# Patient Record
Sex: MALE | Race: WHITE | NOT HISPANIC OR LATINO | Employment: OTHER | URBAN - METROPOLITAN AREA
[De-identification: names, ages, dates, MRNs, and addresses within clinical notes are randomized per-mention and may not be internally consistent; named-entity substitution may affect disease eponyms.]

---

## 2017-03-08 ENCOUNTER — OFFICE VISIT (OUTPATIENT)
Dept: FAMILY MEDICINE | Facility: CLINIC | Age: 72
End: 2017-03-08
Payer: MEDICARE

## 2017-03-08 VITALS
HEIGHT: 69 IN | WEIGHT: 213.38 LBS | OXYGEN SATURATION: 98 % | BODY MASS INDEX: 31.6 KG/M2 | HEART RATE: 59 BPM | DIASTOLIC BLOOD PRESSURE: 82 MMHG | SYSTOLIC BLOOD PRESSURE: 142 MMHG

## 2017-03-08 DIAGNOSIS — E11.59 HYPERTENSION ASSOCIATED WITH DIABETES: ICD-10-CM

## 2017-03-08 DIAGNOSIS — Z00.00 ENCOUNTER FOR PREVENTIVE HEALTH EXAMINATION: Primary | ICD-10-CM

## 2017-03-08 DIAGNOSIS — M15.9 PRIMARY OSTEOARTHRITIS INVOLVING MULTIPLE JOINTS: ICD-10-CM

## 2017-03-08 DIAGNOSIS — E11.69 DYSLIPIDEMIA WITH LOW HIGH DENSITY LIPOPROTEIN (HDL) CHOLESTEROL WITH HYPERTRIGLYCERIDEMIA DUE TO TYPE 2 DIABETES MELLITUS: ICD-10-CM

## 2017-03-08 DIAGNOSIS — Z13.31 POSITIVE DEPRESSION SCREENING: ICD-10-CM

## 2017-03-08 DIAGNOSIS — I77.9 BILATERAL CAROTID ARTERY DISEASE: ICD-10-CM

## 2017-03-08 DIAGNOSIS — M53.87 SCIATICA OF RIGHT SIDE ASSOCIATED WITH DISORDER OF LUMBOSACRAL SPINE: ICD-10-CM

## 2017-03-08 DIAGNOSIS — I15.2 HYPERTENSION ASSOCIATED WITH DIABETES: ICD-10-CM

## 2017-03-08 DIAGNOSIS — E63.9 POOR NUTRITION: ICD-10-CM

## 2017-03-08 DIAGNOSIS — Z12.5 SCREENING FOR PROSTATE CANCER: ICD-10-CM

## 2017-03-08 DIAGNOSIS — Z13.6 ENCOUNTER FOR ABDOMINAL AORTIC ANEURYSM SCREENING: ICD-10-CM

## 2017-03-08 DIAGNOSIS — I27.89 OTHER CHRONIC PULMONARY HEART DISEASES: ICD-10-CM

## 2017-03-08 DIAGNOSIS — E78.2 DYSLIPIDEMIA WITH LOW HIGH DENSITY LIPOPROTEIN (HDL) CHOLESTEROL WITH HYPERTRIGLYCERIDEMIA DUE TO TYPE 2 DIABETES MELLITUS: ICD-10-CM

## 2017-03-08 PROBLEM — M15.0 PRIMARY OSTEOARTHRITIS INVOLVING MULTIPLE JOINTS: Status: ACTIVE | Noted: 2017-03-08

## 2017-03-08 PROCEDURE — 3079F DIAST BP 80-89 MM HG: CPT | Mod: S$GLB,,, | Performed by: NURSE PRACTITIONER

## 2017-03-08 PROCEDURE — 99999 PR PBB SHADOW E&M-EST. PATIENT-LVL V: CPT | Mod: PBBFAC,,, | Performed by: NURSE PRACTITIONER

## 2017-03-08 PROCEDURE — 1160F RVW MEDS BY RX/DR IN RCRD: CPT | Mod: S$GLB,,, | Performed by: NURSE PRACTITIONER

## 2017-03-08 PROCEDURE — 4010F ACE/ARB THERAPY RXD/TAKEN: CPT | Mod: S$GLB,,, | Performed by: NURSE PRACTITIONER

## 2017-03-08 PROCEDURE — 1157F ADVNC CARE PLAN IN RCRD: CPT | Mod: S$GLB,,, | Performed by: NURSE PRACTITIONER

## 2017-03-08 PROCEDURE — 2022F DILAT RTA XM EVC RTNOPTHY: CPT | Mod: S$GLB,,, | Performed by: NURSE PRACTITIONER

## 2017-03-08 PROCEDURE — 1125F AMNT PAIN NOTED PAIN PRSNT: CPT | Mod: S$GLB,,, | Performed by: NURSE PRACTITIONER

## 2017-03-08 PROCEDURE — 3045F PR MOST RECENT HEMOGLOBIN A1C LEVEL 7.0-9.0%: CPT | Mod: S$GLB,,, | Performed by: NURSE PRACTITIONER

## 2017-03-08 PROCEDURE — 99499 UNLISTED E&M SERVICE: CPT | Mod: S$GLB,,, | Performed by: NURSE PRACTITIONER

## 2017-03-08 PROCEDURE — G0439 PPPS, SUBSEQ VISIT: HCPCS | Mod: S$GLB,,, | Performed by: NURSE PRACTITIONER

## 2017-03-08 PROCEDURE — 3077F SYST BP >= 140 MM HG: CPT | Mod: S$GLB,,, | Performed by: NURSE PRACTITIONER

## 2017-03-08 PROCEDURE — 1159F MED LIST DOCD IN RCRD: CPT | Mod: S$GLB,,, | Performed by: NURSE PRACTITIONER

## 2017-03-08 RX ORDER — MELOXICAM 15 MG/1
15 TABLET ORAL DAILY
Qty: 90 TABLET | Refills: 0 | Status: SHIPPED | OUTPATIENT
Start: 2017-03-08 | End: 2017-04-11 | Stop reason: SDUPTHER

## 2017-03-08 RX ORDER — GABAPENTIN 300 MG/1
300 CAPSULE ORAL 2 TIMES DAILY
Qty: 180 CAPSULE | Refills: 0 | Status: SHIPPED | OUTPATIENT
Start: 2017-03-08 | End: 2017-04-11 | Stop reason: SDUPTHER

## 2017-03-08 NOTE — Clinical Note
Primary Care Providers: Sam Barroso MD, MD (General)  Your patient was seen today for a HRA visit. Gap(s) in care (HEDIS gaps) have been identified during this visit that require additional testing and possible follow up.  Orders Placed This Encounter     US AAA Screening         Standing Status: Future         Standing Expiration Date: 3/8/2018         Order Specific Question: May the Radiologist modify the order per protocol to meet the clinical needs of the patient?         Answer: Yes     PSA, Screening         Standing Status: Future         Standing Expiration Date: 5/7/2018     CBC auto differential         Standing Status: Future         Standing Expiration Date: 5/7/2018     Comprehensive metabolic panel         Standing Status: Future         Standing Expiration Date: 5/7/2018     Ambulatory Referral to Diabetes Education         Referral Priority:Routine         Referral Type:Consultation         Referral Reason:Specialty Services Required         Requested Specialt

## 2017-03-08 NOTE — MR AVS SNAPSHOT
[address]  [place] [address]  Phone: [phone]  Fax: [phone]                  Jin Ngo   3/8/2017 10:00 AM   Office Visit    Description:  Male : 1945   Provider:  Terrie Nielsen NP   Department:  [facility]           Reason for Visit     Health Risk Assessment           Diagnoses this Visit        Comments    Encounter for preventive health examination    -  Primary     Uncontrolled type 2 diabetes mellitus without complication, without long-term current use of insulin         Hypertension associated with diabetes         Dyslipidemia with low high density lipoprotein (HDL) cholesterol with hypertriglyceridemia due to type 2 diabetes mellitus         Primary osteoarthritis involving multiple joints         Sciatica of right side associated with disorder of lumbosacral spine         Encounter for abdominal aortic aneurysm screening         Screening for prostate cancer                To Do List           Future Appointments        Provider Department Dept Phone    2017 7:40 AM LAB [place] [facility] [phone]    2017 7:50 AM [facility] [facility] [phone]    2017 8:45 AM [facility] [phone]    2017 9:40 AM Sam Barroso MD [facility] [phone]      Goals (5 Years of Data)     None      Follow-Up and Disposition     Return in 5 weeks (on 2017) for annual visit with PCP, HRA visit in 1 year.       These Medications        Disp Refills Start End    meloxicam (MOBIC) 15 MG tablet 90 tablet 0 3/8/2017     Take 1 tablet (15 mg total) by mouth once daily. - Oral    Ph[facility] [address] [facility] Ph #: [phone]       gabapentin (NEURONTIN) 300 MG capsule 180 capsule 0 3/8/2017 2017    Take 1 capsule (300 mg total) by mouth 2 (two)  times daily. - Oral    Pharmacy: [facility] - [place], [place] - [address]  Ph #: [phone]         [facility]     [facility] On Call Nurse Care Line - 24/7 Assistance  Registered nurses in the [facility] On Call Center provide clinical advisement, health education, appointment booking, and other advisory services.  Call for this free service at [phone].             Medications           START taking these NEW medications        Refills    meloxicam (MOBIC) 15 MG tablet 0    Sig: Take 1 tablet (15 mg total) by mouth once daily.    Class: Normal    Route: Oral    gabapentin (NEURONTIN) 300 MG capsule 0    Sig: Take 1 capsule (300 mg total) by mouth 2 (two) times daily.    Class: Normal    Route: Oral      STOP taking these medications     diclofenac sodium (VOLTAREN) 1 % Gel Apply 2 g topically 4 (four) times daily.    fentaNYL (DURAGESIC) 25 mcg/hr APPLY 1 PATCH EVERY 72 HOURS FOR 30 DAYS           Verify that the below list of medications is an accurate representation of the medications you are currently taking.  If none reported, the list may be blank. If incorrect, please contact your healthcare provider. Carry this list with you in case of emergency.           Current Medications     allopurinol (ZYLOPRIM) 300 MG tablet Take 1 tablet (300 mg total) by mouth once daily.    aspirin (ECOTRIN) 81 MG EC tablet Take 81 mg by mouth once daily.    baclofen (LIORESAL) 10 MG tablet Take 0.5 tablets by mouth 2 (two) times daily.     clopidogrel (PLAVIX) 75 mg tablet Take 1 tablet (75 mg total) by mouth once daily.    fenofibrate 160 MG Tab Take 1 tablet (160 mg total) by mouth once daily.    ketoconazole (NIZORAL) 2 % cream Apply topically once daily. For rash on ears and face for 7 days and may repeat    lisinopril (PRINIVIL,ZESTRIL) 20 MG tablet Take 1 tablet (20 mg total) by mouth once daily.    metoprolol tartrate (LOPRESSOR) 25 MG tablet Take 1 tablet (25 mg total) by mouth 2 (two)  "times daily.    oxycodone-acetaminophen (PERCOCET) 5-325 mg per tablet Take 1 tablet by mouth 2 (two) times daily.    pravastatin (PRAVACHOL) 40 MG tablet Take 2 tablets (80 mg total) by mouth once daily.    colchicine 0.6 mg tablet Take 1 tablet (0.6 mg total) by mouth 2 (two) times daily.    gabapentin (NEURONTIN) 300 MG capsule Take 1 capsule (300 mg total) by mouth 2 (two) times daily.    meloxicam (MOBIC) 15 MG tablet Take 1 tablet (15 mg total) by mouth once daily.           Clinical Reference Information           Your Vitals Were     BP Pulse Height Weight SpO2 BMI    142/82 (BP Location: Left arm, Patient Position: Sitting, BP Method: Manual) 59 5' 9" (1.753 m) 96.8 kg (213 lb 6.5 oz) 98% 31.51 kg/m2      Blood Pressure          Most Recent Value    BP  (!)  142/82      Allergies as of 3/8/2017     Metformin    Pcn [Penicillins]      Immunizations Administered on Date of Encounter - 3/8/2017     None      Orders Placed During Today's Visit     Future Labs/Procedures Expected by Expires    CBC auto differential  3/8/2017 5/7/2018    Comprehensive metabolic panel  3/8/2017 5/7/2018    PSA, Screening  3/8/2017 5/7/2018    US AAA Screening  3/8/2017 3/8/2018      [facility] Sign-Up     Activating your [facility] account is as easy as 1-2-3!     1) Visit [email or web address], select Sign Up Now, enter this activation code and your date of birth, then select Next.  [email or web address]  Expires: 4/22/2017 11:15 AM      2) Create a username and password to use when you visit [facility] in the future and select a security question in case you lose your password and select Next.    3) Enter your e-mail address and click Sign Up!    Additional Information  If you have questions, please e-mail [email or web address] or call [phone] to talk to our [facility] staff. Remember, [facility] is NOT to be used for urgent needs. For medical emergencies, dial 911.         Instructions      Counseling and Referral of Other " Preventative  (Italic type indicates deductible and co-insurance are waived)    Patient Name: Jin Ngo  Today's Date: 3/8/2017      SERVICE LIMITATIONS RECOMMENDATION    Vaccines    · Pneumococcal (once after 65)    · Influenza (annually)    · Hepatitis B (if medium/high risk)    · Prevnar 13      Hepatitis B medium/high risk factors:       - End-stage renal disease       - Hemophiliacs who received Factor VII or         IX concentrates       - Clients of institutions for the mentally             retarded       - Persons who live in the same house as          a HepB carrier       - Homosexual men       - Illicit injectable drug abusers     Pneumococcal: Done, no repeat necessary     Influenza: Recommended to patient, declined     Hepatitis B: N/A Not recommended     Prevnar 13: Done, repeat at next scheduled date    Prostate cancer screening (annually to age 75)     Prostate specific antigen (PSA) Shared decision making with Provider. Sometimes a co-pay may be required if the patient decides to have this test. The USPSTF no longer recommends prostate cancer screening routinely in medicine: every 1 year    Colorectal cancer screening (to age 75)    · Fecal occult blood test (annual)  · Flexible sigmoidoscopy (5y)  · Screening colonoscopy (10y)  · Barium enema   Last done 03/22/16, recommend to repeat every 10  years    Diabetes self-management training (no USPSTF recommendations)  Requires referral by treating physician for patient with diabetes or renal disease. 10 hours of initial DSMT sessions of no less than 30 minutes each in a continuous 12-month period. 2 hours of follow-up DSMT in subsequent years.  Scheduled, see appointments    Glaucoma screening (no USPSTF recommendation)  Diabetes mellitus, family history   , age 50 or over    American, age 65 or over  Scheduled, see appointments    Medical nutrition therapy for diabetes or renal disease (no recommended schedule)  Requires  referral by treating physician for patient with diabetes or renal disease or kidney transplant within the past 3 years.  Can be provided in same year as diabetes self-management training (DSMT), and CMS recommends medical nutrition therapy take place after DSMT. Up to 3 hours for initial year and 2 hours in subsequent years.  Scheduled, see appointments    Cardiovascular screening blood tests (every 5 years)  · Fasting lipid panel  Order as a panel if possible  Scheduled, see appointments    Diabetes screening tests (at least every 3 years, Medicare covers annually or at 6-month intervals for prediabetic patients)  · Fasting blood sugar (FBS) or glucose tolerance test (GTT)  Patient must be diagnosed with one of the following:       - Hypertension       - Dyslipidemia       - Obesity (BMI 30kg/m2)       - Previous elevated impaired FBS or GTT       ... or any two of the following:       - Overweight (BMI 25 but <30)       - Family history of diabetes       - Age 65 or older       - History of gestational diabetes or birth of baby weighing more than 9 pounds  Scheduled, see appointments    Abdominal aortic aneurysm screening (once)  · Sonogram   Limited to patients who meet one of the following criteria:       - Men who are 65-75 years old and have smoked more than 100 cigarette in their lifetime       - Anyone with a family history of abdominal aortic aneurysm       - Anyone recommended for screening by the USPSTF  Scheduled, see appointments    HIV screening (annually for increased risk patients)  · HIV-1 and HIV-2 by EIA, or WILLIAM, rapid antibody test or oral mucosa transudate  Patients must be at increased risk for HIV infection per USPSTF guidelines or pregnant. Tests covered annually for patient at increased risk or as requested by the patient. Pregnant patients may receive up to 3 tests during pregnancy.  Risks discussed, screening is not recommended    Smoking cessation counseling (up to 8 sessions per year)   Patients must be asymptomatic of tobacco-related conditions to receive as a preventative service.  Former Smoker    Subsequent annual wellness visit  At least 12 months since last AWV  Return in one year     The following information is provided to all patients.  This information is to help you find resources for any of the problems found today that may be affecting your health:                Living healthy guide: www.[facility].[place]      Understanding Diabetes: www.diabetes.org      Eating healthy: www.cdc.gov/healthyweight      CDC home safety checklist: www.cdc.gov/steadi/patient.html      Agency on Aging: www.goea.[place].[facility]      Alcoholics anonymous (AA): www.aa.org      Physical Activity: www.any.nih.gov/qo4wonu      Tobacco use: www.quitwithusla.org          Language Assistance Services     ATTENTION: Language assistance services are available, free of charge. Please call [phone].      ATENCIÓN: Si viktoriyala matias, tiene a carrizales disposición servicios gratuitos de asistencia lingüística. Llame al [phone].     CHÚ Ý: N?u b?n nói Ti?ng Vi?t, có các d?ch v? h? tr? ngôn ng? mi?n phí dành cho b?n. G?i s? [phone].         [facility] complies with applicable Federal civil rights laws and does not discriminate on the basis of race, color, national origin, age, disability, or sex.

## 2017-03-08 NOTE — PROGRESS NOTES
"Jin Ngo presented for a  Medicare AWV and comprehensive Health Risk Assessment today. The following components were reviewed and updated:    · Medical history  · Family History  · Social history  · Allergies and Current Medications  · Health Risk Assessment  · Health Maintenance  · Care Team     ** See Completed Assessments for Annual Wellness Visit within the encounter summary.**       The following assessments were completed:  · Living Situation  · CAGE  · Depression Screening  · Timed Get Up and Go  · Whisper Test  · Cognitive Function Screening  · Nutrition Screening  · ADL Screening  · PAQ Screening    Vitals:    03/08/17 1018   BP: (!) 142/82   BP Location: Left arm   Patient Position: Sitting   BP Method: Manual   Pulse: (!) 59   SpO2: 98%   Weight: 96.8 kg (213 lb 6.5 oz)   Height: 5' 9" (1.753 m)     Body mass index is 31.51 kg/(m^2).     Physical Exam   Constitutional: He is oriented to person, place, and time. He appears well-developed. No distress.   obese   HENT:   Head: Normocephalic and atraumatic.   Eyes: EOM are normal. Pupils are equal, round, and reactive to light.   Neck: Neck supple. No JVD present. No tracheal deviation present.   Cardiovascular: Normal rate, regular rhythm, normal heart sounds and intact distal pulses.    No murmur heard.  Pulmonary/Chest: Effort normal and breath sounds normal. No respiratory distress. He has no wheezes. He has no rales.   Abdominal: Soft. Bowel sounds are normal. He exhibits no distension and no mass. There is no tenderness.   Musculoskeletal: He exhibits no edema.        Right shoulder: He exhibits decreased range of motion, tenderness and spasm.        Lumbar back: He exhibits bony tenderness and pain.   Abnormal ROM, stiffness, and joint pain in bilateral knees and hands;    Neurological: He is alert and oriented to person, place, and time. Coordination normal.   Skin: Skin is warm and dry. No erythema. No pallor.   Psychiatric: He has a normal mood " and affect. His behavior is normal. Judgment and thought content normal. Cognition and memory are normal. He expresses no homicidal and no suicidal ideation.   Nursing note and vitals reviewed.        Diagnoses and health risks identified today and associated recommendations/orders:    1. Encounter for preventive health examination    2. Uncontrolled type 2 diabetes mellitus with diabetic neuropathy, without long-term current use of insulin  Chronic; uncontrolled.  Last HgA1C was 7.8.  Followed by PCP and Podiatry.  - Ambulatory Referral to Diabetes Education  - Ambulatory Referral to Nutrition Services    3. Poor Nutrition  - Ambulatory Referral to Nutrition Services    4. Hypertension associated with diabetes  Chronic; stable on medication.  Followed by PCP.  - CBC auto differential; Future  - Comprehensive metabolic panel; Future    5. Dyslipidemia with low HDL cholesterol with hypertriglyceridemia due to type 2 diabetes mellitus  Chronic; stable on medication.  Followed by PCP.    6. Bilateral carotid artery disease  Chronic; stable.  Followed by Cardiology.    7. Other chronic pulmonary heart diseases  Chronic; stable on medication.  Followed by Cardiology.    8. Encounter for abdominal aortic aneurysm screening  - US AAA Screening; Future    9. Positive depression screening  Positive depression screening; PHQ-9 score: 5  Pt is down because of physical limitations due to pain.  No suicidal or homicidal ideation noted.  Follow-up appointment with PCP made.    10. Screening for prostate cancer  - PSA, Screening; Future    11. BMI 31.0-31.9,adult  Chronic, stable. Therapeutic lifestyle changes discussed. Followed by PCP.      Provided Jin with a 5-10 year written screening schedule and personal prevention plan. Recommendations were developed using the USPSTF age appropriate recommendations. Education, counseling, and referrals were provided as needed. After Visit Summary printed and given to patient which  includes a list of additional screenings\tests needed.    Return in 5 weeks (on 4/11/2017) for annual visit with PCP, HRA visit in 1 year.    Terrie Nielsen NP

## 2017-03-08 NOTE — PROGRESS NOTES
Subjective:       Patient ID: Jin Ngo is a 71 y.o. male.    Chief Complaint: Back Pain, Leg Pain    HPI Comments: Pt is currently seen at [facility] where he gets spinal injections and Percocet.  Pt states that spinal injections provide short term relief and Percocet doesn't provide any relief.  Pt is also taking Baclofen.  These drugs all make him extremely drowsy and his optimal goal is pain management without Percocet and Baclofen.  He states that he has never tried NSAIDS or gabapentin for pain relief.    Back Pain   This is a chronic problem. The current episode started more than 1 year ago. The problem occurs constantly. The problem is unchanged. The pain is present in the lumbar spine. The quality of the pain is described as aching. The pain radiates to the right thigh. The pain is at a severity of 8/10. The pain is severe. The pain is the same all the time. The symptoms are aggravated by bending, lying down, standing and twisting. Stiffness is present in the morning. Associated symptoms include leg pain, numbness and tingling. He has tried muscle relaxant and home exercises (spinal injections) for the symptoms. The treatment provided mild (spinal injections have significant relief for about 25 days; he gets them about every 4 months) relief.   Leg Pain    The incident occurred more than 1 week ago. The pain is present in the right thigh. The quality of the pain is described as shooting and burning. The pain is at a severity of 8/10. The pain is severe. The pain has been intermittent since onset. Associated symptoms include numbness and tingling. He reports no foreign bodies present. The symptoms are aggravated by movement. He has tried acetaminophen (Percocet) for the symptoms. The treatment provided mild (Percocet causes extreme drowsiness) relief.     Review of Systems   Constitutional: Positive for activity change.   Musculoskeletal: Positive for arthralgias and back pain.    Neurological: Positive for tingling and numbness.       Objective:      Physical Exam    SEE ATTACHED NOTE    Assessment:       1. Sciatica of right side associated with disorder of lumbosacral spine    2. Primary osteoarthritis involving multiple joints        Plan:     Jin was seen today for back and leg pain.    Diagnoses and all orders for this visit:    Sciatica of right side associated with disorder of lumbosacral spine  Continue seeing [facility] for spinal injections.  Begin to wean off of Percocet and Baclofen slowly as tolerated.  -     gabapentin (NEURONTIN) 300 MG capsule; Take 1 capsule (300 mg total) by mouth 2 (two) times daily.    Primary osteoarthritis involving multiple joints  -     meloxicam (MOBIC) 15 MG tablet; Take 1 tablet (15 mg total) by mouth once daily.    Follow-up with PCP if symptoms worsen or fail to improve.

## 2017-03-13 ENCOUNTER — HOSPITAL ENCOUNTER (OUTPATIENT)
Dept: DIABETES | Facility: HOSPITAL | Age: 72
Discharge: HOME | End: 2017-03-13
Attending: FAMILY MEDICINE
Payer: MEDICARE

## 2017-03-13 PROCEDURE — G0108 DIAB MANAGE TRN  PER INDIV: HCPCS | Performed by: REGISTERED NURSE

## 2017-03-13 NOTE — PROGRESS NOTES
DIABETES EDUCATION ASSESSMENT    : 1945  Referring Physician: Terrie Nielsen NP    Previous Diabetes Education: yes  YR: Individual: 1 hour  YR: Group:   Number of hours remaining in annual dsmt benefit - subject to insurance benefits 9 hours    Social History:  Occupation retired  Highest Level of Education: 11  Learning Challenges: mild  Learning Styles: reading, listening  Primary Support: wife  Major Stressors: pain    Diabetes History:  Diagnosed: 4 years  Family History:  mother    Home Glucose Monitoring Freqency: Pt has a meter but has not been testing. Pt will contact MD to get new testing supplies ordered. Instructed pt to test once a day fasting. Instructed pt on the normal blood glucose ranges. Instructed pt to keep a record of his blood sugars and to bring the record to his MD visits.    Labs: Last A1C was 7.8%. 16    Current Meal Plan: Diabetic meal plan explained to pt. Review of pts current meal plan showed that he drinks water and milk. Pt sleeps late so he usually skips breakfast. Pt states that he has a large appetite Pt snacks on sweets, left over meals or fruit. Pt is not eating balanced meals. Instructed pt on the food groups, how to read labels and count carbs. Pt was given sample menus and meal plans as examples. Discussed with pt the importance of eating 3 balanced and portioned meals. Pt had fair understanding of meal plan and compliance is hoped for.    Exercise Assessment:  Do you exercise: no  Type of Exercise:    Frequency:     Standards of Care  Last Eye Exam: has appt  Last Dental Exam:   Last Podiatry Exam: every 6 months  Stress Test:    During today's visit the patient was introduces to/educated on the following content areas:  · Diabetes Disease Process and Treatment Options  · Chronic and Acute Complication: Hypoglycemia signs, symptoms, and treatment.  · Nutritional Management  · Physical activity  · Diabetes Medications:   · Blood Glucose Monitoring:   provided instructions on usage of machine, logs, AMBG schedule, blood glucose, and estimated glucose average goals. Patient was able to return back demonstration of use.  · Importance of Self Care: Standards of care for eye exam, foot exam, dental exam, stress test, and kidney tests.  · Addressing psychosocial issues and concerns  · Importance of making self care behavioral changes and goal setting.      EDUCATION PLAN:    Based on educational assessment:  Patient has selected the following goal(s) based on his/her individual needs: eat better. The selected goal will have an impact on the patient's health by:lower blood sugars. In order to meet the above goal and self care plan, patient will attend the following Diabetic Self Management Sessions:  · Patient able to attend group session covering Basic Diabetes Self Management Class reflecting current evidence and diabetes practice guidelines.  · Patient will attend Diabetes Nutrition Workshop  ·       Time spent counseling patient today 60 mins      Provided with written materials and phone numbers for Clinic. Questions addressed.  Pt has had diabetes for 4 years. Pt has a family history of diabetes. Instructed pt to call for any problems or questions.

## 2017-04-04 ENCOUNTER — HOSPITAL ENCOUNTER (OUTPATIENT)
Dept: RADIOLOGY | Facility: HOSPITAL | Age: 72
Discharge: HOME | End: 2017-04-04
Attending: NURSE PRACTITIONER
Payer: MEDICARE

## 2017-04-04 DIAGNOSIS — Z13.6 ENCOUNTER FOR ABDOMINAL AORTIC ANEURYSM SCREENING: ICD-10-CM

## 2017-04-04 PROBLEM — I70.0 ABDOMINAL AORTIC ATHEROSCLEROSIS: Status: ACTIVE | Noted: 2017-04-04

## 2017-04-04 PROCEDURE — 76706 US ABDL AORTA SCREEN AAA: CPT | Mod: TC,PO

## 2017-04-11 ENCOUNTER — OFFICE VISIT (OUTPATIENT)
Dept: FAMILY MEDICINE | Facility: CLINIC | Age: 72
End: 2017-04-11
Payer: MEDICARE

## 2017-04-11 VITALS
HEART RATE: 61 BPM | HEIGHT: 68 IN | OXYGEN SATURATION: 96 % | SYSTOLIC BLOOD PRESSURE: 148 MMHG | WEIGHT: 211.19 LBS | BODY MASS INDEX: 32.01 KG/M2 | DIASTOLIC BLOOD PRESSURE: 76 MMHG | TEMPERATURE: 98 F

## 2017-04-11 DIAGNOSIS — E78.2 DYSLIPIDEMIA WITH LOW HIGH DENSITY LIPOPROTEIN (HDL) CHOLESTEROL WITH HYPERTRIGLYCERIDEMIA DUE TO TYPE 2 DIABETES MELLITUS: ICD-10-CM

## 2017-04-11 DIAGNOSIS — E11.69 DYSLIPIDEMIA WITH LOW HIGH DENSITY LIPOPROTEIN (HDL) CHOLESTEROL WITH HYPERTRIGLYCERIDEMIA DUE TO TYPE 2 DIABETES MELLITUS: ICD-10-CM

## 2017-04-11 DIAGNOSIS — Z00.00 ROUTINE HEALTH MAINTENANCE: Primary | ICD-10-CM

## 2017-04-11 DIAGNOSIS — Z23 NEED FOR PNEUMOCOCCAL VACCINATION: ICD-10-CM

## 2017-04-11 DIAGNOSIS — M53.87 SCIATICA OF RIGHT SIDE ASSOCIATED WITH DISORDER OF LUMBOSACRAL SPINE: ICD-10-CM

## 2017-04-11 DIAGNOSIS — E11.59 HYPERTENSION ASSOCIATED WITH DIABETES: ICD-10-CM

## 2017-04-11 DIAGNOSIS — I15.2 HYPERTENSION ASSOCIATED WITH DIABETES: ICD-10-CM

## 2017-04-11 DIAGNOSIS — M15.9 PRIMARY OSTEOARTHRITIS INVOLVING MULTIPLE JOINTS: ICD-10-CM

## 2017-04-11 PROCEDURE — 99397 PER PM REEVAL EST PAT 65+ YR: CPT | Mod: S$GLB,,, | Performed by: FAMILY MEDICINE

## 2017-04-11 PROCEDURE — 3078F DIAST BP <80 MM HG: CPT | Mod: S$GLB,,, | Performed by: FAMILY MEDICINE

## 2017-04-11 PROCEDURE — 99499 UNLISTED E&M SERVICE: CPT | Mod: S$GLB,,, | Performed by: FAMILY MEDICINE

## 2017-04-11 PROCEDURE — 3077F SYST BP >= 140 MM HG: CPT | Mod: S$GLB,,, | Performed by: FAMILY MEDICINE

## 2017-04-11 RX ORDER — METOPROLOL TARTRATE 25 MG/1
25 TABLET, FILM COATED ORAL 2 TIMES DAILY
Qty: 180 TABLET | Refills: 3 | Status: SHIPPED | OUTPATIENT
Start: 2017-04-11 | End: 2018-04-30 | Stop reason: SDUPTHER

## 2017-04-11 RX ORDER — MELOXICAM 15 MG/1
15 TABLET ORAL DAILY
Qty: 90 TABLET | Refills: 0 | Status: SHIPPED | OUTPATIENT
Start: 2017-04-11 | End: 2017-09-12 | Stop reason: SDUPTHER

## 2017-04-11 RX ORDER — LISINOPRIL 20 MG/1
40 TABLET ORAL DAILY
Qty: 180 TABLET | Refills: 3 | Status: SHIPPED | OUTPATIENT
Start: 2017-04-11 | End: 2018-04-30 | Stop reason: SDUPTHER

## 2017-04-11 RX ORDER — LISINOPRIL 20 MG/1
20 TABLET ORAL DAILY
Qty: 90 TABLET | Refills: 3 | Status: SHIPPED | OUTPATIENT
Start: 2017-04-11 | End: 2017-04-11

## 2017-04-11 RX ORDER — ALLOPURINOL 300 MG/1
300 TABLET ORAL DAILY
Qty: 90 TABLET | Refills: 3 | Status: SHIPPED | OUTPATIENT
Start: 2017-04-11 | End: 2018-05-10 | Stop reason: SDUPTHER

## 2017-04-11 RX ORDER — PRAVASTATIN SODIUM 40 MG/1
80 TABLET ORAL DAILY
Qty: 180 TABLET | Refills: 3 | Status: SHIPPED | OUTPATIENT
Start: 2017-04-11 | End: 2018-04-30 | Stop reason: SDUPTHER

## 2017-04-11 RX ORDER — CLOPIDOGREL BISULFATE 75 MG/1
75 TABLET ORAL DAILY
Qty: 90 TABLET | Refills: 3 | Status: SHIPPED | OUTPATIENT
Start: 2017-04-11 | End: 2018-04-30 | Stop reason: SDUPTHER

## 2017-04-11 RX ORDER — GABAPENTIN 300 MG/1
300 CAPSULE ORAL 2 TIMES DAILY
Qty: 180 CAPSULE | Refills: 3 | Status: SHIPPED | OUTPATIENT
Start: 2017-04-11 | End: 2018-04-30

## 2017-04-11 RX ORDER — FENOFIBRATE 160 MG/1
160 TABLET ORAL DAILY
Qty: 90 TABLET | Refills: 3 | Status: SHIPPED | OUTPATIENT
Start: 2017-04-11 | End: 2018-04-30 | Stop reason: SDUPTHER

## 2017-04-11 RX ORDER — KETOCONAZOLE 20 MG/G
CREAM TOPICAL DAILY
Qty: 30 G | Refills: 2 | Status: SHIPPED | OUTPATIENT
Start: 2017-04-11 | End: 2020-11-14

## 2017-04-12 NOTE — PROGRESS NOTES
Patient ID: Jin Ngo is a 71 y.o. male.    Chief Complaint: Follow-up (wellness); Arthritis; and Sciatica    HPI      Jin Ngo is a 71 y.o. male. here for annual exam.   Doing much better since off baclofin and narcotic meds.  Less cloudy feeling, less lethargic and less connstipation.    Co of occ muscle spasm upper back.    Dm cont to loose wt and change diet.      CAD stable with no cp or palpatations.    Gout stable        Review of Symptoms    Constitutional: Negative.    HENT: Negative.    Eyes: Negative.    Respiratory: Negative.    Cardiovascular: Negative.    Gastrointestinal: Negative.    Endocrine: Negative.    Genitourinary: Negative.    Musculoskeletal: Negative.    Skin: Negative.    Allergic/Immunologic: Negative.    Neurological: Negative.    Hematological: Negative.    Psychiatric/Behavioral: Negative.      Except as above in HPI        Physical  Exam    Constitutional:  Oriented to person, place, and time. Appears well-developed and well-nourished.     HENT:   Head: Normocephalic and atraumatic.     Right Ear: Tympanic membrane, external ear and ear canal normal.     Left Ear: Tympanic membrane, external ear and ear canal normal.     Nose: Nose normal. No rhinorrhea or nasal deformity.     Mouth/Throat: Uvula is midline, oropharynx is clear and moist and mucous membranes are normal.   [] dentures    Eyes: Conjunctivae are normal. Right eye exhibits no discharge. Left eye exhibits no discharge. No scleral icterus.     Neck:  No JVD present. No tracheal deviation  []  Neck supple.   []  No Carotid bruit    Cardiovascular: Normal rate, regular rhythm and normal heart sounds.      Pulmonary/Chest: Effort normal and breath sounds normal. No stridor. No respiratory distress. No wheezes. No rales.      Musculoskeletal: Normal range of motion. No edema or tenderness.   No deformity     Lymphadenopathy:  No cervical adenopathy.     Neurological:  Alert and oriented to person, place, and time.  Coordination normal.     Skin: Skin is warm and dry. No rash noted.     Psychiatric: Normal mood and affect. Speech is normal and behavior is normal. Judgment and thought content normal.       Assessment / Plan:      ICD-10-CM ICD-9-CM   1. Routine health maintenance Z00.00 V70.0   2. Sciatica of right side associated with disorder of lumbosacral spine M53.9 724.3   3. Primary osteoarthritis involving multiple joints M15.0 715.09   4. Need for pneumococcal vaccination Z23 V03.82   5. Hypertension associated with diabetes E11.59 250.80    I10 401.9   6. Uncontrolled type 2 diabetes mellitus with diabetic neuropathy, without long-term current use of insulin E11.40 250.62    E11.65 357.2   7. Dyslipidemia with low HDL cholesterol with hypertriglyceridemia due to type 2 diabetes mellitus E11.69 250.80    E78.6 272.4    E78.1      Routine health maintenance    Sciatica of right side associated with disorder of lumbosacral spine  -     gabapentin (NEURONTIN) 300 MG capsule; Take 1 capsule (300 mg total) by mouth 2 (two) times daily.  Dispense: 180 capsule; Refill: 3    Primary osteoarthritis involving multiple joints  -     meloxicam (MOBIC) 15 MG tablet; Take 1 tablet (15 mg total) by mouth once daily.  Dispense: 90 tablet; Refill: 0    Need for pneumococcal vaccination    Hypertension associated with diabetes    Uncontrolled type 2 diabetes mellitus with diabetic neuropathy, without long-term current use of insulin    Dyslipidemia with low HDL cholesterol with hypertriglyceridemia due to type 2 diabetes mellitus    Other orders  -     allopurinol (ZYLOPRIM) 300 MG tablet; Take 1 tablet (300 mg total) by mouth once daily.  Dispense: 90 tablet; Refill: 3  -     clopidogrel (PLAVIX) 75 mg tablet; Take 1 tablet (75 mg total) by mouth once daily.  Dispense: 90 tablet; Refill: 3  -     pravastatin (PRAVACHOL) 40 MG tablet; Take 2 tablets (80 mg total) by mouth once daily.  Dispense: 180 tablet; Refill: 3  -     metoprolol  tartrate (LOPRESSOR) 25 MG tablet; Take 1 tablet (25 mg total) by mouth 2 (two) times daily.  Dispense: 180 tablet; Refill: 3  -     Discontinue: lisinopril (PRINIVIL,ZESTRIL) 20 MG tablet; Take 1 tablet (20 mg total) by mouth once daily.  Dispense: 90 tablet; Refill: 3  -     fenofibrate 160 MG Tab; Take 1 tablet (160 mg total) by mouth once daily.  Dispense: 90 tablet; Refill: 3  -     ketoconazole (NIZORAL) 2 % cream; Apply topically once daily. For rash on ears and face for 7 days and may repeat  Dispense: 30 g; Refill: 2  -     Cancel: Pneumococcal Polysaccharide Vaccine (23 Valent) (SQ/IM)  -     lisinopril (PRINIVIL,ZESTRIL) 20 MG tablet; Take 2 tablets (40 mg total) by mouth once daily.  Dispense: 180 tablet; Refill: 3

## 2017-04-21 ENCOUNTER — HOSPITAL ENCOUNTER (OUTPATIENT)
Dept: DIABETES | Facility: HOSPITAL | Age: 72
Discharge: HOME | End: 2017-04-21
Attending: FAMILY MEDICINE
Payer: MEDICARE

## 2017-04-21 PROCEDURE — G0109 DIAB MANAGE TRN IND/GROUP: HCPCS | Performed by: REGISTERED NURSE

## 2017-04-21 NOTE — PROGRESS NOTES
"Diabetes Education - Combined Group Class  Part I  "The Basics and Beyond of Diabetes Self-Management - Moving Forward with Diabetes"    Pre Test Score - Verbal pretest completed; patient participated during the verbal test using [place] Conversation Map discussion "On The Road to Better Managing Your Diabetes."  Patient attended Diabetes Self Management Training group class today. Class education content included:  Diabetes Overview  Monitoring and Use of Results  The ABCs of Diabetes  Acute and Chronic Complications - Reducing Risks  Medications  Behavior Change Strategies for Healthy Self-Care Behaviors, Done in both part I and II  Support Systems and Healthy Coping, Done in both Part I and II  Patient interaction during the [place] Conversation Map discussion "Continuing Your Journey with Diabetes" was excellent. Part I class time  hours--taught by .    Part II, Post Test score :   "Eating and Moving with Diabetes: "It's all about Choice and Change" --Taught by IVETT Guzman  Class education content included:  Basic Meal Planning  Behavior Modification   Cholesterol Management  Portion Control  Sodium and Fiber Guidelines  Basic Carbohydrate Counting  Label Reading  Exercise Precautions/Guidelines-Taught by     Information presented today focused on behavior modification and meal planning. Pt was given time to practice planning meals and counting carbohydrates. Guidelines and precautions in regards to physical activity were also reviewed today. Pertinent handouts were provided as added resources.   Patient was provided with the self-care behavior change goal (STG) as identified on the initial assessment visit. Using this information along with the "My S.M.A.R.T Goals" worksheet; the patient participated in individualized goal development with a focus on developing personal strategies to promote health and behavior change.  Class evaluation was given and completed.   Patient was participative in both parts of the class and " asked appropriate questions.       Part II 3 hours; Total Class Duration:  hours,

## 2017-05-12 ENCOUNTER — HOSPITAL ENCOUNTER (OUTPATIENT)
Dept: DIABETES | Facility: HOSPITAL | Age: 72
Discharge: HOME | End: 2017-05-12
Attending: FAMILY MEDICINE
Payer: MEDICARE

## 2017-05-12 PROCEDURE — G0109 DIAB MANAGE TRN IND/GROUP: HCPCS | Performed by: REGISTERED NURSE

## 2017-05-12 NOTE — PROGRESS NOTES
"Diabetes Education - Combined Group Class  Part I  "The Basics and Beyond of Diabetes Self-Management - Moving Forward with Diabetes"    Pre Test Score - Verbal pretest completed; patient participated during the verbal test using [place] Conversation Map discussion "On The Road to Better Managing Your Diabetes."  Patient attended Diabetes Self Management Training group class today. Class education content included:  Diabetes Overview  Monitoring and Use of Results  The ABCs of Diabetes  Acute and Chronic Complications - Reducing Risks  Medications  Behavior Change Strategies for Healthy Self-Care Behaviors, Done in both part I and II  Support Systems and Healthy Coping, Done in both Part I and II  Patient interaction during the [place] Conversation Map discussion "Continuing Your Journey with Diabetes" was excellent. Part I class time 3 hours--taught by IVETT Guzman.    Part II, Post Test score :   "Eating and Moving with Diabetes: "It's all about Choice and Change" --Taught by   Class education content included:  Basic Meal Planning  Behavior Modification   Cholesterol Management  Portion Control  Sodium and Fiber Guidelines  Basic Carbohydrate Counting  Label Reading  Exercise Precautions/Guidelines-Taught by IVETT Guzman    Information presented today focused on behavior modification and meal planning. Pt was given time to practice planning meals and counting carbohydrates. Guidelines and precautions in regards to physical activity were also reviewed today. Pertinent handouts were provided as added resources.   Patient was provided with the self-care behavior change goal (STG) as identified on the initial assessment visit. Using this information along with the "My S.M.A.R.T Goals" worksheet; the patient participated in individualized goal development with a focus on developing personal strategies to promote health and behavior change.  Class evaluation was given and completed.   Patient was participative in both parts of the " class and asked appropriate questions.       Part II  hours; Total Class Duration:  hours,

## 2017-05-31 ENCOUNTER — TELEPHONE (OUTPATIENT)
Dept: FAMILY MEDICINE | Facility: CLINIC | Age: 72
End: 2017-05-31

## 2017-05-31 RX ORDER — INSULIN PUMP SYRINGE, 3 ML
EACH MISCELLANEOUS
Qty: 1 EACH | Refills: 0 | Status: SHIPPED | OUTPATIENT
Start: 2017-05-31 | End: 2020-10-01

## 2017-05-31 NOTE — TELEPHONE ENCOUNTER
----- Message from Judi Villatoro sent at 5/31/2017  3:55 PM CDT -----  Contact: wife Candelaria  Wife calling on patient's behalf. Requesting new Rx for diabetic testing supplies  Meter, lancets & strips    [facility] mail order

## 2017-07-05 ENCOUNTER — TELEPHONE (OUTPATIENT)
Dept: FAMILY MEDICINE | Facility: CLINIC | Age: 72
End: 2017-07-05

## 2017-07-05 DIAGNOSIS — I15.2 HYPERTENSION ASSOCIATED WITH DIABETES: ICD-10-CM

## 2017-07-05 DIAGNOSIS — E11.69 DYSLIPIDEMIA WITH LOW HIGH DENSITY LIPOPROTEIN (HDL) CHOLESTEROL WITH HYPERTRIGLYCERIDEMIA DUE TO TYPE 2 DIABETES MELLITUS: ICD-10-CM

## 2017-07-05 DIAGNOSIS — E11.59 HYPERTENSION ASSOCIATED WITH DIABETES: ICD-10-CM

## 2017-07-05 DIAGNOSIS — I77.9 BILATERAL CAROTID ARTERY DISEASE: ICD-10-CM

## 2017-07-05 DIAGNOSIS — E78.2 DYSLIPIDEMIA WITH LOW HIGH DENSITY LIPOPROTEIN (HDL) CHOLESTEROL WITH HYPERTRIGLYCERIDEMIA DUE TO TYPE 2 DIABETES MELLITUS: ICD-10-CM

## 2017-07-05 NOTE — TELEPHONE ENCOUNTER
----- Message from Tiara Adamson sent at 7/5/2017  9:40 AM CDT -----  Contact: Pt [phone]  Patient would like orders for blood work sent to [facility] ([facility])

## 2017-07-07 ENCOUNTER — LAB VISIT (OUTPATIENT)
Dept: LAB | Facility: HOSPITAL | Age: 72
End: 2017-07-07
Attending: FAMILY MEDICINE
Payer: MEDICARE

## 2017-07-07 DIAGNOSIS — E11.69 DYSLIPIDEMIA WITH LOW HIGH DENSITY LIPOPROTEIN (HDL) CHOLESTEROL WITH HYPERTRIGLYCERIDEMIA DUE TO TYPE 2 DIABETES MELLITUS: ICD-10-CM

## 2017-07-07 DIAGNOSIS — E78.2 DYSLIPIDEMIA WITH LOW HIGH DENSITY LIPOPROTEIN (HDL) CHOLESTEROL WITH HYPERTRIGLYCERIDEMIA DUE TO TYPE 2 DIABETES MELLITUS: ICD-10-CM

## 2017-07-07 DIAGNOSIS — I15.2 HYPERTENSION ASSOCIATED WITH DIABETES: ICD-10-CM

## 2017-07-07 DIAGNOSIS — E11.59 HYPERTENSION ASSOCIATED WITH DIABETES: ICD-10-CM

## 2017-07-07 DIAGNOSIS — I77.9 BILATERAL CAROTID ARTERY DISEASE: ICD-10-CM

## 2017-07-07 LAB
ALBUMIN SERPL BCP-MCNC: 4.4 G/DL
ALP SERPL-CCNC: 40 U/L
ALT SERPL W/O P-5'-P-CCNC: 32 U/L
ANION GAP SERPL CALC-SCNC: 13 MMOL/L
AST SERPL-CCNC: 26 U/L
BASOPHILS # BLD AUTO: 0.06 K/UL
BASOPHILS NFR BLD: 0.8 %
BILIRUB SERPL-MCNC: 0.9 MG/DL
BUN SERPL-MCNC: 45 MG/DL
CALCIUM SERPL-MCNC: 9.9 MG/DL
CHLORIDE SERPL-SCNC: 103 MMOL/L
CHOLEST/HDLC SERPL: 5 {RATIO}
CO2 SERPL-SCNC: 27 MMOL/L
CREAT SERPL-MCNC: 1.18 MG/DL
DIFFERENTIAL METHOD: NORMAL
EOSINOPHIL # BLD AUTO: 0.2 K/UL
EOSINOPHIL NFR BLD: 2.6 %
ERYTHROCYTE [DISTWIDTH] IN BLOOD BY AUTOMATED COUNT: 13.6 %
EST. GFR  (AFRICAN AMERICAN): >60 ML/MIN/1.73 M^2
EST. GFR  (NON AFRICAN AMERICAN): >60 ML/MIN/1.73 M^2
GLUCOSE SERPL-MCNC: 187 MG/DL
HCT VFR BLD AUTO: 46.1 %
HDL/CHOLESTEROL RATIO: 20 %
HDLC SERPL-MCNC: 150 MG/DL
HDLC SERPL-MCNC: 30 MG/DL
HGB BLD-MCNC: 15.3 G/DL
LDLC SERPL CALC-MCNC: 82.8 MG/DL
LYMPHOCYTES # BLD AUTO: 2.2 K/UL
LYMPHOCYTES NFR BLD: 28.5 %
MCH RBC QN AUTO: 30.5 PG
MCHC RBC AUTO-ENTMCNC: 33.2 %
MCV RBC AUTO: 92 FL
MONOCYTES # BLD AUTO: 0.7 K/UL
MONOCYTES NFR BLD: 8.3 %
NEUTROPHILS # BLD AUTO: 4.6 K/UL
NEUTROPHILS NFR BLD: 59 %
NONHDLC SERPL-MCNC: 120 MG/DL
PLATELET # BLD AUTO: 227 K/UL
PMV BLD AUTO: 10.6 FL
POTASSIUM SERPL-SCNC: 5.1 MMOL/L
PROT SERPL-MCNC: 7.7 G/DL
RBC # BLD AUTO: 5.02 M/UL
SODIUM SERPL-SCNC: 143 MMOL/L
TRIGL SERPL-MCNC: 186 MG/DL
TSH SERPL DL<=0.005 MIU/L-ACNC: 1.95 UIU/ML
WBC # BLD AUTO: 7.79 K/UL

## 2017-07-07 PROCEDURE — 80061 LIPID PANEL: CPT

## 2017-07-07 PROCEDURE — 85025 COMPLETE CBC W/AUTO DIFF WBC: CPT | Mod: PO

## 2017-07-07 PROCEDURE — 83036 HEMOGLOBIN GLYCOSYLATED A1C: CPT | Mod: PO

## 2017-07-07 PROCEDURE — 80053 COMPREHEN METABOLIC PANEL: CPT | Mod: PO

## 2017-07-07 PROCEDURE — 36415 COLL VENOUS BLD VENIPUNCTURE: CPT | Mod: PO

## 2017-07-07 PROCEDURE — 84443 ASSAY THYROID STIM HORMONE: CPT

## 2017-07-08 LAB
ESTIMATED AVG GLUCOSE: 217 MG/DL
HBA1C MFR BLD HPLC: 9.2 %

## 2017-07-11 ENCOUNTER — OFFICE VISIT (OUTPATIENT)
Dept: FAMILY MEDICINE | Facility: CLINIC | Age: 72
End: 2017-07-11
Payer: MEDICARE

## 2017-07-11 VITALS
DIASTOLIC BLOOD PRESSURE: 82 MMHG | BODY MASS INDEX: 30.81 KG/M2 | HEIGHT: 69 IN | OXYGEN SATURATION: 97 % | TEMPERATURE: 98 F | WEIGHT: 208 LBS | SYSTOLIC BLOOD PRESSURE: 110 MMHG | HEART RATE: 52 BPM

## 2017-07-11 DIAGNOSIS — Z23 NEED FOR PNEUMOCOCCAL VACCINATION: ICD-10-CM

## 2017-07-11 DIAGNOSIS — Z95.1 S/P CABG (CORONARY ARTERY BYPASS GRAFT): ICD-10-CM

## 2017-07-11 DIAGNOSIS — E78.2 MIXED HYPERLIPIDEMIA: ICD-10-CM

## 2017-07-11 DIAGNOSIS — E11.59 HYPERTENSION ASSOCIATED WITH DIABETES: ICD-10-CM

## 2017-07-11 DIAGNOSIS — I15.2 HYPERTENSION ASSOCIATED WITH DIABETES: ICD-10-CM

## 2017-07-11 PROCEDURE — 3046F HEMOGLOBIN A1C LEVEL >9.0%: CPT | Mod: S$GLB,,, | Performed by: FAMILY MEDICINE

## 2017-07-11 PROCEDURE — 1159F MED LIST DOCD IN RCRD: CPT | Mod: S$GLB,,, | Performed by: FAMILY MEDICINE

## 2017-07-11 PROCEDURE — 99499 UNLISTED E&M SERVICE: CPT | Mod: S$GLB,,, | Performed by: FAMILY MEDICINE

## 2017-07-11 PROCEDURE — 99214 OFFICE O/P EST MOD 30 MIN: CPT | Mod: S$GLB,,, | Performed by: FAMILY MEDICINE

## 2017-07-11 PROCEDURE — 90732 PPSV23 VACC 2 YRS+ SUBQ/IM: CPT | Mod: S$GLB,,, | Performed by: FAMILY MEDICINE

## 2017-07-11 PROCEDURE — 4010F ACE/ARB THERAPY RXD/TAKEN: CPT | Mod: S$GLB,,, | Performed by: FAMILY MEDICINE

## 2017-07-11 PROCEDURE — 1125F AMNT PAIN NOTED PAIN PRSNT: CPT | Mod: S$GLB,,, | Performed by: FAMILY MEDICINE

## 2017-07-11 PROCEDURE — G0009 ADMIN PNEUMOCOCCAL VACCINE: HCPCS | Mod: S$GLB,,, | Performed by: FAMILY MEDICINE

## 2017-07-16 NOTE — PROGRESS NOTES
Patient ID: Jin Ngo is a 71 y.o. male.    Chief Complaint: Results    HPI      Jin Ngo is a 71 y.o. male here following up results of recent tests which show elevation of hemoglobin A1c otherwise mostly normal.  Patient has been suffering from muscle skeletal pain including thoracic facet joint pain.  Patient with coronary artery disease-stable  History of TIA-none recently  Hypertension with associated diabetes-under control at this time            Review of Symptoms    Constitutional  Neg activity change, No chills /fever   Resp  Neg hemoptysis, stridor, choking  CVS  Neg chest pain, palpitations    Physical Exam    Constitutional:  Oriented to person, place, and time.appears well-developed and well-nourished.  No distress.      HENT  Head: Normocephalic and atraumatic  Right Ear: External ear normal.   Left Ear: External ear normal.   Nose: External nose normal.   Mouth:  Moist mucus membranes.    Eyes:  Conjunctivae are normal. Right eye exhibits no discharge.  Left eye exhibits no discharge. No scleral icterus.  No periorbital edema    Cardiovascular:  Regular rate, Regular rhythm     No extrasystole  Normal S1-S2      Pulmonary/Chest:   Normal effort and breath sounds.  No wheezing, rhonchi or rales.    Musculoskeletal:  No edema. No obvious deformity No waisting       Neurological:  Alert and oriented to person, place, and time.   Coordination normal.     Skin:   Skin is warm and dry.  No diaphoresis.   No rash noted.     Psychiatric: Normal mood and affect. Behavior is normal.  Judgment and thought content normal.       Assessment / Plan:      ICD-10-CM ICD-9-CM   1. Uncontrolled type 2 diabetes mellitus with diabetic neuropathy, without long-term current use of insulin E11.40 250.62    E11.65 357.2   2. Need for pneumococcal vaccination Z23 V03.82   3. Hypertension associated with diabetes E11.59 250.80    I10 401.9   4. Mixed hyperlipidemia E78.2 272.2   5. S/P CABG (coronary artery bypass  graft) Z95.1 V45.81     Uncontrolled type 2 diabetes mellitus with diabetic neuropathy, without long-term current use of insulin    Need for pneumococcal vaccination  -     Pneumococcal Polysaccharide Vaccine (23 Valent) (SQ/IM)    Hypertension associated with diabetes    Mixed hyperlipidemia    S/P CABG (coronary artery bypass graft)    Other orders  -     SITagliptin (JANUVIA) 50 MG Tab; Take 2 tablets (100 mg total) by mouth once daily.  Dispense: 90 tablet; Refill: 1

## 2017-07-18 ENCOUNTER — TELEPHONE (OUTPATIENT)
Dept: FAMILY MEDICINE | Facility: CLINIC | Age: 72
End: 2017-07-18

## 2017-09-12 DIAGNOSIS — M15.9 PRIMARY OSTEOARTHRITIS INVOLVING MULTIPLE JOINTS: ICD-10-CM

## 2017-09-12 RX ORDER — MELOXICAM 15 MG/1
15 TABLET ORAL DAILY
Qty: 90 TABLET | Refills: 0 | Status: SHIPPED | OUTPATIENT
Start: 2017-09-12 | End: 2018-04-30 | Stop reason: ALTCHOICE

## 2018-02-12 ENCOUNTER — PES CALL (OUTPATIENT)
Dept: ADMINISTRATIVE | Facility: CLINIC | Age: 73
End: 2018-02-12

## 2018-04-18 ENCOUNTER — TELEPHONE (OUTPATIENT)
Dept: FAMILY MEDICINE | Facility: CLINIC | Age: 73
End: 2018-04-18

## 2018-04-18 DIAGNOSIS — I77.9 BILATERAL CAROTID ARTERY DISEASE: ICD-10-CM

## 2018-04-18 DIAGNOSIS — E11.69 DYSLIPIDEMIA WITH LOW HIGH DENSITY LIPOPROTEIN (HDL) CHOLESTEROL WITH HYPERTRIGLYCERIDEMIA DUE TO TYPE 2 DIABETES MELLITUS: ICD-10-CM

## 2018-04-18 DIAGNOSIS — E78.2 MIXED HYPERLIPIDEMIA: ICD-10-CM

## 2018-04-18 DIAGNOSIS — E78.2 DYSLIPIDEMIA WITH LOW HIGH DENSITY LIPOPROTEIN (HDL) CHOLESTEROL WITH HYPERTRIGLYCERIDEMIA DUE TO TYPE 2 DIABETES MELLITUS: ICD-10-CM

## 2018-04-18 DIAGNOSIS — Z00.00 ROUTINE HEALTH MAINTENANCE: Primary | ICD-10-CM

## 2018-04-18 DIAGNOSIS — Z95.1 S/P CABG (CORONARY ARTERY BYPASS GRAFT): ICD-10-CM

## 2018-04-18 DIAGNOSIS — I15.2 HYPERTENSION ASSOCIATED WITH DIABETES: ICD-10-CM

## 2018-04-18 DIAGNOSIS — E11.59 HYPERTENSION ASSOCIATED WITH DIABETES: ICD-10-CM

## 2018-04-18 NOTE — TELEPHONE ENCOUNTER
----- Message from Judi Villatoro sent at 4/18/2018 11:45 AM CDT -----  Needs wellness blood work ordered. Going to [facility] prior to visit on 4/30/18

## 2018-04-27 ENCOUNTER — LAB VISIT (OUTPATIENT)
Dept: LAB | Facility: HOSPITAL | Age: 73
End: 2018-04-27
Attending: FAMILY MEDICINE
Payer: MEDICARE

## 2018-04-27 DIAGNOSIS — I77.9 BILATERAL CAROTID ARTERY DISEASE: ICD-10-CM

## 2018-04-27 DIAGNOSIS — E11.69 DYSLIPIDEMIA WITH LOW HIGH DENSITY LIPOPROTEIN (HDL) CHOLESTEROL WITH HYPERTRIGLYCERIDEMIA DUE TO TYPE 2 DIABETES MELLITUS: ICD-10-CM

## 2018-04-27 DIAGNOSIS — E78.2 MIXED HYPERLIPIDEMIA: ICD-10-CM

## 2018-04-27 DIAGNOSIS — Z00.00 ROUTINE HEALTH MAINTENANCE: ICD-10-CM

## 2018-04-27 DIAGNOSIS — E11.59 HYPERTENSION ASSOCIATED WITH DIABETES: ICD-10-CM

## 2018-04-27 DIAGNOSIS — Z95.1 S/P CABG (CORONARY ARTERY BYPASS GRAFT): ICD-10-CM

## 2018-04-27 DIAGNOSIS — E78.2 DYSLIPIDEMIA WITH LOW HIGH DENSITY LIPOPROTEIN (HDL) CHOLESTEROL WITH HYPERTRIGLYCERIDEMIA DUE TO TYPE 2 DIABETES MELLITUS: ICD-10-CM

## 2018-04-27 DIAGNOSIS — I15.2 HYPERTENSION ASSOCIATED WITH DIABETES: ICD-10-CM

## 2018-04-27 LAB
ALBUMIN SERPL BCP-MCNC: 4.2 G/DL
ALP SERPL-CCNC: 36 U/L
ALT SERPL W/O P-5'-P-CCNC: 34 U/L
ANION GAP SERPL CALC-SCNC: 14 MMOL/L
AST SERPL-CCNC: 28 U/L
BASOPHILS # BLD AUTO: 0.06 K/UL
BASOPHILS NFR BLD: 1.1 %
BILIRUB SERPL-MCNC: 0.6 MG/DL
BUN SERPL-MCNC: 34 MG/DL
CALCIUM SERPL-MCNC: 9.5 MG/DL
CHLORIDE SERPL-SCNC: 106 MMOL/L
CHOLEST SERPL-MCNC: 117 MG/DL
CHOLEST/HDLC SERPL: 4.7 {RATIO}
CO2 SERPL-SCNC: 26 MMOL/L
CREAT SERPL-MCNC: 1.3 MG/DL
DIFFERENTIAL METHOD: ABNORMAL
EOSINOPHIL # BLD AUTO: 0.2 K/UL
EOSINOPHIL NFR BLD: 3.7 %
ERYTHROCYTE [DISTWIDTH] IN BLOOD BY AUTOMATED COUNT: 14.3 %
EST. GFR  (AFRICAN AMERICAN): >60 ML/MIN/1.73 M^2
EST. GFR  (NON AFRICAN AMERICAN): 54.5 ML/MIN/1.73 M^2
ESTIMATED AVG GLUCOSE: 151 MG/DL
GLUCOSE SERPL-MCNC: 145 MG/DL
HBA1C MFR BLD HPLC: 6.9 %
HCT VFR BLD AUTO: 44.8 %
HDLC SERPL-MCNC: 25 MG/DL
HDLC SERPL: 21.4 %
HGB BLD-MCNC: 14.1 G/DL
LDLC SERPL CALC-MCNC: 67.6 MG/DL
LYMPHOCYTES # BLD AUTO: 1.4 K/UL
LYMPHOCYTES NFR BLD: 24.7 %
MCH RBC QN AUTO: 30.5 PG
MCHC RBC AUTO-ENTMCNC: 31.5 G/DL
MCV RBC AUTO: 97 FL
MONOCYTES # BLD AUTO: 0.6 K/UL
MONOCYTES NFR BLD: 11 %
NEUTROPHILS # BLD AUTO: 3.3 K/UL
NEUTROPHILS NFR BLD: 59.3 %
NONHDLC SERPL-MCNC: 92 MG/DL
PLATELET # BLD AUTO: 201 K/UL
PMV BLD AUTO: 11.4 FL
POTASSIUM SERPL-SCNC: 5.8 MMOL/L
PROT SERPL-MCNC: 7.1 G/DL
RBC # BLD AUTO: 4.63 M/UL
SODIUM SERPL-SCNC: 146 MMOL/L
TRIGL SERPL-MCNC: 122 MG/DL
TSH SERPL DL<=0.005 MIU/L-ACNC: 1.47 UIU/ML
WBC # BLD AUTO: 5.47 K/UL

## 2018-04-27 PROCEDURE — 85025 COMPLETE CBC W/AUTO DIFF WBC: CPT | Mod: PO

## 2018-04-27 PROCEDURE — 83036 HEMOGLOBIN GLYCOSYLATED A1C: CPT

## 2018-04-27 PROCEDURE — 36415 COLL VENOUS BLD VENIPUNCTURE: CPT | Mod: PO

## 2018-04-27 PROCEDURE — 80061 LIPID PANEL: CPT

## 2018-04-27 PROCEDURE — 84443 ASSAY THYROID STIM HORMONE: CPT | Mod: PO

## 2018-04-27 PROCEDURE — 80053 COMPREHEN METABOLIC PANEL: CPT | Mod: PO

## 2018-04-30 ENCOUNTER — OFFICE VISIT (OUTPATIENT)
Dept: FAMILY MEDICINE | Facility: CLINIC | Age: 73
End: 2018-04-30
Payer: MEDICARE

## 2018-04-30 VITALS
BODY MASS INDEX: 29.65 KG/M2 | SYSTOLIC BLOOD PRESSURE: 130 MMHG | HEART RATE: 66 BPM | WEIGHT: 200.19 LBS | TEMPERATURE: 99 F | DIASTOLIC BLOOD PRESSURE: 70 MMHG | HEIGHT: 69 IN

## 2018-04-30 DIAGNOSIS — Z95.1 S/P CABG (CORONARY ARTERY BYPASS GRAFT): ICD-10-CM

## 2018-04-30 DIAGNOSIS — E87.5 HYPERKALEMIA: Primary | ICD-10-CM

## 2018-04-30 DIAGNOSIS — E11.59 HYPERTENSION ASSOCIATED WITH DIABETES: ICD-10-CM

## 2018-04-30 DIAGNOSIS — M53.87 SCIATICA OF RIGHT SIDE ASSOCIATED WITH DISORDER OF LUMBOSACRAL SPINE: ICD-10-CM

## 2018-04-30 DIAGNOSIS — I25.10 CORONARY ARTERY DISEASE INVOLVING NATIVE CORONARY ARTERY OF NATIVE HEART WITHOUT ANGINA PECTORIS: ICD-10-CM

## 2018-04-30 DIAGNOSIS — I15.2 HYPERTENSION ASSOCIATED WITH DIABETES: ICD-10-CM

## 2018-04-30 DIAGNOSIS — M10.9 ACUTE GOUT OF KNEE, UNSPECIFIED CAUSE, UNSPECIFIED LATERALITY: ICD-10-CM

## 2018-04-30 PROCEDURE — 3008F BODY MASS INDEX DOCD: CPT | Mod: CPTII,S$GLB,, | Performed by: FAMILY MEDICINE

## 2018-04-30 PROCEDURE — 3044F HG A1C LEVEL LT 7.0%: CPT | Mod: CPTII,S$GLB,, | Performed by: FAMILY MEDICINE

## 2018-04-30 PROCEDURE — 99499 UNLISTED E&M SERVICE: CPT | Mod: S$GLB,,, | Performed by: FAMILY MEDICINE

## 2018-04-30 PROCEDURE — 99214 OFFICE O/P EST MOD 30 MIN: CPT | Mod: S$GLB,,, | Performed by: FAMILY MEDICINE

## 2018-04-30 PROCEDURE — 3078F DIAST BP <80 MM HG: CPT | Mod: CPTII,S$GLB,, | Performed by: FAMILY MEDICINE

## 2018-04-30 PROCEDURE — 3075F SYST BP GE 130 - 139MM HG: CPT | Mod: CPTII,S$GLB,, | Performed by: FAMILY MEDICINE

## 2018-04-30 RX ORDER — PRAVASTATIN SODIUM 40 MG/1
80 TABLET ORAL DAILY
Qty: 180 TABLET | Refills: 3 | Status: SHIPPED | OUTPATIENT
Start: 2018-04-30 | End: 2019-05-20 | Stop reason: SDUPTHER

## 2018-04-30 RX ORDER — METOPROLOL TARTRATE 25 MG/1
25 TABLET, FILM COATED ORAL 2 TIMES DAILY
Qty: 180 TABLET | Refills: 3 | Status: SHIPPED | OUTPATIENT
Start: 2018-04-30 | End: 2019-05-20 | Stop reason: SDUPTHER

## 2018-04-30 RX ORDER — FENOFIBRATE 160 MG/1
160 TABLET ORAL DAILY
Qty: 90 TABLET | Refills: 3 | Status: SHIPPED | OUTPATIENT
Start: 2018-04-30 | End: 2019-05-20 | Stop reason: SDUPTHER

## 2018-04-30 RX ORDER — LISINOPRIL 20 MG/1
40 TABLET ORAL DAILY
Qty: 180 TABLET | Refills: 3 | Status: SHIPPED | OUTPATIENT
Start: 2018-04-30 | End: 2019-05-20 | Stop reason: SDUPTHER

## 2018-04-30 RX ORDER — COLCHICINE 0.6 MG/1
0.6 TABLET ORAL 2 TIMES DAILY PRN
Qty: 30 TABLET | Refills: 2 | Status: SHIPPED | OUTPATIENT
Start: 2018-04-30 | End: 2021-10-15

## 2018-04-30 RX ORDER — CLOPIDOGREL BISULFATE 75 MG/1
75 TABLET ORAL DAILY
Qty: 90 TABLET | Refills: 3 | Status: SHIPPED | OUTPATIENT
Start: 2018-04-30 | End: 2019-05-20 | Stop reason: SDUPTHER

## 2018-04-30 RX ORDER — HYDROCODONE BITARTRATE AND ACETAMINOPHEN 10; 325 MG/1; MG/1
TABLET ORAL
Refills: 0 | COMMUNITY
Start: 2018-04-10 | End: 2019-05-20

## 2018-04-30 RX ORDER — GABAPENTIN 300 MG/1
300 CAPSULE ORAL 2 TIMES DAILY
Qty: 180 CAPSULE | Refills: 3 | Status: CANCELLED | OUTPATIENT
Start: 2018-04-30 | End: 2018-07-29

## 2018-04-30 NOTE — PROGRESS NOTES
Patient ID: Jin Ngo is a 72 y.o. male.    Chief Complaint: Results (discuss lab result s) and Medication Refill (All medications)    HPI      Jin Ngo is a 72 y.o. male here for a few reasons. Discuss labs and check up on chronic medical conditions.  Htn stable   Cad - stable no co of problems  Gout - currently pain and swelling of his knee.  Sciatic pain occ running from back down leg.            Review of Symptoms    Constitutional  Some activity change due to gout, No chills /fever   Resp  Neg hemoptysis, stridor, choking  CVS  Neg chest pain, palpitations    Physical Exam    Constitutional:  Oriented to person, place, and time.appears well-developed and well-nourished.  No distress.      HENT  Head: Normocephalic and atraumatic  Right Ear: External ear normal.   Left Ear: External ear normal.   Nose: External nose normal.   Mouth:  Moist mucus membranes.    Eyes:  Conjunctivae are normal. Right eye exhibits no discharge.  Left eye exhibits no discharge. No scleral icterus.  No periorbital edema    Cardiovascular:  Regular rate, Regular rhythm     No extrasystole  Normal S1-S2      Pulmonary/Chest:   Normal effort and breath sounds.  No wheezing, rhonchi or rales.    Musculoskeletal:  No edema. No obvious deformity No wasting       Neurological:  Alert and oriented to person, place, and time.   Coordination normal.     Skin:   Skin is warm and dry.  No diaphoresis.   No rash noted.     Psychiatric: Normal mood and affect. Behavior is normal.  Judgment and thought content normal.       Assessment / Plan:      ICD-10-CM ICD-9-CM   1. Hyperkalemia E87.5 276.7   2. Acute gout of knee, unspecified cause, unspecified laterality M10.9 274.01   3. Sciatica of right side associated with disorder of lumbosacral spine M53.9 724.3   4. S/P CABG (coronary artery bypass graft) Z95.1 V45.81   5. Coronary artery disease involving native coronary artery of native heart without angina pectoris I25.10 414.01   6.  Hypertension associated with diabetes E11.59 250.80    I10 401.9     Hyperkalemia  -     Cancel: Basic metabolic panel; Future; Expected date: 04/30/2018  -     Basic metabolic panel; Future; Expected date: 04/30/2018    Acute gout of knee, unspecified cause, unspecified laterality  -     colchicine 0.6 mg tablet; Take 1 tablet (0.6 mg total) by mouth 2 (two) times daily as needed.  Dispense: 30 tablet; Refill: 2    Sciatica of right side associated with disorder of lumbosacral spine    S/P CABG (coronary artery bypass graft)    Coronary artery disease involving native coronary artery of native heart without angina pectoris    Hypertension associated with diabetes    Other orders  -     clopidogrel (PLAVIX) 75 mg tablet; Take 1 tablet (75 mg total) by mouth once daily.  Dispense: 90 tablet; Refill: 3  -     fenofibrate 160 MG Tab; Take 1 tablet (160 mg total) by mouth once daily.  Dispense: 90 tablet; Refill: 3  -     Cancel: gabapentin (NEURONTIN) 300 MG capsule; Take 1 capsule (300 mg total) by mouth 2 (two) times daily.  Dispense: 180 capsule; Refill: 3  -     lisinopril (PRINIVIL,ZESTRIL) 20 MG tablet; Take 2 tablets (40 mg total) by mouth once daily.  Dispense: 180 tablet; Refill: 3  -     metoprolol tartrate (LOPRESSOR) 25 MG tablet; Take 1 tablet (25 mg total) by mouth 2 (two) times daily.  Dispense: 180 tablet; Refill: 3  -     pravastatin (PRAVACHOL) 40 MG tablet; Take 2 tablets (80 mg total) by mouth once daily.  Dispense: 180 tablet; Refill: 3    cad stable  Treat gout  Htn stable no changes to meds

## 2018-05-05 LAB
BUN SERPL-MCNC: 20 MG/DL (ref 7–25)
BUN/CREAT SERPL: ABNORMAL (CALC) (ref 6–22)
CALCIUM SERPL-MCNC: 9.6 MG/DL (ref 8.6–10.3)
CHLORIDE SERPL-SCNC: 107 MMOL/L (ref 98–110)
CO2 SERPL-SCNC: 25 MMOL/L (ref 20–31)
CREAT SERPL-MCNC: 1.09 MG/DL (ref 0.7–1.18)
GFR SERPL CREATININE-BSD FRML MDRD: 67 ML/MIN/1.73M2
GLUCOSE SERPL-MCNC: 115 MG/DL (ref 65–99)
POTASSIUM SERPL-SCNC: 5 MMOL/L (ref 3.5–5.3)
SODIUM SERPL-SCNC: 139 MMOL/L (ref 135–146)

## 2018-05-10 RX ORDER — ALLOPURINOL 300 MG/1
300 TABLET ORAL DAILY
Qty: 90 TABLET | Refills: 3 | Status: SHIPPED | OUTPATIENT
Start: 2018-05-10 | End: 2019-05-20 | Stop reason: SDUPTHER

## 2018-05-10 NOTE — TELEPHONE ENCOUNTER
----- Message from Zabrina Childers sent at 5/10/2018  3:49 PM CDT -----  Contact: Wife / Krupa [phone]  Patient is requesting a medication refill.     RX name: allopurinol  Strength: 300 mg  Quantity: 90 pills  Directions: Take 1 tablet (300 mg total) by mouth once daily    Pharmacy name: [facility]      Phone number where patient can be reached: [phone]

## 2018-07-13 ENCOUNTER — TELEPHONE (OUTPATIENT)
Dept: FAMILY MEDICINE | Facility: CLINIC | Age: 73
End: 2018-07-13

## 2018-07-13 NOTE — TELEPHONE ENCOUNTER
----- Message from Marisa Restrepo sent at 7/13/2018  1:18 PM CDT -----  Contact: Dentist/cell/[phone]  Dr. Patricio Nuñez dentist will be extracting a tooth and patient is taking plavix. What would be the instructions for this medication?  Please call his cell phone and advise.

## 2018-07-13 NOTE — TELEPHONE ENCOUNTER
I would hold the medicine for 7 days prior to the procedure-I would not stop aspirin if he will allow you to take it through the procedure.    Do not stop Plavix if you had a stent placed within the last year-than you would have to forego the procedure or we may have to do something else.  I do not have a record any stents placed in your arteries within the year.    Due for a visit for foot exam and eye exam

## 2018-07-16 NOTE — TELEPHONE ENCOUNTER
Tried to contact the pt   No answer at home and unable to LM  And cell # is not working     Pt needs appt for foot exam and eye exam is needed

## 2018-07-16 NOTE — TELEPHONE ENCOUNTER
Claritza SOUZA [facility] Staff   Caller: Josefina () [phone] (Today,  2:19 PM)             Josefina would like to speak with you about the patient. Please advise.      I discussed with the pt dentist, dr hdez.  All instructions were given to the dentist  He will have the pt rtn my call

## 2018-09-27 ENCOUNTER — PES CALL (OUTPATIENT)
Dept: ADMINISTRATIVE | Facility: CLINIC | Age: 73
End: 2018-09-27

## 2019-03-27 ENCOUNTER — PES CALL (OUTPATIENT)
Dept: ADMINISTRATIVE | Facility: CLINIC | Age: 74
End: 2019-03-27

## 2019-05-20 ENCOUNTER — OFFICE VISIT (OUTPATIENT)
Dept: FAMILY MEDICINE | Facility: CLINIC | Age: 74
End: 2019-05-20
Payer: MEDICARE

## 2019-05-20 VITALS
HEIGHT: 69 IN | SYSTOLIC BLOOD PRESSURE: 122 MMHG | WEIGHT: 198.19 LBS | OXYGEN SATURATION: 96 % | HEART RATE: 67 BPM | TEMPERATURE: 99 F | BODY MASS INDEX: 29.36 KG/M2 | DIASTOLIC BLOOD PRESSURE: 68 MMHG

## 2019-05-20 DIAGNOSIS — E78.2 DYSLIPIDEMIA WITH LOW HIGH DENSITY LIPOPROTEIN (HDL) CHOLESTEROL WITH HYPERTRIGLYCERIDEMIA DUE TO TYPE 2 DIABETES MELLITUS: ICD-10-CM

## 2019-05-20 DIAGNOSIS — E79.0 ELEVATED URIC ACID IN BLOOD: ICD-10-CM

## 2019-05-20 DIAGNOSIS — I15.2 HYPERTENSION ASSOCIATED WITH DIABETES: ICD-10-CM

## 2019-05-20 DIAGNOSIS — I10 ESSENTIAL HYPERTENSION: ICD-10-CM

## 2019-05-20 DIAGNOSIS — E11.59 HYPERTENSION ASSOCIATED WITH DIABETES: ICD-10-CM

## 2019-05-20 DIAGNOSIS — K59.00 CONSTIPATION, UNSPECIFIED CONSTIPATION TYPE: Primary | ICD-10-CM

## 2019-05-20 DIAGNOSIS — I27.20 PULMONARY HTN: ICD-10-CM

## 2019-05-20 DIAGNOSIS — E78.2 MIXED HYPERLIPIDEMIA: ICD-10-CM

## 2019-05-20 DIAGNOSIS — E11.69 DYSLIPIDEMIA WITH LOW HIGH DENSITY LIPOPROTEIN (HDL) CHOLESTEROL WITH HYPERTRIGLYCERIDEMIA DUE TO TYPE 2 DIABETES MELLITUS: ICD-10-CM

## 2019-05-20 DIAGNOSIS — Z95.1 S/P CABG (CORONARY ARTERY BYPASS GRAFT): ICD-10-CM

## 2019-05-20 DIAGNOSIS — Z13.220 NEED FOR LIPID SCREENING: ICD-10-CM

## 2019-05-20 PROCEDURE — 3078F PR MOST RECENT DIASTOLIC BLOOD PRESSURE < 80 MM HG: ICD-10-PCS | Mod: CPTII,S$GLB,, | Performed by: FAMILY MEDICINE

## 2019-05-20 PROCEDURE — 3074F PR MOST RECENT SYSTOLIC BLOOD PRESSURE < 130 MM HG: ICD-10-PCS | Mod: CPTII,S$GLB,, | Performed by: FAMILY MEDICINE

## 2019-05-20 PROCEDURE — 99213 PR OFFICE/OUTPT VISIT, EST, LEVL III, 20-29 MIN: ICD-10-PCS | Mod: S$GLB,,, | Performed by: FAMILY MEDICINE

## 2019-05-20 PROCEDURE — 99499 UNLISTED E&M SERVICE: CPT | Mod: S$GLB,,, | Performed by: FAMILY MEDICINE

## 2019-05-20 PROCEDURE — 99499 RISK ADDL DX/OHS AUDIT: ICD-10-PCS | Mod: S$GLB,,, | Performed by: FAMILY MEDICINE

## 2019-05-20 PROCEDURE — 99213 OFFICE O/P EST LOW 20 MIN: CPT | Mod: S$GLB,,, | Performed by: FAMILY MEDICINE

## 2019-05-20 PROCEDURE — 3074F SYST BP LT 130 MM HG: CPT | Mod: CPTII,S$GLB,, | Performed by: FAMILY MEDICINE

## 2019-05-20 PROCEDURE — 3078F DIAST BP <80 MM HG: CPT | Mod: CPTII,S$GLB,, | Performed by: FAMILY MEDICINE

## 2019-05-20 RX ORDER — FENOFIBRATE 160 MG/1
160 TABLET ORAL DAILY
Qty: 90 TABLET | Refills: 3 | Status: SHIPPED | OUTPATIENT
Start: 2019-05-20 | End: 2020-07-16

## 2019-05-20 RX ORDER — LISINOPRIL 20 MG/1
40 TABLET ORAL DAILY
Qty: 180 TABLET | Refills: 3 | Status: SHIPPED | OUTPATIENT
Start: 2019-05-20 | End: 2020-07-16

## 2019-05-20 RX ORDER — PRAVASTATIN SODIUM 40 MG/1
80 TABLET ORAL DAILY
Qty: 180 TABLET | Refills: 3 | Status: SHIPPED | OUTPATIENT
Start: 2019-05-20 | End: 2020-07-16

## 2019-05-20 RX ORDER — ALLOPURINOL 300 MG/1
300 TABLET ORAL DAILY
Qty: 90 TABLET | Refills: 3 | Status: SHIPPED | OUTPATIENT
Start: 2019-05-20 | End: 2020-07-16

## 2019-05-20 RX ORDER — CLOPIDOGREL BISULFATE 75 MG/1
75 TABLET ORAL DAILY
Qty: 90 TABLET | Refills: 3 | Status: SHIPPED | OUTPATIENT
Start: 2019-05-20 | End: 2020-07-16

## 2019-05-20 RX ORDER — METOPROLOL TARTRATE 25 MG/1
25 TABLET, FILM COATED ORAL 2 TIMES DAILY
Qty: 180 TABLET | Refills: 3 | Status: SHIPPED | OUTPATIENT
Start: 2019-05-20 | End: 2020-07-16

## 2019-05-20 RX ORDER — POLYETHYLENE GLYCOL 3350 17 G/17G
17 POWDER, FOR SOLUTION ORAL DAILY
Qty: 3 BOTTLE | Refills: 3 | Status: SHIPPED | OUTPATIENT
Start: 2019-05-20 | End: 2020-11-14

## 2019-05-20 NOTE — PROGRESS NOTES
Patient ID: Jin Ngo is a 73 y.o. male.    Chief Complaint: Medication Refill    HPI       Jin Ngo is a 73 y.o. male with hard infrequent stool.  Comes out as donovan balls.  Feeling of fullness-no feeling of full evacuation    Complains about urinary problems with some leakage.  No dysuria hematuria.        Review of Symptoms    Constitutional  Neg activity change, No chills/ fever   Resp  Neg hemoptysis, stridor, choking  CVS  Neg chest pain, palpitations        Physical Exam    Vitals:    05/20/19 1342   BP: 122/68   Pulse: 67   Temp: 98.5 °F (36.9 °C)       Constitutional:   Oriented to person, place, and time.appears well-developed and well-nourished.   No distress.     HENT:   Head: Normocephalic and atraumatic.     Right Ear: Tympanic membrane, external ear and ear canal normal     Left Ear: Tympanic membrane, external ear and ear canal normal    Nose: External Normal. Normal turbinates, No rhinorrhea     Mouth/Throat: Uvula is midline, oropharynx is clear and moist and mucous membranes are normal.     Neck: supple no anterior cervical adenopathy    Eyes:   Conjunctivae are normal. Right eye exhibits no discharge. Left eye exhibits no discharge. No scleral icterus. No periorbital edema     Foot exam done    Cardiovascular:  Regular rate and rhythm with normal S1 and S2     Pulmonary/Chest:   Clear to auscultation bilaterally without wheezes, rhonchi or rales      Musculoskeletal:  No edema. No obvious deformity No wasting     Neurological:   Alert and oriented to person, place, and time. Coordination normal.     Skin:   Skin is warm and dry.   No diaphoresis.   No rash noted.    Psychiatric: Normal mood and affect. Behavior is normal. Judgment and thought content normal.       Assessment / Plan:      ICD-10-CM ICD-9-CM   1. Constipation, unspecified constipation type K59.00 564.00   2. Need for lipid screening Z13.220 V77.91   3. Dyslipidemia with low HDL cholesterol with hypertriglyceridemia due to  type 2 diabetes mellitus E11.69 250.80    E78.2 272.4   4. Uncontrolled type 2 diabetes mellitus with diabetic neuropathy, without long-term current use of insulin E11.40 250.62    E11.65 357.2   5. Hypertension associated with diabetes E11.59 250.80    I10 401.9   6. S/P CABG (coronary artery bypass graft) Z95.1 V45.81   7. Pulmonary HTN I27.20 416.8   8. Mixed hyperlipidemia E78.2 272.2   9. Essential hypertension I10 401.9   10. Elevated uric acid in blood E79.0 790.6     Constipation, unspecified constipation type    Need for lipid screening    Dyslipidemia with low HDL cholesterol with hypertriglyceridemia due to type 2 diabetes mellitus  -     Lipid panel; Future; Expected date: 05/20/2019    Uncontrolled type 2 diabetes mellitus with diabetic neuropathy, without long-term current use of insulin  -     Hemoglobin A1c; Future; Expected date: 05/20/2019    Hypertension associated with diabetes    S/P CABG (coronary artery bypass graft)    Pulmonary HTN    Mixed hyperlipidemia    Essential hypertension    Elevated uric acid in blood    Other orders  -     polyethylene glycol (GLYCOLAX) 17 gram/dose powder; Take 17 g by mouth once daily.  Dispense: 3 Bottle; Refill: 3      Discussed less tried to self constipation issue-urinary problems may be reduced once constipation is better

## 2019-05-21 ENCOUNTER — LAB VISIT (OUTPATIENT)
Dept: LAB | Facility: HOSPITAL | Age: 74
End: 2019-05-21
Attending: FAMILY MEDICINE
Payer: MEDICARE

## 2019-05-21 DIAGNOSIS — I27.20 PULMONARY HTN: ICD-10-CM

## 2019-05-21 DIAGNOSIS — Z95.1 S/P CABG (CORONARY ARTERY BYPASS GRAFT): ICD-10-CM

## 2019-05-21 DIAGNOSIS — K59.00 CONSTIPATION, UNSPECIFIED CONSTIPATION TYPE: ICD-10-CM

## 2019-05-21 DIAGNOSIS — E78.2 MIXED HYPERLIPIDEMIA: ICD-10-CM

## 2019-05-21 DIAGNOSIS — I15.2 HYPERTENSION ASSOCIATED WITH DIABETES: ICD-10-CM

## 2019-05-21 DIAGNOSIS — E11.69 DYSLIPIDEMIA WITH LOW HIGH DENSITY LIPOPROTEIN (HDL) CHOLESTEROL WITH HYPERTRIGLYCERIDEMIA DUE TO TYPE 2 DIABETES MELLITUS: ICD-10-CM

## 2019-05-21 DIAGNOSIS — E11.59 HYPERTENSION ASSOCIATED WITH DIABETES: ICD-10-CM

## 2019-05-21 DIAGNOSIS — I10 ESSENTIAL HYPERTENSION: ICD-10-CM

## 2019-05-21 DIAGNOSIS — E78.2 DYSLIPIDEMIA WITH LOW HIGH DENSITY LIPOPROTEIN (HDL) CHOLESTEROL WITH HYPERTRIGLYCERIDEMIA DUE TO TYPE 2 DIABETES MELLITUS: ICD-10-CM

## 2019-05-21 LAB
ALBUMIN SERPL BCP-MCNC: 4.2 G/DL (ref 3.5–5.2)
ALP SERPL-CCNC: 50 U/L (ref 38–126)
ALT SERPL W/O P-5'-P-CCNC: 19 U/L (ref 10–44)
ANION GAP SERPL CALC-SCNC: 11 MMOL/L (ref 8–16)
AST SERPL-CCNC: 21 U/L (ref 15–46)
BASOPHILS # BLD AUTO: 0.05 K/UL (ref 0–0.2)
BASOPHILS NFR BLD: 0.9 % (ref 0–1.9)
BILIRUB SERPL-MCNC: 0.5 MG/DL (ref 0.1–1)
BUN SERPL-MCNC: 30 MG/DL (ref 2–20)
CALCIUM SERPL-MCNC: 9.8 MG/DL (ref 8.7–10.5)
CHLORIDE SERPL-SCNC: 99 MMOL/L (ref 95–110)
CHOLEST SERPL-MCNC: 125 MG/DL (ref 120–199)
CHOLEST/HDLC SERPL: 5 {RATIO} (ref 2–5)
CO2 SERPL-SCNC: 28 MMOL/L (ref 23–29)
CREAT SERPL-MCNC: 1.14 MG/DL (ref 0.5–1.4)
DIFFERENTIAL METHOD: NORMAL
EOSINOPHIL # BLD AUTO: 0.2 K/UL (ref 0–0.5)
EOSINOPHIL NFR BLD: 3.4 % (ref 0–8)
ERYTHROCYTE [DISTWIDTH] IN BLOOD BY AUTOMATED COUNT: 13.9 % (ref 11.5–14.5)
EST. GFR  (AFRICAN AMERICAN): >60 ML/MIN/1.73 M^2
EST. GFR  (NON AFRICAN AMERICAN): >60 ML/MIN/1.73 M^2
ESTIMATED AVG GLUCOSE: 346 MG/DL (ref 68–131)
GLUCOSE SERPL-MCNC: 339 MG/DL (ref 70–110)
HBA1C MFR BLD HPLC: 13.7 % (ref 4–5.6)
HCT VFR BLD AUTO: 45.6 % (ref 40–54)
HDLC SERPL-MCNC: 25 MG/DL (ref 40–75)
HDLC SERPL: 20 % (ref 20–50)
HGB BLD-MCNC: 15.3 G/DL (ref 14–18)
LDLC SERPL CALC-MCNC: 40.2 MG/DL (ref 63–159)
LYMPHOCYTES # BLD AUTO: 1.4 K/UL (ref 1–4.8)
LYMPHOCYTES NFR BLD: 27.3 % (ref 18–48)
MCH RBC QN AUTO: 30.4 PG (ref 27–31)
MCHC RBC AUTO-ENTMCNC: 33.6 G/DL (ref 32–36)
MCV RBC AUTO: 91 FL (ref 82–98)
MONOCYTES # BLD AUTO: 0.5 K/UL (ref 0.3–1)
MONOCYTES NFR BLD: 10.2 % (ref 4–15)
NEUTROPHILS # BLD AUTO: 3 K/UL (ref 1.8–7.7)
NEUTROPHILS NFR BLD: 57.4 % (ref 38–73)
NONHDLC SERPL-MCNC: 100 MG/DL
PLATELET # BLD AUTO: 215 K/UL (ref 150–350)
PMV BLD AUTO: 10.9 FL (ref 9.2–12.9)
POTASSIUM SERPL-SCNC: 4.8 MMOL/L (ref 3.5–5.1)
PROT SERPL-MCNC: 7.4 G/DL (ref 6–8.4)
RBC # BLD AUTO: 5.04 M/UL (ref 4.6–6.2)
SODIUM SERPL-SCNC: 138 MMOL/L (ref 136–145)
TRIGL SERPL-MCNC: 299 MG/DL (ref 30–150)
TSH SERPL DL<=0.005 MIU/L-ACNC: 2.14 UIU/ML (ref 0.4–4)
URATE SERPL-MCNC: 5.3 MG/DL (ref 3.4–7)
WBC # BLD AUTO: 5.28 K/UL (ref 3.9–12.7)

## 2019-05-21 PROCEDURE — 85025 COMPLETE CBC W/AUTO DIFF WBC: CPT | Mod: HCNC,PO

## 2019-05-21 PROCEDURE — 83036 HEMOGLOBIN GLYCOSYLATED A1C: CPT | Mod: HCNC

## 2019-05-21 PROCEDURE — 36415 COLL VENOUS BLD VENIPUNCTURE: CPT | Mod: HCNC,PO

## 2019-05-21 PROCEDURE — 80053 COMPREHEN METABOLIC PANEL: CPT | Mod: HCNC,PO

## 2019-05-21 PROCEDURE — 84443 ASSAY THYROID STIM HORMONE: CPT | Mod: HCNC,PO

## 2019-05-21 PROCEDURE — 80061 LIPID PANEL: CPT | Mod: HCNC

## 2019-05-21 PROCEDURE — 84550 ASSAY OF BLOOD/URIC ACID: CPT | Mod: HCNC

## 2019-05-27 ENCOUNTER — TELEPHONE (OUTPATIENT)
Dept: FAMILY MEDICINE | Facility: CLINIC | Age: 74
End: 2019-05-27

## 2019-06-06 ENCOUNTER — TELEPHONE (OUTPATIENT)
Dept: FAMILY MEDICINE | Facility: CLINIC | Age: 74
End: 2019-06-06

## 2019-06-06 NOTE — TELEPHONE ENCOUNTER
Can you make contact with him possibly have a him come in so we can go over what to do with his out of control diabetes

## 2019-06-17 ENCOUNTER — OFFICE VISIT (OUTPATIENT)
Dept: FAMILY MEDICINE | Facility: CLINIC | Age: 74
End: 2019-06-17
Payer: MEDICARE

## 2019-06-17 VITALS
SYSTOLIC BLOOD PRESSURE: 132 MMHG | WEIGHT: 196.63 LBS | HEART RATE: 61 BPM | BODY MASS INDEX: 29.12 KG/M2 | TEMPERATURE: 99 F | HEIGHT: 69 IN | OXYGEN SATURATION: 95 % | DIASTOLIC BLOOD PRESSURE: 74 MMHG

## 2019-06-17 DIAGNOSIS — Z12.11 ENCOUNTER FOR SCREENING FOR MALIGNANT NEOPLASM OF COLON: ICD-10-CM

## 2019-06-17 DIAGNOSIS — K59.00 CONSTIPATION, UNSPECIFIED CONSTIPATION TYPE: ICD-10-CM

## 2019-06-17 DIAGNOSIS — E11.59 HYPERTENSION ASSOCIATED WITH DIABETES: ICD-10-CM

## 2019-06-17 DIAGNOSIS — E11.9 DIABETIC EYE EXAM: Primary | ICD-10-CM

## 2019-06-17 DIAGNOSIS — I15.2 HYPERTENSION ASSOCIATED WITH DIABETES: ICD-10-CM

## 2019-06-17 DIAGNOSIS — E79.0 ELEVATED URIC ACID IN BLOOD: ICD-10-CM

## 2019-06-17 DIAGNOSIS — Z01.00 DIABETIC EYE EXAM: Primary | ICD-10-CM

## 2019-06-17 DIAGNOSIS — I70.0 ABDOMINAL AORTIC ATHEROSCLEROSIS: ICD-10-CM

## 2019-06-17 PROCEDURE — 3078F PR MOST RECENT DIASTOLIC BLOOD PRESSURE < 80 MM HG: ICD-10-PCS | Mod: CPTII,S$GLB,, | Performed by: FAMILY MEDICINE

## 2019-06-17 PROCEDURE — 1101F PT FALLS ASSESS-DOCD LE1/YR: CPT | Mod: CPTII,S$GLB,, | Performed by: FAMILY MEDICINE

## 2019-06-17 PROCEDURE — 1101F PR PT FALLS ASSESS DOC 0-1 FALLS W/OUT INJ PAST YR: ICD-10-PCS | Mod: CPTII,S$GLB,, | Performed by: FAMILY MEDICINE

## 2019-06-17 PROCEDURE — 99214 OFFICE O/P EST MOD 30 MIN: CPT | Mod: S$GLB,,, | Performed by: FAMILY MEDICINE

## 2019-06-17 PROCEDURE — 3075F PR MOST RECENT SYSTOLIC BLOOD PRESS GE 130-139MM HG: ICD-10-PCS | Mod: CPTII,S$GLB,, | Performed by: FAMILY MEDICINE

## 2019-06-17 PROCEDURE — 99214 PR OFFICE/OUTPT VISIT, EST, LEVL IV, 30-39 MIN: ICD-10-PCS | Mod: S$GLB,,, | Performed by: FAMILY MEDICINE

## 2019-06-17 PROCEDURE — 3075F SYST BP GE 130 - 139MM HG: CPT | Mod: CPTII,S$GLB,, | Performed by: FAMILY MEDICINE

## 2019-06-17 PROCEDURE — 3046F HEMOGLOBIN A1C LEVEL >9.0%: CPT | Mod: CPTII,S$GLB,, | Performed by: FAMILY MEDICINE

## 2019-06-17 PROCEDURE — 3046F PR MOST RECENT HEMOGLOBIN A1C LEVEL > 9.0%: ICD-10-PCS | Mod: CPTII,S$GLB,, | Performed by: FAMILY MEDICINE

## 2019-06-17 PROCEDURE — 3078F DIAST BP <80 MM HG: CPT | Mod: CPTII,S$GLB,, | Performed by: FAMILY MEDICINE

## 2019-06-19 ENCOUNTER — TELEPHONE (OUTPATIENT)
Dept: GASTROENTEROLOGY | Facility: CLINIC | Age: 74
End: 2019-06-19

## 2019-06-19 NOTE — TELEPHONE ENCOUNTER
Referral was sent to schedule a Colonoscopy, left an voicemail for patient to call the office back in regards to scheduling procedure.

## 2019-06-27 NOTE — PROGRESS NOTES
" Patient ID: Jin Ngo is a 73 y.o. male.    Chief Complaint: Discuss; Diabetes out of control    HPI       Jin Ngo is a 73 y.o. male to here to discuss diabetes which is not fully controlled.  Has been very liberal with his diet.  Eating candy and other things brought to home by son.  Hypertension-under control-no problems  History of CVA TIA-no new problems  Sciatic-back pain-continues to live without pain medication-      Review of Symptoms    Constitutional  Neg activity change, No chills/ fever   Resp  Neg hemoptysis, stridor, choking  CVS  Neg chest pain, palpitations        Physical Exam    Vitals:    06/17/19 1339   BP: 132/74   Pulse: 61   Temp: 98.5 °F (36.9 °C)   SpO2: 95%   Weight: 89.2 kg (196 lb 10.4 oz)   Height: 5' 9" (1.753 m)        Constitutional:   Oriented to person, place, and time.appears well-developed and well-nourished.   No distress.     HENT:   Head: Normocephalic and atraumatic.     Right Ear: Tympanic membrane, external ear and ear canal normal     Left Ear: Tympanic membrane, external ear and ear canal normal    Nose: External Normal. Normal turbinates, No rhinorrhea     Mouth/Throat: Uvula is midline, oropharynx is clear and moist and mucous membranes are normal.     Neck: supple no anterior cervical adenopathy    Eyes:   Conjunctivae are normal. Right eye exhibits no discharge. Left eye exhibits no discharge. No scleral icterus. No periorbital edema       Cardiovascular:  Regular rate and rhythm with normal S1 and S2     Pulmonary/Chest:   Clear to auscultation bilaterally without wheezes, rhonchi or rales    Musculoskeletal:  No edema. No obvious deformity No wasting     Neurological:   Alert and oriented to person, place, and time. Coordination normal.     Skin:   Skin is warm and dry.   No diaphoresis.   No rash noted.    Psychiatric: Normal mood and affect. Behavior is normal. Judgment and thought content normal.       Assessment / Plan:      ICD-10-CM ICD-9-CM   1. " Diabetic eye exam Z01.00 V72.0    E11.9 250.00   2. Constipation, unspecified constipation type K59.00 564.00   3. Uncontrolled type 2 diabetes mellitus with diabetic neuropathy, without long-term current use of insulin E11.40 250.62    E11.65 357.2   4. Hypertension associated with diabetes E11.59 250.80    I10 401.9   5. Elevated uric acid in blood E79.0 790.6   6. Encounter for screening for malignant neoplasm of colon Z12.11 V76.51     Diabetic eye exam  -     Ambulatory Referral to Ophthalmology  -     Comprehensive metabolic panel; Future; Expected date: 06/17/2019  -     CBC auto differential; Future; Expected date: 06/17/2019  -     Lipid panel; Future; Expected date: 06/17/2019  -     Hemoglobin A1c; Future; Expected date: 06/17/2019    Constipation, unspecified constipation type  -     Comprehensive metabolic panel; Future; Expected date: 06/17/2019  -     CBC auto differential; Future; Expected date: 06/17/2019  -     Lipid panel; Future; Expected date: 06/17/2019  -     Hemoglobin A1c; Future; Expected date: 06/17/2019    Uncontrolled type 2 diabetes mellitus with diabetic neuropathy, without long-term current use of insulin  -     Comprehensive metabolic panel; Future; Expected date: 06/17/2019  -     CBC auto differential; Future; Expected date: 06/17/2019  -     Lipid panel; Future; Expected date: 06/17/2019  -     Hemoglobin A1c; Future; Expected date: 06/17/2019    Hypertension associated with diabetes  -     Comprehensive metabolic panel; Future; Expected date: 06/17/2019  -     CBC auto differential; Future; Expected date: 06/17/2019  -     Lipid panel; Future; Expected date: 06/17/2019  -     Hemoglobin A1c; Future; Expected date: 06/17/2019    Elevated uric acid in blood    Encounter for screening for malignant neoplasm of colon  -     Case request GI: COLONOSCOPY

## 2019-08-30 DIAGNOSIS — I15.2 HYPERTENSION ASSOCIATED WITH DIABETES: ICD-10-CM

## 2019-08-30 DIAGNOSIS — E11.59 HYPERTENSION ASSOCIATED WITH DIABETES: ICD-10-CM

## 2019-11-07 ENCOUNTER — TELEPHONE (OUTPATIENT)
Dept: FAMILY MEDICINE | Facility: CLINIC | Age: 74
End: 2019-11-07

## 2019-11-26 NOTE — TELEPHONE ENCOUNTER
----- Message from Maria Del Carmen Dale sent at 11/26/2019 11:21 AM CST -----  Contact: Krupa Ngo   [phone]    Patient needs an order for diabetic test strips sent to [facility].

## 2020-02-04 ENCOUNTER — TELEPHONE (OUTPATIENT)
Dept: FAMILY MEDICINE | Facility: CLINIC | Age: 75
End: 2020-02-04

## 2020-02-04 NOTE — TELEPHONE ENCOUNTER
----- Message from Estrada Patten sent at 2/4/2020 11:28 AM CST -----  Type:  Sooner Apoointment Request    Caller is requesting a sooner appointment.  Caller declined first available appointment listed below.  Caller will not accept being placed on the waitlist and is requesting a message be sent to doctor.  Name of Caller:  Candelaria , wife   When is the first available appointment? 03/02   Symptoms: annual , diabetes   Would the patient rather a call back or a response via [facility]?  Call back   Best Call Back Number: [phone]   Additional Information:

## 2020-02-04 NOTE — TELEPHONE ENCOUNTER
MILIND I spoke with the pt wife - she was requesting annual office visit to discuss DM    He is scheduled to see dr oakley 2/27/2020   Pt wife states his bld sugar has been elevated again   She will continue to monitor at home  Pt will have labs drawn that were previously ordered  June 2019 to include  A1c, cbc, cmp, lipid    I advised if lab work is alarming then dr oakley will have us reach out to the pt sooner then the scheduled appt

## 2020-02-10 ENCOUNTER — LAB VISIT (OUTPATIENT)
Dept: LAB | Facility: HOSPITAL | Age: 75
End: 2020-02-10
Attending: FAMILY MEDICINE
Payer: MEDICARE

## 2020-02-10 DIAGNOSIS — E11.9 DIABETIC EYE EXAM: ICD-10-CM

## 2020-02-10 DIAGNOSIS — E11.59 HYPERTENSION ASSOCIATED WITH DIABETES: ICD-10-CM

## 2020-02-10 DIAGNOSIS — Z01.00 DIABETIC EYE EXAM: ICD-10-CM

## 2020-02-10 DIAGNOSIS — K59.00 CONSTIPATION, UNSPECIFIED CONSTIPATION TYPE: ICD-10-CM

## 2020-02-10 DIAGNOSIS — I15.2 HYPERTENSION ASSOCIATED WITH DIABETES: ICD-10-CM

## 2020-02-10 LAB
ALBUMIN SERPL BCP-MCNC: 4.3 G/DL (ref 3.5–5.2)
ALP SERPL-CCNC: 42 U/L (ref 38–126)
ALT SERPL W/O P-5'-P-CCNC: 18 U/L (ref 10–44)
ANION GAP SERPL CALC-SCNC: 10 MMOL/L (ref 8–16)
AST SERPL-CCNC: 26 U/L (ref 15–46)
BASOPHILS # BLD AUTO: 0.06 K/UL (ref 0–0.2)
BASOPHILS NFR BLD: 1.3 % (ref 0–1.9)
BILIRUB SERPL-MCNC: 0.7 MG/DL (ref 0.1–1)
BUN SERPL-MCNC: 27 MG/DL (ref 2–20)
CALCIUM SERPL-MCNC: 9.3 MG/DL (ref 8.7–10.5)
CHLORIDE SERPL-SCNC: 100 MMOL/L (ref 95–110)
CHOLEST SERPL-MCNC: 150 MG/DL (ref 120–199)
CHOLEST/HDLC SERPL: 6 {RATIO} (ref 2–5)
CO2 SERPL-SCNC: 26 MMOL/L (ref 23–29)
CREAT SERPL-MCNC: 1.07 MG/DL (ref 0.5–1.4)
DIFFERENTIAL METHOD: ABNORMAL
EOSINOPHIL # BLD AUTO: 0.2 K/UL (ref 0–0.5)
EOSINOPHIL NFR BLD: 3.3 % (ref 0–8)
ERYTHROCYTE [DISTWIDTH] IN BLOOD BY AUTOMATED COUNT: 13.6 % (ref 11.5–14.5)
EST. GFR  (AFRICAN AMERICAN): >60 ML/MIN/1.73 M^2
EST. GFR  (NON AFRICAN AMERICAN): >60 ML/MIN/1.73 M^2
ESTIMATED AVG GLUCOSE: 306 MG/DL (ref 68–131)
GLUCOSE SERPL-MCNC: 271 MG/DL (ref 70–110)
HBA1C MFR BLD HPLC: 12.3 % (ref 4–5.6)
HCT VFR BLD AUTO: 47.2 % (ref 40–54)
HDLC SERPL-MCNC: 25 MG/DL (ref 40–75)
HDLC SERPL: 16.7 % (ref 20–50)
HGB BLD-MCNC: 15.2 G/DL (ref 14–18)
IMM GRANULOCYTES # BLD AUTO: 0.04 K/UL (ref 0–0.04)
IMM GRANULOCYTES NFR BLD AUTO: 0.8 % (ref 0–0.5)
LDLC SERPL CALC-MCNC: 69.4 MG/DL (ref 63–159)
LYMPHOCYTES # BLD AUTO: 1.4 K/UL (ref 1–4.8)
LYMPHOCYTES NFR BLD: 28.5 % (ref 18–48)
MCH RBC QN AUTO: 29.7 PG (ref 27–31)
MCHC RBC AUTO-ENTMCNC: 32.2 G/DL (ref 32–36)
MCV RBC AUTO: 92 FL (ref 82–98)
MONOCYTES # BLD AUTO: 0.6 K/UL (ref 0.3–1)
MONOCYTES NFR BLD: 11.7 % (ref 4–15)
NEUTROPHILS # BLD AUTO: 2.6 K/UL (ref 1.8–7.7)
NEUTROPHILS NFR BLD: 54.4 % (ref 38–73)
NONHDLC SERPL-MCNC: 125 MG/DL
NRBC BLD-RTO: 0 /100 WBC
PLATELET # BLD AUTO: 211 K/UL (ref 150–350)
PMV BLD AUTO: 10.8 FL (ref 9.2–12.9)
POTASSIUM SERPL-SCNC: 4.9 MMOL/L (ref 3.5–5.1)
PROT SERPL-MCNC: 7.5 G/DL (ref 6–8.4)
RBC # BLD AUTO: 5.12 M/UL (ref 4.6–6.2)
SODIUM SERPL-SCNC: 136 MMOL/L (ref 136–145)
TRIGL SERPL-MCNC: 278 MG/DL (ref 30–150)
WBC # BLD AUTO: 4.8 K/UL (ref 3.9–12.7)

## 2020-02-10 PROCEDURE — 83036 HEMOGLOBIN GLYCOSYLATED A1C: CPT | Mod: HCNC

## 2020-02-10 PROCEDURE — 80061 LIPID PANEL: CPT | Mod: HCNC

## 2020-02-10 PROCEDURE — 80053 COMPREHEN METABOLIC PANEL: CPT | Mod: HCNC,PO

## 2020-02-10 PROCEDURE — 85025 COMPLETE CBC W/AUTO DIFF WBC: CPT | Mod: HCNC,PO

## 2020-02-10 PROCEDURE — 36415 COLL VENOUS BLD VENIPUNCTURE: CPT | Mod: HCNC,PO

## 2020-02-11 NOTE — TELEPHONE ENCOUNTER
Lab work has gotten out of control with severely elevated hemoglobin A1c.    I would like you to see the endocrinologist.  I will put in the referral    In the meantime I suggest using a medication called Trulicity which is injected once a week in to your skin   prescription pending

## 2020-02-11 NOTE — TELEPHONE ENCOUNTER
I spoke with the pt   And his wife    Please send the trulicity to [facility]     He will await scheduling to call him to set up appt with endo    I scheduled him to come in tomorrow for nurse visit so I can demonstrate how to administer it

## 2020-02-12 ENCOUNTER — CLINICAL SUPPORT (OUTPATIENT)
Dept: FAMILY MEDICINE | Facility: CLINIC | Age: 75
End: 2020-02-12
Payer: MEDICARE

## 2020-02-12 NOTE — PROGRESS NOTES
Pt here with his wife to discuss how to administer trulicity   I demonstrated and the pt wife administered.  Pt and wife states that they understand how and when to administer    She will call me with any questions or concerns

## 2020-02-13 ENCOUNTER — PATIENT OUTREACH (OUTPATIENT)
Dept: ADMINISTRATIVE | Facility: HOSPITAL | Age: 75
End: 2020-02-13

## 2020-02-27 ENCOUNTER — OFFICE VISIT (OUTPATIENT)
Dept: FAMILY MEDICINE | Facility: CLINIC | Age: 75
End: 2020-02-27
Payer: MEDICARE

## 2020-02-27 VITALS
HEIGHT: 69 IN | SYSTOLIC BLOOD PRESSURE: 130 MMHG | BODY MASS INDEX: 28.75 KG/M2 | OXYGEN SATURATION: 95 % | TEMPERATURE: 98 F | HEART RATE: 67 BPM | DIASTOLIC BLOOD PRESSURE: 78 MMHG | WEIGHT: 194.13 LBS

## 2020-02-27 DIAGNOSIS — I27.20 PULMONARY HTN: ICD-10-CM

## 2020-02-27 DIAGNOSIS — Z00.00 ROUTINE HEALTH MAINTENANCE: Primary | ICD-10-CM

## 2020-02-27 DIAGNOSIS — I15.2 HYPERTENSION ASSOCIATED WITH DIABETES: ICD-10-CM

## 2020-02-27 DIAGNOSIS — K63.5 COLORECTAL POLYPS: ICD-10-CM

## 2020-02-27 DIAGNOSIS — K59.00 CONSTIPATION, UNSPECIFIED CONSTIPATION TYPE: ICD-10-CM

## 2020-02-27 DIAGNOSIS — I70.0 ABDOMINAL AORTIC ATHEROSCLEROSIS: ICD-10-CM

## 2020-02-27 DIAGNOSIS — K62.1 COLORECTAL POLYPS: ICD-10-CM

## 2020-02-27 DIAGNOSIS — E79.0 ELEVATED URIC ACID IN BLOOD: ICD-10-CM

## 2020-02-27 DIAGNOSIS — Z12.11 SCREENING FOR COLON CANCER: ICD-10-CM

## 2020-02-27 DIAGNOSIS — E11.59 HYPERTENSION ASSOCIATED WITH DIABETES: ICD-10-CM

## 2020-02-27 DIAGNOSIS — Z86.010 HISTORY OF COLON POLYPS: ICD-10-CM

## 2020-02-27 PROCEDURE — 3078F PR MOST RECENT DIASTOLIC BLOOD PRESSURE < 80 MM HG: ICD-10-PCS | Mod: CPTII,S$GLB,, | Performed by: FAMILY MEDICINE

## 2020-02-27 PROCEDURE — 99397 PR PREVENTIVE VISIT,EST,65 & OVER: ICD-10-PCS | Mod: S$GLB,,, | Performed by: FAMILY MEDICINE

## 2020-02-27 PROCEDURE — 99499 UNLISTED E&M SERVICE: CPT | Mod: S$GLB,,, | Performed by: FAMILY MEDICINE

## 2020-02-27 PROCEDURE — 3078F DIAST BP <80 MM HG: CPT | Mod: CPTII,S$GLB,, | Performed by: FAMILY MEDICINE

## 2020-02-27 PROCEDURE — 99397 PER PM REEVAL EST PAT 65+ YR: CPT | Mod: S$GLB,,, | Performed by: FAMILY MEDICINE

## 2020-02-27 PROCEDURE — 3046F PR MOST RECENT HEMOGLOBIN A1C LEVEL > 9.0%: ICD-10-PCS | Mod: CPTII,S$GLB,, | Performed by: FAMILY MEDICINE

## 2020-02-27 PROCEDURE — 3046F HEMOGLOBIN A1C LEVEL >9.0%: CPT | Mod: CPTII,S$GLB,, | Performed by: FAMILY MEDICINE

## 2020-02-27 PROCEDURE — 3075F SYST BP GE 130 - 139MM HG: CPT | Mod: CPTII,S$GLB,, | Performed by: FAMILY MEDICINE

## 2020-02-27 PROCEDURE — 99499 RISK ADDL DX/OHS AUDIT: ICD-10-PCS | Mod: S$GLB,,, | Performed by: FAMILY MEDICINE

## 2020-02-27 PROCEDURE — 3075F PR MOST RECENT SYSTOLIC BLOOD PRESS GE 130-139MM HG: ICD-10-PCS | Mod: CPTII,S$GLB,, | Performed by: FAMILY MEDICINE

## 2020-02-27 RX ORDER — TRAZODONE HYDROCHLORIDE 50 MG/1
TABLET ORAL
Qty: 60 TABLET | Refills: 1 | Status: SHIPPED | OUTPATIENT
Start: 2020-02-27 | End: 2020-11-14

## 2020-02-27 NOTE — PROGRESS NOTES
" Patient ID: Jin Ngo is a 74 y.o. male.    Chief Complaint: Annual Exam    HPI      Jin Ngo is a 74 y.o. male. here for annual exam.   Follow-up for hypertension history of TIA coronary disease lipidemia diabetes and elevated triglycerides.  Patient with poor sleep-wake cycle.  Has not been able to get in a rhythm.  Sleeps just a few hours each night wakes and then takes naps during the day.  Does not do any physical exercise.        Review of Symptoms    Constitutional: Negative.    HENT: Negative.    Eyes: Negative.    Respiratory: Negative.    Cardiovascular: Negative.    Gastrointestinal: Negative.    Endocrine: Negative.    Genitourinary: Negative.    Musculoskeletal: Negative.    Skin: Negative.    Allergic/Immunologic: Negative.    Neurological: Negative.    Hematological: Negative.    Psychiatric/Behavioral: Negative.      Except as above in HPI      Vitals:    02/27/20 0852   BP: 130/78   Pulse: 67   Temp: 98.1 °F (36.7 °C)   SpO2: 95%   Weight: 88 kg (194 lb 1.8 oz)   Height: 5' 9" (1.753 m)        Physical  Exam      Constitutional:  Oriented to person, place, and time. Appears well-developed and well-nourished.     HENT:   Head: Normocephalic and atraumatic.     Right Ear: Tympanic membrane, ear canal and External ear normal     Left Ear: Tympanic membrane, ear canal and External ear normal     Nose: Nose normal. No rhinorrhea or nasal deformity.     Mouth/Throat: Uvula is midline, oropharynx is clear and moist and mucous membranes are normal.      Eyes: Conjunctivae are normal. Right eye exhibits no discharge. Left eye exhibits no discharge. No scleral icterus.     Neck:  No JVD present. No tracheal deviation  [x]  Neck supple.   [x]  No Carotid bruit    Cardiovascular:  Regular rate and rhythm with normal S1 and S2     Pulmonary/Chest:   Clear to auscultation bilaterally without wheezes, rhonchi or rales    Musculoskeletal: Normal range of motion. No edema or tenderness.   No deformity "     Lymphadenopathy:  No cervical adenopathy.     Neurological:  Alert and oriented to person, place, and time. Coordination normal.     Skin: Skin is warm and dry. No rash noted.     Psychiatric: Normal mood and affect. Speech is normal and behavior is normal. Judgment and thought content normal.     Foot exam done-full sensation no ulcerations few areas of callus    Complete Blood Count  Lab Results   Component Value Date    RBC 5.12 02/10/2020    HGB 15.2 02/10/2020    HCT 47.2 02/10/2020    MCV 92 02/10/2020    MCH 29.7 02/10/2020    MCHC 32.2 02/10/2020    RDW 13.6 02/10/2020     02/10/2020    MPV 10.8 02/10/2020    GRAN 2.6 02/10/2020    GRAN 54.4 02/10/2020    LYMPH 1.4 02/10/2020    LYMPH 28.5 02/10/2020    MONO 0.6 02/10/2020    MONO 11.7 02/10/2020    EOS 0.2 02/10/2020    BASO 0.06 02/10/2020    EOSINOPHIL 3.3 02/10/2020    BASOPHIL 1.3 02/10/2020    DIFFMETHOD Automated 02/10/2020       Comprehensive Metabolic Panel  Lab Results   Component Value Date     (H) 02/10/2020    BUN 27 (H) 02/10/2020    CREATININE 1.07 02/10/2020     02/10/2020    K 4.9 02/10/2020     02/10/2020    PROT 7.5 02/10/2020    ALBUMIN 4.3 02/10/2020    BILITOT 0.7 02/10/2020    AST 26 02/10/2020    ALKPHOS 42 02/10/2020    CO2 26 02/10/2020    ALT 18 02/10/2020    ANIONGAP 10 02/10/2020    EGFRNONAA >60.0 02/10/2020    ESTGFRAFRICA >60.0 02/10/2020       TSH  No results found for: TSH    Assessment / Plan:      ICD-10-CM ICD-9-CM   1. Routine health maintenance Z00.00 V70.0   2. Screening for colon cancer Z12.11 V76.51   3. Uncontrolled type 2 diabetes mellitus with diabetic neuropathy, without long-term current use of insulin E11.40 250.62    E11.65 357.2   4. Constipation, unspecified constipation type K59.00 564.00   5. Hypertension associated with diabetes E11.59 250.80    I10 401.9   6. Elevated uric acid in blood E79.0 790.6   7. Pulmonary HTN I27.20 416.8   8. Colorectal polyps K63.5 211.3   9.  History of colon polyps Z86.010 V12.72   10. Abdominal aortic atherosclerosis I70.0 440.0     Routine health maintenance    Screening for colon cancer  -     Case request GI: COLONOSCOPY    Uncontrolled type 2 diabetes mellitus with diabetic neuropathy, without long-term current use of insulin  -     Comprehensive metabolic panel; Future; Expected date: 02/27/2020  -     CBC auto differential; Future; Expected date: 02/27/2020  -     Lipid panel; Future; Expected date: 02/27/2020  -     Hemoglobin A1c; Future; Expected date: 02/27/2020    Constipation, unspecified constipation type    Hypertension associated with diabetes  -     Comprehensive metabolic panel; Future; Expected date: 02/27/2020  -     CBC auto differential; Future; Expected date: 02/27/2020  -     Lipid panel; Future; Expected date: 02/27/2020  -     Hemoglobin A1c; Future; Expected date: 02/27/2020    Elevated uric acid in blood  -     Uric acid; Future; Expected date: 02/27/2020    Pulmonary HTN  -     Comprehensive metabolic panel; Future; Expected date: 02/27/2020  -     CBC auto differential; Future; Expected date: 02/27/2020  -     Lipid panel; Future; Expected date: 02/27/2020  -     Hemoglobin A1c; Future; Expected date: 02/27/2020    Colorectal polyps  -     Case request GI: COLONOSCOPY    History of colon polyps    Abdominal aortic atherosclerosis    Other orders  -     traZODone (DESYREL) 50 MG tablet; Use one or two tablets nightly to help get sleep cycle started  Dispense: 60 tablet; Refill: 1          Discussed how to stay healthy including: diet, exercise, refraining from smoking and discussed screening exams / tests needed for age, sex and family Hx.

## 2020-03-04 ENCOUNTER — TELEPHONE (OUTPATIENT)
Dept: GASTROENTEROLOGY | Facility: CLINIC | Age: 75
End: 2020-03-04

## 2020-06-10 ENCOUNTER — TELEPHONE (OUTPATIENT)
Dept: GASTROENTEROLOGY | Facility: CLINIC | Age: 75
End: 2020-06-10

## 2020-06-30 ENCOUNTER — TELEPHONE (OUTPATIENT)
Dept: FAMILY MEDICINE | Facility: CLINIC | Age: 75
End: 2020-06-30

## 2020-06-30 ENCOUNTER — LAB VISIT (OUTPATIENT)
Dept: LAB | Facility: HOSPITAL | Age: 75
End: 2020-06-30
Attending: FAMILY MEDICINE
Payer: MEDICARE

## 2020-06-30 ENCOUNTER — PATIENT OUTREACH (OUTPATIENT)
Dept: ADMINISTRATIVE | Facility: OTHER | Age: 75
End: 2020-06-30

## 2020-06-30 DIAGNOSIS — E11.59 HYPERTENSION ASSOCIATED WITH DIABETES: ICD-10-CM

## 2020-06-30 DIAGNOSIS — E11.59 HYPERTENSION ASSOCIATED WITH DIABETES: Primary | ICD-10-CM

## 2020-06-30 DIAGNOSIS — I27.20 PULMONARY HTN: ICD-10-CM

## 2020-06-30 DIAGNOSIS — E79.0 ELEVATED URIC ACID IN BLOOD: ICD-10-CM

## 2020-06-30 DIAGNOSIS — R79.89 ELEVATED SERUM CREATININE: ICD-10-CM

## 2020-06-30 DIAGNOSIS — I15.2 HYPERTENSION ASSOCIATED WITH DIABETES: ICD-10-CM

## 2020-06-30 DIAGNOSIS — I15.2 HYPERTENSION ASSOCIATED WITH DIABETES: Primary | ICD-10-CM

## 2020-06-30 LAB
ALBUMIN SERPL BCP-MCNC: 4.5 G/DL (ref 3.5–5.2)
ALP SERPL-CCNC: 35 U/L (ref 38–126)
ALT SERPL W/O P-5'-P-CCNC: 20 U/L (ref 10–44)
ANION GAP SERPL CALC-SCNC: 14 MMOL/L (ref 8–16)
AST SERPL-CCNC: 26 U/L (ref 15–46)
BASOPHILS # BLD AUTO: 0.06 K/UL (ref 0–0.2)
BASOPHILS NFR BLD: 1 % (ref 0–1.9)
BILIRUB SERPL-MCNC: 0.5 MG/DL (ref 0.1–1)
BUN SERPL-MCNC: 35 MG/DL (ref 2–20)
CALCIUM SERPL-MCNC: 9.3 MG/DL (ref 8.7–10.5)
CHLORIDE SERPL-SCNC: 101 MMOL/L (ref 95–110)
CHOLEST SERPL-MCNC: 117 MG/DL (ref 120–199)
CHOLEST/HDLC SERPL: 4.3 {RATIO} (ref 2–5)
CO2 SERPL-SCNC: 26 MMOL/L (ref 23–29)
CREAT SERPL-MCNC: 1.62 MG/DL (ref 0.5–1.4)
DIFFERENTIAL METHOD: NORMAL
EOSINOPHIL # BLD AUTO: 0.3 K/UL (ref 0–0.5)
EOSINOPHIL NFR BLD: 4 % (ref 0–8)
ERYTHROCYTE [DISTWIDTH] IN BLOOD BY AUTOMATED COUNT: 13.2 % (ref 11.5–14.5)
EST. GFR  (AFRICAN AMERICAN): 47.6 ML/MIN/1.73 M^2
EST. GFR  (NON AFRICAN AMERICAN): 41.2 ML/MIN/1.73 M^2
ESTIMATED AVG GLUCOSE: 177 MG/DL (ref 68–131)
GLUCOSE SERPL-MCNC: 145 MG/DL (ref 70–110)
HBA1C MFR BLD HPLC: 7.8 % (ref 4–5.6)
HCT VFR BLD AUTO: 44.6 % (ref 40–54)
HDLC SERPL-MCNC: 27 MG/DL (ref 40–75)
HDLC SERPL: 23.1 % (ref 20–50)
HGB BLD-MCNC: 14.7 G/DL (ref 14–18)
IMM GRANULOCYTES # BLD AUTO: 0.03 K/UL (ref 0–0.04)
IMM GRANULOCYTES NFR BLD AUTO: 0.5 % (ref 0–0.5)
LDLC SERPL CALC-MCNC: 60 MG/DL (ref 63–159)
LYMPHOCYTES # BLD AUTO: 1.9 K/UL (ref 1–4.8)
LYMPHOCYTES NFR BLD: 30.6 % (ref 18–48)
MCH RBC QN AUTO: 30.5 PG (ref 27–31)
MCHC RBC AUTO-ENTMCNC: 33 G/DL (ref 32–36)
MCV RBC AUTO: 93 FL (ref 82–98)
MONOCYTES # BLD AUTO: 0.8 K/UL (ref 0.3–1)
MONOCYTES NFR BLD: 12.5 % (ref 4–15)
NEUTROPHILS # BLD AUTO: 3.2 K/UL (ref 1.8–7.7)
NEUTROPHILS NFR BLD: 51.4 % (ref 38–73)
NONHDLC SERPL-MCNC: 90 MG/DL
NRBC BLD-RTO: 0 /100 WBC
PLATELET # BLD AUTO: 223 K/UL (ref 150–350)
PMV BLD AUTO: 10.6 FL (ref 9.2–12.9)
POTASSIUM SERPL-SCNC: 4.4 MMOL/L (ref 3.5–5.1)
PROT SERPL-MCNC: 7.7 G/DL (ref 6–8.4)
RBC # BLD AUTO: 4.82 M/UL (ref 4.6–6.2)
SODIUM SERPL-SCNC: 141 MMOL/L (ref 136–145)
TRIGL SERPL-MCNC: 150 MG/DL (ref 30–150)
URATE SERPL-MCNC: 4.6 MG/DL (ref 3.4–7)
WBC # BLD AUTO: 6.25 K/UL (ref 3.9–12.7)

## 2020-06-30 PROCEDURE — 80053 COMPREHEN METABOLIC PANEL: CPT | Mod: HCNC,PO

## 2020-06-30 PROCEDURE — 80061 LIPID PANEL: CPT | Mod: HCNC

## 2020-06-30 PROCEDURE — 84550 ASSAY OF BLOOD/URIC ACID: CPT | Mod: HCNC

## 2020-06-30 PROCEDURE — 85025 COMPLETE CBC W/AUTO DIFF WBC: CPT | Mod: HCNC,PO

## 2020-06-30 PROCEDURE — 36415 COLL VENOUS BLD VENIPUNCTURE: CPT | Mod: HCNC,PO

## 2020-06-30 PROCEDURE — 83036 HEMOGLOBIN GLYCOSYLATED A1C: CPT | Mod: HCNC

## 2020-06-30 NOTE — PROGRESS NOTES
Subjective:      Patient ID: Jin Ngo is a 74 y.o. male.    Chief Complaint:  Diabetes      History of Present Illness  Here for an appointment for type 2 diabetes management that is uncontrolled and complicated by neuropathy. This was diagnosed 2 years ago. Also diagnosed with hypertension, TIA./CAD, and hyperlipidemia.    Currently denies nocturia, polyuria, unexplained weight loss or blurred vision. Reports back pain complicated by sciatica, unable to walk too much. Not taking NSAIDs.     Diabetes medications include:  Trulicity 0.75 mg weekly --> expensive $162/month --> too expensive  Metformin --> denies side effects. Thinks may have had TIAs while taking metformin.     Denies any side effects.   Denies any difficulties with injections or sites of injections   Denies hypoglycemic episodes or symptoms.      Diabetes complications:  Last eye evaluation 1 year ago. Cataract removed, vision is very good.   Denies numbness, tingling sensation.  Symptomatic CAD or CVD. Denies history of kidney disease but recent labs demonstrate a higher creat 1.62.   Last urine test none  Denies hospitalizations for hyper- or hypo- glycemia.    Diabetes education: up to date    Exercise: no formal exercise    Review of Systems   Constitutional: Negative for chills and fever.   HENT: Negative for congestion and sinus pressure.    Eyes: Negative for visual disturbance.   Respiratory: Negative for chest tightness and shortness of breath.    Cardiovascular: Negative for chest pain and palpitations.   Gastrointestinal: Negative for abdominal distention, diarrhea, nausea and vomiting.   Genitourinary: Negative for dysuria and flank pain.   Musculoskeletal: Negative for back pain.   Skin: Negative for rash.   Neurological: Negative for weakness.   Hematological: Does not bruise/bleed easily.   Psychiatric/Behavioral: Negative for sleep disturbance.       Objective:   Physical Exam  Constitutional:       General: He is not in acute  "distress.     Appearance: He is well-developed.   HENT:      Head: Normocephalic and atraumatic.      Nose: Nose normal.      Mouth/Throat:      Pharynx: No oropharyngeal exudate.   Eyes:      General: No scleral icterus.     Conjunctiva/sclera: Conjunctivae normal.      Pupils: Pupils are equal, round, and reactive to light.   Neck:      Musculoskeletal: Normal range of motion and neck supple.      Thyroid: No thyromegaly.   Cardiovascular:      Rate and Rhythm: Normal rate and regular rhythm.      Heart sounds: Normal heart sounds.   Pulmonary:      Effort: Pulmonary effort is normal.      Breath sounds: Normal breath sounds.   Abdominal:      General: Bowel sounds are normal. There is no distension.      Palpations: Abdomen is soft.      Comments: Normal sites of insulin administration.   Musculoskeletal: Normal range of motion.         General: No tenderness.   Lymphadenopathy:      Cervical: No cervical adenopathy.   Skin:     General: Skin is warm and dry.      Comments: FOOT EXAM:  Visual inspection reveals no abrasions, bruises or calluses.  Vibratory sense is intact b/l.  Microfilament test is intact b/l.  Distal pulses present b/l.   Neurological:      Mental Status: He is alert and oriented to person, place, and time.      Deep Tendon Reflexes: Reflexes are normal and symmetric.       Vitals:    07/01/20 0958   BP: 126/74   Pulse: (!) 55   Height: 5' 9" (1.753 m)       BP Readings from Last 3 Encounters:   07/01/20 126/74   02/27/20 130/78   06/17/19 132/74     Wt Readings from Last 1 Encounters:   02/27/20 0852 88 kg (194 lb 1.8 oz)         Body mass index is 28.67 kg/m².    Lab Review:   Lab Results   Component Value Date    HGBA1C 7.8 (H) 06/30/2020     Lab Results   Component Value Date    CHOL 117 (L) 06/30/2020    HDL 27 (L) 06/30/2020    LDLCALC 60.0 (L) 06/30/2020    TRIG 150 06/30/2020    CHOLHDL 23.1 06/30/2020     Lab Results   Component Value Date     06/30/2020    K 4.4 06/30/2020    CL " 101 06/30/2020    CO2 26 06/30/2020     (H) 06/30/2020    BUN 35 (H) 06/30/2020    CREATININE 1.62 (H) 06/30/2020    CALCIUM 9.3 06/30/2020    PROT 7.7 06/30/2020    ALBUMIN 4.5 06/30/2020    BILITOT 0.5 06/30/2020    ALKPHOS 35 (L) 06/30/2020    AST 26 06/30/2020    ALT 20 06/30/2020    ANIONGAP 14 06/30/2020    ESTGFRAFRICA 47.6 (A) 06/30/2020    EGFRNONAA 41.2 (A) 06/30/2020    TSH 2.140 05/21/2019         Assessment and Plan     Uncontrolled type 2 diabetes mellitus with diabetic neuropathy, without long-term current use of insulin  Try ozempic has better CAD data and outcomes  Cost is an issue    Metformin written as allergy and written to cause stroke, which is extremely unusual and unclear to me if it really is the cause.   However his recent kidney function is borderline and would not be able to start a full dose of metformin.     Will switch to different GLP 1 agonist to see if cost is better. Otherwise consider oral agents: DPP4 inhibitor or SGLT 2 inhibitor     F/u in three months with NP     Goal A1C for age and comorbidities: 7 - 7.5%.    ANGELIC (acute kidney injury)  No new medications, avoids NSAIDs  Blood pressure at home   No supplements    Repeat labs in one week

## 2020-07-01 ENCOUNTER — OFFICE VISIT (OUTPATIENT)
Dept: ENDOCRINOLOGY | Facility: CLINIC | Age: 75
End: 2020-07-01
Payer: MEDICARE

## 2020-07-01 VITALS
HEART RATE: 55 BPM | HEIGHT: 69 IN | SYSTOLIC BLOOD PRESSURE: 126 MMHG | BODY MASS INDEX: 28.67 KG/M2 | DIASTOLIC BLOOD PRESSURE: 74 MMHG

## 2020-07-01 DIAGNOSIS — N17.9 ACUTE KIDNEY INJURY: Primary | ICD-10-CM

## 2020-07-01 DIAGNOSIS — N17.9 AKI (ACUTE KIDNEY INJURY): ICD-10-CM

## 2020-07-01 PROCEDURE — 99499 RISK ADDL DX/OHS AUDIT: ICD-10-PCS | Mod: HCNC,S$GLB,, | Performed by: INTERNAL MEDICINE

## 2020-07-01 PROCEDURE — 99204 OFFICE O/P NEW MOD 45 MIN: CPT | Mod: HCNC,S$GLB,, | Performed by: INTERNAL MEDICINE

## 2020-07-01 PROCEDURE — 99499 UNLISTED E&M SERVICE: CPT | Mod: HCNC,S$GLB,, | Performed by: INTERNAL MEDICINE

## 2020-07-01 PROCEDURE — 99204 PR OFFICE/OUTPT VISIT, NEW, LEVL IV, 45-59 MIN: ICD-10-PCS | Mod: HCNC,S$GLB,, | Performed by: INTERNAL MEDICINE

## 2020-07-01 PROCEDURE — 1159F MED LIST DOCD IN RCRD: CPT | Mod: HCNC,S$GLB,, | Performed by: INTERNAL MEDICINE

## 2020-07-01 PROCEDURE — 3078F DIAST BP <80 MM HG: CPT | Mod: HCNC,CPTII,S$GLB, | Performed by: INTERNAL MEDICINE

## 2020-07-01 PROCEDURE — 3074F SYST BP LT 130 MM HG: CPT | Mod: HCNC,CPTII,S$GLB, | Performed by: INTERNAL MEDICINE

## 2020-07-01 PROCEDURE — 3051F PR MOST RECENT HEMOGLOBIN A1C LEVEL 7.0 - < 8.0%: ICD-10-PCS | Mod: HCNC,CPTII,S$GLB, | Performed by: INTERNAL MEDICINE

## 2020-07-01 PROCEDURE — 1101F PT FALLS ASSESS-DOCD LE1/YR: CPT | Mod: HCNC,CPTII,S$GLB, | Performed by: INTERNAL MEDICINE

## 2020-07-01 PROCEDURE — 99999 PR PBB SHADOW E&M-EST. PATIENT-LVL III: ICD-10-PCS | Mod: PBBFAC,HCNC,, | Performed by: INTERNAL MEDICINE

## 2020-07-01 PROCEDURE — 99999 PR PBB SHADOW E&M-EST. PATIENT-LVL III: CPT | Mod: PBBFAC,HCNC,, | Performed by: INTERNAL MEDICINE

## 2020-07-01 PROCEDURE — 3008F BODY MASS INDEX DOCD: CPT | Mod: HCNC,CPTII,S$GLB, | Performed by: INTERNAL MEDICINE

## 2020-07-01 PROCEDURE — 3051F HG A1C>EQUAL 7.0%<8.0%: CPT | Mod: HCNC,CPTII,S$GLB, | Performed by: INTERNAL MEDICINE

## 2020-07-01 PROCEDURE — 1126F PR PAIN SEVERITY QUANTIFIED, NO PAIN PRESENT: ICD-10-PCS | Mod: HCNC,S$GLB,, | Performed by: INTERNAL MEDICINE

## 2020-07-01 PROCEDURE — 3074F PR MOST RECENT SYSTOLIC BLOOD PRESSURE < 130 MM HG: ICD-10-PCS | Mod: HCNC,CPTII,S$GLB, | Performed by: INTERNAL MEDICINE

## 2020-07-01 PROCEDURE — 1101F PR PT FALLS ASSESS DOC 0-1 FALLS W/OUT INJ PAST YR: ICD-10-PCS | Mod: HCNC,CPTII,S$GLB, | Performed by: INTERNAL MEDICINE

## 2020-07-01 PROCEDURE — 3078F PR MOST RECENT DIASTOLIC BLOOD PRESSURE < 80 MM HG: ICD-10-PCS | Mod: HCNC,CPTII,S$GLB, | Performed by: INTERNAL MEDICINE

## 2020-07-01 PROCEDURE — 1159F PR MEDICATION LIST DOCUMENTED IN MEDICAL RECORD: ICD-10-PCS | Mod: HCNC,S$GLB,, | Performed by: INTERNAL MEDICINE

## 2020-07-01 PROCEDURE — 3008F PR BODY MASS INDEX (BMI) DOCUMENTED: ICD-10-PCS | Mod: HCNC,CPTII,S$GLB, | Performed by: INTERNAL MEDICINE

## 2020-07-01 PROCEDURE — 1126F AMNT PAIN NOTED NONE PRSNT: CPT | Mod: HCNC,S$GLB,, | Performed by: INTERNAL MEDICINE

## 2020-07-01 NOTE — TELEPHONE ENCOUNTER
Your labs are very good   Your Ha1c has come down sigmoidally   From 12.3 to 7.8  That is incredible    Great job  Your cr a function of your kidney is elevated however   I would like to repeat this in one week    Drink plenty of water    .  Lab work ordered

## 2020-07-01 NOTE — ASSESSMENT & PLAN NOTE
No new medications, avoids NSAIDs  Blood pressure at home   No supplements    Repeat labs in one week

## 2020-07-01 NOTE — ASSESSMENT & PLAN NOTE
Try ozempic has better CAD data and outcomes  Cost is an issue    Metformin written as allergy and written to cause stroke, which is extremely unusual and unclear to me if it really is the cause.   However his recent kidney function is borderline and would not be able to start a full dose of metformin.     Will switch to different GLP 1 agonist to see if cost is better. Otherwise consider oral agents: DPP4 inhibitor or SGLT 2 inhibitor     F/u in three months with NP     Goal A1C for age and comorbidities: 7 - 7.5%.

## 2020-07-01 NOTE — LETTER
July 1, 2020      Sam Barroso MD  [address] [facility] [address]           [facility] Endocrinology [address] [place]  [address]  [facility] [address]  Phone: [phone]  Fax: [phone]          Patient: Jin Ngo   MR Number: [id number]   YOB: 1945   Date of Visit: 7/1/2020       Dear Dr. Sam Barroso:    Thank you for referring Jin Ngo to me for evaluation. Attached you will find relevant portions of my assessment and plan of care.    If you have questions, please do not hesitate to call me. I look forward to following Jin Ngo along with you.    Sincerely,    Madelin Diaz MD    Enclosure  CC:  No Recipients    If you would like to receive this communication electronically, please contact [email or web address] or [phone] to request more information on [employer] Link access.    For providers and/or their staff who would like to refer a patient to [facility], please contact us through our one-stop-shop provider referral line, [facility], at [phone].    If you feel you have received this communication in error or would no longer like to receive these types of communications, please e-mail [email or web address]

## 2020-07-01 NOTE — PROGRESS NOTES
Requested updates within Care Everywhere.  Patient's chart was reviewed for overdue JD topics.  Immunizations reconciled.

## 2020-07-06 NOTE — TELEPHONE ENCOUNTER
I called and notified the pt and wife    The nephrologist ordered bmp and scheduled for Wednesday

## 2020-07-08 ENCOUNTER — LAB VISIT (OUTPATIENT)
Dept: LAB | Facility: HOSPITAL | Age: 75
End: 2020-07-08
Attending: FAMILY MEDICINE
Payer: MEDICARE

## 2020-07-08 DIAGNOSIS — N17.9 ACUTE KIDNEY INJURY: ICD-10-CM

## 2020-07-08 LAB
ANION GAP SERPL CALC-SCNC: 11 MMOL/L (ref 8–16)
BUN SERPL-MCNC: 45 MG/DL (ref 2–20)
CALCIUM SERPL-MCNC: 9.6 MG/DL (ref 8.7–10.5)
CHLORIDE SERPL-SCNC: 103 MMOL/L (ref 95–110)
CO2 SERPL-SCNC: 24 MMOL/L (ref 23–29)
CREAT SERPL-MCNC: 1.57 MG/DL (ref 0.5–1.4)
EST. GFR  (AFRICAN AMERICAN): 49.5 ML/MIN/1.73 M^2
EST. GFR  (NON AFRICAN AMERICAN): 42.8 ML/MIN/1.73 M^2
GLUCOSE SERPL-MCNC: 187 MG/DL (ref 70–110)
POTASSIUM SERPL-SCNC: 4.9 MMOL/L (ref 3.5–5.1)
SODIUM SERPL-SCNC: 138 MMOL/L (ref 136–145)

## 2020-07-08 PROCEDURE — 80048 BASIC METABOLIC PNL TOTAL CA: CPT | Mod: HCNC,PO

## 2020-07-08 PROCEDURE — 36415 COLL VENOUS BLD VENIPUNCTURE: CPT | Mod: HCNC,PO

## 2020-07-13 ENCOUNTER — TELEPHONE (OUTPATIENT)
Dept: GASTROENTEROLOGY | Facility: CLINIC | Age: 75
End: 2020-07-13

## 2020-07-13 NOTE — TELEPHONE ENCOUNTER
I called patient to schedule colonoscopy. There was no answer. Phone just rang and hung up after awhile.

## 2020-07-16 RX ORDER — ALLOPURINOL 300 MG/1
TABLET ORAL
Qty: 90 TABLET | Refills: 3 | Status: SHIPPED | OUTPATIENT
Start: 2020-07-16 | End: 2021-06-11

## 2020-07-16 RX ORDER — PRAVASTATIN SODIUM 40 MG/1
80 TABLET ORAL DAILY
Qty: 180 TABLET | Refills: 3 | Status: SHIPPED | OUTPATIENT
Start: 2020-07-16 | End: 2021-06-11

## 2020-07-16 RX ORDER — LISINOPRIL 20 MG/1
TABLET ORAL
Qty: 180 TABLET | Refills: 3 | Status: SHIPPED | OUTPATIENT
Start: 2020-07-16 | End: 2021-06-11

## 2020-07-16 RX ORDER — METOPROLOL TARTRATE 25 MG/1
TABLET, FILM COATED ORAL
Qty: 180 TABLET | Refills: 3 | Status: SHIPPED | OUTPATIENT
Start: 2020-07-16 | End: 2021-06-11

## 2020-07-16 RX ORDER — CLOPIDOGREL BISULFATE 75 MG/1
TABLET ORAL
Qty: 90 TABLET | Refills: 3 | Status: SHIPPED | OUTPATIENT
Start: 2020-07-16 | End: 2021-06-11

## 2020-07-16 RX ORDER — FENOFIBRATE 160 MG/1
TABLET ORAL
Qty: 90 TABLET | Refills: 3 | Status: SHIPPED | OUTPATIENT
Start: 2020-07-16 | End: 2021-06-11

## 2020-07-20 ENCOUNTER — TELEPHONE (OUTPATIENT)
Dept: ENDOCRINOLOGY | Facility: CLINIC | Age: 75
End: 2020-07-20

## 2020-07-20 NOTE — TELEPHONE ENCOUNTER
----- Message from Adriana Dozier sent at 7/14/2020  4:39 PM CDT -----  Regarding: Medication Directions Update  Contact: Pharmacy  Pharmacy called regarding injection medication:   semaglutide (OZEMPIC) 0.25 mg or 0.5 mg(2 mg/1.5 mL) PnIj    Needs directions as there are two attached to the current script - conflict    Callback:   [facility] - [place], [place] - [address]  [address]  [facility] [address]  Phone: [phone] Fax: [phone]

## 2020-07-30 ENCOUNTER — TELEPHONE (OUTPATIENT)
Dept: GASTROENTEROLOGY | Facility: CLINIC | Age: 75
End: 2020-07-30

## 2020-08-27 NOTE — PATIENT INSTRUCTIONS
Counseling and Referral of Other Preventative  (Italic type indicates deductible and co-insurance are waived)    Patient Name: Jin Ngo  Today's Date: 3/8/2017      SERVICE LIMITATIONS RECOMMENDATION    Vaccines    · Pneumococcal (once after 65)    · Influenza (annually)    · Hepatitis B (if medium/high risk)    · Prevnar 13      Hepatitis B medium/high risk factors:       - End-stage renal disease       - Hemophiliacs who received Factor VII or         IX concentrates       - Clients of institutions for the mentally             retarded       - Persons who live in the same house as          a HepB carrier       - Homosexual men       - Illicit injectable drug abusers     Pneumococcal: Done, no repeat necessary     Influenza: Recommended to patient, declined     Hepatitis B: N/A Not recommended     Prevnar 13: Done, repeat at next scheduled date    Prostate cancer screening (annually to age 75)     Prostate specific antigen (PSA) Shared decision making with Provider. Sometimes a co-pay may be required if the patient decides to have this test. The USPSTF no longer recommends prostate cancer screening routinely in medicine: every 1 year    Colorectal cancer screening (to age 75)    · Fecal occult blood test (annual)  · Flexible sigmoidoscopy (5y)  · Screening colonoscopy (10y)  · Barium enema   Last done 03/22/16, recommend to repeat every 10  years    Diabetes self-management training (no USPSTF recommendations)  Requires referral by treating physician for patient with diabetes or renal disease. 10 hours of initial DSMT sessions of no less than 30 minutes each in a continuous 12-month period. 2 hours of follow-up DSMT in subsequent years.  Scheduled, see appointments    Glaucoma screening (no USPSTF recommendation)  Diabetes mellitus, family history   , age 50 or over    American, age 65 or over  Scheduled, see appointments    Medical nutrition therapy for diabetes or renal disease (no  recommended schedule)  Requires referral by treating physician for patient with diabetes or renal disease or kidney transplant within the past 3 years.  Can be provided in same year as diabetes self-management training (DSMT), and CMS recommends medical nutrition therapy take place after DSMT. Up to 3 hours for initial year and 2 hours in subsequent years.  Scheduled, see appointments    Cardiovascular screening blood tests (every 5 years)  · Fasting lipid panel  Order as a panel if possible  Scheduled, see appointments    Diabetes screening tests (at least every 3 years, Medicare covers annually or at 6-month intervals for prediabetic patients)  · Fasting blood sugar (FBS) or glucose tolerance test (GTT)  Patient must be diagnosed with one of the following:       - Hypertension       - Dyslipidemia       - Obesity (BMI 30kg/m2)       - Previous elevated impaired FBS or GTT       ... or any two of the following:       - Overweight (BMI 25 but <30)       - Family history of diabetes       - Age 65 or older       - History of gestational diabetes or birth of baby weighing more than 9 pounds  Scheduled, see appointments    Abdominal aortic aneurysm screening (once)  · Sonogram   Limited to patients who meet one of the following criteria:       - Men who are 65-75 years old and have smoked more than 100 cigarette in their lifetime       - Anyone with a family history of abdominal aortic aneurysm       - Anyone recommended for screening by the USPSTF  Scheduled, see appointments    HIV screening (annually for increased risk patients)  · HIV-1 and HIV-2 by EIA, or WILLIAM, rapid antibody test or oral mucosa transudate  Patients must be at increased risk for HIV infection per USPSTF guidelines or pregnant. Tests covered annually for patient at increased risk or as requested by the patient. Pregnant patients may receive up to 3 tests during pregnancy.  Risks discussed, screening is not recommended    Smoking cessation  counseling (up to 8 sessions per year)  Patients must be asymptomatic of tobacco-related conditions to receive as a preventative service.  Former Smoker    Subsequent annual wellness visit  At least 12 months since last AWV  Return in one year     The following information is provided to all patients.  This information is to help you find resources for any of the problems found today that may be affecting your health:                Living healthy guide: www.[facility].[place].[facility]      Understanding Diabetes: www.diabetes.org      Eating healthy: www.cdc.gov/healthyweight      [facility] home safety checklist: www.cdc.gov/steadi/patient.html      Agency on Aging: www.goea.[place].[facility]      Alcoholics anonymous (AA): www.aa.org      Physical Activity: www.any.nih.gov/gu5bkdr      Tobacco use: www.quitwithusla.org      DISPLAY PLAN FREE TEXT

## 2020-09-25 ENCOUNTER — LAB VISIT (OUTPATIENT)
Dept: LAB | Facility: HOSPITAL | Age: 75
End: 2020-09-25
Attending: INTERNAL MEDICINE
Payer: MEDICARE

## 2020-09-25 LAB
ANION GAP SERPL CALC-SCNC: 8 MMOL/L (ref 8–16)
BUN SERPL-MCNC: 32 MG/DL (ref 2–20)
CALCIUM SERPL-MCNC: 9.3 MG/DL (ref 8.7–10.5)
CHLORIDE SERPL-SCNC: 106 MMOL/L (ref 95–110)
CO2 SERPL-SCNC: 27 MMOL/L (ref 23–29)
CREAT SERPL-MCNC: 1.64 MG/DL (ref 0.5–1.4)
EST. GFR  (AFRICAN AMERICAN): 46.9 ML/MIN/1.73 M^2
EST. GFR  (NON AFRICAN AMERICAN): 40.6 ML/MIN/1.73 M^2
ESTIMATED AVG GLUCOSE: 246 MG/DL (ref 68–131)
GLUCOSE SERPL-MCNC: 232 MG/DL (ref 70–110)
HBA1C MFR BLD HPLC: 10.2 % (ref 4–5.6)
POTASSIUM SERPL-SCNC: 5 MMOL/L (ref 3.5–5.1)
SODIUM SERPL-SCNC: 141 MMOL/L (ref 136–145)

## 2020-09-25 PROCEDURE — 36415 COLL VENOUS BLD VENIPUNCTURE: CPT | Mod: HCNC,PO

## 2020-09-25 PROCEDURE — 80048 BASIC METABOLIC PNL TOTAL CA: CPT | Mod: HCNC,PO

## 2020-09-25 PROCEDURE — 83036 HEMOGLOBIN GLYCOSYLATED A1C: CPT | Mod: HCNC

## 2020-09-29 NOTE — PROGRESS NOTES
Subjective:      Patient ID: Jin Ngo is a 74 y.o. male.    Chief Complaint:  Diabetes    History of Present Illness  Jin Ngo with Type 2 diabetes complicated by TIA/CAD with CABG in 2007, hypertension, and dyslipidemia presents today for follow up of Type 2 diabetes. Previous patient of Dr. Diaz. Last seen in July 2020. This is his first visit with me.     He is accompanied by his wife, Krupa.     With regards to the diabetes:    Diagnosed with Type 2 DM around 2018  Family History of Type 2 DM: mother   Known complications:  DKA/HHS: denies  RN: due for visit; denies  PN: denies  Nephropathy: due for urine; CKD   Gastroparesis: denies  CAD: + s/p CABG 2007; TIAs     Current regimen:  At his last in Feb 2020, he reported that Trulicity was too expensive. We also stopped Metformin due to kidney function. We started Ozempic but he was unable to afford and did not let us know.     He is not on any diabetic medication for the last 3 months.     Other medications tried:  Metformin --> denies side effects. Thinks may have had TIAs while taking metformin.   Trulicity     Not checking blood sugar.   BG clinic is 180, coffee was 2.5 hours ago  Hypoglycemic event-denies  Yes, did not need assistance   Knows how to correct with 15 grams of carbs- juice, coke, or a peppermint.     Dietary recall: He states that following diabetic diet is hard and expensive.   Eats 2 meals a day. Gets up late. Has eggs, toast, and turkey mai for breakfast with iced tea or coffee with splenda.Dinner is pasta with meat and vegetable or salad. When discussing his diet, he reports he is also eating sandwiches midday   Snacks: apples, oranges, bananas, grapes  Drinks: iced tea with lemon, not really drinking water    Exercise - tried to stay active. No formal exercise. Trying to walk but had back injury as a child and has been having some pain. He has exercise bike and is planning on starting and walking.   Education - last visit: 2017;  bradly duran    Has a Medic alert tag-none; does want   Diabetes Management Status  Statin: Taking  ACE/ARB: Taking    Lab Results   Component Value Date    HGBA1C 10.2 (H) 09/25/2020    HGBA1C 7.8 (H) 06/30/2020    HGBA1C 12.3 (H) 02/10/2020     Screening or Prevention Patient's value Goal Complete/Controlled?   HgA1C Testing and Control   Lab Results   Component Value Date    HGBA1C 10.2 (H) 09/25/2020      Annually/Less than 8% No   Lipid profile : 06/30/2020 Annually Yes   LDL control Lab Results   Component Value Date    LDLCALC 60.0 (L) 06/30/2020    Annually/Less than 100 mg/dl  Yes   Nephropathy screening Lab Results   Component Value Date    LABMICR 30.0 04/04/2017     No results found for: PROTEINUA Annually No   Blood pressure BP Readings from Last 1 Encounters:   10/01/20 121/64    Less than 140/90 Yes   Dilated retinal exam : 10/04/2019 Annually Yes   Foot exam   : 02/27/2020 Annually Yes     Review of Systems   Constitutional: Negative for fatigue.   Eyes: Negative for visual disturbance.   Respiratory: Positive for shortness of breath (on exertion).    Cardiovascular: Negative for chest pain.   Gastrointestinal: Negative for abdominal pain.   Endocrine: Positive for polydipsia, polyphagia and polyuria.   Musculoskeletal: Negative for arthralgias.   Skin: Negative for wound.   Neurological: Positive for dizziness. Negative for headaches.   Hematological: Does not bruise/bleed easily.   Psychiatric/Behavioral: Negative for sleep disturbance.       Objective:   Physical Exam  Vitals signs reviewed.   Constitutional:       Appearance: He is well-developed.   Neck:      Thyroid: No thyromegaly.   Cardiovascular:      Rate and Rhythm: Normal rate.      Comments: Irregular heart rhythm   Pulmonary:      Effort: Pulmonary effort is normal.   Abdominal:      Palpations: Abdomen is soft.     Diabetes Foot Exam:   Denies sores or lesions to bilateral feet  Shoes appropriate  DM foot exam deferred; done in  "Feb 2020    Visit Vitals  /64   Ht 5' 9" (1.753 m)   Wt 93.9 kg (207 lb 0.2 oz)   BMI 30.57 kg/m²        Lab Review:   Lab Results   Component Value Date    HGBA1C 10.2 (H) 09/25/2020    HGBA1C 7.8 (H) 06/30/2020    HGBA1C 12.3 (H) 02/10/2020      Lab Results   Component Value Date    CHOL 117 (L) 06/30/2020    HDL 27 (L) 06/30/2020    LDLCALC 60.0 (L) 06/30/2020    TRIG 150 06/30/2020    CHOLHDL 23.1 06/30/2020     Lab Results   Component Value Date     09/25/2020    K 5.0 09/25/2020     09/25/2020    CO2 27 09/25/2020     (H) 09/25/2020    BUN 32 (H) 09/25/2020    CREATININE 1.64 (H) 09/25/2020    CALCIUM 9.3 09/25/2020    PROT 7.7 06/30/2020    ALBUMIN 4.5 06/30/2020    BILITOT 0.5 06/30/2020    ALKPHOS 35 (L) 06/30/2020    AST 26 06/30/2020    ALT 20 06/30/2020    ANIONGAP 8 09/25/2020    ESTGFRAFRICA 46.9 (A) 09/25/2020    EGFRNONAA 40.6 (A) 09/25/2020    TSH 2.140 05/21/2019     No results found for: [id number]  Assessment and Plan     1. Uncontrolled type 2 diabetes mellitus with diabetic neuropathy, without long-term current use of insulin  blood-glucose meter kit    lancets Misc    blood sugar diagnostic Strp    glipiZIDE (GLUCOTROL) 5 MG tablet    insulin detemir U-100 (LEVEMIR FLEXTOUCH U-100 INSULN) 100 unit/mL (3 mL) InPn pen    BD ULTRA-FINE TEJINDER PEN NEEDLE 32 gauge x 5/32" Ndle   2. ANGELIC (acute kidney injury)     3. Hypertension associated with diabetes     4. Mixed hyperlipidemia     5. Abnormal heart sounds  ECG 12 lead       Uncontrolled type 2 diabetes mellitus with diabetic neuropathy, without long-term current use of insulin  Cost is a big issue for him. Sent message for Trulicity patient assistance. He is resistant to insulin. We will try to get him controlled and then restart Trulicity if able to afford with patient assistance.   Goal A1C for age and comorbidities: 7 - 7.5%.  -- Reviewed goals of therapy are to get the best control we can without " hypoglycemia  Medication changes:   Start   Glipizide 5 mg with breakfast and dinner. Do not take without food   Levemir 10 units daily. Instruction and demonstration provided in clinic today. Repeat demonstration was correct.  Discussed I would prefer 3 meals daily so you don't snack. Check blood sugar before breakfast, lunch, and dinner and record on log. Please bring with you next time. Encouraged to call me [phone] for blood sugar less than 70.    -- Reviewed patient's current insulin regimen. Clarified proper insulin dose and timing in relation to meals, etc. Insulin injection sites and proper rotation instructed.    -- Advised frequent self blood glucose monitoring.  Patient encouraged to document glucose results and bring them to every clinic visit    -- Hypoglycemia precautions discussed. Instructed on precautions before driving.    -- Call for Bg repeatedly < 90 or > 180.   -- Close adherence to lifestyle changes recommended.   -- Periodic follow ups for eye evaluations, foot care and dental care suggested.  -- Encouraged exercise as tolerated  -- Given information on low carb snacks and diabetic drinks     ANGELIC (acute kidney injury)  No new medications, avoids NSAIDs  No metformin         Hypertension associated with diabetes  -- on ACEI   -- Controlled  -- Blood pressure goals discussed with patient      Mixed hyperlipidemia  Controlled   On statin per ADA recommendations      Abnormal heart sounds  -- Check EKG today      Follow up in about 4 weeks (around 10/29/2020).

## 2020-09-30 ENCOUNTER — PATIENT OUTREACH (OUTPATIENT)
Dept: ADMINISTRATIVE | Facility: OTHER | Age: 75
End: 2020-09-30

## 2020-09-30 NOTE — PROGRESS NOTES
Care Everywhere: updated   Immunization: updated    Health Maintenance: updated  Media Review: review for outside eye exam report  Legacy Review:   Order placed:   Upcoming appts:  efax sent to Dr. Suarez office to obtain eye exam report

## 2020-09-30 NOTE — LETTER
AUTHORIZATION FOR RELEASE OF   CONFIDENTIAL INFORMATION    Dear Juanpablo Suarez MD,    We are seeing Jin Ngo, date of birth 1945, in the clinic at [facility]. Sam Barroso MD is the patient's PCP. Jin Ngo has an outstanding lab/procedure at the time we reviewed his chart. In order to help keep his health information updated, he has authorized us to request the following medical record(s):        (  )  MAMMOGRAM                                      (  )  COLONOSCOPY      (  )  PAP SMEAR                                          (  )  OUTSIDE LAB RESULTS     (  )  DEXA SCAN                                          ( x )  EYE EXAM            (  )  FOOT EXAM                                          (  )  ENTIRE RECORD     (  )  OUTSIDE IMMUNIZATIONS                 (  )  _______________         Please fax records to [facility], [place] J [place], [place], [phone]     If you have any questions, please contact Rhonda Kennedy at [phone].           Patient Name: Jin Ngo  : 1945  Patient Phone #: [phone]

## 2020-10-01 ENCOUNTER — TELEPHONE (OUTPATIENT)
Dept: ENDOCRINOLOGY | Facility: CLINIC | Age: 75
End: 2020-10-01

## 2020-10-01 ENCOUNTER — OFFICE VISIT (OUTPATIENT)
Dept: ENDOCRINOLOGY | Facility: CLINIC | Age: 75
End: 2020-10-01
Payer: MEDICARE

## 2020-10-01 ENCOUNTER — TELEPHONE (OUTPATIENT)
Dept: PHARMACY | Facility: CLINIC | Age: 75
End: 2020-10-01

## 2020-10-01 ENCOUNTER — HOSPITAL ENCOUNTER (OUTPATIENT)
Dept: CARDIOLOGY | Facility: CLINIC | Age: 75
Discharge: HOME | End: 2020-10-01
Payer: MEDICARE

## 2020-10-01 VITALS
BODY MASS INDEX: 30.66 KG/M2 | WEIGHT: 207 LBS | DIASTOLIC BLOOD PRESSURE: 64 MMHG | SYSTOLIC BLOOD PRESSURE: 121 MMHG | HEIGHT: 69 IN

## 2020-10-01 DIAGNOSIS — E78.2 MIXED HYPERLIPIDEMIA: ICD-10-CM

## 2020-10-01 DIAGNOSIS — I15.2 HYPERTENSION ASSOCIATED WITH DIABETES: ICD-10-CM

## 2020-10-01 DIAGNOSIS — N17.9 AKI (ACUTE KIDNEY INJURY): ICD-10-CM

## 2020-10-01 DIAGNOSIS — R01.2 ABNORMAL HEART SOUNDS: ICD-10-CM

## 2020-10-01 DIAGNOSIS — E11.59 HYPERTENSION ASSOCIATED WITH DIABETES: ICD-10-CM

## 2020-10-01 PROCEDURE — 93005 ELECTROCARDIOGRAM TRACING: CPT | Mod: S$GLB

## 2020-10-01 PROCEDURE — 99214 PR OFFICE/OUTPT VISIT, EST, LEVL IV, 30-39 MIN: ICD-10-PCS | Mod: HCNC,S$GLB,, | Performed by: NURSE PRACTITIONER

## 2020-10-01 PROCEDURE — 3046F PR MOST RECENT HEMOGLOBIN A1C LEVEL > 9.0%: ICD-10-PCS | Mod: HCNC,CPTII,S$GLB, | Performed by: NURSE PRACTITIONER

## 2020-10-01 PROCEDURE — 3008F PR BODY MASS INDEX (BMI) DOCUMENTED: ICD-10-PCS | Mod: HCNC,CPTII,S$GLB, | Performed by: NURSE PRACTITIONER

## 2020-10-01 PROCEDURE — 1125F PR PAIN SEVERITY QUANTIFIED, PAIN PRESENT: ICD-10-PCS | Mod: HCNC,S$GLB,, | Performed by: NURSE PRACTITIONER

## 2020-10-01 PROCEDURE — 93010 EKG 12-LEAD: ICD-10-PCS | Mod: HCNC,S$GLB,, | Performed by: INTERNAL MEDICINE

## 2020-10-01 PROCEDURE — 1101F PT FALLS ASSESS-DOCD LE1/YR: CPT | Mod: HCNC,CPTII,S$GLB, | Performed by: NURSE PRACTITIONER

## 2020-10-01 PROCEDURE — 99499 UNLISTED E&M SERVICE: CPT | Mod: S$GLB,,, | Performed by: NURSE PRACTITIONER

## 2020-10-01 PROCEDURE — 3046F HEMOGLOBIN A1C LEVEL >9.0%: CPT | Mod: HCNC,CPTII,S$GLB, | Performed by: NURSE PRACTITIONER

## 2020-10-01 PROCEDURE — 99499 RISK ADDL DX/OHS AUDIT: ICD-10-PCS | Mod: S$GLB,,, | Performed by: NURSE PRACTITIONER

## 2020-10-01 PROCEDURE — 1159F MED LIST DOCD IN RCRD: CPT | Mod: HCNC,S$GLB,, | Performed by: NURSE PRACTITIONER

## 2020-10-01 PROCEDURE — 99214 OFFICE O/P EST MOD 30 MIN: CPT | Mod: HCNC,S$GLB,, | Performed by: NURSE PRACTITIONER

## 2020-10-01 PROCEDURE — 99999 PR PBB SHADOW E&M-EST. PATIENT-LVL IV: CPT | Mod: PBBFAC,HCNC,, | Performed by: NURSE PRACTITIONER

## 2020-10-01 PROCEDURE — 3078F DIAST BP <80 MM HG: CPT | Mod: HCNC,CPTII,S$GLB, | Performed by: NURSE PRACTITIONER

## 2020-10-01 PROCEDURE — 1125F AMNT PAIN NOTED PAIN PRSNT: CPT | Mod: HCNC,S$GLB,, | Performed by: NURSE PRACTITIONER

## 2020-10-01 PROCEDURE — 99999 PR PBB SHADOW E&M-EST. PATIENT-LVL IV: ICD-10-PCS | Mod: PBBFAC,HCNC,, | Performed by: NURSE PRACTITIONER

## 2020-10-01 PROCEDURE — 1159F PR MEDICATION LIST DOCUMENTED IN MEDICAL RECORD: ICD-10-PCS | Mod: HCNC,S$GLB,, | Performed by: NURSE PRACTITIONER

## 2020-10-01 PROCEDURE — 3074F PR MOST RECENT SYSTOLIC BLOOD PRESSURE < 130 MM HG: ICD-10-PCS | Mod: HCNC,CPTII,S$GLB, | Performed by: NURSE PRACTITIONER

## 2020-10-01 PROCEDURE — 3008F BODY MASS INDEX DOCD: CPT | Mod: HCNC,CPTII,S$GLB, | Performed by: NURSE PRACTITIONER

## 2020-10-01 PROCEDURE — 93010 ELECTROCARDIOGRAM REPORT: CPT | Mod: HCNC,S$GLB,, | Performed by: INTERNAL MEDICINE

## 2020-10-01 PROCEDURE — 1101F PR PT FALLS ASSESS DOC 0-1 FALLS W/OUT INJ PAST YR: ICD-10-PCS | Mod: HCNC,CPTII,S$GLB, | Performed by: NURSE PRACTITIONER

## 2020-10-01 PROCEDURE — 3074F SYST BP LT 130 MM HG: CPT | Mod: HCNC,CPTII,S$GLB, | Performed by: NURSE PRACTITIONER

## 2020-10-01 PROCEDURE — 3078F PR MOST RECENT DIASTOLIC BLOOD PRESSURE < 80 MM HG: ICD-10-PCS | Mod: HCNC,CPTII,S$GLB, | Performed by: NURSE PRACTITIONER

## 2020-10-01 RX ORDER — PEN NEEDLE, DIABETIC 31 GX5/16"
NEEDLE, DISPOSABLE MISCELLANEOUS
Qty: 100 EACH | Refills: 3 | Status: SHIPPED | OUTPATIENT
Start: 2020-10-01 | End: 2021-08-24

## 2020-10-01 RX ORDER — INSULIN PUMP SYRINGE, 3 ML
EACH MISCELLANEOUS
Qty: 1 EACH | Refills: 0 | Status: SHIPPED | OUTPATIENT
Start: 2020-10-01

## 2020-10-01 RX ORDER — INSULIN DETEMIR 100 [IU]/ML
10 INJECTION, SOLUTION SUBCUTANEOUS NIGHTLY
Qty: 3 ML | Refills: 11 | Status: SHIPPED | OUTPATIENT
Start: 2020-10-01 | End: 2021-03-31

## 2020-10-01 RX ORDER — LANCETS
EACH MISCELLANEOUS
Qty: 100 EACH | Refills: 0 | Status: SHIPPED | OUTPATIENT
Start: 2020-10-01 | End: 2020-12-28 | Stop reason: SDUPTHER

## 2020-10-01 RX ORDER — GLIPIZIDE 5 MG/1
5 TABLET ORAL
Qty: 60 TABLET | Refills: 11 | Status: SHIPPED | OUTPATIENT
Start: 2020-10-01 | End: 2021-06-01

## 2020-10-01 NOTE — TELEPHONE ENCOUNTER
----- Message from Ester Mitchell NP sent at 10/1/2020 10:38 AM CDT -----  Good morning,     I am seeing Mr. Ngo and he is having difficulty affording Trulicity. Any assistance would be appreciated. Please let me know if you have any questions.    Thank you,  Ester Mitchell  Endocrinology

## 2020-10-01 NOTE — PATIENT INSTRUCTIONS
Diabetes   Glipizide 5 mg with breakfast and dinner. Do not take without food   Start Levemir 10 units daily.       I would prefer 3 meals daily so you don't snack. Check blood sugar before breakfast, lunch, and dinner and record on log. Please bring with you next time. Call me [phone] for blood sugar less than 70.    Hypoglycemia - Low Blood Sugar    What can you do?  Rule of 15:    Test your blood sugar   If glucose is between 50-70 mg/dL then ingest 15 grams of fast-acting carbs   If glucose is less than 50 mg/dL then ingest 30 grams of fast-acting carbs   Ingest 15 grams of fast-acting carbohydrate - such as:   a. 3-4 glucose tablets  b. 4 oz juice  c. ½ can regular soda pop  d. 15 skittles or mini jelly beans    Re-check your blood sugar in 15 minutes. If its less than 70mg/dl, repeat steps 2 and 3.   If your next meal is more than 1 hour away, eat an additional 15 grams of carbohydrate and 1 ounce of protein (examples include crackers with cheese or one-half of a sandwich with peanut butter). It is important not to eat too much because this can raise your blood sugar above the target level.      After your blood sugar has normalized, think about why you went low. If you notice a pattern of low blood sugars, contact your Diabetes Team. We may need to adjust your medication.    Snacks can be an important part of a balanced, healthy meal plan. They allow you to eat more frequently, feeling full and satisfied throughout the day. Also, they allow you to spread carbohydrates evenly, which may stabilize blood sugars.  Plus, snacks are enjoyable!     The amount of carbohydrate needed at snacks varies. Generally, about 15-30 grams of carbohydrate per snack is recommended.  Below you will find some tasty treats.       0-5 gm carb   Crystal Light   Vitamin Water Zero   Herbal tea, unsweetened   2 tsp peanut butter on celery   1./2 cup sugar-free jell-o   1 sugar-free popsicle   ¼ cup blueberries   8oz Blue  [place] unsweetened almond milk   5 baby carrots & celery sticks, cucumbers, bell peppers dipped in ¼ cup salsa, 2Tbsp light ranch dressing or 2Tbsp plain Greek yogurt   10 Goldfish crackers   ½ oz low-fat cheese or string cheese   1 closed handful of nuts, unsalted   1 Tbsp of sunflower seeds, unsalted   1 cup Smart Pop popcorn   1 whole grain brown rice cake        15 gm carb   1 small piece of fruit or ½ banana or 1/2 cup lite canned fruit   3 amanda cracker squares   3 cups Smart Pop popcorn, top spray butter, [place] lite salt or cinnamon and Truvia   5 Vanilla Wafers   ½ cup low fat, no added sugar ice cream or frozen yogurt (Blue bell, Blue Bunny, Weight Watchers, Skinny Cow)   ½ turkey, ham, or chicken sandwich   ½ c fruit with ½ c Cottage cheese   4-6 unsalted wheat crackers with 1 oz low fat cheese or 1 tbsp peanut butter    30-45 goldfish crackers (depending on flavor)    7-8 Rastafarian mini brown rice cakes (caramel, apple cinnamon, chocolate)    12 Rastafarian mini brown rice cakes (cheddar, bbq, ranch)    1/3 cup hummus dip with raw veg   1/2 whole wheat cristiane, 1Tbsp hummus   Mini Pizza (1/2 whole wheat English muffin, low-fat  cheese, tomato sauce)   100 calorie snack pack (Oreo, Chips Ahoy, Ritz Mix, Baked Cheetos)   4-6 oz. light or Greek Style yogurt (Chobani, Yoplait, Okios, [place])   ½ cup sugar-free pudding     6 in. wheat tortilla or cristiane oven toasted chips (topped with spray butter flavoring, cinnamon, Truvia OR spray butter, garlic powder, chili powder)    18 BBQ Popchips (available at [employer], [employer], Fresh Market)       Diabetic drinks we recommend:   Water -BEST  Crystal light sugar free packets  Gatorade zero   Powerade zero  Tea without sweetener    Diabetic drinks we do not recommend:  Coke or any sugary regular soft drink  Coffee with sugar  Milk  Juice  Beer  Liquor    Sweeteners we recommend:  Stevia   Ngoc Rudolph    Note: Caffeine can increase your blood  sugar

## 2020-10-01 NOTE — Clinical Note
Auscultated abnormal rhythm today. Ordered EKG and showed 1st degree AV block with frequent PVCs. Please advise

## 2020-10-08 ENCOUNTER — TELEPHONE (OUTPATIENT)
Dept: FAMILY MEDICINE | Facility: CLINIC | Age: 75
End: 2020-10-08

## 2020-10-08 DIAGNOSIS — E78.2 MIXED HYPERLIPIDEMIA: ICD-10-CM

## 2020-10-08 DIAGNOSIS — Z95.1 S/P CABG (CORONARY ARTERY BYPASS GRAFT): Primary | ICD-10-CM

## 2020-10-08 NOTE — TELEPHONE ENCOUNTER
Had recent ekg that showed pvc s done by endocrinologist.  These typically are not a problem however I did notice that you have not seen the cardiologist in almost four years for routine follow-up from your coronary artery disease.  I sent in a referral.

## 2020-10-08 NOTE — TELEPHONE ENCOUNTER
----- Message from Ester Mitchell NP sent at 10/1/2020 12:06 PM CDT -----  Auscultated abnormal rhythm today. Ordered EKG and showed 1st degree AV block with frequent PVCs. Please advise

## 2020-10-09 NOTE — TELEPHONE ENCOUNTER
I called the pt house - no answer and no voice mail  Cell phone is not accepting calls at this time    The pt is scheduled to see dr mustafa - cardiologist on oct 23

## 2020-10-14 NOTE — TELEPHONE ENCOUNTER
Dr Barroso - I just wanted to let you know I have tried to call Mr Ngo several times and I did leave a message but he has not returned our call but his ekg.  ( see below )   But he does have scheduled appt  to see dr mustafa - cardiologist on oct 23

## 2020-10-20 ENCOUNTER — PATIENT OUTREACH (OUTPATIENT)
Dept: ADMINISTRATIVE | Facility: HOSPITAL | Age: 75
End: 2020-10-20

## 2020-10-23 ENCOUNTER — OFFICE VISIT (OUTPATIENT)
Dept: CARDIOLOGY | Facility: CLINIC | Age: 75
End: 2020-10-23
Payer: MEDICARE

## 2020-10-23 DIAGNOSIS — E78.2 MIXED HYPERLIPIDEMIA: ICD-10-CM

## 2020-10-23 DIAGNOSIS — E11.9 TYPE 2 DIABETES MELLITUS WITHOUT COMPLICATION, UNSPECIFIED WHETHER LONG TERM INSULIN USE: ICD-10-CM

## 2020-10-23 DIAGNOSIS — I65.23 BILATERAL CAROTID ARTERY OCCLUSION: ICD-10-CM

## 2020-10-23 DIAGNOSIS — I10 ESSENTIAL HYPERTENSION: ICD-10-CM

## 2020-10-23 DIAGNOSIS — M53.87 SCIATICA OF RIGHT SIDE ASSOCIATED WITH DISORDER OF LUMBOSACRAL SPINE: ICD-10-CM

## 2020-10-23 DIAGNOSIS — R00.2 PALPITATIONS: Primary | ICD-10-CM

## 2020-10-23 DIAGNOSIS — I25.2 HISTORY OF MI (MYOCARDIAL INFARCTION): ICD-10-CM

## 2020-10-23 DIAGNOSIS — Z95.1 S/P CABG (CORONARY ARTERY BYPASS GRAFT): ICD-10-CM

## 2020-10-23 PROCEDURE — 1101F PR PT FALLS ASSESS DOC 0-1 FALLS W/OUT INJ PAST YR: ICD-10-PCS | Mod: HCNC,CPTII,S$GLB, | Performed by: INTERNAL MEDICINE

## 2020-10-23 PROCEDURE — 99214 OFFICE O/P EST MOD 30 MIN: CPT | Mod: HCNC,S$GLB,, | Performed by: INTERNAL MEDICINE

## 2020-10-23 PROCEDURE — 3046F HEMOGLOBIN A1C LEVEL >9.0%: CPT | Mod: HCNC,CPTII,S$GLB, | Performed by: INTERNAL MEDICINE

## 2020-10-23 PROCEDURE — 99999 PR PBB SHADOW E&M-EST. PATIENT-LVL III: CPT | Mod: PBBFAC,HCNC,, | Performed by: INTERNAL MEDICINE

## 2020-10-23 PROCEDURE — 99214 PR OFFICE/OUTPT VISIT, EST, LEVL IV, 30-39 MIN: ICD-10-PCS | Mod: HCNC,S$GLB,, | Performed by: INTERNAL MEDICINE

## 2020-10-23 PROCEDURE — 1101F PT FALLS ASSESS-DOCD LE1/YR: CPT | Mod: HCNC,CPTII,S$GLB, | Performed by: INTERNAL MEDICINE

## 2020-10-23 PROCEDURE — 1159F PR MEDICATION LIST DOCUMENTED IN MEDICAL RECORD: ICD-10-PCS | Mod: HCNC,S$GLB,, | Performed by: INTERNAL MEDICINE

## 2020-10-23 PROCEDURE — 3046F PR MOST RECENT HEMOGLOBIN A1C LEVEL > 9.0%: ICD-10-PCS | Mod: HCNC,CPTII,S$GLB, | Performed by: INTERNAL MEDICINE

## 2020-10-23 PROCEDURE — 99499 UNLISTED E&M SERVICE: CPT | Mod: S$GLB,,, | Performed by: INTERNAL MEDICINE

## 2020-10-23 PROCEDURE — 99499 RISK ADDL DX/OHS AUDIT: ICD-10-PCS | Mod: S$GLB,,, | Performed by: INTERNAL MEDICINE

## 2020-10-23 PROCEDURE — 99999 PR PBB SHADOW E&M-EST. PATIENT-LVL III: ICD-10-PCS | Mod: PBBFAC,HCNC,, | Performed by: INTERNAL MEDICINE

## 2020-10-23 PROCEDURE — 1159F MED LIST DOCD IN RCRD: CPT | Mod: HCNC,S$GLB,, | Performed by: INTERNAL MEDICINE

## 2020-10-23 NOTE — ASSESSMENT & PLAN NOTE
His nuclear stress test did not show infarct pattern and his ejection fraction was normal.  His myocardial infarction occurred preceding bypass surgery in 2007 and was likely non-STEMI, small.

## 2020-10-23 NOTE — ASSESSMENT & PLAN NOTE
He is free of angina but is very sedentary.  His last stress test in 2016 was negative.  He is on aspirin and Plavix.

## 2020-10-23 NOTE — PROGRESS NOTES
[facility]     Subjective:    Patient ID:  Jin Ngo is a 74 y.o. male who presents for follow-up of Coronary Artery Disease, Carotid Artery Disease, and Palpitations    Review of patient's allergies indicates:   Allergen Reactions    Metformin      Caused stroke    Pcn [penicillins]       The patient had bypass surgery in 2007.  In 2016 there was confirmation of negative stress test with normal ejection fraction.  He has bilateral carotid disease severe left moderate right in 2016.  No follow-up has been carried out since that.  He is stable neurologically.  He is very inactive due to back problems with sciatica.    He recently saw his endocrinologist and an Electrocardiogram was done showing PVCs.  Irregular pulse on exam was detected prompting Electrocardiogram.  He has felt some palpitations.  He has reported shortness of breath at times.  He did have classic angina and heart attack before his bypass symptom wise.  He is been very sedentary because of his back problems but does not report chest pain.  His wife states his blood pressures have been stable.  He is not a smoker.    Coronary Artery Disease  Symptoms include palpitations. Pertinent negatives include no chest pain, dizziness, leg swelling, muscle weakness, shortness of breath or weight gain.   Palpitations   Pertinent negatives include no anxiety, chest pain, coughing, diaphoresis, dizziness, fever, irregular heartbeat, malaise/fatigue, nausea, near-syncope, numbness, shortness of breath, syncope, vomiting or weakness.       Review of Systems   Constitution: Negative for chills, decreased appetite, diaphoresis, fever, malaise/fatigue, night sweats, weight gain and weight loss.   HENT: Negative for congestion, ear discharge, ear pain, hearing loss, hoarse voice, nosebleeds, odynophagia, sore throat, stridor and tinnitus.    Eyes: Negative for blurred vision,  discharge, double vision, pain, photophobia, redness, vision loss in left eye, vision loss in right eye, visual disturbance and visual halos.   Cardiovascular: Positive for dyspnea on exertion and palpitations. Negative for chest pain, claudication, cyanosis, irregular heartbeat, leg swelling, near-syncope, orthopnea, paroxysmal nocturnal dyspnea and syncope.   Respiratory: Negative for cough, hemoptysis, shortness of breath, sleep disturbances due to breathing, snoring, sputum production and wheezing.    Endocrine: Negative for cold intolerance, heat intolerance, polydipsia, polyphagia and polyuria.   Hematologic/Lymphatic: Negative for adenopathy and bleeding problem. Does not bruise/bleed easily.   Skin: Negative for color change, dry skin, flushing, itching, nail changes, poor wound healing, rash, skin cancer, suspicious lesions and unusual hair distribution.   Musculoskeletal: Positive for back pain. Negative for arthritis, falls, gout, joint pain, joint swelling, muscle cramps, muscle weakness, myalgias, neck pain and stiffness.   Gastrointestinal: Negative for bloating, abdominal pain, anorexia, change in bowel habit, bowel incontinence, constipation, diarrhea, dysphagia, excessive appetite, flatus, heartburn, hematemesis, hematochezia, hemorrhoids, jaundice, melena, nausea and vomiting.   Genitourinary: Negative for bladder incontinence, decreased libido, dysuria, flank pain, frequency, genital sores, hematuria, hesitancy, incomplete emptying, nocturia and urgency.   Neurological: Negative for aphonia, brief paralysis, difficulty with concentration, disturbances in coordination, excessive daytime sleepiness, dizziness, focal weakness, headaches, light-headedness, loss of balance, numbness, paresthesias, seizures, sensory change, tremors, vertigo and weakness.   Psychiatric/Behavioral: Negative for altered mental status, depression, hallucinations, memory loss, substance abuse, suicidal ideas and thoughts of  violence. The patient does not have insomnia and is not nervous/anxious.    Allergic/Immunologic: Negative for hives and persistent infections.        Objective:     There were no vitals filed for this visit. Physical Exam   Constitutional: He is oriented to person, place, and time. He appears well-developed and well-nourished. No distress.   HENT:   Head: Normocephalic and atraumatic.   Nose: Nose normal.   Mouth/Throat: Oropharynx is clear and moist.   Eyes: Pupils are equal, round, and reactive to light. Conjunctivae and EOM are normal. Right eye exhibits no discharge. No scleral icterus.   Neck: Normal range of motion. Neck supple. No JVD present. No tracheal deviation present. No thyromegaly present.   Cardiovascular: Normal rate, regular rhythm, normal heart sounds and intact distal pulses. Exam reveals no gallop and no friction rub.   No murmur heard.  Pulmonary/Chest: Effort normal and breath sounds normal. No stridor. No respiratory distress. He has no wheezes. He has no rales. He exhibits no tenderness.   Abdominal: Soft. Bowel sounds are normal. He exhibits no distension and no mass. There is no abdominal tenderness. There is no rebound and no guarding.   Musculoskeletal: Normal range of motion.         General: No tenderness or edema.   Lymphadenopathy:     He has no cervical adenopathy.   Neurological: He is alert and oriented to person, place, and time. No cranial nerve deficit. Coordination normal.   Skin: Skin is warm and dry. No rash noted. He is not diaphoretic. No erythema. No pallor.   Psychiatric: He has a normal mood and affect. His behavior is normal. Judgment and thought content normal.         Assessment:       1. Palpitations    2. S/P CABG (coronary artery bypass graft)    3. Mixed hyperlipidemia    4. Bilateral carotid artery occlusion    5. Essential hypertension    6. 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 Uncontrolled type 2 diabetes mellitus with  diabetic neuropathy, without long-term current use of insulin    8. History of MI (myocardial infarction)      Results for orders placed or performed in visit on 09/25/20   Hemoglobin A1C   Result Value Ref Range    Hemoglobin A1C 10.2 (H) 4.0 - 5.6 %    Estimated Avg Glucose 246 (H) 68 - 131 mg/dL   Basic metabolic panel   Result Value Ref Range    Sodium 141 136 - 145 mmol/L    Potassium 5.0 3.5 - 5.1 mmol/L    Chloride 106 95 - 110 mmol/L    CO2 27 23 - 29 mmol/L    Glucose 232 (H) 70 - 110 mg/dL    BUN, Bld 32 (H) 2 - 20 mg/dL    Creatinine 1.64 (H) 0.50 - 1.40 mg/dL    Calcium 9.3 8.7 - 10.5 mg/dL    Anion Gap 8 8 - 16 mmol/L    eGFR if African American 46.9 (A) >60 mL/min/1.73 m^2    eGFR if non African American 40.6 (A) >60 mL/min/1.73 m^2     Lab Results   Component Value Date    WBC 6.25 06/30/2020    RBC 4.82 06/30/2020    HGB 14.7 06/30/2020    HCT 44.6 06/30/2020    MCV 93 06/30/2020    MCH 30.5 06/30/2020    MCHC 33.0 06/30/2020    RDW 13.2 06/30/2020     06/30/2020    MPV 10.6 06/30/2020    GRAN 3.2 06/30/2020    GRAN 51.4 06/30/2020    LYMPH 1.9 06/30/2020    LYMPH 30.6 06/30/2020    MONO 0.8 06/30/2020    MONO 12.5 06/30/2020    EOS 0.3 06/30/2020    BASO 0.06 06/30/2020    EOSINOPHIL 4.0 06/30/2020    BASOPHIL 1.0 06/30/2020        CMP  Lab Results   Component Value Date     09/25/2020    K 5.0 09/25/2020     09/25/2020    CO2 27 09/25/2020     (H) 09/25/2020    BUN 32 (H) 09/25/2020    CREATININE 1.64 (H) 09/25/2020    CALCIUM 9.3 09/25/2020    PROT 7.7 06/30/2020    ALBUMIN 4.5 06/30/2020    BILITOT 0.5 06/30/2020    ALKPHOS 35 (L) 06/30/2020    AST 26 06/30/2020    ALT 20 06/30/2020    ANIONGAP 8 09/25/2020    ESTGFRAFRICA 46.9 (A) 09/25/2020    EGFRNONAA 40.6 (A) 09/25/2020        No results found for: LABBLOO, LABURIN, RESPIRATORYC, GSRESP         Results for orders placed or performed during the hospital encounter of 10/01/20   ECG 12 lead    Collection Time: 10/01/20  11:35 AM    Narrative    Test Reason : R01.2,    Vent. Rate : 072 BPM     Atrial Rate : 072 BPM     P-R Int : 262 ms          QRS Dur : 094 ms      QT Int : 412 ms       P-R-T Axes : 090 -10 110 degrees     QTc Int : 451 ms    Sinus rhythm with 1st degree A-V block with frequent Premature ventricular  complexes  Nonspecific ST and T wave abnormality  Abnormal ECG  When compared with ECG of 25-JUL-2016 14:22,  Premature ventricular complexes are now Present  T wave inversion no longer evident in Lateral leads  Confirmed by Zabrina Sloan MD ([id number]) on 10/1/2020 2:37:53 PM    Referred By: GIFTY GAUTHIER           Confirmed By:Zabrina Sloan MD        US AAA Screening 04/04/2017 [id number] Final   NM Myocardial Perfusion Spect Multi Pharmacologic 08/01/2016 [id number] Final   NM Multi Study RX Stress Card Component 08/01/2016 [id number]             Plan:       Problem List Items Addressed This Visit        Neuro    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  His myocardial infarction occurred preceding bypass surgery in 2007 and was likely non-STEMI, small.         S/P CABG (coronary artery bypass graft)     He is free of angina but is very sedentary.  His last stress test in 2016 was negative.  He is on aspirin and Plavix.         Mixed hyperlipidemia     He will continue fenofibrate therapy.  His LDL is 60 HDL 27 range.         Bilateral carotid artery disease     A repeat carotid Doppler study is ordered.  There was significant left greater than right disease on last study in 2015.  Progression of carotid disease likely.         Relevant Orders    US Carotid Bilateral    Palpitations - Primary     A recent irregular pulse was  detected and an Electrocardiogram confirmed PVCs.  I detected an irregular tachycardic rhythm in my office which was transient which I suspected AFib but it normalized.  A 48 hr Holter monitor is ordered.         Relevant Orders    Holter monitor - 48 hour       Endocrine    Uncontrolled type 2 diabetes mellitus with diabetic neuropathy, without long-term current use of insulin     He has converted to insulin recently.                                 Marvin Sotelo MD  10/23/2020   3:06 PM

## 2020-10-23 NOTE — ASSESSMENT & PLAN NOTE
A repeat carotid Doppler study is ordered.  There was significant left greater than right disease on last study in 2015.  Progression of carotid disease likely.

## 2020-10-23 NOTE — ASSESSMENT & PLAN NOTE
A recent irregular pulse was detected and an Electrocardiogram confirmed PVCs.  I detected an irregular tachycardic rhythm in my office which was transient which I suspected AFib but it normalized.  A 48 hr Holter monitor is ordered.

## 2020-10-23 NOTE — PATIENT INSTRUCTIONS
"  Heart Palpitations    Palpitations are the feeling that your heart is beating hard, fast, or irregular. Some describe it as "pounding" or "skipped beats." Palpitations may occur in someone with heart disease, but can also occur in a healthy person.  Heart-related causes:  · Arrhythmia (a change from the heart's normal rhythm)  · Heart valve disease  · Disease of the heart muscle  · Coronary artery disease  · High blood pressure  Non-heart-related causes:  · Certain medicines (such as asthma inhalers and decongestants)  · Some herbal supplements, energy drinks and pills, and weight loss pills  · Illegal stimulant drugs (such as cocaine, crank, methamphetamine, PCP, bath salts, ecstasy)  · Caffeine, alcohol, and tobacco  · Medical conditions such as thyroid disease, anemia, anxiety, and panic disorder  Sometimes the cause can't be found.  Home care  Follow these home care tips:  · Avoid excess caffeine, alcohol, tobacco, and any stimulant drugs.  · Tell your doctor about any prescription or over-the-counter or herbal medicines you take.  Follow-up care  · Follow up with your doctor, or as advised.  Call 911  This is the fastest and safest way to get to the emergency department. The paramedics can also begin treatment on the way to the hospital, if needed.  Don't wait until your symptoms are severe to call 911. These are reasons to call 911:  · Chest pain  · Shortness of breath  · Feeling lightheaded, faint, or dizzy  · Fainting or loss of consciousness  · Very irregular heartbeat  · Rapid heartbeat that makes you uncomfortable  · Slower than usual heart rate associated with symptoms  · Slower than usual heart rate  · Chest pain with weakness, dizziness, heavy sweating, nausea, or vomiting  · Extreme drowsiness or confusion  · Weakness of an arm or leg, or on 1 side of the face  · Difficulty with speech or vision  When to seek medical advice  Get prompt medical attention if you have palpitations and any of the " "following:  · Weakness  · Dizziness  · Lightheadedness  · Fainting  Date Last Reviewed: 4/27/2016  © 9012-0822 [employer]. [address] [place] [address], [place] [address]. All rights reserved. This information is not intended as a substitute for professional medical care. Always follow your healthcare professional's instructions.        Heart Palpitations    Palpitations are the feeling that your heart is beating hard, fast, or irregular. Some describe it as "pounding" or "skipped beats." Palpitations may occur in someone with heart disease, but can also occur in a healthy person.  Heart-related causes:  · Arrhythmia (a change from the heart's normal rhythm)  · Heart valve disease  · Disease of the heart muscle  · Coronary artery disease  · High blood pressure  Non-heart-related causes:  · Certain medicines (such as asthma inhalers and decongestants)  · Some herbal supplements, energy drinks and pills, and weight loss pills  · Illegal stimulant drugs (such as cocaine, crank, methamphetamine, PCP, bath salts, ecstasy)  · Caffeine, alcohol, and tobacco  · Medical conditions such as thyroid disease, anemia, anxiety, and panic disorder  Sometimes the cause can't be found.  Home care  Follow these home care tips:  · Avoid excess caffeine, alcohol, tobacco, and any stimulant drugs.  · Tell your doctor about any prescription or over-the-counter or herbal medicines you take.  Follow-up care  · Follow up with your doctor, or as advised.  Call 911  This is the fastest and safest way to get to the emergency department. The paramedics can also begin treatment on the way to the hospital, if needed.  Don't wait until your symptoms are severe to call 911. These are reasons to call 911:  · Chest pain  · Shortness of breath  · Feeling lightheaded, faint, or dizzy  · Fainting or loss of consciousness  · Very irregular heartbeat  · Rapid heartbeat that makes you uncomfortable  · Slower than usual heart rate associated with " symptoms  · Slower than usual heart rate  · Chest pain with weakness, dizziness, heavy sweating, nausea, or vomiting  · Extreme drowsiness or confusion  · Weakness of an arm or leg, or on 1 side of the face  · Difficulty with speech or vision  When to seek medical advice  Get prompt medical attention if you have palpitations and any of the following:  · Weakness  · Dizziness  · Lightheadedness  · Fainting  Date Last Reviewed: 4/27/2016  © 6270-2313 [employer]. [address] [place] [address]. All rights reserved. This information is not intended as a substitute for professional medical care. Always follow your healthcare professional's instructions.

## 2020-10-23 NOTE — LETTER
October 23, 2020      Sam Barroso MD  [address] [facility] [address]           [facility] - Cardiology  [address]  [facility] [address]  Phone: [phone]  Fax: [phone]          Patient: Jin Ngo   MR Number: [id number]   YOB: 1945   Date of Visit: 10/23/2020       Dear Dr. Sam Barroso:    Thank you for referring Jin Ngo to me for evaluation. Attached you will find relevant portions of my assessment and plan of care.    If you have questions, please do not hesitate to call me. I look forward to following Jin Ngo along with you.    Sincerely,    Marvin Sotelo MD    Enclosure  CC:  No Recipients    If you would like to receive this communication electronically, please contact [email or web address] or [phone] to request more information on [employer] access.    For providers and/or their staff who would like to refer a patient to [facility], please contact us through our one-stop-shop provider referral line, [facility], at [phone].    If you feel you have received this communication in error or would no longer like to receive these types of communications, please e-mail [email or web address]

## 2020-10-28 ENCOUNTER — PATIENT OUTREACH (OUTPATIENT)
Dept: ADMINISTRATIVE | Facility: OTHER | Age: 75
End: 2020-10-28

## 2020-10-28 ENCOUNTER — TELEPHONE (OUTPATIENT)
Dept: CARDIOLOGY | Facility: CLINIC | Age: 75
End: 2020-10-28
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
## 2020-10-28 NOTE — TELEPHONE ENCOUNTER
----- Message from Marleen Mckinnon sent at 10/28/2020  7:53 AM CDT -----  Regarding: Missed call from doctor's office pt requesting a call back  Patient Advice:Pt missed a call from the Dr's Office and would like a return call from the nurse.    Pt can be reached at [phone]    Thanks

## 2020-11-02 ENCOUNTER — HOSPITAL ENCOUNTER (OUTPATIENT)
Dept: RADIOLOGY | Facility: HOSPITAL | Age: 75
Discharge: HOME | End: 2020-11-02
Attending: INTERNAL MEDICINE
Payer: MEDICARE

## 2020-11-02 ENCOUNTER — HOSPITAL ENCOUNTER (OUTPATIENT)
Dept: CARDIOLOGY | Facility: HOSPITAL | Age: 75
Discharge: HOME | End: 2020-11-02
Attending: INTERNAL MEDICINE
Payer: MEDICARE

## 2020-11-02 DIAGNOSIS — I65.23 BILATERAL CAROTID ARTERY OCCLUSION: ICD-10-CM

## 2020-11-02 DIAGNOSIS — R00.2 PALPITATIONS: ICD-10-CM

## 2020-11-02 PROCEDURE — 93226 XTRNL ECG REC<48 HR SCAN A/R: CPT | Mod: HCNC,PO

## 2020-11-02 PROCEDURE — 93880 EXTRACRANIAL BILAT STUDY: CPT | Mod: TC,HCNC,PO

## 2020-11-02 PROCEDURE — 93227 HOLTER MONITOR - 48 HOUR (CUPID ONLY): ICD-10-PCS | Mod: HCNC,,, | Performed by: INTERNAL MEDICINE

## 2020-11-02 PROCEDURE — 93227 XTRNL ECG REC<48 HR R&I: CPT | Mod: HCNC,,, | Performed by: INTERNAL MEDICINE

## 2020-11-03 ENCOUNTER — OFFICE VISIT (OUTPATIENT)
Dept: FAMILY MEDICINE | Facility: CLINIC | Age: 75
End: 2020-11-03
Payer: MEDICARE

## 2020-11-03 VITALS
SYSTOLIC BLOOD PRESSURE: 130 MMHG | HEART RATE: 67 BPM | HEIGHT: 69 IN | TEMPERATURE: 97 F | WEIGHT: 207.31 LBS | OXYGEN SATURATION: 96 % | DIASTOLIC BLOOD PRESSURE: 60 MMHG | BODY MASS INDEX: 30.7 KG/M2

## 2020-11-03 DIAGNOSIS — Z12.11 ENCOUNTER FOR SCREENING FOR MALIGNANT NEOPLASM OF COLON: ICD-10-CM

## 2020-11-03 DIAGNOSIS — M53.87 SCIATICA OF RIGHT SIDE ASSOCIATED WITH DISORDER OF LUMBOSACRAL SPINE: ICD-10-CM

## 2020-11-03 DIAGNOSIS — Z23 NEED FOR INFLUENZA VACCINATION: ICD-10-CM

## 2020-11-03 PROCEDURE — 3288F FALL RISK ASSESSMENT DOCD: CPT | Mod: CPTII,S$GLB,, | Performed by: FAMILY MEDICINE

## 2020-11-03 PROCEDURE — 99214 PR OFFICE/OUTPT VISIT, EST, LEVL IV, 30-39 MIN: ICD-10-PCS | Mod: 25,S$GLB,, | Performed by: FAMILY MEDICINE

## 2020-11-03 PROCEDURE — 1159F MED LIST DOCD IN RCRD: CPT | Mod: S$GLB,,, | Performed by: FAMILY MEDICINE

## 2020-11-03 PROCEDURE — 1125F PR PAIN SEVERITY QUANTIFIED, PAIN PRESENT: ICD-10-PCS | Mod: S$GLB,,, | Performed by: FAMILY MEDICINE

## 2020-11-03 PROCEDURE — 90694 FLU VACCINE - QUADRIVALENT - ADJUVANTED: ICD-10-PCS | Mod: S$GLB,,, | Performed by: FAMILY MEDICINE

## 2020-11-03 PROCEDURE — 3046F PR MOST RECENT HEMOGLOBIN A1C LEVEL > 9.0%: ICD-10-PCS | Mod: CPTII,S$GLB,, | Performed by: FAMILY MEDICINE

## 2020-11-03 PROCEDURE — 1125F AMNT PAIN NOTED PAIN PRSNT: CPT | Mod: S$GLB,,, | Performed by: FAMILY MEDICINE

## 2020-11-03 PROCEDURE — 90694 VACC AIIV4 NO PRSRV 0.5ML IM: CPT | Mod: S$GLB,,, | Performed by: FAMILY MEDICINE

## 2020-11-03 PROCEDURE — 3078F PR MOST RECENT DIASTOLIC BLOOD PRESSURE < 80 MM HG: ICD-10-PCS | Mod: CPTII,S$GLB,, | Performed by: FAMILY MEDICINE

## 2020-11-03 PROCEDURE — 3075F SYST BP GE 130 - 139MM HG: CPT | Mod: CPTII,S$GLB,, | Performed by: FAMILY MEDICINE

## 2020-11-03 PROCEDURE — 99214 OFFICE O/P EST MOD 30 MIN: CPT | Mod: 25,S$GLB,, | Performed by: FAMILY MEDICINE

## 2020-11-03 PROCEDURE — 3288F PR FALLS RISK ASSESSMENT DOCUMENTED: ICD-10-PCS | Mod: CPTII,S$GLB,, | Performed by: FAMILY MEDICINE

## 2020-11-03 PROCEDURE — 1159F PR MEDICATION LIST DOCUMENTED IN MEDICAL RECORD: ICD-10-PCS | Mod: S$GLB,,, | Performed by: FAMILY MEDICINE

## 2020-11-03 PROCEDURE — 1101F PT FALLS ASSESS-DOCD LE1/YR: CPT | Mod: CPTII,S$GLB,, | Performed by: FAMILY MEDICINE

## 2020-11-03 PROCEDURE — 3008F PR BODY MASS INDEX (BMI) DOCUMENTED: ICD-10-PCS | Mod: CPTII,S$GLB,, | Performed by: FAMILY MEDICINE

## 2020-11-03 PROCEDURE — 1101F PR PT FALLS ASSESS DOC 0-1 FALLS W/OUT INJ PAST YR: ICD-10-PCS | Mod: CPTII,S$GLB,, | Performed by: FAMILY MEDICINE

## 2020-11-03 PROCEDURE — 3046F HEMOGLOBIN A1C LEVEL >9.0%: CPT | Mod: CPTII,S$GLB,, | Performed by: FAMILY MEDICINE

## 2020-11-03 PROCEDURE — G0008 FLU VACCINE - QUADRIVALENT - ADJUVANTED: ICD-10-PCS | Mod: S$GLB,,, | Performed by: FAMILY MEDICINE

## 2020-11-03 PROCEDURE — G0008 ADMIN INFLUENZA VIRUS VAC: HCPCS | Mod: S$GLB,,, | Performed by: FAMILY MEDICINE

## 2020-11-03 PROCEDURE — 3078F DIAST BP <80 MM HG: CPT | Mod: CPTII,S$GLB,, | Performed by: FAMILY MEDICINE

## 2020-11-03 PROCEDURE — 3008F BODY MASS INDEX DOCD: CPT | Mod: CPTII,S$GLB,, | Performed by: FAMILY MEDICINE

## 2020-11-03 PROCEDURE — 3075F PR MOST RECENT SYSTOLIC BLOOD PRESS GE 130-139MM HG: ICD-10-PCS | Mod: CPTII,S$GLB,, | Performed by: FAMILY MEDICINE

## 2020-11-03 RX ORDER — DULOXETIN HYDROCHLORIDE 30 MG/1
30 CAPSULE, DELAYED RELEASE ORAL DAILY
Qty: 30 CAPSULE | Refills: 11 | Status: SHIPPED | OUTPATIENT
Start: 2020-11-03 | End: 2021-08-23

## 2020-11-03 NOTE — PROGRESS NOTES
Subjective:      Patient ID: Jin Ngo is a 74 y.o. male.    Chief Complaint:  Diabetes    History of Present Illness  Jin Ngo with Type 2 diabetes complicated by TIA/CAD with CABG in 2007, hypertension, and dyslipidemia presents today for follow up of Type 2 diabetes. Previous patient of Dr. Diaz. Last seen in Oct 2020. This is his first visit with me.     He is accompanied by his wife, Krupa.     At his last in Feb 2020, he reported that Trulicity was too expensive. We also stopped Metformin due to kidney function. We started Ozempic but he was unable to afford and did not let us know. He was not taking medications for diabetes at his Oct visit.    We consulted patient assistance last time for Trulicity but they have not heard anything yet. He is willing to stay on insulin if needed.    States polyuria has improved since last visit    With regards to the diabetes:    Diagnosed with Type 2 DM around 2018  Family History of Type 2 DM: mother   Known complications:  DKA/HHS: denies  RN: due for visit; denies  PN: denies  Nephropathy: due for urine; CKD   Gastroparesis: denies  CAD: + s/p CABG 2007; TIAs     Current regimen:  Levemir 10 units daily   Glipizide 5 mg with breakfast and dinner    Other medications tried:  Metformin --> denies side effects. Thinks may have had TIAs while taking metformin.   Trulicity     He is checking 3 times daily. See log below.            Hypoglycemic event-denies  Yes, did not need assistance   Knows how to correct with 15 grams of carbs- juice, coke, or a peppermint.     Dietary recall: He states that following diabetic diet is hard and expensive. He reports he is hungry but wife states that he was always hungry. He is now eating 3 meals daily. He is wondering when he can snack. He reports he is having problems with 2nd portions.   B: (9AM)  eggs, toast, and turkey mai for breakfast with iced tea or coffee with splenda.  L: sandwich or salad  D: pasta with meat and  vegetable or salad  Snacks: apples, oranges, bananas, grapes  Drinks: iced tea with lemon, not really drinking water; coffee, strawberry watermelon sugar free drink; milk     Exercise - tried to stay active. No formal exercise. Trying to walk but had back injury as a child and has been having some pain. He has exercise bike and is planning on starting and walking.     Education - last visit: 2017; politely declines    Has a Medic alert tag-none; does want   Diabetes Management Status  Statin: Taking  ACE/ARB: Taking    Lab Results   Component Value Date    HGBA1C 10.2 (H) 09/25/2020    HGBA1C 7.8 (H) 06/30/2020    HGBA1C 12.3 (H) 02/10/2020     Screening or Prevention Patient's value Goal Complete/Controlled?   HgA1C Testing and Control   Lab Results   Component Value Date    HGBA1C 10.2 (H) 09/25/2020      Annually/Less than 8% No   Lipid profile : 06/30/2020 Annually Yes   LDL control Lab Results   Component Value Date    LDLCALC 60.0 (L) 06/30/2020    Annually/Less than 100 mg/dl  Yes   Nephropathy screening Lab Results   Component Value Date    LABMICR 30.0 04/04/2017     No results found for: PROTEINUA Annually No   Blood pressure BP Readings from Last 1 Encounters:   11/04/20 130/72    Less than 140/90 Yes   Dilated retinal exam : 10/04/2019 Annually Yes   Foot exam   : 02/27/2020 Annually Yes     Review of Systems   Constitutional: Negative for fatigue.   Eyes: Negative for visual disturbance.   Respiratory: Positive for shortness of breath (on exertion).    Cardiovascular: Negative for chest pain.   Gastrointestinal: Negative for abdominal pain.   Endocrine: Positive for polydipsia, polyphagia and polyuria.   Musculoskeletal: Negative for arthralgias.   Skin: Negative for wound.   Neurological: Positive for dizziness. Negative for headaches.   Hematological: Does not bruise/bleed easily.   Psychiatric/Behavioral: Negative for sleep disturbance.       Objective:   Physical Exam  Vitals signs reviewed.  "  Constitutional:       Appearance: He is well-developed.   Neck:      Thyroid: No thyromegaly.   Cardiovascular:      Rate and Rhythm: Normal rate.      Comments: Irregular heart rhythm   Pulmonary:      Effort: Pulmonary effort is normal.   Abdominal:      Palpations: Abdomen is soft.   Skin:     Comments: Erythema to abdomen injection sites-encouraged rotation     injection sites are without edema or erythema. No lipo hypertropthy or atrophy  Diabetes Foot Exam:   Denies sores or lesions to bilateral feet  Shoes appropriate  DM foot exam deferred; done in Feb 2020    Visit Vitals  /72   Ht 5' 9" (1.753 m)   Wt 93.5 kg (206 lb 0.3 oz)   BMI 30.42 kg/m²        Lab Review:   Lab Results   Component Value Date    HGBA1C 10.2 (H) 09/25/2020    HGBA1C 7.8 (H) 06/30/2020    HGBA1C 12.3 (H) 02/10/2020      Lab Results   Component Value Date    CHOL 117 (L) 06/30/2020    HDL 27 (L) 06/30/2020    LDLCALC 60.0 (L) 06/30/2020    TRIG 150 06/30/2020    CHOLHDL 23.1 06/30/2020     Lab Results   Component Value Date     09/25/2020    K 5.0 09/25/2020     09/25/2020    CO2 27 09/25/2020     (H) 09/25/2020    BUN 32 (H) 09/25/2020    CREATININE 1.64 (H) 09/25/2020    CALCIUM 9.3 09/25/2020    PROT 7.7 06/30/2020    ALBUMIN 4.5 06/30/2020    BILITOT 0.5 06/30/2020    ALKPHOS 35 (L) 06/30/2020    AST 26 06/30/2020    ALT 20 06/30/2020    ANIONGAP 8 09/25/2020    ESTGFRAFRICA 46.9 (A) 09/25/2020    EGFRNONAA 40.6 (A) 09/25/2020    TSH 2.140 05/21/2019     No results found for: [id number]  Assessment and Plan     1. Uncontrolled type 2 diabetes mellitus with diabetic neuropathy, without long-term current use of insulin  Comprehensive Metabolic Panel    Hemoglobin A1C    Microalbumin/Creatinine Ratio, Urine   2. Mixed hyperlipidemia     3. Hypertension associated with diabetes     4. ANGELIC (acute kidney injury)     5. Abnormal heart sounds         Uncontrolled type 2 diabetes mellitus with diabetic neuropathy, " without long-term current use of insulin  Cost is a big issue for him. Sent message for Trulicity patient assistance last time. He was resistant to insulin but is now controlled on insulin and willing to stay on it. We will try to get him controlled and then restart Trulicity if able to afford with patient assistance.   Goal A1C for age and comorbidities: 7 - 7.5%.  -- Reviewed goals of therapy are to get the best control we can without hypoglycemia  -- Discussed logs do not represent and A1c 10.2%! He has controlled diabetes.  Medication changes:    Continue current medications   Levemir 10 units daily and Glipizide 5 mg daily     Check blood sugar a couple of times a day -fasting a couple of times weekly. Call me for blood sugar less than 70 or all the time above 200.    -- Reviewed patient's current insulin regimen. Clarified proper insulin dose and timing in relation to meals, etc. Insulin injection sites and proper rotation instructed.    -- Advised frequent self blood glucose monitoring.  Patient encouraged to document glucose results and bring them to every clinic visit    -- Hypoglycemia precautions discussed. Instructed on precautions before driving.    -- Call for Bg repeatedly < 90 or > 180.   -- Close adherence to lifestyle changes recommended.   -- Periodic follow ups for eye evaluations, foot care and dental care suggested.  -- Encouraged exercise as tolerated  -- Previously given information on low carb snacks and diabetic drinks     Mixed hyperlipidemia  Controlled   On statin per ADA recommendations      Hypertension associated with diabetes  -- on ACEI   -- Controlled  -- Blood pressure goals discussed with patient      ANGELIC (acute kidney injury)  No new medications, avoids NSAIDs  No metformin         Abnormal heart sounds  -- Auscultated at his Oct 2020 appointment and had abnormal EKG  -- Seeing cardio-- has holter monitor on today       Follow up in about 3 months (around 2/4/2021).

## 2020-11-03 NOTE — PROGRESS NOTES
" Patient ID: Jin Ngo is a 74 y.o. male.    Chief Complaint: check up and ciatic nerve pain    HPI       Jin Ngo is a 74 y.o. male here for follow-up currently with diabetes mellitus taking oral medication -insulin insulin-and also does use Ozempic.  Patient with significant muscle skeletal pain due to lower back.  No improvement in pain recently.      Review of Symptoms    Constitutional  Neg activity change, No chills/ fever   Resp  Neg hemoptysis, stridor, choking  CVS  Neg chest pain, palpitations        Physical Exam    Vitals:    11/03/20 1457   BP: 130/60   BP Location: Right arm   Patient Position: Sitting   Pulse: 67   Temp: 97.3 °F (36.3 °C)   TempSrc: Oral   SpO2: 96%   Weight: 94 kg (207 lb 5.5 oz)   Height: 5' 9" (1.753 m)        Constitutional:   Oriented to person, place, and time.appears well-developed and well-nourished.   No distress.     HENT:   Head: Normocephalic and atraumatic.     Right Ear: Tympanic membrane, external ear and ear canal normal     Left Ear: Tympanic membrane, external ear and ear canal normal    Nose: External Normal. Normal turbinates, No rhinorrhea     Mouth/Throat: Uvula is midline, oropharynx is clear and moist and mucous membranes are normal.     Neck: supple no anterior cervical adenopathy    Carotid artery:  Bruit    NEG [x]   POS[]    Eyes:   Conjunctivae are normal. Right eye exhibits no discharge. Left eye exhibits no discharge. No scleral icterus. No periorbital edema     Cardiovascular:  Regular rate and rhythm with normal S1 and S2     Pulmonary/Chest:   Clear to auscultation bilaterally without wheezes, rhonchi or rales    Musculoskeletal:  No edema. No obvious deformity No wasting     Neurological:   Alert and oriented to person, place, and time. Coordination normal.     Skin:   Skin is warm and dry.   No diaphoresis.   No rash noted.    Psychiatric: Normal mood and affect. Behavior is normal. Judgment and thought content normal.       Assessment / " Plan:      ICD-10-CM ICD-9-CM   1. Uncontrolled type 2 diabetes mellitus with diabetic neuropathy, without long-term current use of insulin  E11.40 250.62    E11.65 357.2   2. Need for influenza vaccination  Z23 V04.81   3. Encounter for screening for malignant neoplasm of colon  Z12.11 V76.51   4. Sciatica of right side associated with disorder of lumbosacral spine  M53.87 724.3     Uncontrolled type 2 diabetes mellitus with diabetic neuropathy, without long-term current use of insulin  -     Ambulatory referral/consult to Ophthalmology; Future; Expected date: 11/10/2020    Need for influenza vaccination  -     Influenza (FLUAD) - Quadrivalent (Adjuvanted) *Preferred* (65+) (PF)    Encounter for screening for malignant neoplasm of colon  -     Case request GI: Colonoscopy    Sciatica of right side associated with disorder of lumbosacral spine  Comments:  Cymbalta-in helps to reduce sensation of pain.    Other orders  -     DULoxetine (CYMBALTA) 30 MG capsule; Take 1 capsule (30 mg total) by mouth once daily. To help reduce feeling of back pain  Dispense: 30 capsule; Refill: 11

## 2020-11-04 ENCOUNTER — OFFICE VISIT (OUTPATIENT)
Dept: ENDOCRINOLOGY | Facility: CLINIC | Age: 75
End: 2020-11-04
Payer: MEDICARE

## 2020-11-04 VITALS
SYSTOLIC BLOOD PRESSURE: 130 MMHG | HEIGHT: 69 IN | DIASTOLIC BLOOD PRESSURE: 72 MMHG | WEIGHT: 206 LBS | BODY MASS INDEX: 30.51 KG/M2

## 2020-11-04 DIAGNOSIS — E78.2 MIXED HYPERLIPIDEMIA: ICD-10-CM

## 2020-11-04 DIAGNOSIS — R01.2 ABNORMAL HEART SOUNDS: ICD-10-CM

## 2020-11-04 DIAGNOSIS — E11.59 HYPERTENSION ASSOCIATED WITH DIABETES: ICD-10-CM

## 2020-11-04 DIAGNOSIS — I15.2 HYPERTENSION ASSOCIATED WITH DIABETES: ICD-10-CM

## 2020-11-04 DIAGNOSIS — N17.9 AKI (ACUTE KIDNEY INJURY): ICD-10-CM

## 2020-11-04 PROCEDURE — 99499 RISK ADDL DX/OHS AUDIT: ICD-10-PCS | Mod: S$GLB,,, | Performed by: NURSE PRACTITIONER

## 2020-11-04 PROCEDURE — 1101F PR PT FALLS ASSESS DOC 0-1 FALLS W/OUT INJ PAST YR: ICD-10-PCS | Mod: HCNC,CPTII,S$GLB, | Performed by: NURSE PRACTITIONER

## 2020-11-04 PROCEDURE — 99999 PR PBB SHADOW E&M-EST. PATIENT-LVL III: CPT | Mod: PBBFAC,HCNC,, | Performed by: NURSE PRACTITIONER

## 2020-11-04 PROCEDURE — 99214 OFFICE O/P EST MOD 30 MIN: CPT | Mod: HCNC,S$GLB,, | Performed by: NURSE PRACTITIONER

## 2020-11-04 PROCEDURE — 1126F AMNT PAIN NOTED NONE PRSNT: CPT | Mod: HCNC,S$GLB,, | Performed by: NURSE PRACTITIONER

## 2020-11-04 PROCEDURE — 99214 PR OFFICE/OUTPT VISIT, EST, LEVL IV, 30-39 MIN: ICD-10-PCS | Mod: HCNC,S$GLB,, | Performed by: NURSE PRACTITIONER

## 2020-11-04 PROCEDURE — 1126F PR PAIN SEVERITY QUANTIFIED, NO PAIN PRESENT: ICD-10-PCS | Mod: HCNC,S$GLB,, | Performed by: NURSE PRACTITIONER

## 2020-11-04 PROCEDURE — 3075F SYST BP GE 130 - 139MM HG: CPT | Mod: HCNC,CPTII,S$GLB, | Performed by: NURSE PRACTITIONER

## 2020-11-04 PROCEDURE — 1159F MED LIST DOCD IN RCRD: CPT | Mod: HCNC,S$GLB,, | Performed by: NURSE PRACTITIONER

## 2020-11-04 PROCEDURE — 3078F PR MOST RECENT DIASTOLIC BLOOD PRESSURE < 80 MM HG: ICD-10-PCS | Mod: HCNC,CPTII,S$GLB, | Performed by: NURSE PRACTITIONER

## 2020-11-04 PROCEDURE — 3008F BODY MASS INDEX DOCD: CPT | Mod: HCNC,CPTII,S$GLB, | Performed by: NURSE PRACTITIONER

## 2020-11-04 PROCEDURE — 99999 PR PBB SHADOW E&M-EST. PATIENT-LVL III: ICD-10-PCS | Mod: PBBFAC,HCNC,, | Performed by: NURSE PRACTITIONER

## 2020-11-04 PROCEDURE — 3046F PR MOST RECENT HEMOGLOBIN A1C LEVEL > 9.0%: ICD-10-PCS | Mod: HCNC,CPTII,S$GLB, | Performed by: NURSE PRACTITIONER

## 2020-11-04 PROCEDURE — 3008F PR BODY MASS INDEX (BMI) DOCUMENTED: ICD-10-PCS | Mod: HCNC,CPTII,S$GLB, | Performed by: NURSE PRACTITIONER

## 2020-11-04 PROCEDURE — 99499 UNLISTED E&M SERVICE: CPT | Mod: S$GLB,,, | Performed by: NURSE PRACTITIONER

## 2020-11-04 PROCEDURE — 1101F PT FALLS ASSESS-DOCD LE1/YR: CPT | Mod: HCNC,CPTII,S$GLB, | Performed by: NURSE PRACTITIONER

## 2020-11-04 PROCEDURE — 3075F PR MOST RECENT SYSTOLIC BLOOD PRESS GE 130-139MM HG: ICD-10-PCS | Mod: HCNC,CPTII,S$GLB, | Performed by: NURSE PRACTITIONER

## 2020-11-04 PROCEDURE — 3046F HEMOGLOBIN A1C LEVEL >9.0%: CPT | Mod: HCNC,CPTII,S$GLB, | Performed by: NURSE PRACTITIONER

## 2020-11-04 PROCEDURE — 1159F PR MEDICATION LIST DOCUMENTED IN MEDICAL RECORD: ICD-10-PCS | Mod: HCNC,S$GLB,, | Performed by: NURSE PRACTITIONER

## 2020-11-04 PROCEDURE — 3078F DIAST BP <80 MM HG: CPT | Mod: HCNC,CPTII,S$GLB, | Performed by: NURSE PRACTITIONER

## 2020-11-04 NOTE — ASSESSMENT & PLAN NOTE
-- Auscultated at his Oct 2020 appointment and had abnormal EKG  -- Seeing cardio-- has holter monitor on today

## 2020-11-04 NOTE — PATIENT INSTRUCTIONS
Diabetes   Continue current medications   Levemir 10 units daily and Glipizide 5 mg daily     Check blood sugar a couple of times a day -fasting a couple of times weekly. Call me for blood sugar less than 70 or all the time above 200.      Hypoglycemia - Low Blood Sugar  Blood Sugar less than 70    What can you do?  Rule of 15:    Test your blood sugar   If glucose is between 50-70 mg/dL then ingest 15 grams of fast-acting carbs   If glucose is less than 50 mg/dL then ingest 30 grams of fast-acting carbs   Ingest 15 grams of fast-acting carbohydrate - such as:   a. 3-4 glucose tablets  b. 4 oz juice  c. ½ can regular soda pop  d. 15 skittles or mini jelly beans    Re-check your blood sugar in 15 minutes. If its less than 70mg/dl, repeat steps 2 and 3.   If your next meal is more than 1 hour away, eat an additional 15 grams of carbohydrate and 1 ounce of protein (examples include crackers with cheese or one-half of a sandwich with peanut butter). It is important not to eat too much because this can raise your blood sugar above the target level.      After your blood sugar has normalized, think about why you went low. If you notice a pattern of low blood sugars, contact your Diabetes Team. We may need to adjust your medication.      Goal blood sugar is  fasting   Goal blood sugar 1 hour after meal is less than 180  Goal blood sugar 2 hour after meal is less than 140

## 2020-11-04 NOTE — ASSESSMENT & PLAN NOTE
Cost is a big issue for him. Sent message for Trulicity patient assistance last time. He was resistant to insulin but is now controlled on insulin and willing to stay on it. We will try to get him controlled and then restart Trulicity if able to afford with patient assistance.   Goal A1C for age and comorbidities: 7 - 7.5%.  -- Reviewed goals of therapy are to get the best control we can without hypoglycemia  -- Discussed logs do not represent and A1c 10.2%! He has controlled diabetes.  Medication changes:    Continue current medications   Levemir 10 units daily and Glipizide 5 mg daily     Check blood sugar a couple of times a day -fasting a couple of times weekly. Call me for blood sugar less than 70 or all the time above 200.    -- Reviewed patient's current insulin regimen. Clarified proper insulin dose and timing in relation to meals, etc. Insulin injection sites and proper rotation instructed.    -- Advised frequent self blood glucose monitoring.  Patient encouraged to document glucose results and bring them to every clinic visit    -- Hypoglycemia precautions discussed. Instructed on precautions before driving.    -- Call for Bg repeatedly < 90 or > 180.   -- Close adherence to lifestyle changes recommended.   -- Periodic follow ups for eye evaluations, foot care and dental care suggested.  -- Encouraged exercise as tolerated  -- Previously given information on low carb snacks and diabetic drinks

## 2020-11-05 LAB
OHS CV EVENT MONITOR DAY: 0
OHS CV HOLTER LENGTH DECIMAL HOURS: 48
OHS CV HOLTER LENGTH HOURS: 48
OHS CV HOLTER LENGTH MINUTES: 0

## 2020-11-06 ENCOUNTER — TELEPHONE (OUTPATIENT)
Dept: FAMILY MEDICINE | Facility: CLINIC | Age: 75
End: 2020-11-06

## 2020-11-06 NOTE — TELEPHONE ENCOUNTER
Thank you for the heads up.    I was able to get in touch with he and his wife.  Told them about the atrial fibrillation and the need to start anticoagulation.  Started him on Eliquis 5 milligrams p.o. b.i.d.    Not sure exactly when he started Plavix:  either after CABG or diagnosis of CVA.  Both remote past - told him to hold for now not sure of your recommendations.    Also he does not have a follow-up appointment-I said that your office would contact him regarding a follow-up date.      Thank you    Mike

## 2020-11-06 NOTE — TELEPHONE ENCOUNTER
----- Message from Marvin Sotelo MD sent at 11/6/2020 10:45 AM CST -----  The patient had a Holter monitor which showed atrial fibrillation paroxysmal.  I have tried to call the patient.  I think he will have to start DOAC therapy.  Hopefully he will call me.  There is a follow-up visit with me soon.

## 2020-11-09 ENCOUNTER — OFFICE VISIT (OUTPATIENT)
Dept: CARDIOLOGY | Facility: CLINIC | Age: 75
End: 2020-11-09
Payer: MEDICARE

## 2020-11-09 ENCOUNTER — TELEPHONE (OUTPATIENT)
Dept: CARDIOLOGY | Facility: CLINIC | Age: 75
End: 2020-11-09

## 2020-11-09 VITALS
OXYGEN SATURATION: 97 % | HEIGHT: 69 IN | BODY MASS INDEX: 30.36 KG/M2 | DIASTOLIC BLOOD PRESSURE: 60 MMHG | WEIGHT: 205 LBS | HEART RATE: 59 BPM | SYSTOLIC BLOOD PRESSURE: 130 MMHG

## 2020-11-09 DIAGNOSIS — Z86.73 HISTORY OF TIA (TRANSIENT ISCHEMIC ATTACK): ICD-10-CM

## 2020-11-09 DIAGNOSIS — Z95.1 S/P CABG (CORONARY ARTERY BYPASS GRAFT): Primary | ICD-10-CM

## 2020-11-09 DIAGNOSIS — I48.0 PAROXYSMAL ATRIAL FIBRILLATION: ICD-10-CM

## 2020-11-09 DIAGNOSIS — I65.23 BILATERAL CAROTID ARTERY OCCLUSION: ICD-10-CM

## 2020-11-09 DIAGNOSIS — I70.0 ABDOMINAL AORTIC ATHEROSCLEROSIS: ICD-10-CM

## 2020-11-09 DIAGNOSIS — E78.2 MIXED HYPERLIPIDEMIA: ICD-10-CM

## 2020-11-09 DIAGNOSIS — I10 ESSENTIAL HYPERTENSION: ICD-10-CM

## 2020-11-09 PROCEDURE — 3008F BODY MASS INDEX DOCD: CPT | Mod: HCNC,CPTII,S$GLB, | Performed by: INTERNAL MEDICINE

## 2020-11-09 PROCEDURE — 99999 PR PBB SHADOW E&M-EST. PATIENT-LVL IV: CPT | Mod: PBBFAC,HCNC,, | Performed by: INTERNAL MEDICINE

## 2020-11-09 PROCEDURE — 1159F PR MEDICATION LIST DOCUMENTED IN MEDICAL RECORD: ICD-10-PCS | Mod: HCNC,S$GLB,, | Performed by: INTERNAL MEDICINE

## 2020-11-09 PROCEDURE — 1159F MED LIST DOCD IN RCRD: CPT | Mod: HCNC,S$GLB,, | Performed by: INTERNAL MEDICINE

## 2020-11-09 PROCEDURE — 3075F SYST BP GE 130 - 139MM HG: CPT | Mod: HCNC,CPTII,S$GLB, | Performed by: INTERNAL MEDICINE

## 2020-11-09 PROCEDURE — 3075F PR MOST RECENT SYSTOLIC BLOOD PRESS GE 130-139MM HG: ICD-10-PCS | Mod: HCNC,CPTII,S$GLB, | Performed by: INTERNAL MEDICINE

## 2020-11-09 PROCEDURE — 99214 PR OFFICE/OUTPT VISIT, EST, LEVL IV, 30-39 MIN: ICD-10-PCS | Mod: HCNC,S$GLB,, | Performed by: INTERNAL MEDICINE

## 2020-11-09 PROCEDURE — 99499 RISK ADDL DX/OHS AUDIT: ICD-10-PCS | Mod: S$GLB,,, | Performed by: INTERNAL MEDICINE

## 2020-11-09 PROCEDURE — 1101F PR PT FALLS ASSESS DOC 0-1 FALLS W/OUT INJ PAST YR: ICD-10-PCS | Mod: HCNC,CPTII,S$GLB, | Performed by: INTERNAL MEDICINE

## 2020-11-09 PROCEDURE — 1101F PT FALLS ASSESS-DOCD LE1/YR: CPT | Mod: HCNC,CPTII,S$GLB, | Performed by: INTERNAL MEDICINE

## 2020-11-09 PROCEDURE — 93005 EKG 12-LEAD: ICD-10-PCS | Mod: HCNC,S$GLB,, | Performed by: INTERNAL MEDICINE

## 2020-11-09 PROCEDURE — 93010 EKG 12-LEAD: ICD-10-PCS | Mod: HCNC,S$GLB,, | Performed by: INTERNAL MEDICINE

## 2020-11-09 PROCEDURE — 99999 PR PBB SHADOW E&M-EST. PATIENT-LVL IV: ICD-10-PCS | Mod: PBBFAC,HCNC,, | Performed by: INTERNAL MEDICINE

## 2020-11-09 PROCEDURE — 1126F PR PAIN SEVERITY QUANTIFIED, NO PAIN PRESENT: ICD-10-PCS | Mod: HCNC,S$GLB,, | Performed by: INTERNAL MEDICINE

## 2020-11-09 PROCEDURE — 3008F PR BODY MASS INDEX (BMI) DOCUMENTED: ICD-10-PCS | Mod: HCNC,CPTII,S$GLB, | Performed by: INTERNAL MEDICINE

## 2020-11-09 PROCEDURE — 99214 OFFICE O/P EST MOD 30 MIN: CPT | Mod: HCNC,S$GLB,, | Performed by: INTERNAL MEDICINE

## 2020-11-09 PROCEDURE — 3078F PR MOST RECENT DIASTOLIC BLOOD PRESSURE < 80 MM HG: ICD-10-PCS | Mod: HCNC,CPTII,S$GLB, | Performed by: INTERNAL MEDICINE

## 2020-11-09 PROCEDURE — 93005 ELECTROCARDIOGRAM TRACING: CPT | Mod: HCNC,S$GLB,, | Performed by: INTERNAL MEDICINE

## 2020-11-09 PROCEDURE — 99499 UNLISTED E&M SERVICE: CPT | Mod: S$GLB,,, | Performed by: INTERNAL MEDICINE

## 2020-11-09 PROCEDURE — 1126F AMNT PAIN NOTED NONE PRSNT: CPT | Mod: HCNC,S$GLB,, | Performed by: INTERNAL MEDICINE

## 2020-11-09 PROCEDURE — 93010 ELECTROCARDIOGRAM REPORT: CPT | Mod: HCNC,S$GLB,, | Performed by: INTERNAL MEDICINE

## 2020-11-09 PROCEDURE — 3078F DIAST BP <80 MM HG: CPT | Mod: HCNC,CPTII,S$GLB, | Performed by: INTERNAL MEDICINE

## 2020-11-09 NOTE — PROGRESS NOTES
[facility] Cardiology     Subjective:    Patient ID:  Jin Ngo is a 74 y.o. male who presents for follow-up of Follow-up    Review of patient's allergies indicates:   Allergen Reactions    Metformin      Caused stroke    Pcn [penicillins]       The patient wore a Holter monitor confirming episodes of atrial fibrillation.  There report frequent PACs.  His carotid disease has worsened by Doppler study.  He remains asymptomatic neurologic.  He has had a TIA in the past.  Eliquis was started and he stop clopidogrel.  He remains on aspirin.  His Electrocardiogram today shows sinus rhythm with PACs independently interpreted by myself.  There is history of coronary disease with previous bypass in normal ejection fraction.  He is on Ace inhibitors and metoprolol.  He is very sedentary because of back problems.    Follow-up  Pertinent negatives include no abdominal pain, anorexia, change in bowel habit, chest pain, chills, congestion, coughing, diaphoresis, fever, headaches, joint swelling, myalgias, nausea, neck pain, numbness, rash, sore throat, vertigo, vomiting or weakness.       Review of Systems   Constitution: Negative for chills, decreased appetite, diaphoresis, fever, malaise/fatigue, night sweats, weight gain and weight loss.   HENT: Negative for congestion, ear discharge, ear pain, hearing loss, hoarse voice, nosebleeds, odynophagia, sore throat, stridor and tinnitus.    Eyes: Negative for blurred vision, discharge, double vision, pain, photophobia, redness, vision loss in left eye, vision loss in right eye, visual disturbance and visual halos.   Cardiovascular: Positive for palpitations. Negative for chest pain, claudication, cyanosis, dyspnea on exertion, irregular heartbeat, leg swelling, near-syncope, orthopnea, paroxysmal nocturnal dyspnea and syncope.   Respiratory: Negative for cough, hemoptysis, shortness of breath, sleep  "disturbances due to breathing, snoring, sputum production and wheezing.    Endocrine: Negative for cold intolerance, heat intolerance, polydipsia, polyphagia and polyuria.   Hematologic/Lymphatic: Negative for adenopathy and bleeding problem. Does not bruise/bleed easily.   Skin: Negative for color change, dry skin, flushing, itching, nail changes, poor wound healing, rash, skin cancer, suspicious lesions and unusual hair distribution.   Musculoskeletal: Positive for back pain. Negative for arthritis, falls, gout, joint pain, joint swelling, muscle cramps, muscle weakness, myalgias, neck pain and stiffness.   Gastrointestinal: Negative for bloating, abdominal pain, anorexia, change in bowel habit, bowel incontinence, constipation, diarrhea, dysphagia, excessive appetite, flatus, heartburn, hematemesis, hematochezia, hemorrhoids, jaundice, melena, nausea and vomiting.   Genitourinary: Negative for bladder incontinence, decreased libido, dysuria, flank pain, frequency, genital sores, hematuria, hesitancy, incomplete emptying, nocturia and urgency.   Neurological: Negative for aphonia, brief paralysis, difficulty with concentration, disturbances in coordination, excessive daytime sleepiness, dizziness, focal weakness, headaches, light-headedness, loss of balance, numbness, paresthesias, seizures, sensory change, tremors, vertigo and weakness.   Psychiatric/Behavioral: Negative for altered mental status, depression, hallucinations, memory loss, substance abuse, suicidal ideas and thoughts of violence. The patient does not have insomnia and is not nervous/anxious.    Allergic/Immunologic: Negative for hives and persistent infections.        Objective:       Vitals:    11/09/20 1353 11/09/20 1413   BP: (!) 142/68 130/60   Pulse: (!) 59    SpO2: 97%    Weight: 93 kg (205 lb)    Height: 5' 9" (1.753 m)     Physical Exam   Constitutional: He is oriented to person, place, and time. He appears well-developed and " well-nourished. No distress.   HENT:   Head: Normocephalic and atraumatic.   Nose: Nose normal.   Mouth/Throat: Oropharynx is clear and moist.   Eyes: Pupils are equal, round, and reactive to light. Conjunctivae and EOM are normal. Right eye exhibits no discharge. No scleral icterus.   Neck: Normal range of motion. Neck supple. No JVD present. No tracheal deviation present. No thyromegaly present.   Cardiovascular: Normal rate, normal heart sounds and intact distal pulses. Frequent extrasystoles are present. Exam reveals no gallop and no friction rub.   No murmur heard.  Pulses:       Carotid pulses are on the right side with bruit and on the left side with bruit.  Irregular heart rhythm   Pulmonary/Chest: Effort normal and breath sounds normal. No stridor. No respiratory distress. He has no wheezes. He has no rales. He exhibits no tenderness.   Abdominal: Soft. Bowel sounds are normal. He exhibits no distension and no mass. There is no abdominal tenderness. There is no rebound and no guarding.   Musculoskeletal: Normal range of motion.         General: No tenderness or edema.   Lymphadenopathy:     He has no cervical adenopathy.   Neurological: He is alert and oriented to person, place, and time. No cranial nerve deficit. Coordination normal.   Skin: Skin is warm and dry. No rash noted. He is not diaphoretic. No erythema. No pallor.   Psychiatric: He has a normal mood and affect. His behavior is normal. Judgment and thought content normal.         Assessment:       1. S/P CABG (coronary artery bypass graft)    2. Mixed hyperlipidemia    3. Essential hypertension    4. Bilateral carotid artery occlusion    5. Paroxysmal atrial fibrillation    6. Abdominal aortic atherosclerosis    7. History of TIA (transient ischemic attack)      Results for orders placed or performed during the hospital encounter of 11/02/20   Holter monitor - 48 hour   Result Value Ref Range    Holter length hours 48     holter length minutes 0      holter length dec hours 48.00     Event Monitor Day 0      Lab Results   Component Value Date    WBC 6.25 06/30/2020    RBC 4.82 06/30/2020    HGB 14.7 06/30/2020    HCT 44.6 06/30/2020    MCV 93 06/30/2020    MCH 30.5 06/30/2020    MCHC 33.0 06/30/2020    RDW 13.2 06/30/2020     06/30/2020    MPV 10.6 06/30/2020    GRAN 3.2 06/30/2020    GRAN 51.4 06/30/2020    LYMPH 1.9 06/30/2020    LYMPH 30.6 06/30/2020    MONO 0.8 06/30/2020    MONO 12.5 06/30/2020    EOS 0.3 06/30/2020    BASO 0.06 06/30/2020    EOSINOPHIL 4.0 06/30/2020    BASOPHIL 1.0 06/30/2020        CMP  Lab Results   Component Value Date     09/25/2020    K 5.0 09/25/2020     09/25/2020    CO2 27 09/25/2020     (H) 09/25/2020    BUN 32 (H) 09/25/2020    CREATININE 1.64 (H) 09/25/2020    CALCIUM 9.3 09/25/2020    PROT 7.7 06/30/2020    ALBUMIN 4.5 06/30/2020    BILITOT 0.5 06/30/2020    ALKPHOS 35 (L) 06/30/2020    AST 26 06/30/2020    ALT 20 06/30/2020    ANIONGAP 8 09/25/2020    ESTGFRAFRICA 46.9 (A) 09/25/2020    EGFRNONAA 40.6 (A) 09/25/2020        No results found for: LABBLOO, LABURIN, RESPIRATORYC, GSRESP         Results for orders placed or performed during the hospital encounter of 10/01/20   ECG 12 lead    Collection Time: 10/01/20 11:35 AM    Narrative    Test Reason : R01.2,    Vent. Rate : 072 BPM     Atrial Rate : 072 BPM     P-R Int : 262 ms          QRS Dur : 094 ms      QT Int : 412 ms       P-R-T Axes : 090 -10 110 degrees     QTc Int : 451 ms    Sinus rhythm with 1st degree A-V block with frequent Premature ventricular  complexes  Nonspecific ST and T wave abnormality  Abnormal ECG  When compared with ECG of 25-JUL-2016 14:22,  Premature ventricular complexes are now Present  T wave inversion no longer evident in Lateral leads  Confirmed by Zabrina Sloan MD ([id number]) on 10/1/2020 2:37:53 PM    Referred By: GIFTY GAUTHIER           Confirmed By:Zabrina Sloan MD                  Plan:       Problem List Items  Addressed This Visit        Neuro    History of TIA (transient ischemic attack)     Currently no symptoms reported.            Cardiac/Vascular    Hypertension     His current medications will be continued.  His blood pressure did normalize in my office today.         S/P CABG (coronary artery bypass graft) - Primary     He is free of angina.  His 2016 nuclear stress test was negative for ischemia, ejection fraction normal.         Mixed hyperlipidemia    Bilateral carotid artery disease     There is significant left greater than right asymptomatic disease which has progressed.  Management options have been discussed.  He may be a candidate for carotid stenting  through investigational study.  Dr. Norris has been consulted.         Relevant Orders    Ambulatory referral/consult to Interventional Cardiology    Abdominal aortic atherosclerosis     No symptoms reported.  His 2017 aortic diameter was less than 3 cm.         Paroxysmal atrial fibrillation     A recent Holter monitor confirms episodes of asymptomatic atrial fibrillation.   There were also PACs and PVCs.  He has had palpitations but no marked complaints of tachycardia or dyspnea.  Benefits of Eliquis discussed.  Clopidogrel has been discontinued.  He will continue aspirin.                               Marvin Sotelo MD  11/09/2020   2:24 PM

## 2020-11-09 NOTE — ASSESSMENT & PLAN NOTE
A recent Holter monitor confirms episodes of asymptomatic atrial fibrillation.   There were also PACs and PVCs.  He has had palpitations but no marked complaints of tachycardia or dyspnea.  Benefits of Eliquis discussed.  Clopidogrel has been discontinued.  He will continue aspirin.

## 2020-11-09 NOTE — ASSESSMENT & PLAN NOTE
He is free of angina.  His 2016 nuclear stress test was negative for ischemia, ejection fraction normal.

## 2020-11-09 NOTE — TELEPHONE ENCOUNTER
Reached out as requested by Dr Sotelo and spokw with spouse.    Informed them the Dr Sotelo would like to see him in clinic this afternoon. Spouse responded that she will work on getting a ride and will be to the clinic about 2:00.    Dr Sotelo updated.

## 2020-11-09 NOTE — ASSESSMENT & PLAN NOTE
There is significant left greater than right asymptomatic disease which has progressed.  Management options have been discussed.  He may be a candidate for carotid stenting  through investigational study.  Dr. Norris has been consulted.

## 2020-11-10 ENCOUNTER — TELEPHONE (OUTPATIENT)
Dept: CARDIOLOGY | Facility: CLINIC | Age: 75
End: 2020-11-10

## 2020-11-10 DIAGNOSIS — I10 ESSENTIAL HYPERTENSION: Primary | ICD-10-CM

## 2020-11-10 NOTE — TELEPHONE ENCOUNTER
----- Message from Antony Regan sent at 11/9/2020 12:06 PM CST -----  Type:  Patient Returning Call    Who Called: Krupa (wife)  Who Left Message for Patient: Marian  Does the patient know what this is regarding?: scheduling appt for today  Would the patient rather a call back or a response via [facility]? Call back  Best Call Back Number: [phone]  Additional Information: none

## 2020-11-10 NOTE — TELEPHONE ENCOUNTER
----- Message from Marvin Sotelo MD sent at 11/7/2020  6:32 AM CST -----  Lets work him in this Monday Double book me. Just call him Monday morning and we can squeeze him in sometime in the afternoon if he is willing.

## 2020-11-30 ENCOUNTER — PATIENT OUTREACH (OUTPATIENT)
Dept: ADMINISTRATIVE | Facility: OTHER | Age: 75
End: 2020-11-30

## 2020-11-30 NOTE — LETTER
November 30, 2020        Juanpablo Suarez MD  [address] [facility] [address]  [place] [address]             [facility]  [address]  [facility] [address]  Phone: [phone]   Patient: Jin Ngo   MR Number: [id number]   YOB: 1945           Dear Dr. Suarez:    Jin Ngo is a patient of Dr. Sam Barroso (PCP) at [facility]. While reviewing his chart, it has come to our attention that there is an outstanding procedure. Please help keep our Health Maintenance records as accurate and as up to date as possible by supplying the following:     Eye exam                                                                             Please fax to [facility] @ [phone].    Thank you for your assistance in our patient's healthcare.     Sincerely,     Leah Pozo, CMA [facility] Care Coordinator  [facility]

## 2020-11-30 NOTE — PROGRESS NOTES
Requested updates within Care Everywhere.  Patient's chart was reviewed for overdue JD topics.  Media reviewed for outside eye exam.  Eye exam requested from Dr. Juanpablo Suarez  Immunizations reconciled.

## 2020-12-01 ENCOUNTER — LAB VISIT (OUTPATIENT)
Dept: LAB | Facility: HOSPITAL | Age: 75
End: 2020-12-01
Attending: INTERNAL MEDICINE
Payer: MEDICARE

## 2020-12-01 ENCOUNTER — EDUCATION (OUTPATIENT)
Dept: CARDIOLOGY | Facility: CLINIC | Age: 75
End: 2020-12-01

## 2020-12-01 ENCOUNTER — OFFICE VISIT (OUTPATIENT)
Dept: CARDIOLOGY | Facility: CLINIC | Age: 75
End: 2020-12-01
Payer: MEDICARE

## 2020-12-01 VITALS
SYSTOLIC BLOOD PRESSURE: 136 MMHG | HEART RATE: 55 BPM | HEIGHT: 69 IN | DIASTOLIC BLOOD PRESSURE: 63 MMHG | BODY MASS INDEX: 30.04 KG/M2 | WEIGHT: 202.81 LBS | OXYGEN SATURATION: 98 %

## 2020-12-01 DIAGNOSIS — I15.2 HYPERTENSION ASSOCIATED WITH DIABETES: ICD-10-CM

## 2020-12-01 DIAGNOSIS — I10 ESSENTIAL HYPERTENSION: ICD-10-CM

## 2020-12-01 DIAGNOSIS — I48.0 PAROXYSMAL ATRIAL FIBRILLATION: ICD-10-CM

## 2020-12-01 DIAGNOSIS — I65.23 CAROTID STENOSIS, ASYMPTOMATIC, BILATERAL: Primary | ICD-10-CM

## 2020-12-01 DIAGNOSIS — I25.810 CORONARY ARTERY DISEASE INVOLVING AUTOLOGOUS ARTERY CORONARY BYPASS GRAFT WITHOUT ANGINA PECTORIS: ICD-10-CM

## 2020-12-01 DIAGNOSIS — I27.20 PULMONARY HTN: ICD-10-CM

## 2020-12-01 DIAGNOSIS — Z86.73 HISTORY OF TIA (TRANSIENT ISCHEMIC ATTACK): ICD-10-CM

## 2020-12-01 DIAGNOSIS — I65.23 BILATERAL CAROTID ARTERY STENOSIS: Primary | ICD-10-CM

## 2020-12-01 DIAGNOSIS — E11.59 HYPERTENSION ASSOCIATED WITH DIABETES: ICD-10-CM

## 2020-12-01 DIAGNOSIS — I65.23 CAROTID STENOSIS, ASYMPTOMATIC, BILATERAL: ICD-10-CM

## 2020-12-01 LAB
ANION GAP SERPL CALC-SCNC: 8 MMOL/L (ref 8–16)
APTT BLDCRRT: 33.6 SEC (ref 21–32)
BUN SERPL-MCNC: 38 MG/DL (ref 8–23)
CALCIUM SERPL-MCNC: 9 MG/DL (ref 8.7–10.5)
CHLORIDE SERPL-SCNC: 106 MMOL/L (ref 95–110)
CO2 SERPL-SCNC: 27 MMOL/L (ref 23–29)
CREAT SERPL-MCNC: 1.7 MG/DL (ref 0.5–1.4)
ERYTHROCYTE [DISTWIDTH] IN BLOOD BY AUTOMATED COUNT: 13.8 % (ref 11.5–14.5)
EST. GFR  (AFRICAN AMERICAN): 44.9 ML/MIN/1.73 M^2
EST. GFR  (NON AFRICAN AMERICAN): 38.9 ML/MIN/1.73 M^2
GLUCOSE SERPL-MCNC: 104 MG/DL (ref 70–110)
HCT VFR BLD AUTO: 49.9 % (ref 40–54)
HGB BLD-MCNC: 15.4 G/DL (ref 14–18)
INR PPP: 1 (ref 0.8–1.2)
MCH RBC QN AUTO: 29.7 PG (ref 27–31)
MCHC RBC AUTO-ENTMCNC: 30.9 G/DL (ref 32–36)
MCV RBC AUTO: 96 FL (ref 82–98)
PLATELET # BLD AUTO: 228 K/UL (ref 150–350)
PMV BLD AUTO: 10.4 FL (ref 9.2–12.9)
POTASSIUM SERPL-SCNC: 5.6 MMOL/L (ref 3.5–5.1)
PROTHROMBIN TIME: 11.3 SEC (ref 9–12.5)
RBC # BLD AUTO: 5.18 M/UL (ref 4.6–6.2)
SODIUM SERPL-SCNC: 141 MMOL/L (ref 136–145)
WBC # BLD AUTO: 5.95 K/UL (ref 3.9–12.7)

## 2020-12-01 PROCEDURE — 3075F SYST BP GE 130 - 139MM HG: CPT | Mod: HCNC,CPTII,S$GLB, | Performed by: INTERNAL MEDICINE

## 2020-12-01 PROCEDURE — 3046F PR MOST RECENT HEMOGLOBIN A1C LEVEL > 9.0%: ICD-10-PCS | Mod: HCNC,CPTII,S$GLB, | Performed by: INTERNAL MEDICINE

## 2020-12-01 PROCEDURE — 85610 PROTHROMBIN TIME: CPT | Mod: HCNC

## 2020-12-01 PROCEDURE — 1159F MED LIST DOCD IN RCRD: CPT | Mod: HCNC,S$GLB,, | Performed by: INTERNAL MEDICINE

## 2020-12-01 PROCEDURE — 99999 PR PBB SHADOW E&M-EST. PATIENT-LVL III: CPT | Mod: PBBFAC,HCNC,, | Performed by: INTERNAL MEDICINE

## 2020-12-01 PROCEDURE — 1125F PR PAIN SEVERITY QUANTIFIED, PAIN PRESENT: ICD-10-PCS | Mod: HCNC,S$GLB,, | Performed by: INTERNAL MEDICINE

## 2020-12-01 PROCEDURE — 85730 THROMBOPLASTIN TIME PARTIAL: CPT | Mod: HCNC

## 2020-12-01 PROCEDURE — 99499 RISK ADDL DX/OHS AUDIT: ICD-10-PCS | Mod: S$GLB,,, | Performed by: INTERNAL MEDICINE

## 2020-12-01 PROCEDURE — 3075F PR MOST RECENT SYSTOLIC BLOOD PRESS GE 130-139MM HG: ICD-10-PCS | Mod: HCNC,CPTII,S$GLB, | Performed by: INTERNAL MEDICINE

## 2020-12-01 PROCEDURE — 99499 UNLISTED E&M SERVICE: CPT | Mod: S$GLB,,, | Performed by: INTERNAL MEDICINE

## 2020-12-01 PROCEDURE — 36415 COLL VENOUS BLD VENIPUNCTURE: CPT | Mod: HCNC

## 2020-12-01 PROCEDURE — 80048 BASIC METABOLIC PNL TOTAL CA: CPT | Mod: HCNC

## 2020-12-01 PROCEDURE — 3078F DIAST BP <80 MM HG: CPT | Mod: HCNC,CPTII,S$GLB, | Performed by: INTERNAL MEDICINE

## 2020-12-01 PROCEDURE — 1159F PR MEDICATION LIST DOCUMENTED IN MEDICAL RECORD: ICD-10-PCS | Mod: HCNC,S$GLB,, | Performed by: INTERNAL MEDICINE

## 2020-12-01 PROCEDURE — 99205 PR OFFICE/OUTPT VISIT, NEW, LEVL V, 60-74 MIN: ICD-10-PCS | Mod: HCNC,S$GLB,, | Performed by: INTERNAL MEDICINE

## 2020-12-01 PROCEDURE — 3008F PR BODY MASS INDEX (BMI) DOCUMENTED: ICD-10-PCS | Mod: HCNC,CPTII,S$GLB, | Performed by: INTERNAL MEDICINE

## 2020-12-01 PROCEDURE — 3008F BODY MASS INDEX DOCD: CPT | Mod: HCNC,CPTII,S$GLB, | Performed by: INTERNAL MEDICINE

## 2020-12-01 PROCEDURE — 1125F AMNT PAIN NOTED PAIN PRSNT: CPT | Mod: HCNC,S$GLB,, | Performed by: INTERNAL MEDICINE

## 2020-12-01 PROCEDURE — 99999 PR PBB SHADOW E&M-EST. PATIENT-LVL III: ICD-10-PCS | Mod: PBBFAC,HCNC,, | Performed by: INTERNAL MEDICINE

## 2020-12-01 PROCEDURE — 3078F PR MOST RECENT DIASTOLIC BLOOD PRESSURE < 80 MM HG: ICD-10-PCS | Mod: HCNC,CPTII,S$GLB, | Performed by: INTERNAL MEDICINE

## 2020-12-01 PROCEDURE — 85027 COMPLETE CBC AUTOMATED: CPT | Mod: HCNC

## 2020-12-01 PROCEDURE — 3046F HEMOGLOBIN A1C LEVEL >9.0%: CPT | Mod: HCNC,CPTII,S$GLB, | Performed by: INTERNAL MEDICINE

## 2020-12-01 PROCEDURE — 99205 OFFICE O/P NEW HI 60 MIN: CPT | Mod: HCNC,S$GLB,, | Performed by: INTERNAL MEDICINE

## 2020-12-01 RX ORDER — SODIUM CHLORIDE 9 MG/ML
INJECTION, SOLUTION INTRAVENOUS CONTINUOUS
Status: CANCELLED | OUTPATIENT
Start: 2020-12-01

## 2020-12-01 RX ORDER — DIPHENHYDRAMINE HCL 25 MG
50 CAPSULE ORAL ONCE
Status: CANCELLED | OUTPATIENT
Start: 2020-12-01 | End: 2020-12-01

## 2020-12-01 NOTE — PROGRESS NOTES
"OUTPATIENT CATHETERIZATION INSTRUCTIONS    You have been scheduled for a procedure in the catheterization lab on Thursday, December 10, 2020.     Please report to the Cardiology Waiting Area on the Third floor of the hospital and check in at 6 AM.   You will then be taken to the SSCU (Short Stay Cardiac Unit) and prepared for your procedure. Please be aware that this is not the time of your procedure but the time you are to arrive. The procedures are scheduled on an hourly basis; however, emergency cases take precedence over all other cases.       You may not have anything to eat or drink after midnight the night before your test. You may take your regular morning medications with water. If there are any medications that you should not take you will be instructed to hold them that morning. If you are diabetic and on Metformin (Glucophage) do not take it the day before, the day of, and for 2 days after your procedure.      The procedure will take 1-2 hours to perform. After the procedure, you will return to SSCU on the third floor of the hospital. You will need to lie still (or keep your arm still) for the next 4 to 6 hours to minimize bleeding from the puncture site. Your family may remain in the room with you during this time.       You may be able to be discharged home that same afternoon if there is someone to drive you home and there were no complications. If you have one of the balloon, stent, or device procedures you may spend the night in the hospital. Your doctor will determine, based on your progress, the date and time of your discharge. The results of your procedure will be discussed with you before you are discharged. Any further testing or procedures will be scheduled for you either before you leave or you will be called with these appointments.       If you should have any questions, concerns, or need to change the date of your procedure, please call MIN Patel @ [phone]",    Special " Instructions:  Continue medications        THE ABOVE INSTRUCTIONS WERE GIVEN TO THE PATIENT VERBALLY AND THEY VERBALIZED UNDERSTANDING.  THEY DO NOT REQUIRE ANY SPECIAL NEEDS AND DO NOT HAVE ANY LEARNING BARRIERS.          Directions for Reporting to Cardiology Waiting Area in the Hospital  If you park in the Parking Garage:  Take elevators to the1st floor of the parking garage.  Continue past the gift shop, coffee shop, and piano.  Take a right and go to the gold elevators. (Elevator B)  Take the elevator to the 3rd floor.  Follow the arrow on the sign on the wall that says Cath Lab Registration/EP Lab Registration.  Follow the long hallway all the way around until you come to a big open area.  This is the registration area.  Check in at Reception Desk.    OR    If family is dropping you off:  Have them drop you off at the front of the Hospital under the green overhang.  Enter through the doors and take a right.  Take the E elevators to the 3rd floor Cardiology Waiting Area.  Check in at the Reception Desk in the waiting room.

## 2020-12-01 NOTE — H&P (VIEW-ONLY)
Interventional Cardiology Clinic Note  Reason for Visit: Bilateral carotid disease    HPI:   PT is a 74 year old gentleman with bilateral carotid disease referred from Dr. Sotelo.    Pt has had a long hx of carotid disease and had a TIA about 5 years ago. He is currently asymptomatic and states that they have been following with imaging. Recently he had an ultrasound with bilateral stenosis of > 70% so is here today for possible intervention. He currently has no numbness, weakness.     He has a hx of CAD, Hypertension, HLD, DM, AF, and CKD.   Review of Systems   All other systems reviewed and are negative.      PMH:     Past Medical History:   Diagnosis Date    ANGELIC (acute kidney injury) 7/1/2020    Coronary artery disease     Hyperlipidemia     Hypertension     Myocardial infarction     Obesity     Paroxysmal atrial fibrillation 11/9/2020    Primary osteoarthritis involving multiple joints 3/8/2017    Stroke     Type 2 diabetes mellitus without complication     Uncontrolled type 2 diabetes mellitus with neurologic complication, without long-term current use of insulin 3/8/2017     Past Surgical History:   Procedure Laterality Date    CATARACT EXTRACTION, BILATERAL      COLONOSCOPY      2011    COLONOSCOPY N/A 3/22/2016    Procedure: COLONOSCOPY;  Surgeon: Sridhar Reynoso MD;  Location: [facility];  Service: Endoscopy;  Laterality: N/A;    CORONARY ARTERY BYPASS GRAFT      EYE SURGERY  06/2014    Cataracts     Allergies:     Review of patient's allergies indicates:   Allergen Reactions    Metformin      Caused stroke    Pcn [penicillins]      Medications:     Current Outpatient Medications on File Prior to Visit   Medication Sig Dispense Refill    allopurinoL (ZYLOPRIM) 300 MG tablet TAKE 1 TABLET EVERY DAY 90 tablet 3    apixaban (ELIQUIS) 5 mg Tab Take 1 tablet (5 mg total) by mouth 2 (two) times daily. 60 tablet 1    aspirin (ECOTRIN) 81 MG EC tablet Take 81 mg by mouth once  "daily.      BD ULTRA-FINE TEJINDER PEN NEEDLE 32 gauge x 5/32" Ndle To use with levemir daily 100 each 3    blood sugar diagnostic Strp To check BG 4 times daily, to use with insurance preferred meter 100 strip 0    blood-glucose meter kit To check BG 4 times daily, to use with insurance preferred meter 1 each 0    clopidogreL (PLAVIX) 75 mg tablet TAKE 1 TABLET EVERY DAY 90 tablet 3    DULoxetine (CYMBALTA) 30 MG capsule Take 1 capsule (30 mg total) by mouth once daily. To help reduce feeling of back pain 30 capsule 11    fenofibrate 160 MG Tab TAKE 1 TABLET EVERY DAY 90 tablet 3    glipiZIDE (GLUCOTROL) 5 MG tablet Take 1 tablet (5 mg total) by mouth 2 (two) times daily before meals. 60 tablet 11    insulin detemir U-100 (LEVEMIR FLEXTOUCH U-100 INSULN) 100 unit/mL (3 mL) InPn pen Inject 10 Units into the skin every evening. 3 mL 11    lancets Misc To check BG 4 times daily, to use with insurance preferred meter 100 each 0    lisinopriL (PRINIVIL,ZESTRIL) 20 MG tablet TAKE 2 TABLETS ONE TIME DAILY 180 tablet 3    metoprolol tartrate (LOPRESSOR) 25 MG tablet TAKE 1 TABLET TWICE DAILY 180 tablet 3    semaglutide (OZEMPIC) 0.25 mg or 0.5 mg(2 mg/1.5 mL) PnIj Inject 0.25 mg into the skin every 7 days. Take 0.25 mg for 4 weeks, then increase to 0.5 mg weekly 6 Syringe 3    colchicine 0.6 mg tablet Take 1 tablet (0.6 mg total) by mouth 2 (two) times daily as needed. 30 tablet 2    pravastatin (PRAVACHOL) 40 MG tablet TAKE 2 TABLETS (80 MG TOTAL) BY MOUTH ONCE DAILY. (Patient not taking: Reported on 2020) 180 tablet 3     No current facility-administered medications on file prior to visit.      Social History:     Social History     Tobacco Use    Smoking status: Former Smoker     Packs/day: 3.00     Years: 15.00     Pack years: 45.00     Types: Cigarettes     Quit date: 1972     Years since quittin.9    Smokeless tobacco: Former User   Substance Use Topics    Alcohol use: Yes     Comment: 2-3 " "drinks per year     Family History:     Family History   Problem Relation Age of Onset    Heart disease Mother     Hypertension Mother     Diabetes Mother     Hyperlipidemia Mother     Heart disease Father     Hypertension Father     Thyroid disease Brother     Hypertension Son        Physical Exam  /63 (BP Location: Left arm, Patient Position: Sitting, BP Method: Medium (Automatic))   Pulse (!) 55   Ht 5' 9" (1.753 m)   Wt 92 kg (202 lb 12.8 oz)   SpO2 98%   BMI 29.95 kg/m²    GEN: Alert and oriented in NAD  NECK: no JVD appreciated   CVS: RRR, s1/s2, no MRG  PULM: CTAB no rales  ABD: NT/ND BS +  Extremities: warm and dry, palpable pulses, no edema  NEURO: Alert and oriented x 3  PSYCH: appropriate affect.             Labs:     Lab Results   Component Value Date     09/25/2020    K 5.0 09/25/2020     09/25/2020    CO2 27 09/25/2020    BUN 32 (H) 09/25/2020    CREATININE 1.64 (H) 09/25/2020    ANIONGAP 8 09/25/2020     Lab Results   Component Value Date    HGBA1C 10.2 (H) 09/25/2020     No results found for: BNP, BNPTRIAGEBLO Lab Results   Component Value Date    WBC 5.95 12/01/2020    HGB 15.4 12/01/2020    HCT 49.9 12/01/2020     12/01/2020    GRAN 3.2 06/30/2020    GRAN 51.4 06/30/2020     Lab Results   Component Value Date    CHOL 117 (L) 06/30/2020    HDL 27 (L) 06/30/2020    LDLCALC 60.0 (L) 06/30/2020    TRIG 150 06/30/2020          No results found for: EF      Assessment and Plan  Jin Ngo is a 74 y.o. gentleman here for B/L CRISTELA    1. Bilateral carotid artery stenosis   Pt with long hx of CRISTELA, he is asymptomatic with bilateral disease > 70% on US. Will sign up for CREST 2 registry does not fit the trial due to AF.     1. Arch and 4 Vessel with angioplasty and stenting of the worst lesion   2. Antiplatelets: ASA/Plavix  3. Access: R CFA  4. Pt is a RAYNA candidate and understands the importance of taking plavix for at least one year, understands that in case of " receiving a drug coated stent the failure to comply with dual anti-platelet therapy is likely to result in stent clothing, heart attack and death.   5. The risks, benefits, and alternatives of coronary vascular angiography and possible intervention were discussed with the patient. All questions were answered and informed consent was obtained. I had a detailed discussion with the patient regarding risk of stroke, MI, bleeding access site complications including limb loss, allergy, kidney failure including dialysis and death.  6. The patient understands the risks and benefits and wishes to go ahead with the procedure.  7. All patient's questions were answered       Ambulatory referral/consult to Interventional Cardiology   2. Essential hypertension   Continue current meds    3. Coronary artery disease involving autologous artery coronary bypass graft without angina pectoris   Cont current meds    4. Hypertension associated with diabetes   Continue current meds diabetes management per primary    5. Paroxysmal atrial fibrillation   Cont eliquis    6. Pulmonary HTN   Noted on echo.             Signed:        Jose C Hidalgo MD  Interventional Cardiology Fellow  Pager [phone]    Interventional Cardiology Staff  I have personally taken the history and examined this patient. I have discussed and agree with the resident's findings and plan as documented in the resident's note.  Bilateral carotid artery stenosis greater than 70% referred for evaluation for revascularization.  He has a past history of transient ischemic attack.  Patient fits crest 2 registry.  Will plan aortic arch with 4 vessel angiography and a carotid stenting.  Or    Cheko Norris

## 2020-12-01 NOTE — PROGRESS NOTES
Interventional Cardiology Clinic Note  Reason for Visit: Bilateral carotid disease    HPI:   PT is a 74 year old gentleman with bilateral carotid disease referred from Dr. Sotelo.    Pt has had a long hx of carotid disease and had a TIA about 5 years ago. He is currently asymptomatic and states that they have been following with imaging. Recently he had an ultrasound with bilateral stenosis of > 70% so is here today for possible intervention. He currently has no numbness, weakness.     He has a hx of CAD, Hypertension, HLD, DM, AF, and CKD.   Review of Systems   All other systems reviewed and are negative.      PMH:     Past Medical History:   Diagnosis Date    ANGELIC (acute kidney injury) 7/1/2020    Coronary artery disease     Hyperlipidemia     Hypertension     Myocardial infarction     Obesity     Paroxysmal atrial fibrillation 11/9/2020    Primary osteoarthritis involving multiple joints 3/8/2017    Stroke     Type 2 diabetes mellitus without complication     Uncontrolled type 2 diabetes mellitus with neurologic complication, without long-term current use of insulin 3/8/2017     Past Surgical History:   Procedure Laterality Date    CATARACT EXTRACTION, BILATERAL      COLONOSCOPY      2011    COLONOSCOPY N/A 3/22/2016    Procedure: COLONOSCOPY;  Surgeon: Sridhar Reynoso MD;  Location: [facility];  Service: Endoscopy;  Laterality: N/A;    CORONARY ARTERY BYPASS GRAFT      EYE SURGERY  06/2014    Cataracts     Allergies:     Review of patient's allergies indicates:   Allergen Reactions    Metformin      Caused stroke    Pcn [penicillins]      Medications:     Current Outpatient Medications on File Prior to Visit   Medication Sig Dispense Refill    allopurinoL (ZYLOPRIM) 300 MG tablet TAKE 1 TABLET EVERY DAY 90 tablet 3    apixaban (ELIQUIS) 5 mg Tab Take 1 tablet (5 mg total) by mouth 2 (two) times daily. 60 tablet 1    aspirin (ECOTRIN) 81 MG EC tablet Take 81 mg by mouth once  "daily.      BD ULTRA-FINE TEJINDER PEN NEEDLE 32 gauge x 5/32" Ndle To use with levemir daily 100 each 3    blood sugar diagnostic Strp To check BG 4 times daily, to use with insurance preferred meter 100 strip 0    blood-glucose meter kit To check BG 4 times daily, to use with insurance preferred meter 1 each 0    clopidogreL (PLAVIX) 75 mg tablet TAKE 1 TABLET EVERY DAY 90 tablet 3    DULoxetine (CYMBALTA) 30 MG capsule Take 1 capsule (30 mg total) by mouth once daily. To help reduce feeling of back pain 30 capsule 11    fenofibrate 160 MG Tab TAKE 1 TABLET EVERY DAY 90 tablet 3    glipiZIDE (GLUCOTROL) 5 MG tablet Take 1 tablet (5 mg total) by mouth 2 (two) times daily before meals. 60 tablet 11    insulin detemir U-100 (LEVEMIR FLEXTOUCH U-100 INSULN) 100 unit/mL (3 mL) InPn pen Inject 10 Units into the skin every evening. 3 mL 11    lancets Misc To check BG 4 times daily, to use with insurance preferred meter 100 each 0    lisinopriL (PRINIVIL,ZESTRIL) 20 MG tablet TAKE 2 TABLETS ONE TIME DAILY 180 tablet 3    metoprolol tartrate (LOPRESSOR) 25 MG tablet TAKE 1 TABLET TWICE DAILY 180 tablet 3    semaglutide (OZEMPIC) 0.25 mg or 0.5 mg(2 mg/1.5 mL) PnIj Inject 0.25 mg into the skin every 7 days. Take 0.25 mg for 4 weeks, then increase to 0.5 mg weekly 6 Syringe 3    colchicine 0.6 mg tablet Take 1 tablet (0.6 mg total) by mouth 2 (two) times daily as needed. 30 tablet 2    pravastatin (PRAVACHOL) 40 MG tablet TAKE 2 TABLETS (80 MG TOTAL) BY MOUTH ONCE DAILY. (Patient not taking: Reported on 2020) 180 tablet 3     No current facility-administered medications on file prior to visit.      Social History:     Social History     Tobacco Use    Smoking status: Former Smoker     Packs/day: 3.00     Years: 15.00     Pack years: 45.00     Types: Cigarettes     Quit date: 1972     Years since quittin.9    Smokeless tobacco: Former User   Substance Use Topics    Alcohol use: Yes     Comment: 2-3 " "drinks per year     Family History:     Family History   Problem Relation Age of Onset    Heart disease Mother     Hypertension Mother     Diabetes Mother     Hyperlipidemia Mother     Heart disease Father     Hypertension Father     Thyroid disease Brother     Hypertension Son        Physical Exam  /63 (BP Location: Left arm, Patient Position: Sitting, BP Method: Medium (Automatic))   Pulse (!) 55   Ht 5' 9" (1.753 m)   Wt 92 kg (202 lb 12.8 oz)   SpO2 98%   BMI 29.95 kg/m²    GEN: Alert and oriented in NAD  NECK: no JVD appreciated   CVS: RRR, s1/s2, no MRG  PULM: CTAB no rales  ABD: NT/ND BS +  Extremities: warm and dry, palpable pulses, no edema  NEURO: Alert and oriented x 3  PSYCH: appropriate affect.             Labs:     Lab Results   Component Value Date     09/25/2020    K 5.0 09/25/2020     09/25/2020    CO2 27 09/25/2020    BUN 32 (H) 09/25/2020    CREATININE 1.64 (H) 09/25/2020    ANIONGAP 8 09/25/2020     Lab Results   Component Value Date    HGBA1C 10.2 (H) 09/25/2020     No results found for: BNP, BNPTRIAGEBLO Lab Results   Component Value Date    WBC 5.95 12/01/2020    HGB 15.4 12/01/2020    HCT 49.9 12/01/2020     12/01/2020    GRAN 3.2 06/30/2020    GRAN 51.4 06/30/2020     Lab Results   Component Value Date    CHOL 117 (L) 06/30/2020    HDL 27 (L) 06/30/2020    LDLCALC 60.0 (L) 06/30/2020    TRIG 150 06/30/2020          No results found for: EF      Assessment and Plan  Jin Ngo is a 74 y.o. gentleman here for B/L CRISTELA    1. Bilateral carotid artery stenosis   Pt with long hx of CRISTELA, he is asymptomatic with bilateral disease > 70% on US. Will sign up for CREST 2 registry does not fit the trial due to AF.     1. Arch and 4 Vessel with angioplasty and stenting of the worst lesion   2. Antiplatelets: ASA/Plavix  3. Access: R CFA  4. Pt is a RAYNA candidate and understands the importance of taking plavix for at least one year, understands that in case of " receiving a drug coated stent the failure to comply with dual anti-platelet therapy is likely to result in stent clothing, heart attack and death.   5. The risks, benefits, and alternatives of coronary vascular angiography and possible intervention were discussed with the patient. All questions were answered and informed consent was obtained. I had a detailed discussion with the patient regarding risk of stroke, MI, bleeding access site complications including limb loss, allergy, kidney failure including dialysis and death.  6. The patient understands the risks and benefits and wishes to go ahead with the procedure.  7. All patient's questions were answered       Ambulatory referral/consult to Interventional Cardiology   2. Essential hypertension   Continue current meds    3. Coronary artery disease involving autologous artery coronary bypass graft without angina pectoris   Cont current meds    4. Hypertension associated with diabetes   Continue current meds diabetes management per primary    5. Paroxysmal atrial fibrillation   Cont eliquis    6. Pulmonary HTN   Noted on echo.             Signed:        Jose C Hidalgo MD  Interventional Cardiology Fellow  Pager [phone]    Interventional Cardiology Staff  I have personally taken the history and examined this patient. I have discussed and agree with the resident's findings and plan as documented in the resident's note.  Bilateral carotid artery stenosis greater than 70% referred for evaluation for revascularization.  He has a past history of transient ischemic attack.  Patient fits crest 2 registry.  Will plan aortic arch with 4 vessel angiography and a carotid stenting.  Or    Cheko Norris

## 2020-12-01 NOTE — LETTER
December 1, 2020      Marvin Sotelo MD  [address]  [address]  [place] [address]           [facility] 3rd Fl  [address]  [facility] [address]  Phone: [phone]          Patient: Jin Ngo   MR Number: [id number]   YOB: 1945   Date of Visit: 12/1/2020       Dear Dr. Marvin Sotelo:    Thank you for referring Jin Ngo to me for evaluation. Attached you will find relevant portions of my assessment and plan of care.    If you have questions, please do not hesitate to call me. I look forward to following Jin Ngo along with you.    Sincerely,    Cheko Norris MD    Enclosure  CC:  No Recipients    If you would like to receive this communication electronically, please contact [email or web address] or [phone] to request more information on [employer] access.    For providers and/or their staff who would like to refer a patient to [facility], please contact us through our one-stop-shop provider referral line, Chelsi Harden, at [phone].    If you feel you have received this communication in error or would no longer like to receive these types of communications, please e-mail [email or web address]

## 2020-12-04 ENCOUNTER — PES CALL (OUTPATIENT)
Dept: ADMINISTRATIVE | Facility: CLINIC | Age: 75
End: 2020-12-04

## 2020-12-10 ENCOUNTER — HOSPITAL ENCOUNTER (INPATIENT)
Facility: HOSPITAL | Age: 75
LOS: 1 days | Discharge: HOME | DRG: 036 | End: 2020-12-11
Attending: INTERNAL MEDICINE | Admitting: INTERNAL MEDICINE
Payer: MEDICARE

## 2020-12-10 DIAGNOSIS — I25.810 CORONARY ARTERY DISEASE INVOLVING AUTOLOGOUS ARTERY CORONARY BYPASS GRAFT WITHOUT ANGINA PECTORIS: ICD-10-CM

## 2020-12-10 DIAGNOSIS — I65.23 CAROTID STENOSIS, ASYMPTOMATIC, BILATERAL: ICD-10-CM

## 2020-12-10 DIAGNOSIS — Z86.73 HISTORY OF TIA (TRANSIENT ISCHEMIC ATTACK): ICD-10-CM

## 2020-12-10 LAB
ABO + RH BLD: NORMAL
ANION GAP SERPL CALC-SCNC: 11 MMOL/L (ref 8–16)
APTT BLDCRRT: 32.8 SEC (ref 21–32)
BLD GP AB SCN CELLS X3 SERPL QL: NORMAL
BUN SERPL-MCNC: 34 MG/DL (ref 8–23)
CALCIUM SERPL-MCNC: 9.2 MG/DL (ref 8.7–10.5)
CHLORIDE SERPL-SCNC: 106 MMOL/L (ref 95–110)
CO2 SERPL-SCNC: 22 MMOL/L (ref 23–29)
CREAT SERPL-MCNC: 1.5 MG/DL (ref 0.5–1.4)
ERYTHROCYTE [DISTWIDTH] IN BLOOD BY AUTOMATED COUNT: 13.8 % (ref 11.5–14.5)
EST. GFR  (AFRICAN AMERICAN): 52.3 ML/MIN/1.73 M^2
EST. GFR  (NON AFRICAN AMERICAN): 45.2 ML/MIN/1.73 M^2
GLUCOSE SERPL-MCNC: 105 MG/DL (ref 70–110)
HCT VFR BLD AUTO: 43.5 % (ref 40–54)
HGB BLD-MCNC: 13.7 G/DL (ref 14–18)
INR PPP: 1 (ref 0.8–1.2)
MCH RBC QN AUTO: 29.1 PG (ref 27–31)
MCHC RBC AUTO-ENTMCNC: 31.5 G/DL (ref 32–36)
MCV RBC AUTO: 92 FL (ref 82–98)
PLATELET # BLD AUTO: 206 K/UL (ref 150–350)
PMV BLD AUTO: 10.7 FL (ref 9.2–12.9)
POCT GLUCOSE: 103 MG/DL (ref 70–110)
POCT GLUCOSE: 170 MG/DL (ref 70–110)
POCT GLUCOSE: 94 MG/DL (ref 70–110)
POTASSIUM SERPL-SCNC: 4.4 MMOL/L (ref 3.5–5.1)
PROTHROMBIN TIME: 11.5 SEC (ref 9–12.5)
RBC # BLD AUTO: 4.71 M/UL (ref 4.6–6.2)
SODIUM SERPL-SCNC: 139 MMOL/L (ref 136–145)
WBC # BLD AUTO: 5.15 K/UL (ref 3.9–12.7)

## 2020-12-10 PROCEDURE — 25000242 PHARM REV CODE 250 ALT 637 W/ HCPCS: Mod: HCNC | Performed by: INTERNAL MEDICINE

## 2020-12-10 PROCEDURE — 36222 PLACE CATH CAROTID/INOM ART: CPT | Mod: 59,HCNC,RT | Performed by: INTERNAL MEDICINE

## 2020-12-10 PROCEDURE — 25000003 PHARM REV CODE 250: Mod: HCNC | Performed by: INTERNAL MEDICINE

## 2020-12-10 PROCEDURE — C1769 GUIDE WIRE: HCPCS | Mod: HCNC | Performed by: INTERNAL MEDICINE

## 2020-12-10 PROCEDURE — 85027 COMPLETE CBC AUTOMATED: CPT | Mod: HCNC

## 2020-12-10 PROCEDURE — 37215 PR CAROTID STENT PLACEMENT, CERVICAL: ICD-10-PCS | Mod: HCNC,LT,, | Performed by: INTERNAL MEDICINE

## 2020-12-10 PROCEDURE — C1725 CATH, TRANSLUMIN NON-LASER: HCPCS | Mod: HCNC | Performed by: INTERNAL MEDICINE

## 2020-12-10 PROCEDURE — 85730 THROMBOPLASTIN TIME PARTIAL: CPT | Mod: HCNC

## 2020-12-10 PROCEDURE — C1887 CATHETER, GUIDING: HCPCS | Mod: HCNC | Performed by: INTERNAL MEDICINE

## 2020-12-10 PROCEDURE — 27201423 OPTIME MED/SURG SUP & DEVICES STERILE SUPPLY: Mod: HCNC | Performed by: INTERNAL MEDICINE

## 2020-12-10 PROCEDURE — 85610 PROTHROMBIN TIME: CPT | Mod: HCNC

## 2020-12-10 PROCEDURE — 93005 ELECTROCARDIOGRAM TRACING: CPT | Mod: HCNC

## 2020-12-10 PROCEDURE — 37215 TRANSCATH STENT CCA W/EPS: CPT | Mod: HCNC,LT,, | Performed by: INTERNAL MEDICINE

## 2020-12-10 PROCEDURE — 93010 EKG 12-LEAD: ICD-10-PCS | Mod: HCNC,,, | Performed by: INTERNAL MEDICINE

## 2020-12-10 PROCEDURE — C1894 INTRO/SHEATH, NON-LASER: HCPCS | Mod: HCNC | Performed by: INTERNAL MEDICINE

## 2020-12-10 PROCEDURE — 82962 GLUCOSE BLOOD TEST: CPT | Mod: HCNC | Performed by: INTERNAL MEDICINE

## 2020-12-10 PROCEDURE — 37215 TRANSCATH STENT CCA W/EPS: CPT | Mod: HCNC,LT | Performed by: INTERNAL MEDICINE

## 2020-12-10 PROCEDURE — C9399 UNCLASSIFIED DRUGS OR BIOLOG: HCPCS | Mod: HCNC | Performed by: INTERNAL MEDICINE

## 2020-12-10 PROCEDURE — 36222 PLACE CATH CAROTID/INOM ART: CPT | Mod: 51,59,HCNC,RT | Performed by: INTERNAL MEDICINE

## 2020-12-10 PROCEDURE — 93010 ELECTROCARDIOGRAM REPORT: CPT | Mod: HCNC,,, | Performed by: INTERNAL MEDICINE

## 2020-12-10 PROCEDURE — 36222 PR ANGIO XTRCRANL ART+/- CERVIOCEREBRAL ARCH, CAROTID/INNOM ART, SELECTV CATH , S&I: ICD-10-PCS | Mod: 51,59,HCNC,RT | Performed by: INTERNAL MEDICINE

## 2020-12-10 PROCEDURE — 20600001 HC STEP DOWN PRIVATE ROOM: Mod: HCNC

## 2020-12-10 PROCEDURE — 86901 BLOOD TYPING SEROLOGIC RH(D): CPT | Mod: HCNC

## 2020-12-10 PROCEDURE — 63600175 PHARM REV CODE 636 W HCPCS: Mod: HCNC | Performed by: INTERNAL MEDICINE

## 2020-12-10 PROCEDURE — 85347 COAGULATION TIME ACTIVATED: CPT | Mod: HCNC | Performed by: INTERNAL MEDICINE

## 2020-12-10 PROCEDURE — C1760 CLOSURE DEV, VASC: HCPCS | Mod: HCNC | Performed by: INTERNAL MEDICINE

## 2020-12-10 PROCEDURE — C1876 STENT, NON-COA/NON-COV W/DEL: HCPCS | Mod: HCNC | Performed by: INTERNAL MEDICINE

## 2020-12-10 PROCEDURE — 80048 BASIC METABOLIC PNL TOTAL CA: CPT | Mod: HCNC

## 2020-12-10 DEVICE — IMPLANTABLE DEVICE
Type: IMPLANTABLE DEVICE | Site: CAROTID | Status: FUNCTIONAL
Brand: CORDIS PRECISE PRO RX NITINOL STENT SYSTEM

## 2020-12-10 RX ORDER — FENOFIBRATE 160 MG/1
160 TABLET ORAL DAILY
Status: DISCONTINUED | OUTPATIENT
Start: 2020-12-10 | End: 2020-12-10

## 2020-12-10 RX ORDER — IBUPROFEN 200 MG
24 TABLET ORAL
Status: DISCONTINUED | OUTPATIENT
Start: 2020-12-10 | End: 2020-12-11 | Stop reason: HOSPADM

## 2020-12-10 RX ORDER — GLUCAGON 1 MG
1 KIT INJECTION
Status: DISCONTINUED | OUTPATIENT
Start: 2020-12-10 | End: 2020-12-11 | Stop reason: HOSPADM

## 2020-12-10 RX ORDER — DIPHENHYDRAMINE HCL 50 MG
50 CAPSULE ORAL ONCE
Status: COMPLETED | OUTPATIENT
Start: 2020-12-10 | End: 2020-12-10

## 2020-12-10 RX ORDER — SODIUM CHLORIDE 9 MG/ML
INJECTION, SOLUTION INTRAVENOUS CONTINUOUS
Status: DISCONTINUED | OUTPATIENT
Start: 2020-12-10 | End: 2020-12-10

## 2020-12-10 RX ORDER — LIDOCAINE HYDROCHLORIDE 20 MG/ML
INJECTION, SOLUTION INFILTRATION; PERINEURAL
Status: DISCONTINUED | OUTPATIENT
Start: 2020-12-10 | End: 2020-12-10 | Stop reason: HOSPADM

## 2020-12-10 RX ORDER — LISINOPRIL 10 MG/1
40 TABLET ORAL DAILY
Status: DISCONTINUED | OUTPATIENT
Start: 2020-12-11 | End: 2020-12-11 | Stop reason: HOSPADM

## 2020-12-10 RX ORDER — IBUPROFEN 200 MG
16 TABLET ORAL
Status: DISCONTINUED | OUTPATIENT
Start: 2020-12-10 | End: 2020-12-11 | Stop reason: HOSPADM

## 2020-12-10 RX ORDER — PRAVASTATIN SODIUM 80 MG/1
80 TABLET ORAL DAILY
Status: DISCONTINUED | OUTPATIENT
Start: 2020-12-11 | End: 2020-12-11 | Stop reason: HOSPADM

## 2020-12-10 RX ORDER — HEPARIN SODIUM 1000 [USP'U]/ML
INJECTION, SOLUTION INTRAVENOUS; SUBCUTANEOUS
Status: DISCONTINUED | OUTPATIENT
Start: 2020-12-10 | End: 2020-12-10 | Stop reason: HOSPADM

## 2020-12-10 RX ORDER — ACETAMINOPHEN 325 MG/1
650 TABLET ORAL EVERY 4 HOURS PRN
Status: DISCONTINUED | OUTPATIENT
Start: 2020-12-10 | End: 2020-12-11 | Stop reason: HOSPADM

## 2020-12-10 RX ORDER — INSULIN ASPART 100 [IU]/ML
1-10 INJECTION, SOLUTION INTRAVENOUS; SUBCUTANEOUS
Status: DISCONTINUED | OUTPATIENT
Start: 2020-12-10 | End: 2020-12-11 | Stop reason: HOSPADM

## 2020-12-10 RX ORDER — FENOFIBRATE 160 MG/1
160 TABLET ORAL NIGHTLY
Status: DISCONTINUED | OUTPATIENT
Start: 2020-12-10 | End: 2020-12-11 | Stop reason: HOSPADM

## 2020-12-10 RX ORDER — CLOPIDOGREL BISULFATE 75 MG/1
75 TABLET ORAL DAILY
Status: DISCONTINUED | OUTPATIENT
Start: 2020-12-10 | End: 2020-12-11 | Stop reason: HOSPADM

## 2020-12-10 RX ORDER — AMOXICILLIN 250 MG
1 CAPSULE ORAL DAILY PRN
Status: DISCONTINUED | OUTPATIENT
Start: 2020-12-10 | End: 2020-12-11 | Stop reason: HOSPADM

## 2020-12-10 RX ORDER — SODIUM CHLORIDE 9 MG/ML
INJECTION, SOLUTION INTRAVENOUS CONTINUOUS
Status: ACTIVE | OUTPATIENT
Start: 2020-12-10 | End: 2020-12-10

## 2020-12-10 RX ORDER — DULOXETIN HYDROCHLORIDE 30 MG/1
30 CAPSULE, DELAYED RELEASE ORAL DAILY
Status: DISCONTINUED | OUTPATIENT
Start: 2020-12-10 | End: 2020-12-11 | Stop reason: HOSPADM

## 2020-12-10 RX ORDER — GLIPIZIDE 5 MG/1
5 TABLET ORAL
Status: DISCONTINUED | OUTPATIENT
Start: 2020-12-10 | End: 2020-12-10

## 2020-12-10 RX ORDER — METOPROLOL TARTRATE 25 MG/1
25 TABLET, FILM COATED ORAL 2 TIMES DAILY
Status: DISCONTINUED | OUTPATIENT
Start: 2020-12-10 | End: 2020-12-11 | Stop reason: HOSPADM

## 2020-12-10 RX ORDER — PROTAMINE SULFATE 10 MG/ML
INJECTION, SOLUTION INTRAVENOUS
Status: DISCONTINUED | OUTPATIENT
Start: 2020-12-10 | End: 2020-12-10 | Stop reason: HOSPADM

## 2020-12-10 RX ORDER — FENOFIBRATE 160 MG/1
160 TABLET ORAL NIGHTLY
Status: DISCONTINUED | OUTPATIENT
Start: 2020-12-11 | End: 2020-12-10

## 2020-12-10 RX ORDER — TALC
6 POWDER (GRAM) TOPICAL ONCE
Status: COMPLETED | OUTPATIENT
Start: 2020-12-10 | End: 2020-12-10

## 2020-12-10 RX ORDER — DIPHENHYDRAMINE HYDROCHLORIDE 50 MG/ML
INJECTION INTRAMUSCULAR; INTRAVENOUS
Status: DISCONTINUED | OUTPATIENT
Start: 2020-12-10 | End: 2020-12-10 | Stop reason: HOSPADM

## 2020-12-10 RX ORDER — ONDANSETRON 8 MG/1
8 TABLET, ORALLY DISINTEGRATING ORAL EVERY 8 HOURS PRN
Status: DISCONTINUED | OUTPATIENT
Start: 2020-12-10 | End: 2020-12-11 | Stop reason: HOSPADM

## 2020-12-10 RX ADMIN — SODIUM CHLORIDE 150 ML/HR: 0.9 INJECTION, SOLUTION INTRAVENOUS at 09:12

## 2020-12-10 RX ADMIN — DIPHENHYDRAMINE HYDROCHLORIDE 50 MG: 50 CAPSULE ORAL at 06:12

## 2020-12-10 RX ADMIN — METOPROLOL TARTRATE 25 MG: 25 TABLET, FILM COATED ORAL at 08:12

## 2020-12-10 RX ADMIN — ACETAMINOPHEN 650 MG: 325 TABLET ORAL at 11:12

## 2020-12-10 RX ADMIN — APIXABAN 5 MG: 5 TABLET, FILM COATED ORAL at 08:12

## 2020-12-10 RX ADMIN — SODIUM CHLORIDE 25 ML/HR: 0.9 INJECTION, SOLUTION INTRAVENOUS at 06:12

## 2020-12-10 RX ADMIN — FENOFIBRATE 160 MG: 160 TABLET ORAL at 08:12

## 2020-12-10 RX ADMIN — INSULIN DETEMIR 10 UNITS: 100 INJECTION, SOLUTION SUBCUTANEOUS at 08:12

## 2020-12-10 RX ADMIN — MELATONIN TAB 3 MG 6 MG: 3 TAB at 08:12

## 2020-12-10 RX ADMIN — DULOXETINE HYDROCHLORIDE 30 MG: 30 CAPSULE, DELAYED RELEASE ORAL at 11:12

## 2020-12-10 NOTE — Clinical Note
Angiography performed of the left common carotid. via hand injection with .  Multiple views performed

## 2020-12-10 NOTE — PLAN OF CARE
Plan of care discussed with patient. Patient is free of fall/trauma/injury. Denies CP/SOB. VSS and pt remains SR on monitor with Hrs in the high 50s - 60s. HA managed with PRN tylenol. Aortic arch procedure completed; R groin site CDI. Bedrest completed at 11:30. Patient is ACHS; BG protocol maintained per order. POC is for pt to be discharged 12/11. All questions addressed. Will continue to monitor

## 2020-12-10 NOTE — Clinical Note
Angiography performed of the right common carotid. via hand injection with .  Multiple views performed

## 2020-12-10 NOTE — NURSING
Patient admitted to CSU. Patient arrived to floor from , no evidence of distress; patient AAO x4 at this time. Patient on tele. NS infusing. R groin site checked; dressing CDI, site without redness or hematoma. Vital signs obtained. Patient voices no complaints at this time. Plan of care initiated with patient; bedrest until 11:30 am. Bed in lowest position, locked, SR up x2, call bell in reach. Will continue to monitor patient.

## 2020-12-10 NOTE — INTERVAL H&P NOTE
The patient has been examined and the H&P has been reviewed:    I concur with the findings and no changes have occurred since H&P was written.    Surgery risks, benefits and alternative options discussed and understood by patient/family.  Arch and 4 vessels  ASA/Plavix  Access: R CFA    Active Hospital Problems    Diagnosis  POA    Carotid stenosis, asymptomatic, bilateral [I65.23]  Yes      Resolved Hospital Problems   No resolved problems to display.

## 2020-12-10 NOTE — BRIEF OP NOTE
Post Cath Note  Preoperative Diagnosis: Carotid stenosis, asymptomatic, bilateral [I65.23]Coronary artery disease involving autologous artery coronary bypass graft without angina pectoris [I25.810]History of TIA (transient ischemic attack) [Z86.73]   Postop Diagnosis: Carotid stenosis, asymptomatic, bilateral [I65.23]Coronary artery disease involving autologous artery coronary bypass graft without angina pectoris [I25.810]History of TIA (transient ischemic attack) [Z86.73]  Referring Physician: Sam Barroso     Procedure: Angiogram, Aortic Arch And 4 Vessel (Bilateral), Angiogram, Carotid (Left), Stent, Carotid W/ Protect, PTA, ARTERY, CAROTID (N/A)       Access: Right CFA  : Cheko Norris MD   All Operators: Surgeon(s):  MD Yann Parnell MD Madhav Prakash Upadhyaya, MD Marloe Prince, MD Mike Blanco MD     See full report for further details    Intervention:     - S/P Carotid peripheral angioplasty please see full report.  Treatments/Procedures: Procedure(s) (LRB):  Angiogram, Aortic Arch And 4 Vessel (Bilateral)  Angiogram, Carotid (Left)  Stent, Carotid W/ Protect  PTA, ARTERY, CAROTID (N/A)     -Closure device: Perclosure    Findings:Severe peripheral disease is present. See catheterization report for full details.    Estimated Blood loss: 20 cc    Specimens removed: No  Post Cath Exam:     Vitals:    12/10/20 0630   BP: (!) 154/75   Pulse:    Resp:    Temp:      No unusual pain, hematoma, thrill or bruit at vascular access site.  Distal pulse present without signs of ischemia.    Recommendations:   - Patient tolerated procedure well. No immediate complications.   - Routine post-cath care  - Continue Eliquis and  Plavix   - Routine post cath protocol.  - Maximize medical management.  - Admit to inpatient overnight  - IVF at 150cc/hr for 4 hours  - Continue medical management, Follow-up with outpatient cardiologist    Yann Bernardo - Pager# [phone]  [phone]  12/10/2020  9:18 AM  Cardiovascular Fellow  [facility]

## 2020-12-10 NOTE — PLAN OF CARE
Pt arrived to unit accompanied by spouse.  Vss. Oriented pt and spouse to rm and unit.  Pre op orders and assessment initiated.  Pt remains npo.  Pt  In no acute distress and verbalizes no complaints.  Will continue to monitor.  Call bell within reach.

## 2020-12-10 NOTE — Clinical Note
Angiography performed post intervention of the left common carotid. via hand injection with .  MULTIPLE VIEWS PERFORMED

## 2020-12-11 VITALS
RESPIRATION RATE: 18 BRPM | TEMPERATURE: 99 F | BODY MASS INDEX: 29.62 KG/M2 | SYSTOLIC BLOOD PRESSURE: 158 MMHG | OXYGEN SATURATION: 99 % | DIASTOLIC BLOOD PRESSURE: 68 MMHG | HEART RATE: 73 BPM | HEIGHT: 69 IN | WEIGHT: 200 LBS

## 2020-12-11 DIAGNOSIS — I65.23 CAROTID STENOSIS, ASYMPTOMATIC, BILATERAL: Primary | ICD-10-CM

## 2020-12-11 LAB
POC ACTIVATED CLOTTING TIME K: 246 SEC (ref 74–137)
SAMPLE: ABNORMAL

## 2020-12-11 PROCEDURE — 25000003 PHARM REV CODE 250: Mod: HCNC | Performed by: INTERNAL MEDICINE

## 2020-12-11 RX ADMIN — CLOPIDOGREL 75 MG: 75 TABLET, FILM COATED ORAL at 10:12

## 2020-12-11 RX ADMIN — APIXABAN 5 MG: 5 TABLET, FILM COATED ORAL at 10:12

## 2020-12-11 RX ADMIN — LISINOPRIL 40 MG: 10 TABLET ORAL at 10:12

## 2020-12-11 RX ADMIN — METOPROLOL TARTRATE 25 MG: 25 TABLET, FILM COATED ORAL at 10:12

## 2020-12-11 RX ADMIN — DULOXETINE HYDROCHLORIDE 30 MG: 30 CAPSULE, DELAYED RELEASE ORAL at 10:12

## 2020-12-11 NOTE — PLAN OF CARE
Patient free from falls and injuries. Skin clean, dry, and intact. Reviewed plan of care with patient. Patient AAOX4, vital signs stable.       Pt s/p Angiogram w L Carotid stent placement. R groin access. Groin site dressing CDI, w no hematoma noted. Pt reports slight tenderness. Slight bruising noted around gauze. Pt ambulates w/o assistance. CBG ACHS, scheduled insulin given. PRN Melatonin given per pt request. Pt scheduled for d/c today. No acute events overnight. Pt resting in bed, no questions or concerns. Will continue to monitor.

## 2020-12-11 NOTE — PROGRESS NOTES
Patient is ready for discharge. Patient stable alert and oriented. IVs removed. No complaints of pain. Discussed discharge plan. Reviewed medications and side effects, appointments, and answered questions with patient and wife.

## 2020-12-11 NOTE — DISCHARGE SUMMARY
"Discharge Summary  Interventional Cardiology      Admit Date: 12/10/2020    Discharge Date:  12/11/2020    Attending Physician: Cheko Norris MD    Discharge Physician: Jose C Hidalgo MD    Principal Diagnoses: Carotid stenosis, asymptomatic, bilateral  Indication for Admission: Carotid angiogram with stenting    Discharged Condition: Good    Hospital Course:   PT with asymptomatic carotid disease here for angiogram. He was noted to have severe LICA stenosis so PTA with 5mm balloon was used and 10 x 40 stent was placed. Embolic protection was used. No complications. Pt will be discharged on Eliquis and plavix.    Outpatient Plan:  Follow up in 1 month with ultrasound.     Diet: Cardiac    Activity: Ad james, wound care instructions provided    Disposition: Home     Discharge Medications:      Medication List      CONTINUE taking these medications    allopurinoL 300 MG tablet  Commonly known as: ZYLOPRIM  TAKE 1 TABLET EVERY DAY     apixaban 5 mg Tab  Commonly known as: ELIQUIS  Take 1 tablet (5 mg total) by mouth 2 (two) times daily.     BD ULTRA-FINE TEJINDER PEN NEEDLE 32 gauge x 5/32" Ndle  Generic drug: pen needle, diabetic  To use with levemir daily     blood sugar diagnostic Strp  To check BG 4 times daily, to use with insurance preferred meter     blood-glucose meter kit  To check BG 4 times daily, to use with insurance preferred meter     clopidogreL 75 mg tablet  Commonly known as: PLAVIX  TAKE 1 TABLET EVERY DAY     colchicine 0.6 mg tablet  Commonly known as: COLCRYS  Take 1 tablet (0.6 mg total) by mouth 2 (two) times daily as needed.     DULoxetine 30 MG capsule  Commonly known as: CYMBALTA  Take 1 capsule (30 mg total) by mouth once daily. To help reduce feeling of back pain     fenofibrate 160 MG Tab  TAKE 1 TABLET EVERY DAY     glipiZIDE 5 MG tablet  Commonly known as: GLUCOTROL  Take 1 tablet (5 mg total) by mouth 2 (two) times daily before meals.     lancets Misc  To check BG 4 times daily, to use " with insurance preferred meter     LEVEMIR FLEXTOUCH U-100 INSULN 100 unit/mL (3 mL) Inpn pen  Generic drug: insulin detemir U-100  Inject 10 Units into the skin every evening.     lisinopriL 20 MG tablet  Commonly known as: PRINIVIL,ZESTRIL  TAKE 2 TABLETS ONE TIME DAILY     metoprolol tartrate 25 MG tablet  Commonly known as: LOPRESSOR  TAKE 1 TABLET TWICE DAILY     pravastatin 40 MG tablet  Commonly known as: PRAVACHOL  TAKE 2 TABLETS (80 MG TOTAL) BY MOUTH ONCE DAILY.     semaglutide 0.25 mg or 0.5 mg(2 mg/1.5 mL) Pnij  Commonly known as: OZEMPIC  Inject 0.25 mg into the skin every 7 days. Take 0.25 mg for 4 weeks, then increase to 0.5 mg weekly        STOP taking these medications    aspirin 81 MG EC tablet  Commonly known as: ECOTRIN          Follow Up:      Jose C Hidalgo MD  Interventional Cardiology Fellow  Pager: [phone]  12/11/2020 7:14 AM

## 2020-12-11 NOTE — PLAN OF CARE
12/11/20 1241   Final Note   Assessment Type Final Discharge Note   Anticipated Discharge Disposition Home   Hospital Follow Up  Appt(s) scheduled? Yes

## 2020-12-11 NOTE — PLAN OF CARE
12/11/20 1240   Discharge Assessment   Assessment Type Discharge Planning Assessment   Confirmed/corrected address and phone number on facesheet? Yes   Assessment information obtained from? Patient;Medical Record   Expected Length of Stay (days) 1   Communicated expected length of stay with patient/caregiver yes   Prior to hospitilization cognitive status: Alert/Oriented   Prior to hospitalization functional status: Independent   Current cognitive status: Alert/Oriented   Current Functional Status: Independent   Lives With spouse   Able to Return to Prior Arrangements yes   Is patient able to care for self after discharge? Yes   Patient's perception of discharge disposition home or selfcare   Readmission Within the Last 30 Days no previous admission in last 30 days   Patient currently being followed by outpatient case management? No   Patient currently receives any other outside agency services? No   Equipment Currently Used at Home none   Do you have any problems affording any of your prescribed medications? TBD   Is the patient taking medications as prescribed? yes   Does the patient have transportation home? Yes   Transportation Anticipated family or friend will provide   Does the patient receive services at the Coumadin Clinic? No   Discharge Plan A Home with family   DME Needed Upon Discharge  none   Patient/Family in Agreement with Plan yes

## 2020-12-14 NOTE — PHYSICIAN QUERY
PT Name: Jin Ngo  MR #: [id number]  CHRONIC KIDNEY DISEASE (CKD) CLARIFICATION     CDS/: Marcia Parker               Contact information: [email or web address]  This form is a permanent document in the medical record.     Query Date: December 14, 2020    By submitting this query, we are merely seeking further clarification of documentation. Please utilize your independent clinical judgment when addressing the question(s) below.    The Medical Record contains the following:   Indicators Supporting Clinical Findings Location in Medical Record   X CKD or Chronic Kidney (Renal) Failure/Disease CKD. H&P 12/1   X BUN/Creatinine                          GFR CR 1.5  BUN 34  GFR 45.2 Lab 12/10    Dehydration      Nausea / Vomiting      Dialysis / CRRT      Medication      Treatment      Other Chronic Conditions      Other       Provider, please further specify the stage of Chronic Kidney Disease (CKD):    [ x  ] Chronic Kidney Disease (CKD) stage 3a - Mildly to moderately decreased eGFR 45-59   [   ] Chronic Kidney Disease (CKD) stage 3b - Moderately to severely decreased eGFR 30-44   [   ] Chronic Kidney Disease (CKD) stage 3 unspecified   [   ] Other (please specify): _________________   [   ] Clinically Undetermined     Please document in your progress notes daily for the duration of treatment until resolved and include in your discharge summary.    Reference:     RADHA Mcgovern MD, & MIRANDA Friend MD, MS. (2020, June 18). Definition and staging of chronic kidney disease in adults ([id number] MAIK Varela MD, ScD & [id number] NELA Sesay MD, MSc, Eds.). Retrieved October 21, 2020, from https://www.[employer].madvertise/contents/definition-and-staging-of-chronic-kidney-disease-in-adults?search=ckd%20staging&source=search_result&selectedTitle=1~150&usage_type=default&display_rank=1    Form No. 58802

## 2020-12-21 LAB
LEFT EYE DM RETINOPATHY: NEGATIVE
LEFT EYE DM RETINOPATHY: NEGATIVE
RIGHT EYE DM RETINOPATHY: NEGATIVE
RIGHT EYE DM RETINOPATHY: NEGATIVE

## 2020-12-28 RX ORDER — LANCETS
EACH MISCELLANEOUS
Qty: 100 EACH | Refills: 0 | Status: SHIPPED | OUTPATIENT
Start: 2020-12-28 | End: 2021-05-12

## 2020-12-29 RX ORDER — LANCETS
EACH MISCELLANEOUS
Qty: 100 EACH | Refills: 0 | Status: SHIPPED | OUTPATIENT
Start: 2020-12-29 | End: 2021-01-04 | Stop reason: SDUPTHER

## 2020-12-29 RX ORDER — BLOOD SUGAR DIAGNOSTIC
STRIP MISCELLANEOUS
Qty: 100 STRIP | Refills: 0 | Status: SHIPPED | OUTPATIENT
Start: 2020-12-29 | End: 2021-01-04 | Stop reason: SDUPTHER

## 2020-12-31 RX ORDER — APIXABAN 5 MG/1
TABLET, FILM COATED ORAL
Qty: 180 TABLET | Refills: 1 | Status: SHIPPED | OUTPATIENT
Start: 2020-12-31 | End: 2021-06-27

## 2021-01-04 RX ORDER — LANCETS
EACH MISCELLANEOUS
Qty: 100 EACH | Refills: 0 | Status: SHIPPED | OUTPATIENT
Start: 2021-01-04 | End: 2021-05-12

## 2021-01-04 RX ORDER — BLOOD SUGAR DIAGNOSTIC
STRIP MISCELLANEOUS
Qty: 100 STRIP | Refills: 0 | Status: SHIPPED | OUTPATIENT
Start: 2021-01-04 | End: 2021-05-12 | Stop reason: SDUPTHER

## 2021-01-10 ENCOUNTER — PATIENT OUTREACH (OUTPATIENT)
Dept: ADMINISTRATIVE | Facility: OTHER | Age: 76
End: 2021-01-10

## 2021-01-12 ENCOUNTER — OFFICE VISIT (OUTPATIENT)
Dept: CARDIOLOGY | Facility: CLINIC | Age: 76
End: 2021-01-12
Payer: MEDICARE

## 2021-01-12 ENCOUNTER — HOSPITAL ENCOUNTER (OUTPATIENT)
Dept: CARDIOLOGY | Facility: HOSPITAL | Age: 76
Discharge: HOME | End: 2021-01-12
Attending: INTERNAL MEDICINE
Payer: MEDICARE

## 2021-01-12 VITALS
HEIGHT: 69 IN | OXYGEN SATURATION: 96 % | SYSTOLIC BLOOD PRESSURE: 146 MMHG | DIASTOLIC BLOOD PRESSURE: 70 MMHG | WEIGHT: 203.69 LBS | BODY MASS INDEX: 30.17 KG/M2 | HEART RATE: 70 BPM

## 2021-01-12 DIAGNOSIS — I10 ESSENTIAL HYPERTENSION: ICD-10-CM

## 2021-01-12 DIAGNOSIS — I48.0 PAROXYSMAL ATRIAL FIBRILLATION: ICD-10-CM

## 2021-01-12 DIAGNOSIS — Z95.1 S/P CABG (CORONARY ARTERY BYPASS GRAFT): ICD-10-CM

## 2021-01-12 DIAGNOSIS — Z86.73 HISTORY OF TIA (TRANSIENT ISCHEMIC ATTACK): ICD-10-CM

## 2021-01-12 DIAGNOSIS — E78.2 MIXED HYPERLIPIDEMIA: ICD-10-CM

## 2021-01-12 DIAGNOSIS — I65.23 BILATERAL CAROTID ARTERY STENOSIS: Primary | ICD-10-CM

## 2021-01-12 DIAGNOSIS — I65.23 CAROTID STENOSIS, ASYMPTOMATIC, BILATERAL: ICD-10-CM

## 2021-01-12 LAB
LEFT ARM DIASTOLIC BLOOD PRESSURE: 68 MMHG
LEFT ARM SYSTOLIC BLOOD PRESSURE: 140 MMHG
LEFT CBA DIAS: 14 CM/S
LEFT CBA SYS: 48 CM/S
LEFT CCA DIST DIAS: 14 CM/S
LEFT CCA DIST SYS: 67 CM/S
LEFT CCA MID DIAS: 10 CM/S
LEFT CCA MID SYS: 45 CM/S
LEFT CCA PROX DIAS: 10 CM/S
LEFT CCA PROX SYS: 68 CM/S
LEFT ECA DIAS: 9 CM/S
LEFT ECA SYS: 99 CM/S
LEFT ICA DIST DIAS: 19 CM/S
LEFT ICA DIST SYS: 72 CM/S
LEFT ICA MID DIAS: 19 CM/S
LEFT ICA MID SYS: 58 CM/S
LEFT ICA PROX DIAS: 14 CM/S
LEFT ICA PROX SYS: 48 CM/S
LEFT VERTEBRAL DIAS: 8 CM/S
LEFT VERTEBRAL SYS: 29 CM/S
OHS CV CAROTID RIGHT ICA EDV HIGHEST: 36
OHS CV CAROTID ULTRASOUND LEFT ICA/CCA RATIO: 1.07
OHS CV CAROTID ULTRASOUND RIGHT ICA/CCA RATIO: 2.39
OHS CV PV CAROTID LEFT HIGHEST CCA: 68
OHS CV PV CAROTID LEFT HIGHEST ICA: 72
OHS CV PV CAROTID RIGHT HIGHEST CCA: 67
OHS CV PV CAROTID RIGHT HIGHEST ICA: 153
OHS CV US CAROTID LEFT HIGHEST EDV: 19
RIGHT ARM DIASTOLIC BLOOD PRESSURE: 68 MMHG
RIGHT ARM SYSTOLIC BLOOD PRESSURE: 140 MMHG
RIGHT CBA DIAS: 14 CM/S
RIGHT CBA SYS: 92 CM/S
RIGHT CCA DIST DIAS: 15 CM/S
RIGHT CCA DIST SYS: 64 CM/S
RIGHT CCA MID DIAS: 14 CM/S
RIGHT CCA MID SYS: 67 CM/S
RIGHT CCA PROX DIAS: 14 CM/S
RIGHT CCA PROX SYS: 67 CM/S
RIGHT ECA DIAS: 12 CM/S
RIGHT ECA SYS: 248 CM/S
RIGHT ICA DIST DIAS: 19 CM/S
RIGHT ICA DIST SYS: 89 CM/S
RIGHT ICA MID DIAS: 30 CM/S
RIGHT ICA MID SYS: 108 CM/S
RIGHT ICA PROX DIAS: 36 CM/S
RIGHT ICA PROX SYS: 153 CM/S
RIGHT VERTEBRAL DIAS: 0 CM/S
RIGHT VERTEBRAL SYS: 0 CM/S

## 2021-01-12 PROCEDURE — 1125F PR PAIN SEVERITY QUANTIFIED, PAIN PRESENT: ICD-10-PCS | Mod: HCNC,S$GLB,, | Performed by: PHYSICIAN ASSISTANT

## 2021-01-12 PROCEDURE — 99024 PR POST-OP FOLLOW-UP VISIT: ICD-10-PCS | Mod: HCNC,S$GLB,, | Performed by: PHYSICIAN ASSISTANT

## 2021-01-12 PROCEDURE — 99999 PR PBB SHADOW E&M-EST. PATIENT-LVL IV: CPT | Mod: PBBFAC,HCNC,, | Performed by: PHYSICIAN ASSISTANT

## 2021-01-12 PROCEDURE — 99024 POSTOP FOLLOW-UP VISIT: CPT | Mod: HCNC,S$GLB,, | Performed by: PHYSICIAN ASSISTANT

## 2021-01-12 PROCEDURE — 99999 PR PBB SHADOW E&M-EST. PATIENT-LVL IV: ICD-10-PCS | Mod: PBBFAC,HCNC,, | Performed by: PHYSICIAN ASSISTANT

## 2021-01-12 PROCEDURE — 93880 EXTRACRANIAL BILAT STUDY: CPT | Mod: HCNC

## 2021-01-12 PROCEDURE — 3077F SYST BP >= 140 MM HG: CPT | Mod: HCNC,CPTII,S$GLB, | Performed by: PHYSICIAN ASSISTANT

## 2021-01-12 PROCEDURE — 3078F DIAST BP <80 MM HG: CPT | Mod: HCNC,CPTII,S$GLB, | Performed by: PHYSICIAN ASSISTANT

## 2021-01-12 PROCEDURE — 3078F PR MOST RECENT DIASTOLIC BLOOD PRESSURE < 80 MM HG: ICD-10-PCS | Mod: HCNC,CPTII,S$GLB, | Performed by: PHYSICIAN ASSISTANT

## 2021-01-12 PROCEDURE — 93880 EXTRACRANIAL BILAT STUDY: CPT | Mod: 26,HCNC,, | Performed by: INTERNAL MEDICINE

## 2021-01-12 PROCEDURE — 3046F HEMOGLOBIN A1C LEVEL >9.0%: CPT | Mod: HCNC,CPTII,S$GLB, | Performed by: PHYSICIAN ASSISTANT

## 2021-01-12 PROCEDURE — 93880 CV US DOPPLER CAROTID (CUPID ONLY): ICD-10-PCS | Mod: 26,HCNC,, | Performed by: INTERNAL MEDICINE

## 2021-01-12 PROCEDURE — 1125F AMNT PAIN NOTED PAIN PRSNT: CPT | Mod: HCNC,S$GLB,, | Performed by: PHYSICIAN ASSISTANT

## 2021-01-12 PROCEDURE — 3077F PR MOST RECENT SYSTOLIC BLOOD PRESSURE >= 140 MM HG: ICD-10-PCS | Mod: HCNC,CPTII,S$GLB, | Performed by: PHYSICIAN ASSISTANT

## 2021-01-12 PROCEDURE — 1159F PR MEDICATION LIST DOCUMENTED IN MEDICAL RECORD: ICD-10-PCS | Mod: HCNC,S$GLB,, | Performed by: PHYSICIAN ASSISTANT

## 2021-01-12 PROCEDURE — 3046F PR MOST RECENT HEMOGLOBIN A1C LEVEL > 9.0%: ICD-10-PCS | Mod: HCNC,CPTII,S$GLB, | Performed by: PHYSICIAN ASSISTANT

## 2021-01-12 PROCEDURE — 1159F MED LIST DOCD IN RCRD: CPT | Mod: HCNC,S$GLB,, | Performed by: PHYSICIAN ASSISTANT

## 2021-01-28 ENCOUNTER — LAB VISIT (OUTPATIENT)
Dept: LAB | Facility: HOSPITAL | Age: 76
End: 2021-01-28
Attending: NURSE PRACTITIONER
Payer: MEDICARE

## 2021-01-28 LAB
ALBUMIN SERPL BCP-MCNC: 4.4 G/DL (ref 3.5–5.2)
ALP SERPL-CCNC: 39 U/L (ref 38–126)
ALT SERPL W/O P-5'-P-CCNC: 19 U/L (ref 10–44)
ANION GAP SERPL CALC-SCNC: 10 MMOL/L (ref 8–16)
AST SERPL-CCNC: 28 U/L (ref 15–46)
BILIRUB SERPL-MCNC: 0.6 MG/DL (ref 0.1–1)
CALCIUM SERPL-MCNC: 9.7 MG/DL (ref 8.7–10.5)
CHLORIDE SERPL-SCNC: 103 MMOL/L (ref 95–110)
CO2 SERPL-SCNC: 28 MMOL/L (ref 23–29)
CREAT SERPL-MCNC: 1.63 MG/DL (ref 0.5–1.4)
EST. GFR  (AFRICAN AMERICAN): 46.9 ML/MIN/1.73 M^2
EST. GFR  (NON AFRICAN AMERICAN): 40.6 ML/MIN/1.73 M^2
ESTIMATED AVG GLUCOSE: 148 MG/DL (ref 68–131)
GLUCOSE SERPL-MCNC: 201 MG/DL (ref 70–110)
HBA1C MFR BLD: 6.8 % (ref 4–5.6)
POTASSIUM SERPL-SCNC: 4.9 MMOL/L (ref 3.5–5.1)
PROT SERPL-MCNC: 7.9 G/DL (ref 6–8.4)
SODIUM SERPL-SCNC: 141 MMOL/L (ref 136–145)
UUN UR-MCNC: 45 MG/DL (ref 2–20)

## 2021-01-28 PROCEDURE — 80053 COMPREHEN METABOLIC PANEL: CPT | Mod: PO

## 2021-01-28 PROCEDURE — 83036 HEMOGLOBIN GLYCOSYLATED A1C: CPT

## 2021-01-28 PROCEDURE — 36415 COLL VENOUS BLD VENIPUNCTURE: CPT | Mod: PO

## 2021-02-03 ENCOUNTER — TELEPHONE (OUTPATIENT)
Dept: ENDOCRINOLOGY | Facility: CLINIC | Age: 76
End: 2021-02-03

## 2021-02-04 ENCOUNTER — TELEPHONE (OUTPATIENT)
Dept: ENDOCRINOLOGY | Facility: CLINIC | Age: 76
End: 2021-02-04

## 2021-02-04 ENCOUNTER — OFFICE VISIT (OUTPATIENT)
Dept: ENDOCRINOLOGY | Facility: CLINIC | Age: 76
End: 2021-02-04
Payer: MEDICARE

## 2021-02-04 VITALS
HEART RATE: 65 BPM | BODY MASS INDEX: 29.71 KG/M2 | HEIGHT: 69 IN | WEIGHT: 200.63 LBS | SYSTOLIC BLOOD PRESSURE: 144 MMHG | OXYGEN SATURATION: 97 % | DIASTOLIC BLOOD PRESSURE: 78 MMHG

## 2021-02-04 DIAGNOSIS — I10 ESSENTIAL HYPERTENSION: ICD-10-CM

## 2021-02-04 DIAGNOSIS — E78.2 MIXED HYPERLIPIDEMIA: ICD-10-CM

## 2021-02-04 DIAGNOSIS — I25.810 CORONARY ARTERY DISEASE INVOLVING AUTOLOGOUS ARTERY CORONARY BYPASS GRAFT WITHOUT ANGINA PECTORIS: ICD-10-CM

## 2021-02-04 PROCEDURE — 99499 UNLISTED E&M SERVICE: CPT | Mod: S$GLB,,, | Performed by: NURSE PRACTITIONER

## 2021-02-04 PROCEDURE — 3078F PR MOST RECENT DIASTOLIC BLOOD PRESSURE < 80 MM HG: ICD-10-PCS | Mod: CPTII,S$GLB,, | Performed by: NURSE PRACTITIONER

## 2021-02-04 PROCEDURE — 99214 PR OFFICE/OUTPT VISIT, EST, LEVL IV, 30-39 MIN: ICD-10-PCS | Mod: S$GLB,,, | Performed by: NURSE PRACTITIONER

## 2021-02-04 PROCEDURE — 1125F PR PAIN SEVERITY QUANTIFIED, PAIN PRESENT: ICD-10-PCS | Mod: S$GLB,,, | Performed by: NURSE PRACTITIONER

## 2021-02-04 PROCEDURE — 99999 PR PBB SHADOW E&M-EST. PATIENT-LVL IV: ICD-10-PCS | Mod: PBBFAC,,, | Performed by: NURSE PRACTITIONER

## 2021-02-04 PROCEDURE — 1159F PR MEDICATION LIST DOCUMENTED IN MEDICAL RECORD: ICD-10-PCS | Mod: S$GLB,,, | Performed by: NURSE PRACTITIONER

## 2021-02-04 PROCEDURE — 99999 PR PBB SHADOW E&M-EST. PATIENT-LVL IV: CPT | Mod: PBBFAC,,, | Performed by: NURSE PRACTITIONER

## 2021-02-04 PROCEDURE — 3044F HG A1C LEVEL LT 7.0%: CPT | Mod: CPTII,S$GLB,, | Performed by: NURSE PRACTITIONER

## 2021-02-04 PROCEDURE — 3044F PR MOST RECENT HEMOGLOBIN A1C LEVEL <7.0%: ICD-10-PCS | Mod: CPTII,S$GLB,, | Performed by: NURSE PRACTITIONER

## 2021-02-04 PROCEDURE — 99499 RISK ADDL DX/OHS AUDIT: ICD-10-PCS | Mod: S$GLB,,, | Performed by: NURSE PRACTITIONER

## 2021-02-04 PROCEDURE — 3078F DIAST BP <80 MM HG: CPT | Mod: CPTII,S$GLB,, | Performed by: NURSE PRACTITIONER

## 2021-02-04 PROCEDURE — 99214 OFFICE O/P EST MOD 30 MIN: CPT | Mod: S$GLB,,, | Performed by: NURSE PRACTITIONER

## 2021-02-04 PROCEDURE — 3077F SYST BP >= 140 MM HG: CPT | Mod: CPTII,S$GLB,, | Performed by: NURSE PRACTITIONER

## 2021-02-04 PROCEDURE — 1125F AMNT PAIN NOTED PAIN PRSNT: CPT | Mod: S$GLB,,, | Performed by: NURSE PRACTITIONER

## 2021-02-04 PROCEDURE — 1159F MED LIST DOCD IN RCRD: CPT | Mod: S$GLB,,, | Performed by: NURSE PRACTITIONER

## 2021-02-04 PROCEDURE — 3077F PR MOST RECENT SYSTOLIC BLOOD PRESSURE >= 140 MM HG: ICD-10-PCS | Mod: CPTII,S$GLB,, | Performed by: NURSE PRACTITIONER

## 2021-03-18 ENCOUNTER — PATIENT OUTREACH (OUTPATIENT)
Dept: ADMINISTRATIVE | Facility: OTHER | Age: 76
End: 2021-03-18

## 2021-03-22 ENCOUNTER — OFFICE VISIT (OUTPATIENT)
Dept: CARDIOLOGY | Facility: CLINIC | Age: 76
End: 2021-03-22
Payer: MEDICARE

## 2021-03-22 ENCOUNTER — LAB VISIT (OUTPATIENT)
Dept: LAB | Facility: HOSPITAL | Age: 76
End: 2021-03-22
Attending: INTERNAL MEDICINE
Payer: MEDICARE

## 2021-03-22 VITALS
OXYGEN SATURATION: 97 % | SYSTOLIC BLOOD PRESSURE: 148 MMHG | WEIGHT: 206.19 LBS | HEART RATE: 73 BPM | HEIGHT: 69 IN | BODY MASS INDEX: 30.54 KG/M2 | DIASTOLIC BLOOD PRESSURE: 70 MMHG

## 2021-03-22 DIAGNOSIS — I48.0 PAROXYSMAL ATRIAL FIBRILLATION: ICD-10-CM

## 2021-03-22 DIAGNOSIS — N18.30 CKD STAGE 3 DUE TO TYPE 2 DIABETES MELLITUS: ICD-10-CM

## 2021-03-22 DIAGNOSIS — I48.0 PAROXYSMAL ATRIAL FIBRILLATION: Primary | ICD-10-CM

## 2021-03-22 DIAGNOSIS — E11.22 CKD STAGE 3 DUE TO TYPE 2 DIABETES MELLITUS: ICD-10-CM

## 2021-03-22 DIAGNOSIS — Z86.73 HISTORY OF TIA (TRANSIENT ISCHEMIC ATTACK): ICD-10-CM

## 2021-03-22 DIAGNOSIS — I70.0 ABDOMINAL AORTIC ATHEROSCLEROSIS: ICD-10-CM

## 2021-03-22 DIAGNOSIS — E78.2 MIXED HYPERLIPIDEMIA: ICD-10-CM

## 2021-03-22 DIAGNOSIS — I10 ESSENTIAL HYPERTENSION: ICD-10-CM

## 2021-03-22 DIAGNOSIS — I65.23 BILATERAL CAROTID ARTERY STENOSIS: ICD-10-CM

## 2021-03-22 DIAGNOSIS — Z95.1 S/P CABG (CORONARY ARTERY BYPASS GRAFT): ICD-10-CM

## 2021-03-22 LAB
BASOPHILS # BLD AUTO: 0.06 K/UL (ref 0–0.2)
BASOPHILS NFR BLD: 1 % (ref 0–1.9)
DIFFERENTIAL METHOD: ABNORMAL
EOSINOPHIL # BLD AUTO: 0.2 K/UL (ref 0–0.5)
EOSINOPHIL NFR BLD: 3.4 % (ref 0–8)
ERYTHROCYTE [DISTWIDTH] IN BLOOD BY AUTOMATED COUNT: 13.9 % (ref 11.5–14.5)
HCT VFR BLD AUTO: 43.3 % (ref 40–54)
HGB BLD-MCNC: 13.8 G/DL (ref 14–18)
IMM GRANULOCYTES # BLD AUTO: 0.02 K/UL (ref 0–0.04)
IMM GRANULOCYTES NFR BLD AUTO: 0.3 % (ref 0–0.5)
LYMPHOCYTES # BLD AUTO: 1.7 K/UL (ref 1–4.8)
LYMPHOCYTES NFR BLD: 27.4 % (ref 18–48)
MCH RBC QN AUTO: 29.8 PG (ref 27–31)
MCHC RBC AUTO-ENTMCNC: 31.9 G/DL (ref 32–36)
MCV RBC AUTO: 94 FL (ref 82–98)
MONOCYTES # BLD AUTO: 0.7 K/UL (ref 0.3–1)
MONOCYTES NFR BLD: 11.9 % (ref 4–15)
NEUTROPHILS # BLD AUTO: 3.4 K/UL (ref 1.8–7.7)
NEUTROPHILS NFR BLD: 56 % (ref 38–73)
NRBC BLD-RTO: 0 /100 WBC
PLATELET # BLD AUTO: 217 K/UL (ref 150–350)
PMV BLD AUTO: 10.4 FL (ref 9.2–12.9)
RBC # BLD AUTO: 4.63 M/UL (ref 4.6–6.2)
WBC # BLD AUTO: 6.14 K/UL (ref 3.9–12.7)

## 2021-03-22 PROCEDURE — 99999 PR PBB SHADOW E&M-EST. PATIENT-LVL V: CPT | Mod: PBBFAC,,, | Performed by: INTERNAL MEDICINE

## 2021-03-22 PROCEDURE — 99499 UNLISTED E&M SERVICE: CPT | Mod: S$GLB,,, | Performed by: INTERNAL MEDICINE

## 2021-03-22 PROCEDURE — 36415 COLL VENOUS BLD VENIPUNCTURE: CPT | Mod: PO | Performed by: INTERNAL MEDICINE

## 2021-03-22 PROCEDURE — 3044F HG A1C LEVEL LT 7.0%: CPT | Mod: CPTII,S$GLB,, | Performed by: INTERNAL MEDICINE

## 2021-03-22 PROCEDURE — 1159F PR MEDICATION LIST DOCUMENTED IN MEDICAL RECORD: ICD-10-PCS | Mod: S$GLB,,, | Performed by: INTERNAL MEDICINE

## 2021-03-22 PROCEDURE — 1101F PR PT FALLS ASSESS DOC 0-1 FALLS W/OUT INJ PAST YR: ICD-10-PCS | Mod: CPTII,S$GLB,, | Performed by: INTERNAL MEDICINE

## 2021-03-22 PROCEDURE — 1125F AMNT PAIN NOTED PAIN PRSNT: CPT | Mod: S$GLB,,, | Performed by: INTERNAL MEDICINE

## 2021-03-22 PROCEDURE — 85025 COMPLETE CBC W/AUTO DIFF WBC: CPT | Mod: PO | Performed by: INTERNAL MEDICINE

## 2021-03-22 PROCEDURE — 3077F SYST BP >= 140 MM HG: CPT | Mod: CPTII,S$GLB,, | Performed by: INTERNAL MEDICINE

## 2021-03-22 PROCEDURE — 3044F PR MOST RECENT HEMOGLOBIN A1C LEVEL <7.0%: ICD-10-PCS | Mod: CPTII,S$GLB,, | Performed by: INTERNAL MEDICINE

## 2021-03-22 PROCEDURE — 1159F MED LIST DOCD IN RCRD: CPT | Mod: S$GLB,,, | Performed by: INTERNAL MEDICINE

## 2021-03-22 PROCEDURE — 99214 OFFICE O/P EST MOD 30 MIN: CPT | Mod: S$GLB,,, | Performed by: INTERNAL MEDICINE

## 2021-03-22 PROCEDURE — 99499 RISK ADDL DX/OHS AUDIT: ICD-10-PCS | Mod: S$GLB,,, | Performed by: INTERNAL MEDICINE

## 2021-03-22 PROCEDURE — 99999 PR PBB SHADOW E&M-EST. PATIENT-LVL V: ICD-10-PCS | Mod: PBBFAC,,, | Performed by: INTERNAL MEDICINE

## 2021-03-22 PROCEDURE — 3078F PR MOST RECENT DIASTOLIC BLOOD PRESSURE < 80 MM HG: ICD-10-PCS | Mod: CPTII,S$GLB,, | Performed by: INTERNAL MEDICINE

## 2021-03-22 PROCEDURE — 3078F DIAST BP <80 MM HG: CPT | Mod: CPTII,S$GLB,, | Performed by: INTERNAL MEDICINE

## 2021-03-22 PROCEDURE — 3077F PR MOST RECENT SYSTOLIC BLOOD PRESSURE >= 140 MM HG: ICD-10-PCS | Mod: CPTII,S$GLB,, | Performed by: INTERNAL MEDICINE

## 2021-03-22 PROCEDURE — 3288F FALL RISK ASSESSMENT DOCD: CPT | Mod: CPTII,S$GLB,, | Performed by: INTERNAL MEDICINE

## 2021-03-22 PROCEDURE — 3288F PR FALLS RISK ASSESSMENT DOCUMENTED: ICD-10-PCS | Mod: CPTII,S$GLB,, | Performed by: INTERNAL MEDICINE

## 2021-03-22 PROCEDURE — 1101F PT FALLS ASSESS-DOCD LE1/YR: CPT | Mod: CPTII,S$GLB,, | Performed by: INTERNAL MEDICINE

## 2021-03-22 PROCEDURE — 99214 PR OFFICE/OUTPT VISIT, EST, LEVL IV, 30-39 MIN: ICD-10-PCS | Mod: S$GLB,,, | Performed by: INTERNAL MEDICINE

## 2021-03-22 PROCEDURE — 1125F PR PAIN SEVERITY QUANTIFIED, PAIN PRESENT: ICD-10-PCS | Mod: S$GLB,,, | Performed by: INTERNAL MEDICINE

## 2021-03-22 RX ORDER — AMLODIPINE BESYLATE 5 MG/1
5 TABLET ORAL DAILY
Qty: 90 TABLET | Refills: 4 | Status: SHIPPED | OUTPATIENT
Start: 2021-03-22 | End: 2022-03-01

## 2021-03-24 ENCOUNTER — PES CALL (OUTPATIENT)
Dept: ADMINISTRATIVE | Facility: CLINIC | Age: 76
End: 2021-03-24

## 2021-04-19 ENCOUNTER — PATIENT OUTREACH (OUTPATIENT)
Dept: FAMILY MEDICINE | Facility: CLINIC | Age: 76
End: 2021-04-19

## 2021-04-21 ENCOUNTER — PATIENT OUTREACH (OUTPATIENT)
Dept: ADMINISTRATIVE | Facility: HOSPITAL | Age: 76
End: 2021-04-21

## 2021-05-03 ENCOUNTER — OFFICE VISIT (OUTPATIENT)
Dept: FAMILY MEDICINE | Facility: CLINIC | Age: 76
End: 2021-05-03
Payer: MEDICARE

## 2021-05-03 VITALS
WEIGHT: 203.69 LBS | OXYGEN SATURATION: 95 % | BODY MASS INDEX: 30.17 KG/M2 | DIASTOLIC BLOOD PRESSURE: 50 MMHG | TEMPERATURE: 97 F | SYSTOLIC BLOOD PRESSURE: 110 MMHG | HEIGHT: 69 IN | HEART RATE: 58 BPM

## 2021-05-03 DIAGNOSIS — E11.59 HYPERTENSION ASSOCIATED WITH DIABETES: ICD-10-CM

## 2021-05-03 DIAGNOSIS — Z12.11 ENCOUNTER FOR SCREENING FOR MALIGNANT NEOPLASM OF COLON: Primary | ICD-10-CM

## 2021-05-03 DIAGNOSIS — J30.1 ALLERGIC RHINITIS DUE TO POLLEN, UNSPECIFIED SEASONALITY: ICD-10-CM

## 2021-05-03 DIAGNOSIS — E78.2 MIXED HYPERLIPIDEMIA: ICD-10-CM

## 2021-05-03 DIAGNOSIS — I15.2 HYPERTENSION ASSOCIATED WITH DIABETES: ICD-10-CM

## 2021-05-03 PROCEDURE — 3288F PR FALLS RISK ASSESSMENT DOCUMENTED: ICD-10-PCS | Mod: CPTII,S$GLB,, | Performed by: FAMILY MEDICINE

## 2021-05-03 PROCEDURE — 1126F PR PAIN SEVERITY QUANTIFIED, NO PAIN PRESENT: ICD-10-PCS | Mod: S$GLB,,, | Performed by: FAMILY MEDICINE

## 2021-05-03 PROCEDURE — 99214 OFFICE O/P EST MOD 30 MIN: CPT | Mod: S$GLB,,, | Performed by: FAMILY MEDICINE

## 2021-05-03 PROCEDURE — 1159F PR MEDICATION LIST DOCUMENTED IN MEDICAL RECORD: ICD-10-PCS | Mod: S$GLB,,, | Performed by: FAMILY MEDICINE

## 2021-05-03 PROCEDURE — 99214 PR OFFICE/OUTPT VISIT, EST, LEVL IV, 30-39 MIN: ICD-10-PCS | Mod: S$GLB,,, | Performed by: FAMILY MEDICINE

## 2021-05-03 PROCEDURE — 3288F FALL RISK ASSESSMENT DOCD: CPT | Mod: CPTII,S$GLB,, | Performed by: FAMILY MEDICINE

## 2021-05-03 PROCEDURE — 1126F AMNT PAIN NOTED NONE PRSNT: CPT | Mod: S$GLB,,, | Performed by: FAMILY MEDICINE

## 2021-05-03 PROCEDURE — 1101F PT FALLS ASSESS-DOCD LE1/YR: CPT | Mod: CPTII,S$GLB,, | Performed by: FAMILY MEDICINE

## 2021-05-03 PROCEDURE — 1101F PR PT FALLS ASSESS DOC 0-1 FALLS W/OUT INJ PAST YR: ICD-10-PCS | Mod: CPTII,S$GLB,, | Performed by: FAMILY MEDICINE

## 2021-05-03 PROCEDURE — 1159F MED LIST DOCD IN RCRD: CPT | Mod: S$GLB,,, | Performed by: FAMILY MEDICINE

## 2021-05-03 RX ORDER — FLUTICASONE PROPIONATE 50 MCG
1 SPRAY, SUSPENSION (ML) NASAL DAILY
Qty: 16 G | Refills: 2 | Status: SHIPPED | OUTPATIENT
Start: 2021-05-03 | End: 2021-07-28

## 2021-05-03 RX ORDER — CETIRIZINE HYDROCHLORIDE 10 MG/1
10 TABLET ORAL NIGHTLY
Qty: 90 TABLET | Refills: 3 | Status: SHIPPED | OUTPATIENT
Start: 2021-05-03 | End: 2021-10-15

## 2021-05-03 RX ORDER — CLOTRIMAZOLE 1 %
CREAM (GRAM) TOPICAL 2 TIMES DAILY
Qty: 45 G | Refills: 0 | Status: SHIPPED | OUTPATIENT
Start: 2021-05-03 | End: 2021-07-13

## 2021-05-12 ENCOUNTER — TELEPHONE (OUTPATIENT)
Dept: FAMILY MEDICINE | Facility: CLINIC | Age: 76
End: 2021-05-12

## 2021-05-12 RX ORDER — LANCETS
EACH MISCELLANEOUS
Qty: 200 EACH | Refills: 0 | Status: SHIPPED | OUTPATIENT
Start: 2021-05-12 | End: 2023-03-21 | Stop reason: SDUPTHER

## 2021-05-12 RX ORDER — BLOOD SUGAR DIAGNOSTIC
STRIP MISCELLANEOUS
Qty: 200 STRIP | Refills: 1 | Status: SHIPPED | OUTPATIENT
Start: 2021-05-12 | End: 2021-06-01

## 2021-05-25 ENCOUNTER — LAB VISIT (OUTPATIENT)
Dept: LAB | Facility: HOSPITAL | Age: 76
End: 2021-05-25
Attending: NURSE PRACTITIONER
Payer: MEDICARE

## 2021-05-25 LAB
ALBUMIN SERPL BCP-MCNC: 4 G/DL (ref 3.5–5.2)
ALP SERPL-CCNC: 36 U/L (ref 38–126)
ALT SERPL W/O P-5'-P-CCNC: 19 U/L (ref 10–44)
ANION GAP SERPL CALC-SCNC: 8 MMOL/L (ref 8–16)
AST SERPL-CCNC: 27 U/L (ref 15–46)
BILIRUB SERPL-MCNC: 0.7 MG/DL (ref 0.1–1)
CALCIUM SERPL-MCNC: 9.3 MG/DL (ref 8.7–10.5)
CHLORIDE SERPL-SCNC: 105 MMOL/L (ref 95–110)
CO2 SERPL-SCNC: 27 MMOL/L (ref 23–29)
CREAT SERPL-MCNC: 1.68 MG/DL (ref 0.5–1.4)
EST. GFR  (AFRICAN AMERICAN): 45.3 ML/MIN/1.73 M^2
EST. GFR  (NON AFRICAN AMERICAN): 39.1 ML/MIN/1.73 M^2
ESTIMATED AVG GLUCOSE: 177 MG/DL (ref 68–131)
GLUCOSE SERPL-MCNC: 234 MG/DL (ref 70–110)
HBA1C MFR BLD: 7.8 % (ref 4–5.6)
POTASSIUM SERPL-SCNC: 5.3 MMOL/L (ref 3.5–5.1)
PROT SERPL-MCNC: 7.1 G/DL (ref 6–8.4)
SODIUM SERPL-SCNC: 140 MMOL/L (ref 136–145)
UUN UR-MCNC: 40 MG/DL (ref 2–20)

## 2021-05-25 PROCEDURE — 83036 HEMOGLOBIN GLYCOSYLATED A1C: CPT | Performed by: NURSE PRACTITIONER

## 2021-05-25 PROCEDURE — 80053 COMPREHEN METABOLIC PANEL: CPT | Mod: PO | Performed by: NURSE PRACTITIONER

## 2021-05-25 PROCEDURE — 36415 COLL VENOUS BLD VENIPUNCTURE: CPT | Mod: PO | Performed by: NURSE PRACTITIONER

## 2021-06-01 ENCOUNTER — OFFICE VISIT (OUTPATIENT)
Dept: ENDOCRINOLOGY | Facility: CLINIC | Age: 76
End: 2021-06-01
Payer: MEDICARE

## 2021-06-01 VITALS
SYSTOLIC BLOOD PRESSURE: 128 MMHG | DIASTOLIC BLOOD PRESSURE: 74 MMHG | WEIGHT: 204.13 LBS | HEIGHT: 69 IN | BODY MASS INDEX: 30.23 KG/M2

## 2021-06-01 DIAGNOSIS — E87.5 HIGH POTASSIUM: ICD-10-CM

## 2021-06-01 DIAGNOSIS — I10 ESSENTIAL HYPERTENSION: Chronic | ICD-10-CM

## 2021-06-01 DIAGNOSIS — E78.2 DYSLIPIDEMIA WITH LOW HIGH DENSITY LIPOPROTEIN (HDL) CHOLESTEROL WITH HYPERTRIGLYCERIDEMIA DUE TO TYPE 2 DIABETES MELLITUS: ICD-10-CM

## 2021-06-01 DIAGNOSIS — I25.810 CORONARY ARTERY DISEASE INVOLVING AUTOLOGOUS ARTERY CORONARY BYPASS GRAFT WITHOUT ANGINA PECTORIS: Chronic | ICD-10-CM

## 2021-06-01 DIAGNOSIS — E11.69 DYSLIPIDEMIA WITH LOW HIGH DENSITY LIPOPROTEIN (HDL) CHOLESTEROL WITH HYPERTRIGLYCERIDEMIA DUE TO TYPE 2 DIABETES MELLITUS: ICD-10-CM

## 2021-06-01 PROCEDURE — 99999 PR PBB SHADOW E&M-EST. PATIENT-LVL III: ICD-10-PCS | Mod: PBBFAC,,, | Performed by: NURSE PRACTITIONER

## 2021-06-01 PROCEDURE — 99999 PR PBB SHADOW E&M-EST. PATIENT-LVL III: CPT | Mod: PBBFAC,,, | Performed by: NURSE PRACTITIONER

## 2021-06-01 PROCEDURE — 1126F AMNT PAIN NOTED NONE PRSNT: CPT | Mod: S$GLB,,, | Performed by: NURSE PRACTITIONER

## 2021-06-01 PROCEDURE — 1159F PR MEDICATION LIST DOCUMENTED IN MEDICAL RECORD: ICD-10-PCS | Mod: S$GLB,,, | Performed by: NURSE PRACTITIONER

## 2021-06-01 PROCEDURE — 3051F PR MOST RECENT HEMOGLOBIN A1C LEVEL 7.0 - < 8.0%: ICD-10-PCS | Mod: CPTII,S$GLB,, | Performed by: NURSE PRACTITIONER

## 2021-06-01 PROCEDURE — 1159F MED LIST DOCD IN RCRD: CPT | Mod: S$GLB,,, | Performed by: NURSE PRACTITIONER

## 2021-06-01 PROCEDURE — 99214 OFFICE O/P EST MOD 30 MIN: CPT | Mod: S$GLB,,, | Performed by: NURSE PRACTITIONER

## 2021-06-01 PROCEDURE — 1126F PR PAIN SEVERITY QUANTIFIED, NO PAIN PRESENT: ICD-10-PCS | Mod: S$GLB,,, | Performed by: NURSE PRACTITIONER

## 2021-06-01 PROCEDURE — 99214 PR OFFICE/OUTPT VISIT, EST, LEVL IV, 30-39 MIN: ICD-10-PCS | Mod: S$GLB,,, | Performed by: NURSE PRACTITIONER

## 2021-06-01 PROCEDURE — 3051F HG A1C>EQUAL 7.0%<8.0%: CPT | Mod: CPTII,S$GLB,, | Performed by: NURSE PRACTITIONER

## 2021-06-01 RX ORDER — GLIPIZIDE 5 MG/1
TABLET ORAL
Qty: 120 TABLET | Refills: 11 | Status: SHIPPED | OUTPATIENT
Start: 2021-06-01 | End: 2021-10-06

## 2021-06-08 ENCOUNTER — LAB VISIT (OUTPATIENT)
Dept: LAB | Facility: HOSPITAL | Age: 76
End: 2021-06-08
Attending: NURSE PRACTITIONER
Payer: MEDICARE

## 2021-06-08 DIAGNOSIS — E87.5 HIGH POTASSIUM: ICD-10-CM

## 2021-06-08 LAB
ANION GAP SERPL CALC-SCNC: 8 MMOL/L (ref 8–16)
CALCIUM SERPL-MCNC: 9.8 MG/DL (ref 8.7–10.5)
CHLORIDE SERPL-SCNC: 106 MMOL/L (ref 95–110)
CO2 SERPL-SCNC: 27 MMOL/L (ref 23–29)
CREAT SERPL-MCNC: 1.77 MG/DL (ref 0.5–1.4)
EST. GFR  (AFRICAN AMERICAN): 42.5 ML/MIN/1.73 M^2
EST. GFR  (NON AFRICAN AMERICAN): 36.8 ML/MIN/1.73 M^2
GLUCOSE SERPL-MCNC: 248 MG/DL (ref 70–110)
POTASSIUM SERPL-SCNC: 5.5 MMOL/L (ref 3.5–5.1)
SODIUM SERPL-SCNC: 141 MMOL/L (ref 136–145)
UUN UR-MCNC: 37 MG/DL (ref 2–20)

## 2021-06-08 PROCEDURE — 36415 COLL VENOUS BLD VENIPUNCTURE: CPT | Mod: PO | Performed by: NURSE PRACTITIONER

## 2021-06-08 PROCEDURE — 80048 BASIC METABOLIC PNL TOTAL CA: CPT | Mod: PO | Performed by: NURSE PRACTITIONER

## 2021-06-11 RX ORDER — FENOFIBRATE 160 MG/1
TABLET ORAL
Qty: 90 TABLET | Refills: 3 | Status: SHIPPED | OUTPATIENT
Start: 2021-06-11 | End: 2022-03-31

## 2021-06-11 RX ORDER — CLOPIDOGREL BISULFATE 75 MG/1
TABLET ORAL
Qty: 90 TABLET | Refills: 3 | Status: SHIPPED | OUTPATIENT
Start: 2021-06-11 | End: 2022-03-31

## 2021-06-11 RX ORDER — ALLOPURINOL 300 MG/1
TABLET ORAL
Qty: 90 TABLET | Refills: 3 | Status: SHIPPED | OUTPATIENT
Start: 2021-06-11 | End: 2022-03-31

## 2021-06-11 RX ORDER — METOPROLOL TARTRATE 25 MG/1
TABLET, FILM COATED ORAL
Qty: 180 TABLET | Refills: 3 | Status: SHIPPED | OUTPATIENT
Start: 2021-06-11 | End: 2022-03-31

## 2021-06-11 RX ORDER — PRAVASTATIN SODIUM 40 MG/1
TABLET ORAL
Qty: 180 TABLET | Refills: 3 | Status: SHIPPED | OUTPATIENT
Start: 2021-06-11 | End: 2021-10-06

## 2021-06-11 RX ORDER — LISINOPRIL 20 MG/1
TABLET ORAL
Qty: 180 TABLET | Refills: 3 | Status: SHIPPED | OUTPATIENT
Start: 2021-06-11 | End: 2021-10-15

## 2021-06-14 ENCOUNTER — TELEPHONE (OUTPATIENT)
Dept: FAMILY MEDICINE | Facility: CLINIC | Age: 76
End: 2021-06-14

## 2021-06-14 DIAGNOSIS — R79.89 ELEVATED SERUM CREATININE: ICD-10-CM

## 2021-06-14 DIAGNOSIS — I15.2 HYPERTENSION ASSOCIATED WITH DIABETES: Primary | ICD-10-CM

## 2021-06-14 DIAGNOSIS — E78.2 MIXED HYPERLIPIDEMIA: ICD-10-CM

## 2021-06-14 DIAGNOSIS — E79.0 ELEVATED URIC ACID IN BLOOD: ICD-10-CM

## 2021-06-14 DIAGNOSIS — E11.59 HYPERTENSION ASSOCIATED WITH DIABETES: Primary | ICD-10-CM

## 2021-06-17 ENCOUNTER — LAB VISIT (OUTPATIENT)
Dept: LAB | Facility: HOSPITAL | Age: 76
End: 2021-06-17
Attending: FAMILY MEDICINE
Payer: MEDICARE

## 2021-06-17 DIAGNOSIS — E78.2 MIXED HYPERLIPIDEMIA: ICD-10-CM

## 2021-06-17 DIAGNOSIS — R79.89 ELEVATED SERUM CREATININE: ICD-10-CM

## 2021-06-17 DIAGNOSIS — I15.2 HYPERTENSION ASSOCIATED WITH DIABETES: ICD-10-CM

## 2021-06-17 DIAGNOSIS — E79.0 ELEVATED URIC ACID IN BLOOD: ICD-10-CM

## 2021-06-17 DIAGNOSIS — E11.59 HYPERTENSION ASSOCIATED WITH DIABETES: ICD-10-CM

## 2021-06-17 LAB
ALBUMIN SERPL BCP-MCNC: 4.3 G/DL (ref 3.5–5.2)
ALP SERPL-CCNC: 35 U/L (ref 38–126)
ALT SERPL W/O P-5'-P-CCNC: 21 U/L (ref 10–44)
ANION GAP SERPL CALC-SCNC: 7 MMOL/L (ref 8–16)
AST SERPL-CCNC: 30 U/L (ref 15–46)
BASOPHILS # BLD AUTO: 0.06 K/UL (ref 0–0.2)
BASOPHILS NFR BLD: 1.2 % (ref 0–1.9)
BILIRUB SERPL-MCNC: 0.6 MG/DL (ref 0.1–1)
CALCIUM SERPL-MCNC: 9.4 MG/DL (ref 8.7–10.5)
CHLORIDE SERPL-SCNC: 103 MMOL/L (ref 95–110)
CO2 SERPL-SCNC: 28 MMOL/L (ref 23–29)
CREAT SERPL-MCNC: 1.63 MG/DL (ref 0.5–1.4)
DIFFERENTIAL METHOD: ABNORMAL
EOSINOPHIL # BLD AUTO: 0.3 K/UL (ref 0–0.5)
EOSINOPHIL NFR BLD: 5.2 % (ref 0–8)
ERYTHROCYTE [DISTWIDTH] IN BLOOD BY AUTOMATED COUNT: 13.6 % (ref 11.5–14.5)
EST. GFR  (AFRICAN AMERICAN): 46.9 ML/MIN/1.73 M^2
EST. GFR  (NON AFRICAN AMERICAN): 40.6 ML/MIN/1.73 M^2
GLUCOSE SERPL-MCNC: 141 MG/DL (ref 70–110)
HCT VFR BLD AUTO: 44.2 % (ref 40–54)
HGB BLD-MCNC: 14.1 G/DL (ref 14–18)
IMM GRANULOCYTES # BLD AUTO: 0.02 K/UL (ref 0–0.04)
IMM GRANULOCYTES NFR BLD AUTO: 0.4 % (ref 0–0.5)
LYMPHOCYTES # BLD AUTO: 1.5 K/UL (ref 1–4.8)
LYMPHOCYTES NFR BLD: 28.9 % (ref 18–48)
MAGNESIUM SERPL-MCNC: 1.7 MG/DL (ref 1.6–2.6)
MCH RBC QN AUTO: 29.9 PG (ref 27–31)
MCHC RBC AUTO-ENTMCNC: 31.9 G/DL (ref 32–36)
MCV RBC AUTO: 94 FL (ref 82–98)
MONOCYTES # BLD AUTO: 0.5 K/UL (ref 0.3–1)
MONOCYTES NFR BLD: 10.8 % (ref 4–15)
NEUTROPHILS # BLD AUTO: 2.7 K/UL (ref 1.8–7.7)
NEUTROPHILS NFR BLD: 53.5 % (ref 38–73)
NRBC BLD-RTO: 0 /100 WBC
PHOSPHATE SERPL-MCNC: 3.8 MG/DL (ref 2.7–4.5)
PLATELET # BLD AUTO: 222 K/UL (ref 150–450)
PMV BLD AUTO: 10.3 FL (ref 9.2–12.9)
POTASSIUM SERPL-SCNC: 4.8 MMOL/L (ref 3.5–5.1)
PROT SERPL-MCNC: 7.5 G/DL (ref 6–8.4)
RBC # BLD AUTO: 4.72 M/UL (ref 4.6–6.2)
SODIUM SERPL-SCNC: 138 MMOL/L (ref 136–145)
URATE SERPL-MCNC: 4.5 MG/DL (ref 3.4–7)
UUN UR-MCNC: 29 MG/DL (ref 2–20)
WBC # BLD AUTO: 5.02 K/UL (ref 3.9–12.7)

## 2021-06-17 PROCEDURE — 85025 COMPLETE CBC W/AUTO DIFF WBC: CPT | Mod: PO | Performed by: FAMILY MEDICINE

## 2021-06-17 PROCEDURE — 84100 ASSAY OF PHOSPHORUS: CPT | Mod: PO | Performed by: FAMILY MEDICINE

## 2021-06-17 PROCEDURE — 80053 COMPREHEN METABOLIC PANEL: CPT | Mod: PO | Performed by: FAMILY MEDICINE

## 2021-06-17 PROCEDURE — 84550 ASSAY OF BLOOD/URIC ACID: CPT | Performed by: FAMILY MEDICINE

## 2021-06-17 PROCEDURE — 83735 ASSAY OF MAGNESIUM: CPT | Mod: PO | Performed by: FAMILY MEDICINE

## 2021-06-17 PROCEDURE — 36415 COLL VENOUS BLD VENIPUNCTURE: CPT | Mod: PO | Performed by: FAMILY MEDICINE

## 2021-06-21 ENCOUNTER — OFFICE VISIT (OUTPATIENT)
Dept: NEPHROLOGY | Facility: CLINIC | Age: 76
End: 2021-06-21
Payer: MEDICARE

## 2021-06-21 VITALS
OXYGEN SATURATION: 93 % | WEIGHT: 205 LBS | SYSTOLIC BLOOD PRESSURE: 134 MMHG | HEART RATE: 64 BPM | HEIGHT: 69 IN | DIASTOLIC BLOOD PRESSURE: 74 MMHG | BODY MASS INDEX: 30.36 KG/M2

## 2021-06-21 DIAGNOSIS — I10 ESSENTIAL HYPERTENSION: Primary | ICD-10-CM

## 2021-06-21 DIAGNOSIS — N18.32 STAGE 3B CHRONIC KIDNEY DISEASE: ICD-10-CM

## 2021-06-21 PROCEDURE — 1159F PR MEDICATION LIST DOCUMENTED IN MEDICAL RECORD: ICD-10-PCS | Mod: S$GLB,,, | Performed by: INTERNAL MEDICINE

## 2021-06-21 PROCEDURE — 3051F HG A1C>EQUAL 7.0%<8.0%: CPT | Mod: CPTII,S$GLB,, | Performed by: INTERNAL MEDICINE

## 2021-06-21 PROCEDURE — 1159F MED LIST DOCD IN RCRD: CPT | Mod: S$GLB,,, | Performed by: INTERNAL MEDICINE

## 2021-06-21 PROCEDURE — 99999 PR PBB SHADOW E&M-EST. PATIENT-LVL V: CPT | Mod: PBBFAC,,, | Performed by: INTERNAL MEDICINE

## 2021-06-21 PROCEDURE — 1101F PT FALLS ASSESS-DOCD LE1/YR: CPT | Mod: CPTII,S$GLB,, | Performed by: INTERNAL MEDICINE

## 2021-06-21 PROCEDURE — 3051F PR MOST RECENT HEMOGLOBIN A1C LEVEL 7.0 - < 8.0%: ICD-10-PCS | Mod: CPTII,S$GLB,, | Performed by: INTERNAL MEDICINE

## 2021-06-21 PROCEDURE — 3288F PR FALLS RISK ASSESSMENT DOCUMENTED: ICD-10-PCS | Mod: CPTII,S$GLB,, | Performed by: INTERNAL MEDICINE

## 2021-06-21 PROCEDURE — 99204 OFFICE O/P NEW MOD 45 MIN: CPT | Mod: S$GLB,,, | Performed by: INTERNAL MEDICINE

## 2021-06-21 PROCEDURE — 3288F FALL RISK ASSESSMENT DOCD: CPT | Mod: CPTII,S$GLB,, | Performed by: INTERNAL MEDICINE

## 2021-06-21 PROCEDURE — 99204 PR OFFICE/OUTPT VISIT, NEW, LEVL IV, 45-59 MIN: ICD-10-PCS | Mod: S$GLB,,, | Performed by: INTERNAL MEDICINE

## 2021-06-21 PROCEDURE — 1101F PR PT FALLS ASSESS DOC 0-1 FALLS W/OUT INJ PAST YR: ICD-10-PCS | Mod: CPTII,S$GLB,, | Performed by: INTERNAL MEDICINE

## 2021-06-21 PROCEDURE — 99999 PR PBB SHADOW E&M-EST. PATIENT-LVL V: ICD-10-PCS | Mod: PBBFAC,,, | Performed by: INTERNAL MEDICINE

## 2021-06-27 RX ORDER — APIXABAN 5 MG/1
TABLET, FILM COATED ORAL
Qty: 60 TABLET | Refills: 5 | Status: SHIPPED | OUTPATIENT
Start: 2021-06-27 | End: 2021-12-23

## 2021-07-13 ENCOUNTER — HOSPITAL ENCOUNTER (OUTPATIENT)
Dept: CARDIOLOGY | Facility: HOSPITAL | Age: 76
Discharge: HOME | End: 2021-07-13
Attending: PHYSICIAN ASSISTANT
Payer: MEDICARE

## 2021-07-13 ENCOUNTER — OFFICE VISIT (OUTPATIENT)
Dept: CARDIOLOGY | Facility: CLINIC | Age: 76
End: 2021-07-13
Payer: MEDICARE

## 2021-07-13 VITALS
DIASTOLIC BLOOD PRESSURE: 68 MMHG | WEIGHT: 205.5 LBS | SYSTOLIC BLOOD PRESSURE: 126 MMHG | OXYGEN SATURATION: 96 % | HEART RATE: 63 BPM | BODY MASS INDEX: 30.44 KG/M2 | HEIGHT: 69 IN

## 2021-07-13 DIAGNOSIS — N18.30 CKD STAGE 3 DUE TO TYPE 2 DIABETES MELLITUS: ICD-10-CM

## 2021-07-13 DIAGNOSIS — I48.0 PAROXYSMAL ATRIAL FIBRILLATION: ICD-10-CM

## 2021-07-13 DIAGNOSIS — I65.23 CAROTID STENOSIS, ASYMPTOMATIC, BILATERAL: Primary | ICD-10-CM

## 2021-07-13 DIAGNOSIS — I65.23 BILATERAL CAROTID ARTERY STENOSIS: ICD-10-CM

## 2021-07-13 DIAGNOSIS — I10 ESSENTIAL HYPERTENSION: Chronic | ICD-10-CM

## 2021-07-13 DIAGNOSIS — E78.2 MIXED HYPERLIPIDEMIA: ICD-10-CM

## 2021-07-13 DIAGNOSIS — E11.22 CKD STAGE 3 DUE TO TYPE 2 DIABETES MELLITUS: ICD-10-CM

## 2021-07-13 DIAGNOSIS — Z86.73 HISTORY OF TIA (TRANSIENT ISCHEMIC ATTACK): ICD-10-CM

## 2021-07-13 DIAGNOSIS — I25.810 CORONARY ARTERY DISEASE INVOLVING AUTOLOGOUS ARTERY CORONARY BYPASS GRAFT WITHOUT ANGINA PECTORIS: Chronic | ICD-10-CM

## 2021-07-13 DIAGNOSIS — Z95.1 S/P CABG (CORONARY ARTERY BYPASS GRAFT): ICD-10-CM

## 2021-07-13 DIAGNOSIS — I25.2 HISTORY OF MI (MYOCARDIAL INFARCTION): ICD-10-CM

## 2021-07-13 PROBLEM — R00.2 PALPITATIONS: Status: RESOLVED | Noted: 2020-10-23 | Resolved: 2021-07-13

## 2021-07-13 PROBLEM — I15.2 HYPERTENSION ASSOCIATED WITH DIABETES: Status: RESOLVED | Noted: 2017-03-08 | Resolved: 2021-07-13

## 2021-07-13 PROBLEM — E11.59 HYPERTENSION ASSOCIATED WITH DIABETES: Status: RESOLVED | Noted: 2017-03-08 | Resolved: 2021-07-13

## 2021-07-13 PROBLEM — R01.2 ABNORMAL HEART SOUNDS: Status: RESOLVED | Noted: 2020-10-01 | Resolved: 2021-07-13

## 2021-07-13 LAB
LEFT ARM DIASTOLIC BLOOD PRESSURE: 65 MMHG
LEFT ARM SYSTOLIC BLOOD PRESSURE: 129 MMHG
LEFT CBA DIAS: 12 CM/S
LEFT CBA SYS: 56 CM/S
LEFT CCA DIST DIAS: 13 CM/S
LEFT CCA DIST SYS: 54 CM/S
LEFT CCA MID DIAS: 10 CM/S
LEFT CCA MID SYS: 70 CM/S
LEFT CCA PROX DIAS: 13 CM/S
LEFT CCA PROX SYS: 100 CM/S
LEFT ECA DIAS: 0 CM/S
LEFT ECA SYS: 86 CM/S
LEFT ICA DIST DIAS: 18 CM/S
LEFT ICA DIST SYS: 68 CM/S
LEFT ICA MID DIAS: 15 CM/S
LEFT ICA MID SYS: 74 CM/S
LEFT ICA PROX DIAS: 21 CM/S
LEFT ICA PROX SYS: 62 CM/S
LEFT VERTEBRAL DIAS: 12 CM/S
LEFT VERTEBRAL SYS: 55 CM/S
OHS CV CAROTID RIGHT ICA EDV HIGHEST: 26
OHS CV CAROTID ULTRASOUND LEFT ICA/CCA RATIO: 1.37
OHS CV CAROTID ULTRASOUND RIGHT ICA/CCA RATIO: 2.29
OHS CV PV CAROTID LEFT HIGHEST CCA: 100
OHS CV PV CAROTID LEFT HIGHEST ICA: 74
OHS CV PV CAROTID RIGHT HIGHEST CCA: 69
OHS CV PV CAROTID RIGHT HIGHEST ICA: 158
OHS CV US CAROTID LEFT HIGHEST EDV: 21
RIGHT ARM DIASTOLIC BLOOD PRESSURE: 67 MMHG
RIGHT ARM SYSTOLIC BLOOD PRESSURE: 139 MMHG
RIGHT CBA DIAS: 11 CM/S
RIGHT CBA SYS: 55 CM/S
RIGHT CCA DIST DIAS: 8 CM/S
RIGHT CCA DIST SYS: 69 CM/S
RIGHT CCA MID DIAS: 9 CM/S
RIGHT CCA MID SYS: 69 CM/S
RIGHT CCA PROX DIAS: 10 CM/S
RIGHT CCA PROX SYS: 68 CM/S
RIGHT ECA DIAS: 14 CM/S
RIGHT ECA SYS: 218 CM/S
RIGHT ICA DIST DIAS: 24 CM/S
RIGHT ICA DIST SYS: 123 CM/S
RIGHT ICA MID DIAS: 23 CM/S
RIGHT ICA MID SYS: 158 CM/S
RIGHT ICA PROX DIAS: 26 CM/S
RIGHT ICA PROX SYS: 142 CM/S

## 2021-07-13 PROCEDURE — 1125F AMNT PAIN NOTED PAIN PRSNT: CPT | Mod: S$GLB,,, | Performed by: PHYSICIAN ASSISTANT

## 2021-07-13 PROCEDURE — 1159F MED LIST DOCD IN RCRD: CPT | Mod: S$GLB,,, | Performed by: PHYSICIAN ASSISTANT

## 2021-07-13 PROCEDURE — 99499 RISK ADDL DX/OHS AUDIT: ICD-10-PCS | Mod: S$GLB,,, | Performed by: PHYSICIAN ASSISTANT

## 2021-07-13 PROCEDURE — 99999 PR PBB SHADOW E&M-EST. PATIENT-LVL IV: ICD-10-PCS | Mod: PBBFAC,,, | Performed by: PHYSICIAN ASSISTANT

## 2021-07-13 PROCEDURE — 3051F PR MOST RECENT HEMOGLOBIN A1C LEVEL 7.0 - < 8.0%: ICD-10-PCS | Mod: CPTII,S$GLB,, | Performed by: PHYSICIAN ASSISTANT

## 2021-07-13 PROCEDURE — 1159F PR MEDICATION LIST DOCUMENTED IN MEDICAL RECORD: ICD-10-PCS | Mod: S$GLB,,, | Performed by: PHYSICIAN ASSISTANT

## 2021-07-13 PROCEDURE — 93880 EXTRACRANIAL BILAT STUDY: CPT

## 2021-07-13 PROCEDURE — 99499 UNLISTED E&M SERVICE: CPT | Mod: S$GLB,,, | Performed by: PHYSICIAN ASSISTANT

## 2021-07-13 PROCEDURE — 3074F PR MOST RECENT SYSTOLIC BLOOD PRESSURE < 130 MM HG: ICD-10-PCS | Mod: CPTII,S$GLB,, | Performed by: PHYSICIAN ASSISTANT

## 2021-07-13 PROCEDURE — 3078F DIAST BP <80 MM HG: CPT | Mod: CPTII,S$GLB,, | Performed by: PHYSICIAN ASSISTANT

## 2021-07-13 PROCEDURE — 3051F HG A1C>EQUAL 7.0%<8.0%: CPT | Mod: CPTII,S$GLB,, | Performed by: PHYSICIAN ASSISTANT

## 2021-07-13 PROCEDURE — 99214 PR OFFICE/OUTPT VISIT, EST, LEVL IV, 30-39 MIN: ICD-10-PCS | Mod: S$GLB,,, | Performed by: PHYSICIAN ASSISTANT

## 2021-07-13 PROCEDURE — 99999 PR PBB SHADOW E&M-EST. PATIENT-LVL IV: CPT | Mod: PBBFAC,,, | Performed by: PHYSICIAN ASSISTANT

## 2021-07-13 PROCEDURE — 1125F PR PAIN SEVERITY QUANTIFIED, PAIN PRESENT: ICD-10-PCS | Mod: S$GLB,,, | Performed by: PHYSICIAN ASSISTANT

## 2021-07-13 PROCEDURE — 3078F PR MOST RECENT DIASTOLIC BLOOD PRESSURE < 80 MM HG: ICD-10-PCS | Mod: CPTII,S$GLB,, | Performed by: PHYSICIAN ASSISTANT

## 2021-07-13 PROCEDURE — 99214 OFFICE O/P EST MOD 30 MIN: CPT | Mod: S$GLB,,, | Performed by: PHYSICIAN ASSISTANT

## 2021-07-13 PROCEDURE — 3074F SYST BP LT 130 MM HG: CPT | Mod: CPTII,S$GLB,, | Performed by: PHYSICIAN ASSISTANT

## 2021-07-13 PROCEDURE — 93880 CV US DOPPLER CAROTID (CUPID ONLY): ICD-10-PCS | Mod: 26,,, | Performed by: INTERNAL MEDICINE

## 2021-07-13 PROCEDURE — 93880 EXTRACRANIAL BILAT STUDY: CPT | Mod: 26,,, | Performed by: INTERNAL MEDICINE

## 2021-07-14 ENCOUNTER — TELEPHONE (OUTPATIENT)
Dept: GASTROENTEROLOGY | Facility: CLINIC | Age: 76
End: 2021-07-14

## 2021-07-26 ENCOUNTER — OFFICE VISIT (OUTPATIENT)
Dept: CARDIOLOGY | Facility: CLINIC | Age: 76
End: 2021-07-26
Payer: MEDICARE

## 2021-07-26 VITALS
WEIGHT: 204.13 LBS | HEIGHT: 69 IN | OXYGEN SATURATION: 98 % | DIASTOLIC BLOOD PRESSURE: 61 MMHG | HEART RATE: 73 BPM | SYSTOLIC BLOOD PRESSURE: 117 MMHG | BODY MASS INDEX: 30.23 KG/M2

## 2021-07-26 DIAGNOSIS — N18.30 CKD STAGE 3 DUE TO TYPE 2 DIABETES MELLITUS: ICD-10-CM

## 2021-07-26 DIAGNOSIS — I65.23 CAROTID STENOSIS, ASYMPTOMATIC, BILATERAL: ICD-10-CM

## 2021-07-26 DIAGNOSIS — E11.22 CKD STAGE 3 DUE TO TYPE 2 DIABETES MELLITUS: ICD-10-CM

## 2021-07-26 DIAGNOSIS — I70.0 ABDOMINAL AORTIC ATHEROSCLEROSIS: ICD-10-CM

## 2021-07-26 DIAGNOSIS — I10 ESSENTIAL HYPERTENSION: Chronic | ICD-10-CM

## 2021-07-26 DIAGNOSIS — Z95.1 S/P CABG (CORONARY ARTERY BYPASS GRAFT): ICD-10-CM

## 2021-07-26 DIAGNOSIS — I25.10 CORONARY ARTERY DISEASE INVOLVING NATIVE HEART WITHOUT ANGINA PECTORIS, UNSPECIFIED VESSEL OR LESION TYPE: Chronic | ICD-10-CM

## 2021-07-26 DIAGNOSIS — E78.2 MIXED HYPERLIPIDEMIA: ICD-10-CM

## 2021-07-26 DIAGNOSIS — Z86.73 HISTORY OF TIA (TRANSIENT ISCHEMIC ATTACK): ICD-10-CM

## 2021-07-26 DIAGNOSIS — I48.0 PAROXYSMAL ATRIAL FIBRILLATION: ICD-10-CM

## 2021-07-26 PROCEDURE — 1126F AMNT PAIN NOTED NONE PRSNT: CPT | Mod: CPTII,S$GLB,, | Performed by: INTERNAL MEDICINE

## 2021-07-26 PROCEDURE — 1101F PT FALLS ASSESS-DOCD LE1/YR: CPT | Mod: CPTII,S$GLB,, | Performed by: INTERNAL MEDICINE

## 2021-07-26 PROCEDURE — 3288F PR FALLS RISK ASSESSMENT DOCUMENTED: ICD-10-PCS | Mod: CPTII,S$GLB,, | Performed by: INTERNAL MEDICINE

## 2021-07-26 PROCEDURE — 3051F HG A1C>EQUAL 7.0%<8.0%: CPT | Mod: CPTII,S$GLB,, | Performed by: INTERNAL MEDICINE

## 2021-07-26 PROCEDURE — 1160F PR REVIEW ALL MEDS BY PRESCRIBER/CLIN PHARMACIST DOCUMENTED: ICD-10-PCS | Mod: CPTII,S$GLB,, | Performed by: INTERNAL MEDICINE

## 2021-07-26 PROCEDURE — 99999 PR PBB SHADOW E&M-EST. PATIENT-LVL IV: ICD-10-PCS | Mod: PBBFAC,,, | Performed by: INTERNAL MEDICINE

## 2021-07-26 PROCEDURE — 3078F DIAST BP <80 MM HG: CPT | Mod: CPTII,S$GLB,, | Performed by: INTERNAL MEDICINE

## 2021-07-26 PROCEDURE — 99499 UNLISTED E&M SERVICE: CPT | Mod: S$GLB,,, | Performed by: INTERNAL MEDICINE

## 2021-07-26 PROCEDURE — 3288F FALL RISK ASSESSMENT DOCD: CPT | Mod: CPTII,S$GLB,, | Performed by: INTERNAL MEDICINE

## 2021-07-26 PROCEDURE — 3074F PR MOST RECENT SYSTOLIC BLOOD PRESSURE < 130 MM HG: ICD-10-PCS | Mod: CPTII,S$GLB,, | Performed by: INTERNAL MEDICINE

## 2021-07-26 PROCEDURE — 1126F PR PAIN SEVERITY QUANTIFIED, NO PAIN PRESENT: ICD-10-PCS | Mod: CPTII,S$GLB,, | Performed by: INTERNAL MEDICINE

## 2021-07-26 PROCEDURE — 1159F PR MEDICATION LIST DOCUMENTED IN MEDICAL RECORD: ICD-10-PCS | Mod: CPTII,S$GLB,, | Performed by: INTERNAL MEDICINE

## 2021-07-26 PROCEDURE — 99214 OFFICE O/P EST MOD 30 MIN: CPT | Mod: S$GLB,,, | Performed by: INTERNAL MEDICINE

## 2021-07-26 PROCEDURE — 99214 PR OFFICE/OUTPT VISIT, EST, LEVL IV, 30-39 MIN: ICD-10-PCS | Mod: S$GLB,,, | Performed by: INTERNAL MEDICINE

## 2021-07-26 PROCEDURE — 3051F PR MOST RECENT HEMOGLOBIN A1C LEVEL 7.0 - < 8.0%: ICD-10-PCS | Mod: CPTII,S$GLB,, | Performed by: INTERNAL MEDICINE

## 2021-07-26 PROCEDURE — 99999 PR PBB SHADOW E&M-EST. PATIENT-LVL IV: CPT | Mod: PBBFAC,,, | Performed by: INTERNAL MEDICINE

## 2021-07-26 PROCEDURE — 3074F SYST BP LT 130 MM HG: CPT | Mod: CPTII,S$GLB,, | Performed by: INTERNAL MEDICINE

## 2021-07-26 PROCEDURE — 1159F MED LIST DOCD IN RCRD: CPT | Mod: CPTII,S$GLB,, | Performed by: INTERNAL MEDICINE

## 2021-07-26 PROCEDURE — 1101F PR PT FALLS ASSESS DOC 0-1 FALLS W/OUT INJ PAST YR: ICD-10-PCS | Mod: CPTII,S$GLB,, | Performed by: INTERNAL MEDICINE

## 2021-07-26 PROCEDURE — 3078F PR MOST RECENT DIASTOLIC BLOOD PRESSURE < 80 MM HG: ICD-10-PCS | Mod: CPTII,S$GLB,, | Performed by: INTERNAL MEDICINE

## 2021-07-26 PROCEDURE — 1160F RVW MEDS BY RX/DR IN RCRD: CPT | Mod: CPTII,S$GLB,, | Performed by: INTERNAL MEDICINE

## 2021-07-26 PROCEDURE — 99499 RISK ADDL DX/OHS AUDIT: ICD-10-PCS | Mod: S$GLB,,, | Performed by: INTERNAL MEDICINE

## 2021-07-28 RX ORDER — FLUTICASONE PROPIONATE 50 MCG
SPRAY, SUSPENSION (ML) NASAL
Qty: 32 ML | Refills: 1 | Status: SHIPPED | OUTPATIENT
Start: 2021-07-28 | End: 2022-02-03

## 2021-08-02 ENCOUNTER — PES CALL (OUTPATIENT)
Dept: ADMINISTRATIVE | Facility: CLINIC | Age: 76
End: 2021-08-02

## 2021-09-29 ENCOUNTER — LAB VISIT (OUTPATIENT)
Dept: LAB | Facility: HOSPITAL | Age: 76
End: 2021-09-29
Attending: FAMILY MEDICINE
Payer: MEDICARE

## 2021-09-29 LAB
ALBUMIN SERPL BCP-MCNC: 4.4 G/DL (ref 3.5–5.2)
ALP SERPL-CCNC: 39 U/L (ref 38–126)
ALT SERPL W/O P-5'-P-CCNC: 22 U/L (ref 10–44)
ANION GAP SERPL CALC-SCNC: 13 MMOL/L (ref 8–16)
AST SERPL-CCNC: 29 U/L (ref 15–46)
BILIRUB SERPL-MCNC: 0.6 MG/DL (ref 0.1–1)
CALCIUM SERPL-MCNC: 9.1 MG/DL (ref 8.7–10.5)
CHLORIDE SERPL-SCNC: 103 MMOL/L (ref 95–110)
CHOLEST SERPL-MCNC: 135 MG/DL (ref 120–199)
CHOLEST/HDLC SERPL: 5.4 {RATIO} (ref 2–5)
CO2 SERPL-SCNC: 25 MMOL/L (ref 23–29)
CREAT SERPL-MCNC: 1.53 MG/DL (ref 0.5–1.4)
EST. GFR  (AFRICAN AMERICAN): 50.7 ML/MIN/1.73 M^2
EST. GFR  (NON AFRICAN AMERICAN): 43.8 ML/MIN/1.73 M^2
ESTIMATED AVG GLUCOSE: 174 MG/DL (ref 68–131)
GLUCOSE SERPL-MCNC: 138 MG/DL (ref 70–110)
HBA1C MFR BLD: 7.7 % (ref 4–5.6)
HDLC SERPL-MCNC: 25 MG/DL (ref 40–75)
HDLC SERPL: 18.5 % (ref 20–50)
LDLC SERPL CALC-MCNC: 80.8 MG/DL (ref 63–159)
NONHDLC SERPL-MCNC: 110 MG/DL
POTASSIUM SERPL-SCNC: 5.8 MMOL/L (ref 3.5–5.1)
PROT SERPL-MCNC: 7.7 G/DL (ref 6–8.4)
SODIUM SERPL-SCNC: 141 MMOL/L (ref 136–145)
TRIGL SERPL-MCNC: 146 MG/DL (ref 30–150)
UUN UR-MCNC: 34 MG/DL (ref 2–20)

## 2021-09-29 PROCEDURE — 80053 COMPREHEN METABOLIC PANEL: CPT | Mod: HCNC,PO | Performed by: NURSE PRACTITIONER

## 2021-09-29 PROCEDURE — 83036 HEMOGLOBIN GLYCOSYLATED A1C: CPT | Mod: HCNC | Performed by: NURSE PRACTITIONER

## 2021-09-29 PROCEDURE — 36415 COLL VENOUS BLD VENIPUNCTURE: CPT | Mod: HCNC,PO | Performed by: NURSE PRACTITIONER

## 2021-09-29 PROCEDURE — 80061 LIPID PANEL: CPT | Mod: HCNC | Performed by: NURSE PRACTITIONER

## 2021-10-06 ENCOUNTER — TELEPHONE (OUTPATIENT)
Dept: ENDOCRINOLOGY | Facility: CLINIC | Age: 76
End: 2021-10-06

## 2021-10-06 ENCOUNTER — NURSE TRIAGE (OUTPATIENT)
Dept: ADMINISTRATIVE | Facility: CLINIC | Age: 76
End: 2021-10-06

## 2021-10-06 ENCOUNTER — OFFICE VISIT (OUTPATIENT)
Dept: ENDOCRINOLOGY | Facility: CLINIC | Age: 76
End: 2021-10-06
Payer: MEDICARE

## 2021-10-06 ENCOUNTER — LAB VISIT (OUTPATIENT)
Dept: LAB | Facility: HOSPITAL | Age: 76
End: 2021-10-06
Payer: MEDICARE

## 2021-10-06 ENCOUNTER — HOSPITAL ENCOUNTER (EMERGENCY)
Facility: HOSPITAL | Age: 76
Discharge: HOME | End: 2021-10-06
Attending: EMERGENCY MEDICINE
Payer: MEDICARE

## 2021-10-06 ENCOUNTER — TELEPHONE (OUTPATIENT)
Dept: FAMILY MEDICINE | Facility: CLINIC | Age: 76
End: 2021-10-06

## 2021-10-06 VITALS
HEIGHT: 69 IN | BODY MASS INDEX: 29.78 KG/M2 | SYSTOLIC BLOOD PRESSURE: 112 MMHG | WEIGHT: 201.06 LBS | DIASTOLIC BLOOD PRESSURE: 65 MMHG

## 2021-10-06 VITALS
BODY MASS INDEX: 29.77 KG/M2 | SYSTOLIC BLOOD PRESSURE: 147 MMHG | DIASTOLIC BLOOD PRESSURE: 67 MMHG | TEMPERATURE: 99 F | RESPIRATION RATE: 20 BRPM | HEART RATE: 60 BPM | OXYGEN SATURATION: 96 % | WEIGHT: 201 LBS | HEIGHT: 69 IN

## 2021-10-06 DIAGNOSIS — N18.30 CKD STAGE 3 DUE TO TYPE 2 DIABETES MELLITUS: ICD-10-CM

## 2021-10-06 DIAGNOSIS — I25.10 CORONARY ARTERY DISEASE INVOLVING NATIVE HEART WITHOUT ANGINA PECTORIS, UNSPECIFIED VESSEL OR LESION TYPE: Chronic | ICD-10-CM

## 2021-10-06 DIAGNOSIS — E11.22 CKD STAGE 3 DUE TO TYPE 2 DIABETES MELLITUS: ICD-10-CM

## 2021-10-06 DIAGNOSIS — N18.32 STAGE 3B CHRONIC KIDNEY DISEASE: Primary | ICD-10-CM

## 2021-10-06 DIAGNOSIS — I10 PRIMARY HYPERTENSION: Chronic | ICD-10-CM

## 2021-10-06 DIAGNOSIS — R79.89 ELEVATED SERUM CREATININE: Primary | ICD-10-CM

## 2021-10-06 DIAGNOSIS — E78.5 HYPERLIPIDEMIA, UNSPECIFIED HYPERLIPIDEMIA TYPE: ICD-10-CM

## 2021-10-06 LAB
ALBUMIN SERPL BCP-MCNC: 3.9 G/DL (ref 3.5–5.2)
ALP SERPL-CCNC: 38 U/L (ref 38–126)
ALT SERPL W/O P-5'-P-CCNC: 21 U/L (ref 10–44)
ANION GAP SERPL CALC-SCNC: 11 MMOL/L (ref 8–16)
ANION GAP SERPL CALC-SCNC: 12 MMOL/L (ref 8–16)
AST SERPL-CCNC: 27 U/L (ref 15–46)
BASOPHILS # BLD AUTO: 0.05 K/UL (ref 0–0.2)
BASOPHILS NFR BLD: 0.9 % (ref 0–1.9)
BILIRUB SERPL-MCNC: 0.4 MG/DL (ref 0.1–1)
BUN SERPL-MCNC: 32 MG/DL (ref 8–23)
CALCIUM SERPL-MCNC: 8.7 MG/DL (ref 8.7–10.5)
CALCIUM SERPL-MCNC: 9.6 MG/DL (ref 8.7–10.5)
CHLORIDE SERPL-SCNC: 104 MMOL/L (ref 95–110)
CHLORIDE SERPL-SCNC: 106 MMOL/L (ref 95–110)
CO2 SERPL-SCNC: 20 MMOL/L (ref 23–29)
CO2 SERPL-SCNC: 23 MMOL/L (ref 23–29)
CREAT SERPL-MCNC: 1.54 MG/DL (ref 0.5–1.4)
CREAT SERPL-MCNC: 1.7 MG/DL (ref 0.5–1.4)
DIFFERENTIAL METHOD: ABNORMAL
EOSINOPHIL # BLD AUTO: 0.2 K/UL (ref 0–0.5)
EOSINOPHIL NFR BLD: 3.6 % (ref 0–8)
ERYTHROCYTE [DISTWIDTH] IN BLOOD BY AUTOMATED COUNT: 13.9 % (ref 11.5–14.5)
EST. GFR  (AFRICAN AMERICAN): 44.6 ML/MIN/1.73 M^2
EST. GFR  (AFRICAN AMERICAN): 50.3 ML/MIN/1.73 M^2
EST. GFR  (NON AFRICAN AMERICAN): 38.6 ML/MIN/1.73 M^2
EST. GFR  (NON AFRICAN AMERICAN): 43.5 ML/MIN/1.73 M^2
GLUCOSE SERPL-MCNC: 148 MG/DL (ref 70–110)
GLUCOSE SERPL-MCNC: 240 MG/DL (ref 70–110)
HCT VFR BLD AUTO: 40.1 % (ref 40–54)
HGB BLD-MCNC: 12.8 G/DL (ref 14–18)
IMM GRANULOCYTES # BLD AUTO: 0.02 K/UL (ref 0–0.04)
IMM GRANULOCYTES NFR BLD AUTO: 0.3 % (ref 0–0.5)
LYMPHOCYTES # BLD AUTO: 1.4 K/UL (ref 1–4.8)
LYMPHOCYTES NFR BLD: 24.1 % (ref 18–48)
MCH RBC QN AUTO: 29.7 PG (ref 27–31)
MCHC RBC AUTO-ENTMCNC: 31.9 G/DL (ref 32–36)
MCV RBC AUTO: 93 FL (ref 82–98)
MONOCYTES # BLD AUTO: 0.8 K/UL (ref 0.3–1)
MONOCYTES NFR BLD: 12.9 % (ref 4–15)
NEUTROPHILS # BLD AUTO: 3.4 K/UL (ref 1.8–7.7)
NEUTROPHILS NFR BLD: 58.2 % (ref 38–73)
NRBC BLD-RTO: 0 /100 WBC
PLATELET # BLD AUTO: 231 K/UL (ref 150–450)
PMV BLD AUTO: 10.4 FL (ref 9.2–12.9)
POCT GLUCOSE: 231 MG/DL (ref 70–110)
POTASSIUM SERPL-SCNC: 4.5 MMOL/L (ref 3.5–5.1)
POTASSIUM SERPL-SCNC: 6.1 MMOL/L (ref 3.5–5.1)
PROT SERPL-MCNC: 6.8 G/DL (ref 6–8.4)
RBC # BLD AUTO: 4.31 M/UL (ref 4.6–6.2)
SODIUM SERPL-SCNC: 136 MMOL/L (ref 136–145)
SODIUM SERPL-SCNC: 140 MMOL/L (ref 136–145)
UUN UR-MCNC: 34 MG/DL (ref 2–20)
WBC # BLD AUTO: 5.88 K/UL (ref 3.9–12.7)

## 2021-10-06 PROCEDURE — 80048 BASIC METABOLIC PNL TOTAL CA: CPT | Mod: HCNC | Performed by: NURSE PRACTITIONER

## 2021-10-06 PROCEDURE — 1160F PR REVIEW ALL MEDS BY PRESCRIBER/CLIN PHARMACIST DOCUMENTED: ICD-10-PCS | Mod: HCNC,CPTII,S$GLB, | Performed by: NURSE PRACTITIONER

## 2021-10-06 PROCEDURE — 1126F PR PAIN SEVERITY QUANTIFIED, NO PAIN PRESENT: ICD-10-PCS | Mod: HCNC,CPTII,S$GLB, | Performed by: NURSE PRACTITIONER

## 2021-10-06 PROCEDURE — 85025 COMPLETE CBC W/AUTO DIFF WBC: CPT | Mod: HCNC,ER | Performed by: EMERGENCY MEDICINE

## 2021-10-06 PROCEDURE — 1126F AMNT PAIN NOTED NONE PRSNT: CPT | Mod: HCNC,CPTII,S$GLB, | Performed by: NURSE PRACTITIONER

## 2021-10-06 PROCEDURE — 93010 EKG 12-LEAD: ICD-10-PCS | Mod: HCNC,,, | Performed by: INTERNAL MEDICINE

## 2021-10-06 PROCEDURE — 93010 ELECTROCARDIOGRAM REPORT: CPT | Mod: HCNC,,, | Performed by: INTERNAL MEDICINE

## 2021-10-06 PROCEDURE — 3051F PR MOST RECENT HEMOGLOBIN A1C LEVEL 7.0 - < 8.0%: ICD-10-PCS | Mod: HCNC,CPTII,S$GLB, | Performed by: NURSE PRACTITIONER

## 2021-10-06 PROCEDURE — 99999 PR PBB SHADOW E&M-EST. PATIENT-LVL III: CPT | Mod: PBBFAC,HCNC,, | Performed by: NURSE PRACTITIONER

## 2021-10-06 PROCEDURE — 93005 ELECTROCARDIOGRAM TRACING: CPT | Mod: HCNC,ER

## 2021-10-06 PROCEDURE — 82962 GLUCOSE BLOOD TEST: CPT | Mod: HCNC,ER

## 2021-10-06 PROCEDURE — 1101F PR PT FALLS ASSESS DOC 0-1 FALLS W/OUT INJ PAST YR: ICD-10-PCS | Mod: HCNC,CPTII,S$GLB, | Performed by: NURSE PRACTITIONER

## 2021-10-06 PROCEDURE — 1159F PR MEDICATION LIST DOCUMENTED IN MEDICAL RECORD: ICD-10-PCS | Mod: HCNC,CPTII,S$GLB, | Performed by: NURSE PRACTITIONER

## 2021-10-06 PROCEDURE — 99214 OFFICE O/P EST MOD 30 MIN: CPT | Mod: HCNC,S$GLB,, | Performed by: NURSE PRACTITIONER

## 2021-10-06 PROCEDURE — 1159F MED LIST DOCD IN RCRD: CPT | Mod: HCNC,CPTII,S$GLB, | Performed by: NURSE PRACTITIONER

## 2021-10-06 PROCEDURE — 99284 EMERGENCY DEPT VISIT MOD MDM: CPT | Mod: 25,HCNC,ER

## 2021-10-06 PROCEDURE — 3078F DIAST BP <80 MM HG: CPT | Mod: HCNC,CPTII,S$GLB, | Performed by: NURSE PRACTITIONER

## 2021-10-06 PROCEDURE — 3288F PR FALLS RISK ASSESSMENT DOCUMENTED: ICD-10-PCS | Mod: HCNC,CPTII,S$GLB, | Performed by: NURSE PRACTITIONER

## 2021-10-06 PROCEDURE — 36415 COLL VENOUS BLD VENIPUNCTURE: CPT | Mod: HCNC | Performed by: NURSE PRACTITIONER

## 2021-10-06 PROCEDURE — 99499 UNLISTED E&M SERVICE: CPT | Mod: S$GLB,,, | Performed by: NURSE PRACTITIONER

## 2021-10-06 PROCEDURE — 3288F FALL RISK ASSESSMENT DOCD: CPT | Mod: HCNC,CPTII,S$GLB, | Performed by: NURSE PRACTITIONER

## 2021-10-06 PROCEDURE — 80053 COMPREHEN METABOLIC PANEL: CPT | Mod: HCNC,ER | Performed by: EMERGENCY MEDICINE

## 2021-10-06 PROCEDURE — 3074F SYST BP LT 130 MM HG: CPT | Mod: HCNC,CPTII,S$GLB, | Performed by: NURSE PRACTITIONER

## 2021-10-06 PROCEDURE — 99999 PR PBB SHADOW E&M-EST. PATIENT-LVL III: ICD-10-PCS | Mod: PBBFAC,HCNC,, | Performed by: NURSE PRACTITIONER

## 2021-10-06 PROCEDURE — 3078F PR MOST RECENT DIASTOLIC BLOOD PRESSURE < 80 MM HG: ICD-10-PCS | Mod: HCNC,CPTII,S$GLB, | Performed by: NURSE PRACTITIONER

## 2021-10-06 PROCEDURE — 99499 RISK ADDL DX/OHS AUDIT: ICD-10-PCS | Mod: S$GLB,,, | Performed by: NURSE PRACTITIONER

## 2021-10-06 PROCEDURE — 99214 PR OFFICE/OUTPT VISIT, EST, LEVL IV, 30-39 MIN: ICD-10-PCS | Mod: HCNC,S$GLB,, | Performed by: NURSE PRACTITIONER

## 2021-10-06 PROCEDURE — 3074F PR MOST RECENT SYSTOLIC BLOOD PRESSURE < 130 MM HG: ICD-10-PCS | Mod: HCNC,CPTII,S$GLB, | Performed by: NURSE PRACTITIONER

## 2021-10-06 PROCEDURE — 3051F HG A1C>EQUAL 7.0%<8.0%: CPT | Mod: HCNC,CPTII,S$GLB, | Performed by: NURSE PRACTITIONER

## 2021-10-06 PROCEDURE — 1160F RVW MEDS BY RX/DR IN RCRD: CPT | Mod: HCNC,CPTII,S$GLB, | Performed by: NURSE PRACTITIONER

## 2021-10-06 PROCEDURE — 1101F PT FALLS ASSESS-DOCD LE1/YR: CPT | Mod: HCNC,CPTII,S$GLB, | Performed by: NURSE PRACTITIONER

## 2021-10-06 RX ORDER — GLIPIZIDE 5 MG/1
TABLET ORAL
Qty: 120 TABLET | Refills: 11 | Status: SHIPPED | OUTPATIENT
Start: 2021-10-06 | End: 2022-02-07

## 2021-10-06 RX ORDER — ATORVASTATIN CALCIUM 40 MG/1
40 TABLET, FILM COATED ORAL DAILY
Qty: 90 TABLET | Refills: 3 | Status: SHIPPED | OUTPATIENT
Start: 2021-10-06 | End: 2022-08-25

## 2021-10-08 ENCOUNTER — TELEPHONE (OUTPATIENT)
Dept: FAMILY MEDICINE | Facility: CLINIC | Age: 76
End: 2021-10-08

## 2021-10-08 ENCOUNTER — OFFICE VISIT (OUTPATIENT)
Dept: NEPHROLOGY | Facility: CLINIC | Age: 76
End: 2021-10-08
Payer: MEDICARE

## 2021-10-08 ENCOUNTER — LAB VISIT (OUTPATIENT)
Dept: LAB | Facility: HOSPITAL | Age: 76
End: 2021-10-08
Payer: MEDICARE

## 2021-10-08 VITALS
BODY MASS INDEX: 29.68 KG/M2 | SYSTOLIC BLOOD PRESSURE: 130 MMHG | OXYGEN SATURATION: 96 % | HEART RATE: 57 BPM | WEIGHT: 200.38 LBS | DIASTOLIC BLOOD PRESSURE: 60 MMHG | HEIGHT: 69 IN

## 2021-10-08 DIAGNOSIS — I10 ESSENTIAL HYPERTENSION: ICD-10-CM

## 2021-10-08 DIAGNOSIS — N18.30 CKD STAGE 3 DUE TO TYPE 2 DIABETES MELLITUS: Primary | ICD-10-CM

## 2021-10-08 DIAGNOSIS — E87.5 HYPERKALEMIA: ICD-10-CM

## 2021-10-08 DIAGNOSIS — E11.22 CKD STAGE 3 DUE TO TYPE 2 DIABETES MELLITUS: Primary | ICD-10-CM

## 2021-10-08 DIAGNOSIS — R79.89 ELEVATED SERUM CREATININE: ICD-10-CM

## 2021-10-08 LAB
ANION GAP SERPL CALC-SCNC: 9 MMOL/L (ref 8–16)
BUN SERPL-MCNC: 33 MG/DL (ref 8–23)
CALCIUM SERPL-MCNC: 10.2 MG/DL (ref 8.7–10.5)
CHLORIDE SERPL-SCNC: 102 MMOL/L (ref 95–110)
CO2 SERPL-SCNC: 25 MMOL/L (ref 23–29)
CREAT SERPL-MCNC: 1.7 MG/DL (ref 0.5–1.4)
EST. GFR  (AFRICAN AMERICAN): 44.6 ML/MIN/1.73 M^2
EST. GFR  (NON AFRICAN AMERICAN): 38.6 ML/MIN/1.73 M^2
GLUCOSE SERPL-MCNC: 185 MG/DL (ref 70–110)
POTASSIUM SERPL-SCNC: 5.2 MMOL/L (ref 3.5–5.1)
SODIUM SERPL-SCNC: 136 MMOL/L (ref 136–145)

## 2021-10-08 PROCEDURE — 1159F PR MEDICATION LIST DOCUMENTED IN MEDICAL RECORD: ICD-10-PCS | Mod: CPTII,S$GLB,, | Performed by: INTERNAL MEDICINE

## 2021-10-08 PROCEDURE — 3051F PR MOST RECENT HEMOGLOBIN A1C LEVEL 7.0 - < 8.0%: ICD-10-PCS | Mod: CPTII,S$GLB,, | Performed by: INTERNAL MEDICINE

## 2021-10-08 PROCEDURE — 99215 OFFICE O/P EST HI 40 MIN: CPT | Mod: S$GLB,,, | Performed by: INTERNAL MEDICINE

## 2021-10-08 PROCEDURE — 3075F PR MOST RECENT SYSTOLIC BLOOD PRESS GE 130-139MM HG: ICD-10-PCS | Mod: CPTII,S$GLB,, | Performed by: INTERNAL MEDICINE

## 2021-10-08 PROCEDURE — 3288F FALL RISK ASSESSMENT DOCD: CPT | Mod: CPTII,S$GLB,, | Performed by: INTERNAL MEDICINE

## 2021-10-08 PROCEDURE — 3051F HG A1C>EQUAL 7.0%<8.0%: CPT | Mod: CPTII,S$GLB,, | Performed by: INTERNAL MEDICINE

## 2021-10-08 PROCEDURE — 1159F MED LIST DOCD IN RCRD: CPT | Mod: CPTII,S$GLB,, | Performed by: INTERNAL MEDICINE

## 2021-10-08 PROCEDURE — 3075F SYST BP GE 130 - 139MM HG: CPT | Mod: CPTII,S$GLB,, | Performed by: INTERNAL MEDICINE

## 2021-10-08 PROCEDURE — 1126F AMNT PAIN NOTED NONE PRSNT: CPT | Mod: CPTII,S$GLB,, | Performed by: INTERNAL MEDICINE

## 2021-10-08 PROCEDURE — 99999 PR PBB SHADOW E&M-EST. PATIENT-LVL IV: ICD-10-PCS | Mod: PBBFAC,HCNC,, | Performed by: INTERNAL MEDICINE

## 2021-10-08 PROCEDURE — 3288F PR FALLS RISK ASSESSMENT DOCUMENTED: ICD-10-PCS | Mod: CPTII,S$GLB,, | Performed by: INTERNAL MEDICINE

## 2021-10-08 PROCEDURE — 1101F PR PT FALLS ASSESS DOC 0-1 FALLS W/OUT INJ PAST YR: ICD-10-PCS | Mod: CPTII,S$GLB,, | Performed by: INTERNAL MEDICINE

## 2021-10-08 PROCEDURE — 3078F DIAST BP <80 MM HG: CPT | Mod: CPTII,S$GLB,, | Performed by: INTERNAL MEDICINE

## 2021-10-08 PROCEDURE — 1126F PR PAIN SEVERITY QUANTIFIED, NO PAIN PRESENT: ICD-10-PCS | Mod: CPTII,S$GLB,, | Performed by: INTERNAL MEDICINE

## 2021-10-08 PROCEDURE — 1101F PT FALLS ASSESS-DOCD LE1/YR: CPT | Mod: CPTII,S$GLB,, | Performed by: INTERNAL MEDICINE

## 2021-10-08 PROCEDURE — 99999 PR PBB SHADOW E&M-EST. PATIENT-LVL IV: CPT | Mod: PBBFAC,HCNC,, | Performed by: INTERNAL MEDICINE

## 2021-10-08 PROCEDURE — 36415 COLL VENOUS BLD VENIPUNCTURE: CPT | Mod: HCNC | Performed by: FAMILY MEDICINE

## 2021-10-08 PROCEDURE — 80048 BASIC METABOLIC PNL TOTAL CA: CPT | Mod: HCNC | Performed by: FAMILY MEDICINE

## 2021-10-08 PROCEDURE — 99215 PR OFFICE/OUTPT VISIT, EST, LEVL V, 40-54 MIN: ICD-10-PCS | Mod: S$GLB,,, | Performed by: INTERNAL MEDICINE

## 2021-10-08 PROCEDURE — 3078F PR MOST RECENT DIASTOLIC BLOOD PRESSURE < 80 MM HG: ICD-10-PCS | Mod: CPTII,S$GLB,, | Performed by: INTERNAL MEDICINE

## 2021-10-09 DIAGNOSIS — N18.30 CKD STAGE 3 DUE TO TYPE 2 DIABETES MELLITUS: ICD-10-CM

## 2021-10-09 DIAGNOSIS — E11.22 CKD STAGE 3 DUE TO TYPE 2 DIABETES MELLITUS: ICD-10-CM

## 2021-10-09 DIAGNOSIS — E87.5 HYPERKALEMIA: Primary | ICD-10-CM

## 2021-10-11 ENCOUNTER — HOSPITAL ENCOUNTER (OUTPATIENT)
Dept: RADIOLOGY | Facility: HOSPITAL | Age: 76
Discharge: HOME | End: 2021-10-11
Attending: INTERNAL MEDICINE
Payer: MEDICARE

## 2021-10-11 DIAGNOSIS — I10 ESSENTIAL HYPERTENSION: ICD-10-CM

## 2021-10-11 PROCEDURE — 76770 US EXAM ABDO BACK WALL COMP: CPT | Mod: TC,PO

## 2021-10-12 ENCOUNTER — CLINICAL SUPPORT (OUTPATIENT)
Dept: ENDOCRINOLOGY | Facility: CLINIC | Age: 76
End: 2021-10-12
Payer: MEDICARE

## 2021-10-15 ENCOUNTER — LAB VISIT (OUTPATIENT)
Dept: LAB | Facility: HOSPITAL | Age: 76
End: 2021-10-15
Attending: INTERNAL MEDICINE
Payer: MEDICARE

## 2021-10-15 ENCOUNTER — OFFICE VISIT (OUTPATIENT)
Dept: FAMILY MEDICINE | Facility: CLINIC | Age: 76
End: 2021-10-15
Payer: MEDICARE

## 2021-10-15 VITALS
OXYGEN SATURATION: 95 % | SYSTOLIC BLOOD PRESSURE: 120 MMHG | WEIGHT: 199 LBS | DIASTOLIC BLOOD PRESSURE: 68 MMHG | TEMPERATURE: 98 F | HEART RATE: 68 BPM | HEIGHT: 69 IN | BODY MASS INDEX: 29.47 KG/M2

## 2021-10-15 DIAGNOSIS — N18.30 CKD STAGE 3 DUE TO TYPE 2 DIABETES MELLITUS: ICD-10-CM

## 2021-10-15 DIAGNOSIS — E87.5 HYPERKALEMIA: ICD-10-CM

## 2021-10-15 DIAGNOSIS — I15.2 HYPERTENSION ASSOCIATED WITH DIABETES: Primary | ICD-10-CM

## 2021-10-15 DIAGNOSIS — E11.22 CKD STAGE 3 DUE TO TYPE 2 DIABETES MELLITUS: ICD-10-CM

## 2021-10-15 DIAGNOSIS — E87.5 HIGH POTASSIUM: ICD-10-CM

## 2021-10-15 DIAGNOSIS — E11.59 HYPERTENSION ASSOCIATED WITH DIABETES: Primary | ICD-10-CM

## 2021-10-15 LAB
ALBUMIN SERPL BCP-MCNC: 4.4 G/DL (ref 3.5–5.2)
ALP SERPL-CCNC: 42 U/L (ref 38–126)
ALT SERPL W/O P-5'-P-CCNC: 21 U/L (ref 10–44)
ANION GAP SERPL CALC-SCNC: 11 MMOL/L (ref 8–16)
AST SERPL-CCNC: 33 U/L (ref 15–46)
BILIRUB SERPL-MCNC: 0.8 MG/DL (ref 0.1–1)
CALCIUM SERPL-MCNC: 9.2 MG/DL (ref 8.7–10.5)
CHLORIDE SERPL-SCNC: 101 MMOL/L (ref 95–110)
CO2 SERPL-SCNC: 27 MMOL/L (ref 23–29)
CREAT SERPL-MCNC: 1.6 MG/DL (ref 0.5–1.4)
EST. GFR  (AFRICAN AMERICAN): 48 ML/MIN/1.73 M^2
EST. GFR  (NON AFRICAN AMERICAN): 41.5 ML/MIN/1.73 M^2
GLUCOSE SERPL-MCNC: 156 MG/DL (ref 70–110)
POTASSIUM SERPL-SCNC: 4.6 MMOL/L (ref 3.5–5.1)
PROT SERPL-MCNC: 7.7 G/DL (ref 6–8.4)
SODIUM SERPL-SCNC: 139 MMOL/L (ref 136–145)
UUN UR-MCNC: 37 MG/DL (ref 2–20)

## 2021-10-15 PROCEDURE — 1160F RVW MEDS BY RX/DR IN RCRD: CPT | Mod: CPTII,S$GLB,, | Performed by: FAMILY MEDICINE

## 2021-10-15 PROCEDURE — 36415 COLL VENOUS BLD VENIPUNCTURE: CPT | Mod: PO | Performed by: INTERNAL MEDICINE

## 2021-10-15 PROCEDURE — 1159F MED LIST DOCD IN RCRD: CPT | Mod: CPTII,S$GLB,, | Performed by: FAMILY MEDICINE

## 2021-10-15 PROCEDURE — 3078F DIAST BP <80 MM HG: CPT | Mod: CPTII,S$GLB,, | Performed by: FAMILY MEDICINE

## 2021-10-15 PROCEDURE — 99213 OFFICE O/P EST LOW 20 MIN: CPT | Mod: S$GLB,,, | Performed by: FAMILY MEDICINE

## 2021-10-15 PROCEDURE — 99213 PR OFFICE/OUTPT VISIT, EST, LEVL III, 20-29 MIN: ICD-10-PCS | Mod: S$GLB,,, | Performed by: FAMILY MEDICINE

## 2021-10-15 PROCEDURE — 1125F AMNT PAIN NOTED PAIN PRSNT: CPT | Mod: CPTII,S$GLB,, | Performed by: FAMILY MEDICINE

## 2021-10-15 PROCEDURE — 3074F SYST BP LT 130 MM HG: CPT | Mod: CPTII,S$GLB,, | Performed by: FAMILY MEDICINE

## 2021-10-15 PROCEDURE — 80053 COMPREHEN METABOLIC PANEL: CPT | Mod: PO | Performed by: INTERNAL MEDICINE

## 2021-10-15 PROCEDURE — 3051F PR MOST RECENT HEMOGLOBIN A1C LEVEL 7.0 - < 8.0%: ICD-10-PCS | Mod: CPTII,S$GLB,, | Performed by: FAMILY MEDICINE

## 2021-10-15 PROCEDURE — 3051F HG A1C>EQUAL 7.0%<8.0%: CPT | Mod: CPTII,S$GLB,, | Performed by: FAMILY MEDICINE

## 2021-10-15 PROCEDURE — 3288F FALL RISK ASSESSMENT DOCD: CPT | Mod: CPTII,S$GLB,, | Performed by: FAMILY MEDICINE

## 2021-10-15 PROCEDURE — 1160F PR REVIEW ALL MEDS BY PRESCRIBER/CLIN PHARMACIST DOCUMENTED: ICD-10-PCS | Mod: CPTII,S$GLB,, | Performed by: FAMILY MEDICINE

## 2021-10-15 PROCEDURE — 3078F PR MOST RECENT DIASTOLIC BLOOD PRESSURE < 80 MM HG: ICD-10-PCS | Mod: CPTII,S$GLB,, | Performed by: FAMILY MEDICINE

## 2021-10-15 PROCEDURE — 1101F PR PT FALLS ASSESS DOC 0-1 FALLS W/OUT INJ PAST YR: ICD-10-PCS | Mod: CPTII,S$GLB,, | Performed by: FAMILY MEDICINE

## 2021-10-15 PROCEDURE — 1125F PR PAIN SEVERITY QUANTIFIED, PAIN PRESENT: ICD-10-PCS | Mod: CPTII,S$GLB,, | Performed by: FAMILY MEDICINE

## 2021-10-15 PROCEDURE — 3074F PR MOST RECENT SYSTOLIC BLOOD PRESSURE < 130 MM HG: ICD-10-PCS | Mod: CPTII,S$GLB,, | Performed by: FAMILY MEDICINE

## 2021-10-15 PROCEDURE — 3288F PR FALLS RISK ASSESSMENT DOCUMENTED: ICD-10-PCS | Mod: CPTII,S$GLB,, | Performed by: FAMILY MEDICINE

## 2021-10-15 PROCEDURE — 1159F PR MEDICATION LIST DOCUMENTED IN MEDICAL RECORD: ICD-10-PCS | Mod: CPTII,S$GLB,, | Performed by: FAMILY MEDICINE

## 2021-10-15 PROCEDURE — 1101F PT FALLS ASSESS-DOCD LE1/YR: CPT | Mod: CPTII,S$GLB,, | Performed by: FAMILY MEDICINE

## 2021-10-15 RX ORDER — CETIRIZINE HYDROCHLORIDE 10 MG/1
10 TABLET ORAL DAILY
Qty: 90 TABLET | Refills: 3 | Status: SHIPPED | OUTPATIENT
Start: 2021-10-15 | End: 2022-05-17

## 2021-10-18 ENCOUNTER — CLINICAL SUPPORT (OUTPATIENT)
Dept: ENDOCRINOLOGY | Facility: CLINIC | Age: 76
End: 2021-10-18
Payer: MEDICARE

## 2021-10-18 PROCEDURE — 95250 CONT GLUC MNTR PHYS/QHP EQP: CPT | Mod: S$GLB,,, | Performed by: INTERNAL MEDICINE

## 2021-10-18 PROCEDURE — 95250 PR GLUCOSE MONITORING,72 HRS,SUB-Q SENSOR: ICD-10-PCS | Mod: S$GLB,,, | Performed by: INTERNAL MEDICINE

## 2021-10-18 PROCEDURE — 95251 CONT GLUC MNTR ANALYSIS I&R: CPT | Mod: S$GLB,,, | Performed by: INTERNAL MEDICINE

## 2021-10-18 PROCEDURE — 95251 PR GLUCOSE MONITOR, 72 HOUR, PHYS INTERP: ICD-10-PCS | Mod: S$GLB,,, | Performed by: INTERNAL MEDICINE

## 2021-10-25 ENCOUNTER — TELEPHONE (OUTPATIENT)
Dept: ENDOCRINOLOGY | Facility: CLINIC | Age: 76
End: 2021-10-25
Payer: MEDICARE

## 2021-11-01 RX ORDER — SODIUM, POTASSIUM,MAG SULFATES 17.5-3.13G
1 SOLUTION, RECONSTITUTED, ORAL ORAL DAILY
Qty: 1 KIT | Refills: 0 | Status: SHIPPED | OUTPATIENT
Start: 2021-11-01 | End: 2021-11-03

## 2021-11-29 ENCOUNTER — TELEPHONE (OUTPATIENT)
Dept: CARDIOLOGY | Facility: CLINIC | Age: 76
End: 2021-11-29
Payer: MEDICARE

## 2021-11-29 ENCOUNTER — TELEPHONE (OUTPATIENT)
Dept: GASTROENTEROLOGY | Facility: CLINIC | Age: 76
End: 2021-11-29
Payer: MEDICARE

## 2021-11-29 ENCOUNTER — TELEPHONE (OUTPATIENT)
Dept: ENDOSCOPY | Facility: HOSPITAL | Age: 76
End: 2021-11-29
Payer: MEDICARE

## 2021-11-30 ENCOUNTER — TELEPHONE (OUTPATIENT)
Dept: ENDOSCOPY | Facility: HOSPITAL | Age: 76
End: 2021-11-30
Payer: MEDICARE

## 2021-12-03 ENCOUNTER — TELEPHONE (OUTPATIENT)
Dept: ENDOSCOPY | Facility: HOSPITAL | Age: 76
End: 2021-12-03
Payer: MEDICARE

## 2021-12-07 ENCOUNTER — ANESTHESIA (OUTPATIENT)
Dept: ENDOSCOPY | Facility: HOSPITAL | Age: 76
End: 2021-12-07
Payer: MEDICARE

## 2021-12-07 ENCOUNTER — ANESTHESIA EVENT (OUTPATIENT)
Dept: ENDOSCOPY | Facility: HOSPITAL | Age: 76
End: 2021-12-07
Payer: MEDICARE

## 2021-12-07 ENCOUNTER — HOSPITAL ENCOUNTER (OUTPATIENT)
Facility: HOSPITAL | Age: 76
Discharge: HOME | End: 2021-12-07
Attending: INTERNAL MEDICINE | Admitting: INTERNAL MEDICINE
Payer: MEDICARE

## 2021-12-07 VITALS
TEMPERATURE: 99 F | WEIGHT: 200 LBS | HEART RATE: 72 BPM | RESPIRATION RATE: 19 BRPM | BODY MASS INDEX: 30.31 KG/M2 | OXYGEN SATURATION: 97 % | HEIGHT: 68 IN | DIASTOLIC BLOOD PRESSURE: 65 MMHG | SYSTOLIC BLOOD PRESSURE: 116 MMHG

## 2021-12-07 DIAGNOSIS — Z12.11 SCREENING FOR MALIGNANT NEOPLASM OF COLON: ICD-10-CM

## 2021-12-07 LAB
GLUCOSE SERPL-MCNC: 175 MG/DL (ref 70–110)
POCT GLUCOSE: 175 MG/DL (ref 70–110)

## 2021-12-07 PROCEDURE — 88305 TISSUE EXAM BY PATHOLOGIST: CPT | Mod: 26,,, | Performed by: PATHOLOGY

## 2021-12-07 PROCEDURE — 27201089 HC SNARE, DISP (ANY): Mod: HCNC | Performed by: INTERNAL MEDICINE

## 2021-12-07 PROCEDURE — 45385 PR COLONOSCOPY,REMV LESN,SNARE: ICD-10-PCS | Mod: PT,HCNC,, | Performed by: INTERNAL MEDICINE

## 2021-12-07 PROCEDURE — 45385 COLONOSCOPY W/LESION REMOVAL: CPT | Mod: PT,HCNC,, | Performed by: INTERNAL MEDICINE

## 2021-12-07 PROCEDURE — 37000008 HC ANESTHESIA 1ST 15 MINUTES: Mod: HCNC | Performed by: INTERNAL MEDICINE

## 2021-12-07 PROCEDURE — 82962 GLUCOSE BLOOD TEST: CPT | Mod: HCNC | Performed by: INTERNAL MEDICINE

## 2021-12-07 PROCEDURE — 27200997: Performed by: INTERNAL MEDICINE

## 2021-12-07 PROCEDURE — 37000009 HC ANESTHESIA EA ADD 15 MINS: Mod: HCNC | Performed by: INTERNAL MEDICINE

## 2021-12-07 PROCEDURE — 45385 COLONOSCOPY W/LESION REMOVAL: CPT | Mod: PT,HCNC | Performed by: INTERNAL MEDICINE

## 2021-12-07 PROCEDURE — 25000003 PHARM REV CODE 250: Mod: HCNC | Performed by: INTERNAL MEDICINE

## 2021-12-07 PROCEDURE — 88305 TISSUE EXAM BY PATHOLOGIST: ICD-10-PCS | Mod: 26,,, | Performed by: PATHOLOGY

## 2021-12-07 PROCEDURE — 25000003 PHARM REV CODE 250: Mod: HCNC | Performed by: NURSE ANESTHETIST, CERTIFIED REGISTERED

## 2021-12-07 PROCEDURE — 88305 TISSUE EXAM BY PATHOLOGIST: CPT | Mod: HCNC | Performed by: PATHOLOGY

## 2021-12-07 PROCEDURE — 63600175 PHARM REV CODE 636 W HCPCS: Mod: HCNC | Performed by: NURSE ANESTHETIST, CERTIFIED REGISTERED

## 2021-12-07 RX ORDER — ETOMIDATE 2 MG/ML
INJECTION INTRAVENOUS
Status: DISCONTINUED | OUTPATIENT
Start: 2021-12-07 | End: 2021-12-07

## 2021-12-07 RX ORDER — SODIUM CHLORIDE 0.9 % (FLUSH) 0.9 %
10 SYRINGE (ML) INJECTION
Status: DISCONTINUED | OUTPATIENT
Start: 2021-12-07 | End: 2021-12-07 | Stop reason: HOSPADM

## 2021-12-07 RX ORDER — LIDOCAINE HYDROCHLORIDE 20 MG/ML
INJECTION INTRAVENOUS
Status: DISCONTINUED | OUTPATIENT
Start: 2021-12-07 | End: 2021-12-07

## 2021-12-07 RX ORDER — SODIUM CHLORIDE 9 MG/ML
INJECTION, SOLUTION INTRAVENOUS CONTINUOUS
Status: DISCONTINUED | OUTPATIENT
Start: 2021-12-07 | End: 2021-12-07 | Stop reason: HOSPADM

## 2021-12-07 RX ORDER — PROPOFOL 10 MG/ML
VIAL (ML) INTRAVENOUS
Status: DISCONTINUED | OUTPATIENT
Start: 2021-12-07 | End: 2021-12-07

## 2021-12-07 RX ADMIN — LIDOCAINE HYDROCHLORIDE 100 MG: 20 INJECTION, SOLUTION INTRAVENOUS at 02:12

## 2021-12-07 RX ADMIN — PROPOFOL 25 MG: 10 INJECTION, EMULSION INTRAVENOUS at 02:12

## 2021-12-07 RX ADMIN — PROPOFOL 30 MG: 10 INJECTION, EMULSION INTRAVENOUS at 02:12

## 2021-12-07 RX ADMIN — ETOMIDATE 2 MG: 2 INJECTION, SOLUTION INTRAVENOUS at 03:12

## 2021-12-07 RX ADMIN — ETOMIDATE 2 MG: 2 INJECTION, SOLUTION INTRAVENOUS at 02:12

## 2021-12-07 RX ADMIN — PROPOFOL 35 MG: 10 INJECTION, EMULSION INTRAVENOUS at 02:12

## 2021-12-07 RX ADMIN — SODIUM CHLORIDE: 0.9 INJECTION, SOLUTION INTRAVENOUS at 02:12

## 2021-12-07 RX ADMIN — ETOMIDATE 3 MG: 2 INJECTION, SOLUTION INTRAVENOUS at 02:12

## 2021-12-07 RX ADMIN — PROPOFOL 20 MG: 10 INJECTION, EMULSION INTRAVENOUS at 03:12

## 2021-12-08 ENCOUNTER — TELEPHONE (OUTPATIENT)
Dept: ENDOSCOPY | Facility: HOSPITAL | Age: 76
End: 2021-12-08
Payer: MEDICARE

## 2021-12-12 NOTE — PATIENT INSTRUCTIONS
Use OTC cream combination  clotrimazole and hydrocortisone.  To the affected area due to leaky faucet.     Click to add… Click to add…

## 2021-12-13 LAB
FINAL PATHOLOGIC DIAGNOSIS: NORMAL
GROSS: NORMAL
Lab: NORMAL

## 2021-12-23 RX ORDER — APIXABAN 5 MG/1
TABLET, FILM COATED ORAL
Qty: 60 TABLET | Refills: 2 | Status: SHIPPED | OUTPATIENT
Start: 2021-12-23 | End: 2022-04-07

## 2022-01-11 ENCOUNTER — HOSPITAL ENCOUNTER (OUTPATIENT)
Dept: CARDIOLOGY | Facility: HOSPITAL | Age: 77
Discharge: HOME | End: 2022-01-11
Attending: PHYSICIAN ASSISTANT
Payer: MEDICARE

## 2022-01-11 ENCOUNTER — OFFICE VISIT (OUTPATIENT)
Dept: CARDIOLOGY | Facility: CLINIC | Age: 77
End: 2022-01-11
Payer: MEDICARE

## 2022-01-11 VITALS
OXYGEN SATURATION: 96 % | SYSTOLIC BLOOD PRESSURE: 159 MMHG | BODY MASS INDEX: 29.91 KG/M2 | DIASTOLIC BLOOD PRESSURE: 91 MMHG | HEIGHT: 69 IN | HEART RATE: 72 BPM | WEIGHT: 201.94 LBS

## 2022-01-11 DIAGNOSIS — I65.23 CAROTID STENOSIS, ASYMPTOMATIC, BILATERAL: ICD-10-CM

## 2022-01-11 LAB
LEFT ARM DIASTOLIC BLOOD PRESSURE: 72 MMHG
LEFT ARM SYSTOLIC BLOOD PRESSURE: 124 MMHG
LEFT CBA DIAS: 12 CM/S
LEFT CBA SYS: 55 CM/S
LEFT CCA DIST DIAS: 14 CM/S
LEFT CCA DIST SYS: 52 CM/S
LEFT CCA MID DIAS: 13 CM/S
LEFT CCA MID SYS: 53 CM/S
LEFT CCA PROX DIAS: 15 CM/S
LEFT CCA PROX SYS: 69 CM/S
LEFT ECA DIAS: 8 CM/S
LEFT ECA SYS: 78 CM/S
LEFT ICA DIST DIAS: 13 CM/S
LEFT ICA DIST SYS: 42 CM/S
LEFT ICA MID DIAS: 16 CM/S
LEFT ICA MID SYS: 52 CM/S
LEFT ICA PROX DIAS: 18 CM/S
LEFT ICA PROX SYS: 47 CM/S
OHS CV CAROTID RIGHT ICA EDV HIGHEST: 33
OHS CV CAROTID ULTRASOUND LEFT ICA/CCA RATIO: 1
OHS CV CAROTID ULTRASOUND RIGHT ICA/CCA RATIO: 2.55
OHS CV PV CAROTID LEFT HIGHEST CCA: 69
OHS CV PV CAROTID LEFT HIGHEST ICA: 52
OHS CV PV CAROTID RIGHT HIGHEST CCA: 58
OHS CV PV CAROTID RIGHT HIGHEST ICA: 143
OHS CV US CAROTID LEFT HIGHEST EDV: 18
RIGHT ARM DIASTOLIC BLOOD PRESSURE: 72 MMHG
RIGHT ARM SYSTOLIC BLOOD PRESSURE: 124 MMHG
RIGHT CBA DIAS: 14 CM/S
RIGHT CBA SYS: 61 CM/S
RIGHT CCA DIST DIAS: 11 CM/S
RIGHT CCA DIST SYS: 56 CM/S
RIGHT CCA MID DIAS: 11 CM/S
RIGHT CCA MID SYS: 51 CM/S
RIGHT CCA PROX DIAS: 14 CM/S
RIGHT CCA PROX SYS: 58 CM/S
RIGHT ECA DIAS: 19 CM/S
RIGHT ECA SYS: 250 CM/S
RIGHT ICA DIST DIAS: 18 CM/S
RIGHT ICA DIST SYS: 89 CM/S
RIGHT ICA MID DIAS: 33 CM/S
RIGHT ICA MID SYS: 143 CM/S
RIGHT ICA PROX DIAS: 30 CM/S
RIGHT ICA PROX SYS: 114 CM/S

## 2022-01-11 PROCEDURE — 1160F PR REVIEW ALL MEDS BY PRESCRIBER/CLIN PHARMACIST DOCUMENTED: ICD-10-PCS | Mod: HCNC,CPTII,S$GLB, | Performed by: INTERNAL MEDICINE

## 2022-01-11 PROCEDURE — 93880 CV US DOPPLER CAROTID (CUPID ONLY): ICD-10-PCS | Mod: 26,HCNC,, | Performed by: INTERNAL MEDICINE

## 2022-01-11 PROCEDURE — 3077F SYST BP >= 140 MM HG: CPT | Mod: HCNC,CPTII,S$GLB, | Performed by: INTERNAL MEDICINE

## 2022-01-11 PROCEDURE — 1159F MED LIST DOCD IN RCRD: CPT | Mod: HCNC,CPTII,S$GLB, | Performed by: INTERNAL MEDICINE

## 2022-01-11 PROCEDURE — 3080F PR MOST RECENT DIASTOLIC BLOOD PRESSURE >= 90 MM HG: ICD-10-PCS | Mod: HCNC,CPTII,S$GLB, | Performed by: INTERNAL MEDICINE

## 2022-01-11 PROCEDURE — 99215 OFFICE O/P EST HI 40 MIN: CPT | Mod: HCNC,S$GLB,, | Performed by: INTERNAL MEDICINE

## 2022-01-11 PROCEDURE — 99999 PR PBB SHADOW E&M-EST. PATIENT-LVL IV: ICD-10-PCS | Mod: PBBFAC,HCNC,, | Performed by: INTERNAL MEDICINE

## 2022-01-11 PROCEDURE — 93880 EXTRACRANIAL BILAT STUDY: CPT | Mod: 26,HCNC,, | Performed by: INTERNAL MEDICINE

## 2022-01-11 PROCEDURE — 99499 UNLISTED E&M SERVICE: CPT | Mod: S$GLB,,, | Performed by: INTERNAL MEDICINE

## 2022-01-11 PROCEDURE — 99499 RISK ADDL DX/OHS AUDIT: ICD-10-PCS | Mod: S$GLB,,, | Performed by: INTERNAL MEDICINE

## 2022-01-11 PROCEDURE — 99999 PR PBB SHADOW E&M-EST. PATIENT-LVL IV: CPT | Mod: PBBFAC,HCNC,, | Performed by: INTERNAL MEDICINE

## 2022-01-11 PROCEDURE — 1159F PR MEDICATION LIST DOCUMENTED IN MEDICAL RECORD: ICD-10-PCS | Mod: HCNC,CPTII,S$GLB, | Performed by: INTERNAL MEDICINE

## 2022-01-11 PROCEDURE — 1160F RVW MEDS BY RX/DR IN RCRD: CPT | Mod: HCNC,CPTII,S$GLB, | Performed by: INTERNAL MEDICINE

## 2022-01-11 PROCEDURE — 3077F PR MOST RECENT SYSTOLIC BLOOD PRESSURE >= 140 MM HG: ICD-10-PCS | Mod: HCNC,CPTII,S$GLB, | Performed by: INTERNAL MEDICINE

## 2022-01-11 PROCEDURE — 99215 PR OFFICE/OUTPT VISIT, EST, LEVL V, 40-54 MIN: ICD-10-PCS | Mod: HCNC,S$GLB,, | Performed by: INTERNAL MEDICINE

## 2022-01-11 PROCEDURE — 3080F DIAST BP >= 90 MM HG: CPT | Mod: HCNC,CPTII,S$GLB, | Performed by: INTERNAL MEDICINE

## 2022-01-11 PROCEDURE — 93880 EXTRACRANIAL BILAT STUDY: CPT | Mod: HCNC

## 2022-01-11 NOTE — PROGRESS NOTES
Interventional Cardiology Clinic Note  Reason for Visit: Bilateral carotid disease    HPI:   PT is a 74 year old gentleman with bilateral carotid disease referred from Dr. Sotelo.    Pt has had a long hx of carotid disease and had a TIA about 5 years ago. He is currently asymptomatic and states that they have been following with imaging. Recently he had an ultrasound with bilateral stenosis of > 70% so is here today for possible intervention. He currently has no numbness, weakness.     He has a hx of CAD, Hypertension, HLD, DM, AF, and CKD.   Review of Systems   All other systems reviewed and are negative.      PMH:     Past Medical History:   Diagnosis Date    ANGELIC (acute kidney injury) 7/1/2020    Anticoagulant long-term use     CKD stage 3 due to type 2 diabetes mellitus 3/22/2021    Coronary artery disease     Encounter for blood transfusion     Hyperlipidemia     Hypertension     Myocardial infarction     Obesity     Other chronic pulmonary heart diseases     Paroxysmal atrial fibrillation 11/9/2020    Primary osteoarthritis involving multiple joints 3/8/2017    Stroke     Type 2 diabetes mellitus without complication     Uncontrolled type 2 diabetes mellitus with neurologic complication, without long-term current use of insulin 3/8/2017     Past Surgical History:   Procedure Laterality Date    CARDIAC SURGERY      CATARACT EXTRACTION, BILATERAL      COLONOSCOPY      2011    COLONOSCOPY N/A 3/22/2016    Procedure: COLONOSCOPY;  Surgeon: Sridhar Reynoso MD;  Location: [facility] ENDO;  Service: Endoscopy;  Laterality: N/A;    COLONOSCOPY N/A 12/7/2021    Procedure: Colonoscopy;  Surgeon: Bebeto Perry MD;  Location: [facility] ENDO;  Service: Endoscopy;  Laterality: N/A;    CORONARY ARTERY BYPASS GRAFT      EYE SURGERY  06/2014    Cataracts    PERCUTANEOUS TRANSLUMINAL ANGIOPLASTY (PTA) OF CAROTID ARTERY N/A 12/10/2020    Procedure: PTA, ARTERY, CAROTID;  Surgeon: Cheko Norris  "MD;  Location: [facility] CATH LAB;  Service: Cardiology;  Laterality: N/A;     Allergies:     Review of patient's allergies indicates:   Allergen Reactions    Metformin      Caused stroke    Pcn [penicillins]      Medications:     Current Outpatient Medications on File Prior to Visit   Medication Sig Dispense Refill    allopurinoL (ZYLOPRIM) 300 MG tablet TAKE 1 TABLET EVERY DAY 90 tablet 3    amLODIPine (NORVASC) 5 MG tablet Take 1 tablet (5 mg total) by mouth once daily. 90 tablet 4    atorvastatin (LIPITOR) 40 MG tablet Take 1 tablet (40 mg total) by mouth once daily. 90 tablet 3    cetirizine (ZYRTEC) 10 MG tablet Take 1 tablet (10 mg total) by mouth once daily. 90 tablet 3    clopidogreL (PLAVIX) 75 mg tablet TAKE 1 TABLET EVERY DAY 90 tablet 3    DULoxetine (CYMBALTA) 30 MG capsule TAKE 1 CAPSULE (30 MG TOTAL) ONCE DAILY. TO HELP REDUCE FEELING OF BACK PAIN 90 capsule 3    ELIQUIS 5 mg Tab TAKE 1 TABLET BY MOUTH TWICE A DAY 60 tablet 2    fenofibrate 160 MG Tab TAKE 1 TABLET EVERY DAY 90 tablet 3    fluticasone propionate (FLONASE) 50 mcg/actuation nasal spray INHALE 1 SPRAY IN EACH NOSTRIL ROUTE ONCE DAILY. 32 mL 1    glipiZIDE (GLUCOTROL) 5 MG tablet Take 5 mg (half tab) with breakfast and 5 mg with dinner 120 tablet 11    LEVEMIR FLEXTOUCH U-100 INSULN 100 unit/mL (3 mL) InPn pen INJECT 10 UNITS INTO THE SKIN EVERY EVENING. 15 mL 3    metoprolol tartrate (LOPRESSOR) 25 MG tablet TAKE 1 TABLET TWICE DAILY 180 tablet 3    ACCU-CHEK VERONICA PLUS TEST STRP Strp TEST BLOOD SUGAR FOUR TIMES DAILY 400 strip 3    BD ULTRA-FINE TEJINDER PEN NEEDLE 32 gauge x 5/32" Ndle USE WITH LEVEMIR DAILY 100 each 3    blood-glucose meter kit To check BG 4 times daily, to use with insurance preferred meter 1 each 0    lancets (ACCU-CHEK SOFTCLIX LANCETS) Misc To check BG 4 times daily, to use with insurance preferred meter. 200 each 0     No current facility-administered medications on file prior to visit.     Social " "History:     Social History     Tobacco Use    Smoking status: Former Smoker     Packs/day: 3.00     Years: 15.00     Pack years: 45.00     Types: Cigarettes     Quit date:      Years since quittin.0    Smokeless tobacco: Never Used   Substance Use Topics    Alcohol use: Yes     Alcohol/week: 1.0 standard drink     Types: 1 Shots of liquor per week     Comment: 2-3 drinks per year     Family History:     Family History   Problem Relation Age of Onset    Heart disease Mother     Hypertension Mother     Diabetes Mother     Hyperlipidemia Mother     Heart disease Father     Hypertension Father     Thyroid disease Brother     Hypertension Son        Physical Exam  BP (!) 159/91 (BP Location: Right arm, Patient Position: Sitting, BP Method: Large (Automatic))   Pulse 72   Ht 5' 9" (1.753 m)   Wt 91.6 kg (201 lb 15.1 oz)   SpO2 96%   BMI 29.82 kg/m²    GEN: Alert and oriented in NAD  NECK: no JVD appreciated   CVS: RRR, s1/s2, no MRG  PULM: CTAB no rales  ABD: NT/ND BS +  Extremities: warm and dry, palpable pulses, no edema  NEURO: Alert and oriented x 3  PSYCH: appropriate affect.             Labs:     Lab Results   Component Value Date     10/15/2021    K 4.6 10/15/2021     10/15/2021    CO2 27 10/15/2021    BUN 37 (H) 10/15/2021    CREATININE 1.60 (H) 10/15/2021    ANIONGAP 11 10/15/2021     Lab Results   Component Value Date    HGBA1C 7.7 (H) 2021     No results found for: BNP, BNPTRIAGEBLO Lab Results   Component Value Date    WBC 5.88 10/06/2021    HGB 12.8 (L) 10/06/2021    HCT 40.1 10/06/2021     10/06/2021    GRAN 3.4 10/06/2021    GRAN 58.2 10/06/2021     Lab Results   Component Value Date    CHOL 135 2021    HDL 25 (L) 2021    LDLCALC 80.8 2021    TRIG 146 2021          No results found for: EF      Assessment and Plan  Jin Ngo is a 76 y.o. gentleman here for B/L CRISTELA    1. Bilateral carotid artery stenosis   Pt with long hx of CRISTELA, " he is asymptomatic with bilateral disease > 70% on US. CREST 2 registry does not fit the trial due to AF. S/P Carotid stenting L ICA with a 9 x 40 On 12/10/2020. U/S today with no significant stenosis. Can follow up with primary cardiologist.    Ambulatory referral/consult to Interventional Cardiology   2. Essential hypertension   Continue current meds    3. Coronary artery disease involving autologous artery coronary bypass graft without angina pectoris   Cont current meds    4. Hypertension associated with diabetes   Continue current meds diabetes management per primary    5. Paroxysmal atrial fibrillation   Cont eliquis. Discussed watchman will get records from [place] to see if BROWN is present if no stop eliquis if it is discuss watchman further.     6. Pulmonary HTN   Noted on echo.             Signed:        Jose C Hidalgo MD  Interventional Cardiology Fellow  Pager [phone]      Interventional Cardiology Staff  I have personally taken the history and examined this patient. I have discussed and agree with the resident's findings and plan as documented in the resident's note. Follow up with primary cardiologist for yearly carotid US. Follow up with me prn.    Cheko Norris

## 2022-01-24 NOTE — TELEPHONE ENCOUNTER
No new care gaps identified.  Powered by [employer]. Reference number: [id number].   1/24/2022 12:09:41 AM CST

## 2022-01-31 ENCOUNTER — LAB VISIT (OUTPATIENT)
Dept: LAB | Facility: HOSPITAL | Age: 77
End: 2022-01-31
Attending: NURSE PRACTITIONER
Payer: MEDICARE

## 2022-01-31 DIAGNOSIS — E78.5 HYPERLIPIDEMIA, UNSPECIFIED HYPERLIPIDEMIA TYPE: ICD-10-CM

## 2022-01-31 LAB
ALBUMIN SERPL BCP-MCNC: 4.5 G/DL (ref 3.5–5.2)
ALP SERPL-CCNC: 49 U/L (ref 38–126)
ALT SERPL W/O P-5'-P-CCNC: 24 U/L (ref 10–44)
ANION GAP SERPL CALC-SCNC: 12 MMOL/L (ref 8–16)
AST SERPL-CCNC: 30 U/L (ref 15–46)
BILIRUB SERPL-MCNC: 0.6 MG/DL (ref 0.1–1)
CALCIUM SERPL-MCNC: 9.6 MG/DL (ref 8.7–10.5)
CHLORIDE SERPL-SCNC: 100 MMOL/L (ref 95–110)
CHOLEST SERPL-MCNC: 128 MG/DL (ref 120–199)
CHOLEST/HDLC SERPL: 5.6 {RATIO} (ref 2–5)
CO2 SERPL-SCNC: 28 MMOL/L (ref 23–29)
CREAT SERPL-MCNC: 1.31 MG/DL (ref 0.5–1.4)
EST. GFR  (AFRICAN AMERICAN): >60 ML/MIN/1.73 M^2
EST. GFR  (NON AFRICAN AMERICAN): 52.5 ML/MIN/1.73 M^2
ESTIMATED AVG GLUCOSE: 232 MG/DL (ref 68–131)
GLUCOSE SERPL-MCNC: 183 MG/DL (ref 70–110)
HBA1C MFR BLD: 9.7 % (ref 4–5.6)
HDLC SERPL-MCNC: 23 MG/DL (ref 40–75)
HDLC SERPL: 18 % (ref 20–50)
LDLC SERPL CALC-MCNC: 68.4 MG/DL (ref 63–159)
NONHDLC SERPL-MCNC: 105 MG/DL
POTASSIUM SERPL-SCNC: 4.9 MMOL/L (ref 3.5–5.1)
PROT SERPL-MCNC: 7.9 G/DL (ref 6–8.4)
SODIUM SERPL-SCNC: 140 MMOL/L (ref 136–145)
TRIGL SERPL-MCNC: 183 MG/DL (ref 30–150)
UUN UR-MCNC: 37 MG/DL (ref 2–20)

## 2022-01-31 PROCEDURE — 36415 COLL VENOUS BLD VENIPUNCTURE: CPT | Mod: HCNC,PO | Performed by: NURSE PRACTITIONER

## 2022-01-31 PROCEDURE — 83036 HEMOGLOBIN GLYCOSYLATED A1C: CPT | Mod: HCNC | Performed by: NURSE PRACTITIONER

## 2022-01-31 PROCEDURE — 80061 LIPID PANEL: CPT | Mod: HCNC | Performed by: NURSE PRACTITIONER

## 2022-01-31 PROCEDURE — 80053 COMPREHEN METABOLIC PANEL: CPT | Mod: HCNC,PO | Performed by: NURSE PRACTITIONER

## 2022-02-03 RX ORDER — FLUTICASONE PROPIONATE 50 MCG
SPRAY, SUSPENSION (ML) NASAL
Qty: 48 ML | Refills: 2 | Status: SHIPPED | OUTPATIENT
Start: 2022-02-03 | End: 2022-11-14

## 2022-02-03 NOTE — PROGRESS NOTES
"  Subjective:      Patient ID: Jin Ngo is a 76 y.o. male.    Chief Complaint:  diabetes    History of Present Illness  Jin Ngo with Type 2 diabetes complicated by TIA/CAD with CABG in 2007, hypertension, and dyslipidemia presents today for follow up of Type 2 diabetes. Previous patient of Dr. Diaz and RADHA Langston and myself. Last seen 10/2021    He is accompanied by his wife, Krupa.    At his Feb 2020, he reported that Trulicity was too expensive. We also stopped Metformin due to kidney function. We started Ozempic but he was unable to afford and did not let us know. He was not taking medications for diabetes at his Oct 2020 visit. We started medications below.     States his teeth are falling out. Has not seen dentist yet.     We performed CGMS in Oct 2021 and noted controlled BGs at 81% in target. We changed to glipizide to 5 mg before breakfast only and     With regards to diabetes:    Diagnosed: ~2018  Family History of Type 2 DM: mother   Known complications:  DKA -  RN -; saw in Dec 2020 outside [facility] -needs appt  PN -  Nephropathy +; needs appt  CAD +  CABG in 2007  LCA STENT placed in Dec 2020    Current regimen:  Levemir 10 units in AM  Glipizide 5 mg with breakfast and 5 mg with dinner    Other medications tried:  MFM - stopped in the past due to CKD  Trulicity-- stopped due to expense    We consulted patient assistance last time for Trulicity but they have not heard anything yet. He is willing to stay on insulin if needed.    Glucose Monitor:   1-2 times a day testing  Log reviewed:   States BGs in the AM have been in the 120-180s    Hypoglycemia:   Denies and denies s/s of hypoglycemia  Knows how to correct with 15 grams of carbs- juice, coke, or a peppermint.     Diet/Exercise: He feels that he is "not close as he could be" following diabetic diet. He states I am "constantly looking for food". States he has been eating more.     B: (9AM) eggs, toast, and turkey mai for breakfast with " iced tea or coffee with splenda.  L: sandwich or salad  D: pasta with meat and vegetable or salad (biggest meal)  Snacks: apples, oranges, bananas, grapes; cakes; cookies  Drinks: iced tea with lemon, not really drinking water; coffee and splenda, milk      Not sleeping well and having a snack at 2AM    Exercise - Trying to walk but had back injury as a child and has been having some pain. He has exercise bike but does not use.     Education - last visit: in the past; 2017  Podiatry: None    Diabetes Management Status    Hemoglobin A1C   Date Value Ref Range Status   01/31/2022 9.7 (H) 4.0 - 5.6 % Final     Comment:     ADA Screening Guidelines:  5.7-6.4%  Consistent with prediabetes  >or=6.5%  Consistent with diabetes    High levels of fetal hemoglobin interfere with the HbA1C  assay. Heterozygous hemoglobin variants (HbS, HgC, etc)do  not significantly interfere with this assay.   However, presence of multiple variants may affect accuracy.     09/29/2021 7.7 (H) 4.0 - 5.6 % Final     Comment:     ADA Screening Guidelines:  5.7-6.4%  Consistent with prediabetes  >or=6.5%  Consistent with diabetes    High levels of fetal hemoglobin interfere with the HbA1C  assay. Heterozygous hemoglobin variants (HbS, HgC, etc)do  not significantly interfere with this assay.   However, presence of multiple variants may affect accuracy.     05/25/2021 7.8 (H) 4.0 - 5.6 % Final     Comment:     ADA Screening Guidelines:  5.7-6.4%  Consistent with prediabetes  >or=6.5%  Consistent with diabetes    High levels of fetal hemoglobin interfere with the HbA1C  assay. Heterozygous hemoglobin variants (HbS, HgC, etc)do  not significantly interfere with this assay.   However, presence of multiple variants may affect accuracy.       Statin: Taking  ACE/ARB: Taking    Screening or Prevention Patient's value Goal Complete/Controlled?   HgA1C Testing and Control   Lab Results   Component Value Date    HGBA1C 9.7 (H) 01/31/2022      Annually/Less  "than 8% Yes   Lipid profile : 01/31/2022 Annually Yes   LDL control Lab Results   Component Value Date    LDLCALC 68.4 01/31/2022    Annually/Less than 100 mg/dl  Yes   Nephropathy screening Lab Results   Component Value Date    LABMICR 22.0 10/11/2021     Lab Results   Component Value Date    PROTEINUA Trace (A) 10/11/2021    Annually Yes   Blood pressure BP Readings from Last 1 Encounters:   02/07/22 115/80    Less than 140/90 No   Dilated retinal exam : 12/21/2020 Annually Yes   Foot exam   : 05/03/2021 Annually Yes     With regards to dyslipidemia,     He is on atorvastatin 40 mg daily  Results for FELIBERTO EUGENE (MRN [id number]) as of 2/7/2022 09:27   Ref. Range 1/31/2022 09:35   Cholesterol Latest Ref Range: 120 - 199 mg/dL 128   HDL Latest Ref Range: 40 - 75 mg/dL 23 (L)   HDL/Cholesterol Ratio Latest Ref Range: 20.0 - 50.0 % 18.0 (L)   LDL Cholesterol External Latest Ref Range: 63.0 - 159.0 mg/dL 68.4   Non-HDL Cholesterol Latest Units: mg/dL 105   Total Cholesterol/HDL Ratio Latest Ref Range: 2.0 - 5.0  5.6 (H)   Triglycerides Latest Ref Range: 30 - 150 mg/dL 183 (H)     Review of Systems   Constitutional: Negative for fatigue and unexpected weight change.   Eyes: Positive for visual disturbance.   Endocrine: Negative for polydipsia, polyphagia and polyuria.     Objective:   Physical Exam  Vitals reviewed.   Neck:      Thyroid: No thyromegaly.   Cardiovascular:      Comments: No edema present  Pulmonary:      Effort: Pulmonary effort is normal.     denies injection site edema or erythema. No lipo hypertropthy or atrophy.  Denies sores or lesions to feet  Shoes appropriate  DM foot exam deferred; done in May 2021    Visit Vitals  /80   Ht 5' 9" (1.753 m)   Wt 91.7 kg (202 lb 4.4 oz)   BMI 29.87 kg/m²     Body mass index is 29.87 kg/m².    Lab Review:   Lab Results   Component Value Date    HGBA1C 9.7 (H) 01/31/2022    HGBA1C 7.7 (H) 09/29/2021    HGBA1C 7.8 (H) 05/25/2021       Lab Results   Component " Value Date    CHOL 128 01/31/2022    HDL 23 (L) 01/31/2022    LDLCALC 68.4 01/31/2022    TRIG 183 (H) 01/31/2022    CHOLHDL 18.0 (L) 01/31/2022     Lab Results   Component Value Date     01/31/2022    K 4.9 01/31/2022     01/31/2022    CO2 28 01/31/2022     (H) 01/31/2022    BUN 37 (H) 01/31/2022    CREATININE 1.31 01/31/2022    CALCIUM 9.6 01/31/2022    PROT 7.9 01/31/2022    ALBUMIN 4.5 01/31/2022    BILITOT 0.6 01/31/2022    ALKPHOS 49 01/31/2022    AST 30 01/31/2022    ALT 24 01/31/2022    ANIONGAP 12 01/31/2022    ESTGFRAFRICA >60.0 01/31/2022    EGFRNONAA 52.5 (A) 01/31/2022    TSH 2.140 05/21/2019     No results found for: [id number]  Assessment and Plan     1. Uncontrolled type 2 diabetes mellitus with diabetic neuropathy, without long-term current use of insulin  insulin detemir U-100 (LEVEMIR FLEXTOUCH U-100 INSULN) 100 unit/mL (3 mL) InPn pen    glipiZIDE (GLUCOTROL) 5 MG tablet    Hemoglobin A1C    GLUCOSE MONITORING CONTINUOUS MIN 72 HOURS   2. Primary hypertension     3. Hyperlipidemia, unspecified hyperlipidemia type     4. CKD stage 3 due to type 2 diabetes mellitus     5. BMI 31.0-31.9,adult         Uncontrolled type 2 diabetes mellitus with diabetic neuropathy, without long-term current use of insulin  -- Labs prior to follow up.  -- A1c goal 7-7.5%.  -- Medications discussed:  MFM - stopped in the past due to possible side effects  GLP1-DPP4 - cost prohibitive  AMBROSE   SGLT2   Insulin   -- Reviewed logs/CGM: none today  Reach out to me sooner for any glucose <70 or consistently >200.  -- Patient would benefit from GLP1 and would likely be able to stop SFU or both SFU and insulin. However, medications are very limited.   -- Medication Changes:   Discussed his A1c john significantly. His BGs were at goal for age in Oct 2021. I believe the A1c jump is diet related. He does not want to change medications at this time as we discussed it will increase risk for low blood sugar. We will  let him go back to diabetic diet and recheck CGMS in one month. He is not interested in adding expensive medications for diabetes.     Continue Levemir 10 units [place]   Continue Glipizide 5mg with breakfast and 5 mg with dinner      Call me for blood sugar is less than 70.  Check labs on RTC     Kidney function is improved. Needs follow up. Encouraged to make follow up with Dr Isaac  Discussed you may need to go back on lisinopril - smaller dose and monitor potassium for renal protection    We will perform a professional continuous glucose monitor in order to assess blood sugar trends and patterns, highs and lows, and determine treatment plan.     Call me for blood sugar is less than 70.    -- Reviewed goals of therapy are to get the best control we can without hypoglycemia.  -- Reviewed patient's current insulin regimen. Clarified proper insulin dose and timing in relation to meals, etc. Insulin injection sites and proper rotation instructed.    -- Advised frequent self blood glucose monitoring.  Patient encouraged to document glucose results and bring them to every clinic visit.  -- Hypoglycemia precautions discussed. Instructed on precautions before driving.    -- Call for Bg repeatedly < 90 or > 180.   -- Close adherence to lifestyle changes recommended.   -- Periodic follow ups for eye evaluations, foot care and dental care suggested.    Coronary artery disease  -- Optimize glucose control.     Hypertension  -- No longer on ACEI-- defer to renal  -- Controlled today  -- Blood pressure goals discussed with patient.    Hyperlipidemia  LDL not controlled  On fenofibrate   Stop pravastatin    Start atorvastatin 40 mg daily      CKD stage 3 due to type 2 diabetes mellitus  Will make him appt with Dr. Isaac    BMI 31.0-31.9,adult  -- Encouraged dietary and lifestyle modifications.  -- Emphasized weight loss goals.      Follow up in about 4 months (around 6/7/2022).  Labs in today and labs on RTC

## 2022-02-04 NOTE — TELEPHONE ENCOUNTER
Refill Authorization Note   Jin Ngo  is requesting a refill authorization.  Brief Assessment and Rationale for Refill:  Approve     Medication Therapy Plan:       Medication Reconciliation Completed: No   Comments:   --->Care Gap information included below if applicable.   Orders Placed This Encounter    fluticasone propionate (FLONASE) 50 mcg/actuation nasal spray      Requested Prescriptions   Signed Prescriptions Disp Refills    fluticasone propionate (FLONASE) 50 mcg/actuation nasal spray 48 mL 2     Sig: INHALE 1 SPRAY IN EACH NOSTRIL ROUTE ONCE DAILY.       Ear, Nose, and Throat: Nasal Preparations - Corticosteroids Passed - 1/24/2022 12:09 AM        Passed - Patient is at least 18 years old        Passed - Valid encounter within last 15 months     Recent Visits  Date Type Provider Dept   10/15/21 Office Visit Sam Barroso MD [facility]   05/03/21 Office Visit Sam Barroso MD [facility]   11/03/20 Office Visit Sam Barroso MD [facility]   02/27/20 Office Visit Sam Barroso MD [facility]   Showing recent visits within past 720 days and meeting all other requirements  Future Appointments  No visits were found meeting these conditions.  Showing future appointments within next 150 days and meeting all other requirements      Future Appointments              In 4 days [facility] 6th Fl, [place]    In 1 week Sam Barroso MD [facility] [place] [facility]                    Appointments  past 12m or future 3m with PCP    Date Provider   Last Visit   10/15/2021 Sam Barroso MD   Next Visit   2/15/2022 Sam Barroso MD   ED visits in past 90 days: 0     Note composed:6:00 PM 02/03/2022

## 2022-02-07 ENCOUNTER — OFFICE VISIT (OUTPATIENT)
Dept: ENDOCRINOLOGY | Facility: CLINIC | Age: 77
End: 2022-02-07
Payer: MEDICARE

## 2022-02-07 VITALS
BODY MASS INDEX: 29.96 KG/M2 | DIASTOLIC BLOOD PRESSURE: 80 MMHG | WEIGHT: 202.25 LBS | HEIGHT: 69 IN | SYSTOLIC BLOOD PRESSURE: 115 MMHG

## 2022-02-07 DIAGNOSIS — I10 PRIMARY HYPERTENSION: Chronic | ICD-10-CM

## 2022-02-07 DIAGNOSIS — N18.30 CKD STAGE 3 DUE TO TYPE 2 DIABETES MELLITUS: ICD-10-CM

## 2022-02-07 DIAGNOSIS — E11.22 CKD STAGE 3 DUE TO TYPE 2 DIABETES MELLITUS: ICD-10-CM

## 2022-02-07 DIAGNOSIS — E78.5 HYPERLIPIDEMIA, UNSPECIFIED HYPERLIPIDEMIA TYPE: ICD-10-CM

## 2022-02-07 PROCEDURE — 99214 PR OFFICE/OUTPT VISIT, EST, LEVL IV, 30-39 MIN: ICD-10-PCS | Mod: HCNC,S$GLB,, | Performed by: NURSE PRACTITIONER

## 2022-02-07 PROCEDURE — 99499 RISK ADDL DX/OHS AUDIT: ICD-10-PCS | Mod: HCNC,S$GLB,, | Performed by: NURSE PRACTITIONER

## 2022-02-07 PROCEDURE — 1160F PR REVIEW ALL MEDS BY PRESCRIBER/CLIN PHARMACIST DOCUMENTED: ICD-10-PCS | Mod: HCNC,CPTII,S$GLB, | Performed by: NURSE PRACTITIONER

## 2022-02-07 PROCEDURE — 3288F FALL RISK ASSESSMENT DOCD: CPT | Mod: HCNC,CPTII,S$GLB, | Performed by: NURSE PRACTITIONER

## 2022-02-07 PROCEDURE — 1126F PR PAIN SEVERITY QUANTIFIED, NO PAIN PRESENT: ICD-10-PCS | Mod: HCNC,CPTII,S$GLB, | Performed by: NURSE PRACTITIONER

## 2022-02-07 PROCEDURE — 1159F MED LIST DOCD IN RCRD: CPT | Mod: HCNC,CPTII,S$GLB, | Performed by: NURSE PRACTITIONER

## 2022-02-07 PROCEDURE — 99499 UNLISTED E&M SERVICE: CPT | Mod: HCNC,S$GLB,, | Performed by: NURSE PRACTITIONER

## 2022-02-07 PROCEDURE — 3079F DIAST BP 80-89 MM HG: CPT | Mod: HCNC,CPTII,S$GLB, | Performed by: NURSE PRACTITIONER

## 2022-02-07 PROCEDURE — 99999 PR PBB SHADOW E&M-EST. PATIENT-LVL III: CPT | Mod: PBBFAC,HCNC,, | Performed by: NURSE PRACTITIONER

## 2022-02-07 PROCEDURE — 1159F PR MEDICATION LIST DOCUMENTED IN MEDICAL RECORD: ICD-10-PCS | Mod: HCNC,CPTII,S$GLB, | Performed by: NURSE PRACTITIONER

## 2022-02-07 PROCEDURE — 3074F PR MOST RECENT SYSTOLIC BLOOD PRESSURE < 130 MM HG: ICD-10-PCS | Mod: HCNC,CPTII,S$GLB, | Performed by: NURSE PRACTITIONER

## 2022-02-07 PROCEDURE — 99999 PR PBB SHADOW E&M-EST. PATIENT-LVL III: ICD-10-PCS | Mod: PBBFAC,HCNC,, | Performed by: NURSE PRACTITIONER

## 2022-02-07 PROCEDURE — 3288F PR FALLS RISK ASSESSMENT DOCUMENTED: ICD-10-PCS | Mod: HCNC,CPTII,S$GLB, | Performed by: NURSE PRACTITIONER

## 2022-02-07 PROCEDURE — 1160F RVW MEDS BY RX/DR IN RCRD: CPT | Mod: HCNC,CPTII,S$GLB, | Performed by: NURSE PRACTITIONER

## 2022-02-07 PROCEDURE — 1101F PR PT FALLS ASSESS DOC 0-1 FALLS W/OUT INJ PAST YR: ICD-10-PCS | Mod: HCNC,CPTII,S$GLB, | Performed by: NURSE PRACTITIONER

## 2022-02-07 PROCEDURE — 99214 OFFICE O/P EST MOD 30 MIN: CPT | Mod: HCNC,S$GLB,, | Performed by: NURSE PRACTITIONER

## 2022-02-07 PROCEDURE — 3074F SYST BP LT 130 MM HG: CPT | Mod: HCNC,CPTII,S$GLB, | Performed by: NURSE PRACTITIONER

## 2022-02-07 PROCEDURE — 1126F AMNT PAIN NOTED NONE PRSNT: CPT | Mod: HCNC,CPTII,S$GLB, | Performed by: NURSE PRACTITIONER

## 2022-02-07 PROCEDURE — 1101F PT FALLS ASSESS-DOCD LE1/YR: CPT | Mod: HCNC,CPTII,S$GLB, | Performed by: NURSE PRACTITIONER

## 2022-02-07 PROCEDURE — 3079F PR MOST RECENT DIASTOLIC BLOOD PRESSURE 80-89 MM HG: ICD-10-PCS | Mod: HCNC,CPTII,S$GLB, | Performed by: NURSE PRACTITIONER

## 2022-02-07 RX ORDER — INSULIN DETEMIR 100 [IU]/ML
10 INJECTION, SOLUTION SUBCUTANEOUS NIGHTLY
Qty: 15 ML | Refills: 3 | Status: SHIPPED | OUTPATIENT
Start: 2022-02-07 | End: 2022-06-09

## 2022-02-07 RX ORDER — GLIPIZIDE 5 MG/1
TABLET ORAL
Qty: 120 TABLET | Refills: 11 | Status: SHIPPED | OUTPATIENT
Start: 2022-02-07 | End: 2022-03-23

## 2022-02-07 NOTE — ASSESSMENT & PLAN NOTE
-- No longer on ACEI-- defer to renal  -- Controlled today  -- Blood pressure goals discussed with patient.

## 2022-02-07 NOTE — ASSESSMENT & PLAN NOTE
-- Labs prior to follow up.  -- A1c goal 7-7.5%.  -- Medications discussed:  MFM - stopped in the past due to possible side effects  GLP1-DPP4 - cost prohibitive  AMBROSE   SGLT2   Insulin   -- Reviewed logs/CGM: none today  Reach out to me sooner for any glucose <70 or consistently >200.  -- Patient would benefit from GLP1 and would likely be able to stop SFU or both SFU and insulin. However, medications are very limited.   -- Medication Changes:   Discussed his A1c john significantly. His BGs were at goal for age in Oct 2021. I believe the A1c jump is diet related. He does not want to change medications at this time as we discussed it will increase risk for low blood sugar. We will let him go back to diabetic diet and recheck CGMS in one month. He is not interested in adding expensive medications for diabetes.     Continue Levemir 10 units Torres   Continue Glipizide 5mg with breakfast and 5 mg with dinner      Call me for blood sugar is less than 70.  Check labs on RTC     Kidney function is improved. Needs follow up. Encouraged to make follow up with Dr Isaac  Discussed you may need to go back on lisinopril - smaller dose and monitor potassium for renal protection    We will perform a professional continuous glucose monitor in order to assess blood sugar trends and patterns, highs and lows, and determine treatment plan.     Call me for blood sugar is less than 70.    -- Reviewed goals of therapy are to get the best control we can without hypoglycemia.  -- Reviewed patient's current insulin regimen. Clarified proper insulin dose and timing in relation to meals, etc. Insulin injection sites and proper rotation instructed.    -- Advised frequent self blood glucose monitoring.  Patient encouraged to document glucose results and bring them to every clinic visit.  -- Hypoglycemia precautions discussed. Instructed on precautions before driving.    -- Call for Bg repeatedly < 90 or > 180.   -- Close adherence to lifestyle  changes recommended.   -- Periodic follow ups for eye evaluations, foot care and dental care suggested.

## 2022-02-07 NOTE — PATIENT INSTRUCTIONS
Cholesterol    Continue atorvastatin 40 mg daily    Diabetes    Discussed his A1c john significantly. His BGs were at goal for age in Oct 2021. I believe the A1c jump is diet related. He does not want to change medications at this time as we discussed it will increase risk for low blood sugar. We will let him go back to diabetic diet and recheck CGMS in one month. He is not interested in adding expensive medications for diabetes.     Continue Levemir 10 units Torres   Continue Glipizide 5mg with breakfast and 5 mg with dinner      Call me for blood sugar is less than 70.  Check labs on RTC     Kidney function is improved. Needs follow up. Encouraged to make follow up with Dr Isaac  Discussed you may need to go back on lisinopril - smaller dose and monitor potassium for renal protection    UNDERSTANDING CONTINUOUS GLUCOSE MONITORING     What is continuous glucose monitoring?   Continuous glucose monitoring system (CGMS) is a method used to record your blood sugar levels over a period of time (usually 5 days). Your home blood glucose monitoring is very helpful, but only measures blood glucose levels at various points in time and can miss dangerous high and low patterns, especially during the night while you sleep. Continuous glucose monitoring provides a continuous 24-hour measurement of your blood glucose levels.     Who benefits from continuous glucose monitoring?    People who experience dangerous high and low blood glucose readings.    People who suffer from hypoglycemia unawareness and cannot feel when their blood glucose is dropping.    People who desire better blood glucose control.    People with elevated A1C levels.     How does continuous glucose monitoring work?   A glucose sensor is inserted on the top of your skin on the back of your arm. We do not leave a needle under your skin. The blood glucose sensor measures your blood sugar level every 15 minutes, 24 hours a day. At the end of the 5 days, the CGM  Sensor is then downloaded and reviewed so your healthcare provider can see your blood sugar trends and patterns.     What are your responsibilities to ensure successful testing?   It is recommended that you monitor your blood readings as directed by your healthcare provider.  It is vital that you document the correct times of your medications, meals, snacks, blood sugar readings, and any exercise.     How do you prepare for the test?   There is nothing you need to do to prepare for the test. You do not need to fast (restrict food intake) the night before the test.     Revised: 01/2017    © 2015 [facility] ([email or web address]) is a non-profit, academic, multi-specialty, healthcare delivery system dedicated to patient care, research and education.     Snacks can be an important part of a balanced, healthy meal plan. They allow you to eat more frequently, feeling full and satisfied throughout the day. Also, they allow you to spread carbohydrates evenly, which may stabilize blood sugars.  Plus, snacks are enjoyable!     The amount of carbohydrate needed at snacks varies. Generally, about 15-30 grams of carbohydrate per snack is recommended.  Below you will find some tasty treats.       0-5 gm carb   Crystal Light   Vitamin Water Zero   Herbal tea, unsweetened   2 tsp peanut butter on celery   1./2 cup sugar-free jell-o   1 sugar-free popsicle   ¼ cup blueberries   8oz Blue Aurora unsweetened almond milk   5 baby carrots & celery sticks, cucumbers, bell peppers dipped in ¼ cup salsa, 2Tbsp light ranch dressing or 2Tbsp plain Greek yogurt   10 Goldfish crackers   ½ oz low-fat cheese or string cheese   1 closed handful of nuts, unsalted   1 Tbsp of sunflower seeds, unsalted   1 cup Smart Pop popcorn   1 whole grain brown rice cake        15 gm carb   1 small piece of fruit or ½ banana or 1/2 cup lite canned fruit   3 amanda cracker squares   3 cups Smart Pop popcorn, top spray butter, Hale lite  salt or cinnamon and Truvia   5 Vanilla Wafers   ½ cup low fat, no added sugar ice cream or frozen yogurt (Blue bell, Blue Bunny, Weight Watchers, Skinny Cow)   ½ turkey, ham, or chicken sandwich   ½ c fruit with ½ c Cottage cheese   4-6 unsalted wheat crackers with 1 oz low fat cheese or 1 tbsp peanut butter    30-45 goldfish crackers (depending on flavor)    7-8 Tenriism mini brown rice cakes (caramel, apple cinnamon, chocolate)    12 Tenriism mini brown rice cakes (cheddar, bbq, ranch)    1/3 cup hummus dip with raw veg   1/2 whole wheat cristiane, 1Tbsp hummus   Mini Pizza (1/2 whole wheat English muffin, low-fat  cheese, tomato sauce)   100 calorie snack pack (Oreo, Chips Ahoy, Ritz Mix, Baked Cheetos)   4-6 oz. light or Greek Style yogurt (Chobani, Yoplait, Okios, [place]oneyfield)   ½ cup sugar-free pudding     6 in. wheat tortilla or cristiane oven toasted chips (topped with spray butter flavoring, cinnamon, Truvia OR spray butter, garlic powder, chili powder)    18 BBQ Popchips (available at Target, Whole Foods, Fresh Market)     Eating the Right Number of Calories (2904-0617 Guidelines)    Calories are a measure of the energy you get from food. If you eat more calories than you use, you will gain weight. If you eat fewer calories than you use, you will lose weight. Below are tables that give the number of calories needed each day. Look for your gender, age, and activity level. If you stick to this number, you should neither gain nor lose weight. Note that this is an estimated number of calories.* Your exact number may differ.    Women  Age in years Low activity level (calories/day) Moderate activity level (calories/day) High activity level (calories/day)   19 to 30 1,800-2,000 2,000-2,200 2,400   31 to 50 1,800 2,000 2,200   51 and older 1,600 1,800 2,000-2,200      Men  Age in years Low activity level  (calories/day) Moderate activity level (calories/day) High activity level (calories/day)   19 to 30  2,400-2,600 2,600-2,800 3,000   31 to 50 2,200-2,400 2,400-2,600 2,800-3,000   51 and older 2,000-2,200 2,200-2,400 2,400-2,800     Activity levels defined  · Low. Only light physical activity such as that done during typical daily life.  · Moderate. Light physical activity done during typical daily life AND physical activity equal to walking about 1.5 to 3 miles a day at 3 to 4 miles per hour.  · High. Light physical activity done during typical daily life AND physical activity equal to walking more than 3 miles a day at 3 to 4 miles per hour.  *From Dietary Guidelines for Americans, 0571-3758, [place] Department of Health and Human Services.    Eat less fat  A gram of fat has almost 2.5 times the calories of a gram of protein or carbohydrates. Try to balance your food choices so that only 20% to 35% of your calories comes from total fat. This means an average of 2½ to 3½ grams of fat for each 100 calories you eat.    Eat more fiber  High-fiber foods are digested more slowly than low-fiber foods, so you feel full longer. Try to get at least 25 grams of fiber each day for a 2000 calorie diet. Foods high in fiber include:  · Vegetables and fruits  · Whole-grain or bran breads, pastas, and cereals  · Legumes (beans) and peas  As you begin to eat more fiber, be sure to drink plenty of water to keep your digestive system working smoothly.    Tips  Do's and don'ts include:   · Dont skip meals. This often leads to overeating later on. Its best to spread your eating throughout the day.  · Eat a variety of foods, not just a few favorites.  · If you find yourself eating when youre not hungry, ask yourself why. Many of us eat when were bored, stressed, or just to be polite. Listen to your body. If youre not hungry, get busy doing something else instead of eating.  · Eat slower, shooting for 20 to 30 minutes for each meal. It takes 20 minutes for your stomach to tell your brain that its full. Slow eaters tend to eat less  and are still satisfied, while fast eaters may tend to be overeaters.   · Pay attention to what you eat. Dont read or watch TV during your meal.     ---------------------------------------------------------------------------------------------------------------------------------------------------    Losing Weight for Heart Health  Excess weight is a major risk factor for heart disease. Losing weight has many benefits including lowering your blood pressure, improving your cholesterol level, and decreasing your risk for diseases such as diabetes and heart disease. It may help keep your arteries open so that your heart can get the oxygen-rich blood it needs. All in all, losing weight makes you healthier.       Exercise with a friend. When activity is fun, you're more likely to stick with it.     Calories and weight loss  · Calories are the fuel your body burns for energy. You get the calories you need from the food you eat. For healthy weight loss, women should eat at least 1,200 calories a day, men at least 1,500.  · When you eat more calories than you need, your body stores the extra calories as fat. One pound of fat equals 3,500 calories.  · To lose weight, try to reduce your total calorie intake by 500 calories. To do this, eat 250 calories less each day. Add activity to burn the other 250 calories. Walking 2.5 miles burns about 250 calories. Other more intense activities can burn more calories in the time you spend doing them, such as swimming and running. It is important to understand that reducing calorie intake is much more effective at weight loss than is exercise.  · Eat a variety of healthy foods to get the nutrients you need.    Tips for losing weight  · Drink 8 to 10 glasses of water a day.  · Dont skip meals. Instead, eat smaller portions.  · Eat your meals earlier in the day.  · Cut out sugary drinks such as soda and fruit juices.  · Make your later meals lighter than your earlier meals.     What can  exercise help?  · Blood sugar. Regular exercise improves blood sugar control by helping your body use insulin.  · Mental and emotional health. Physical activity relieves stress and helps you sleep better.  · Heart health. With regular exercise, you can reduce your risk of heart disease and high blood pressure. You can also improve your cholesterol and triglyceride levels.  · Weight. Exercise helps you lose fat, gain muscle, and control your weight.  · Health of blood vessels and nerves. Activity helps lower blood sugar. This helps prevent damage to blood vessels and nerves that can cause problems with your brain, eyes, feet, and legs.  · Finances. If you manage your blood sugar, you may spend less on medical care.    2 types of exercise  Two types of exercise help your body use blood sugar. Experts advise both types of exercise for people with diabetes:  · Aerobic exercise. This is a rhythmic, repeated, continued movement of large muscle groups for at least 10 minutes at a time. You should do this about 30 minutes a day on most days of the week. Examples include walking, bicycling, jogging, swimming, water aerobics, and many sports.  · Resistance exercise (strength training). This type of exercise uses muscles to move weight or work against resistance. You can do it with free weights, machines, resistance tubing, or your own body weight. Adults with diabetes should aim for 2 to 3 sessions of resistance exercise each week. Its best to skip a day in between.    A goal to shoot for  Your main goal is to become more active. Even a little bit helps. Choose an activity that you like. Walking is one great form of exercise that everyone can do. Talk to your healthcare provider about any limits you may have before starting with an exercise program. Then aim for 150 minutes a week of physical activity. Dont let more than 2 days go by without exercise. When you are sitting for long periods of time, get up for short sessions  of light activity every 30 minutes.    Getting activity into your day  Being more active doesnt have to be hard work. Try these to get more activity into your day:  · Take the stairs instead of the elevator  · Garden, do housework, and yard work  · Choose a parking space farther from the store  · Walk to talk to a co-worker instead of calling  · Take a 10-minute walk around the block at lunch  · Walk to a bus stop a little farther from your home or office  · Walk the dog after dinner     ---------------------------------------------------------------------------------------------------------------------------------------------------    Reading Food Labels  Look for the Nutrition Facts label on packaged foods. Reading labels is a big step toward eating healthier. The tips below help you know what to look for.    1. Serving size. Read this closely because the package, jar, or can may contain more than 1 serving. This is how to measure 1 serving of the food in the package. If you eat more than 1 serving, you get more of everything on the label -- including fat, cholesterol, and calories.  2. Total fat. This tells you how many grams (g) of fat are in 1 serving. Fat is high in calories. A healthy goal is to have less than 25% of your daily calories come from fat.  3. Saturated fat. This tells you how much saturated fat is in 1 serving. Saturated fat raises your cholesterol the most. Look for foods that have little or no saturated fat.  4. Trans fat. This tells you how much trans fat is in 1 serving. Even a small amount of trans fat can harm your health. Choose foods that have no trans fat.  5. Cholesterol. This tells you how much cholesterol is in 1 serving. For many years, it had been recommended to eat less than 300 milligrams (mg) of cholesterol a day. New guidelines have removed this limitation as cholesterol has been recently shown to not raise blood cholesterol levels as significantly as previously thought.  However, many foods high in cholesterol are also high in saturated fat. It is recommended to limit saturated fat in your diet.  6. Calories from fat. This number tells you how many calories from fat are in 1 serving (there are 9 calories per gram of fat). Look for foods with few calories from fat.  7. % Daily value. The higher the number, the more 1 serving has of that nutrient. Look for foods that have low numbers for total fat, saturated fat, cholesterol, and sodium.  8. Sodium. This tells you how much sodium (salt) is in 1 serving. Choose foods with low numbers for sodium.  9. Dietary fiber. This number tells you how much fiber is in 1 serving. Foods that are high in fiber can help you feel full. They can also be good for your heart and digestion. The recommended daily amount of fiber is 25 grams for women and 38 grams for men. After age 50, your daily fiber needs drop to 21 grams for women and 30 grams for men.       ---------------------------------------------------------------------------------------------------------------------------------------------------    Understanding Carbohydrates, Fats, and Protein  Food is a source of fuel and nourishment for your body. Its also a source of pleasure. Having diabetes doesnt mean you have to eat special foods or give up desserts. Instead, your dietitian can show you how to plan meals to suit your body. To start, learn how different foods affect blood sugar.    Carbohydrates  Carbohydrates are the main source of fuel for the body. Carbohydrates raise blood sugar. Many people think carbohydrates are only found in pasta or bread. But carbohydrates are actually in many kinds of foods:  · Sugars occur naturally in foods such as fruit, milk, honey, and molasses. Sugars can also be added to many foods, from cereals and yogurt to candy and desserts. Sugars raise blood sugar.  · Starches are found in bread, cereals, pasta, and dried beans. Theyre also found in corn, peas,  potatoes, yam, acorn squash, and butternut squash. Starches also raise blood sugar.   · Fiber is found in foods such as vegetables, fruits, beans, and whole grains. Unlike other carbs, fiber isnt digested or absorbed. So it doesnt raise blood sugar. In fact, fiber can help keep blood sugar from rising too fast. It also helps keep blood cholesterol at a healthy level.  Did you know?  Even though carbohydrates raise blood sugar, its best to have some in every meal. They are an important part of a healthy diet.     Fat  Fat is an energy source that can be stored until needed. Fat does not raise blood sugar. However, it can raise blood cholesterol, increasing the risk of heart disease. Fat is also high in calories, which can cause weight gain. Not all types of fat are the same.    More Healthy:  · Monounsaturated fats are mostly found in vegetable oils, such as olive, canola, and peanut oils. They are also found in avocados and some nuts. Monounsaturated fats are healthy for your heart. Thats because they lower LDL (unhealthy) cholesterol.  · Polyunsaturated fats are mostly found in vegetable oils, such as corn, safflower, and soybean oils. They are also found in some seeds, nuts, and fish. Polyunsaturated fats lower LDL (unhealthy) cholesterol. So, choosing them instead of saturated fats is healthy for your heart. Certain unsaturated fats can help lower triglycerides.     Less Healthy:  · Saturated fats are found in animal products, such as meat, poultry, whole milk, lard, and butter. Saturated fats raise LDL cholesterol and are not healthy for your heart.  · Hydrogenated oils and trans fats are formed when vegetable oils are processed into solid fats. They are found in many processed foods. Hydrogenated oils and trans fats raise LDL cholesterol and lower HDL (healthy) cholesterol. They are not healthy for your heart.    Protein  Protein helps the body build and repair muscle and other tissue. Protein has little or  no effect on blood sugar. However, many foods that contain protein also contain saturated fat. By choosing low-fat protein sources, you can get the benefits of protein without the extra fat:  · Plant protein is found in dry beans and peas, nuts, and soy products, such as tofu and soymilk. These sources tend to be cholesterol-free and low in saturated fat.  · Animal protein is found in fish, poultry, meat, cheese, milk, and eggs. These contain cholesterol and can be high in saturated fat. Aim for lean, lower-fat choices.    ---------------------------------------------------------------------------------------------------------------------------------------------------    Understanding Carbohydrates    A car needs the right type of fuel to run. And you need the right kind of food to function. To keep your energy level up, your body needs food that has carbohydrates. But carbohydrates raise blood sugar levels higher and faster than other kinds of food. Your dietitian will work with you to figure out the amount of carbohydrates you need.    Starches  Starches are found in grains, some vegetables, and beans. Grain products include bread, pasta, cereal, and tortillas. Starchy vegetables include potatoes, peas, corn, lima beans, yams, and squash. Kidney beans, jacobson beans, black beans, garbanzo beans, and lentils also contain starches.    Sugars  Sugars are found naturally in many foods. Or sugar can be added. Foods that contain natural sugar include fruits and fruit juices, dairy products, honey, and molasses. Added sugars are found in most desserts, processed foods, candy, regular soda, and fruit drinks. These are very helpful for treating low blood sugar, or hypoglycemia. They provide sugar quickly. Try to keep at least 15 to 20 grams of these simple sugars with you at all times. Eat this if you begin to have low blood sugar symptoms.    Fiber  Fiber comes from plant foods. Most fiber isnt digested by the body.  Instead of raising blood sugar levels like other carbohydrates, it actually stops blood sugar from rising too fast. Fiber is found in fruits, vegetables, whole grains, beans, peas, and many nuts.    Carb counting  Keep track of the amount of carbohydrates you eat. This can help you keep the right balance of physical activity and medicine. The amount of carbohydrates needed will vary for each person. It depends on many things such as your health, the medicines you take, and how active you are. Your healthcare team will help you figure out the right amount of carbohydrates for you. You may start with around 45 to 60 grams of carbohydrate per meal, depending on your situation. Carb counting is a system that helps you keep track of the carbohydrates you eat at each meal.  Carbohydrates come from a variety of foods. These include grains, starchy vegetables, fruit, milk, beans, and snack foods. You can either count carbohydrate grams or carbohydrate servings. When you count carbohydrate servings, 1 carbohydrate serving = 15 grams of carbohydrates.  Here are some examples of foods containing about 15 grams of carbohydrates (1 serving of carbohydrates):  · 1/2 cup of canned or frozen fruit  · A small piece of fresh fruit (4 ounces)  · 1 slice of bread  · 1/2 cup of oatmeal  · 1/3 cup of rice  · 4 to 6 crackers  · 1/2 English muffin  · 1/2 cup of black beans  · 1/4 of a large baked potato (3 ounces)  · 2/3 cup of plain fat-free yogurt  · 1 cup of soup  · 1/2 cup of casserole  · 6 chicken nuggets  · 2-inch-square brownie or cake without frosting  · 2 small cookies  · 1/2 cup of ice cream or sherbet    Carb counting is easier when food labels are available. Look at the label to see how many grams of total carbohydrates the food contains. Then you can figure out how much you should eat.  Two very important lines to look at on the label are the serving size and the total carbohydrate amount. Here are some tips for using food  labels to count your carbohydrate intake:  · Check the serving size. The information on the label is based on that serving size. If you eat more than the listed serving size, you may have to double or triple the other information on the label.   · Check the total grams of carbohydrates. Total carbohydrate from the label includes sugar, starch, and fiber. Be sure to use the total carbohydrate number and not sugar alone.  · Know how many grams of carbohydrates you can have.  Be familiar with the matching portion sizes.  · Compare labels. Compare the labels of different products, looking at serving sizes and total carbohydrates to find the products that work best for you.   · Don't forget protein and fat. With all the focus on carb counting, it might be easy to forget protein and fat in your meals. Don't forget to include sources of protein and healthy fat to balance your meals.  Its also important to be consistent with the amount and time you eat when taking a fixed dose of diabetes medicine. Work with your healthcare provider or dietitian if you need additional help. He or she can help you keep track of your carbohydrate intake. He or she can also help you figure out how many grams of carbohydrates you should have.      ---------------------------------------------------------------------------------------------------------------------------------------------------    Diabetes: Learning About Serving and Portion Sizes     A good rule of thumb: Devote half your plate to vegetables and green salad. Split the other half between protein and starchy carbohydrates. Fruit makes a good dessert.     Servings and portions. Whats the difference? These terms can be very confusing. But learning to measure serving sizes can help you figure out how many carbohydrates (carbs) and other foods you eat each day. They are also powerful tools for managing your weight.    Servings and portions  Many different words are used to  describe amounts of food. If your health care provider uses a term youre not sure of, dont be afraid to ask. It helps to know the difference between servings and portions:  · A serving size is a fixed size. Food producers use this term to describe their products. For example, the label on a cereal box could say that 1 cup of dry cereal = 1 serving.  · A portion (also called a helping) is how much you eat or how much you put on your plate at a meal. For example, you might eat 2 cups of cereal at breakfast.    Using serving information  The portion you choose to eat (such as 2 cups of cereal) may be more than one serving as listed on the food label (such as 1 cup of cereal). Thats why it helps to measure or weigh the food you eat. Because the food label values are based on servings, youll need to know how many servings you eat at one sitting.     Ounces: 2 to 3 ounces is about the size of your palm.       1 Cup: 1 cup (or a medium-sized piece) is about the size of your fist.       1/2 Cup: 1/2 cup is about the size of your cupped hand.      One tablespoon is about the size of your thumb.  One teaspoon is about the size of the tip of your thumb.    Keeping track of serving sizes  When youre planning for a snack or a meal, keep servings in mind. If you dont have measuring cups or a scale handy, there are ways to eyeball serving sizes, such as comparing your food to the size of your hand (see pictures above).    Managing portion sizes  If your weight is a concern, reducing your portions can help. You can eat more than one serving of a food at once. But to keep from eating too much at one meal, learn how to manage your portions. A portion is the amount of each type of food on your plate. See the plate diagram for an example of balanced portions.    ---------------------------------------------------------------------------------------------------------------------------------------------------    Diabetes:  "Shopping for and Preparing Meals    Having diabetes doesnt mean you have to shop in a special aisle or look for special foods. But you will need to make choices. By comparing items and reading food labels, you can find the healthiest foods for you and your family.  Comparing items  When you shop, compare items to find the best ones for your needs. Keep these facts in mind:  · No sugar added does not mean a product is sugar-free.  · "Sugar-free" means less than 1/2 gram (g) of sugar per serving.  · Fat free means less than 1/2 g of fat per serving. This does not necessarily mean the product is low in calories.  · Low fat means 3 g fat or less per serving. Reduced fat or less fat means 25% less fat than the regular version. Some of this fat may be saturated or trans fat. And calories per serving may be similar to the regular version.    Making small changes  Dont try to change all of your eating habits at once. Here are some ideas to start with:  · Try fat-free or low-fat cheese, milk, and yogurt. Also try leaner cuts of meat. This will help you cut down on saturated fat.  · Try whole-grain breads, brown rice, and whole-wheat pasta.  · Load up on fresh or frozen vegetables. If you buy canned, choose low-sodium vegetables.  · Avoid processed foods as much as possible. They tend to be low in fiber and high in trans fats and sodium.  · Try tofu, soymilk, or meat substitutes.?They can help you cut cholesterol and saturated fat out of your diet.    Learning to read food labels  To find healthy foods that help you control blood sugar, learn how to read food labels. Look for the Nutrition Facts label on packaged foods. It will tell you how much carbohydrate, sugar, fat, and fiber is in each serving. Then, you can decide whether or not the food fits into your meal plan.    Using the food label  So, once you have the food label, what do you do with it? The food label helps in many ways. Use it to:  · Compare items " and decide which is the best for your health needs.  · Track the number of carbohydrates in your portions.  · Figure out how many servings of a food you can have and still stay within the number of carbohydrates for that meal.    Planning meals  For good blood sugar control, plan what and when youll eat. Start by creating a meal plan that includes all the food groups. Then, time your meals to help keep your blood sugar level steady. You may need to adjust your plan for special situations.    Eat from all the food groups  The basis of a healthy meal plan is variety (eating many different types of foods). Look for lean meats, fresh fruits and vegetables, whole grains, and low-fat or non-fat dairy products. Eating a wide variety of foods provides the nutrients your body needs. It can also keep you from getting bored with your meal plan.    Reduce liquid sugars  Extra calories from sodas, sports drinks, and fruit drinks make it hard to keep blood sugar in range. Cut as many liquid sugars from your meal plan as you can. This includes most fruit juices, which are often high in natural or added sugar. Instead, drink plenty of water and other sugar-free beverages.    Eat less fat  If you need to lose some weight, try to reduce the amount of fat in your diet. This can also help lower your cholesterol level to keep blood vessels healthier. Cut fat by using only small amounts of liquid oil for cooking. Read food labels carefully to avoid foods with unhealthy trans fats.    Timing your meals  When it comes to blood sugar control, when you eat is as important as what you eat. You may need to eat several small meals spaced evenly throughout the day to stay in your target range. So dont skip breakfast or wait until late in the day to get most of your calories. Doing so can cause your blood sugar to rise too high or fall too low.    Cooking wisely  · Broil, steam, bake, or grill meats and vegetables, instead of frying.  · Instead  of cream-based sauces or sugary glazes, flavor foods with vegetable purée, lemon or lime juice, or herb seasonings.  · Remove skin from chicken and turkey before serving.  · Look in cookbooks for easy, low-fat, low-sugar recipes. When making your usual recipes, cut sugar by 1/2 and fat by 1/3.      ---------------------------------------------------------------------------------------------------------------------------------------------------    Healthy Meals for Diabetes    Ask your healthcare team to help you make a meal plan that fits your needs. Your meal plan tells you when to eat your meals and snacks, what kinds of foods to eat, and how much of each food to eat. You dont have to give up all the foods you like. But you do need to follow some guidelines.  Choose healthy carbohydrates  Starches, sugars, and fiber are all types of carbohydrates. Fiber can help lower your cholesterol and triglycerides. Fiber is also healthy for your heart. You should have 20 to 35 grams of total fiber each day. Fiber-rich foods include:  · Whole-grain breads and cereals  · Bulgur wheat  · Brown rice     · Whole-wheat pasta  · Fruits and vegetables  · Dry beans, and peas   Keep track of the amount of carbohydrates you eat. This can help you keep the right balance of physical activity and medicine. The amount of carbohydrates needed will vary for each person. It depends on many things such as your health, the medicines you take, and how active you are. Your healthcare team will help you figure out the right amount of carbohydrates for you. You may start with around 45 to 60 grams of carbohydrates per meal, depending on your situation.   Here are some examples of foods containing about 15 grams of carbohydrates (1 serving of carbohydrates):  · 1/2 cup of canned or frozen fruit  · A small piece of fresh fruit (4 ounces)  · 1 slice of bread  · 1/2 cup of oatmeal  · 1/3 cup of rice  · 4 to 6 crackers  · 1/2 English muffin  · 1/2 cup  of black beans  · 1/4 of a large baked potato (3 ounces)  · 2/3 cup of plain fat-free yogurt  · 1 cup of soup  · 1/2 cup of casserole  · 6 chicken nuggets  · 2-inch-square brownie or cake without frosting  · 2 small cookies  · 1/2 cup of ice cream or sherbet    Choose healthy protein foods  Eating protein that is low in fat can help you control your weight. It also helps keep your heart healthy. Low-fat protein foods include:  · Fish  · Plant proteins, such as dry beans and peas, nuts, and soy products like tofu and soymilk  · Lean meat with all visible fat removed  · Poultry with the skin removed  · Low-fat or nonfat milk, cheese, and yogurt    Limit unhealthy fats and sugar  Saturated and trans fats are unhealthy for your heart. They raise LDL (bad) cholesterol. Fat is also high in calories, so it can make you gain weight. To cut down on unhealthy fats and sugar, limit these foods:  · Butter or margarine  · Palm and palm kernel oils and coconut oil  · Cream  · Cheese  · Germain  · Lunch meats     · Ice cream  · Sweet bakery goods such as pies, muffins, and donuts  · Jams and jellies  · Candy bars  · Regular sodas     How much to eat  The amount of food you eat affects your blood sugar. It also affects your weight. Your healthcare team will tell you how much of each type of food you should eat.  · Use measuring cups and spoons and a food scale to measure serving sizes.  · Learn what a correct serving size looks like on your plate. This will help when you are away from home and cant measure your servings.  · Eat only the number of servings given on your meal plan for each food. Dont take seconds.  · Learn to read food labels. Be sure to look at serving size, total carbohydrates, fiber, calories, sugar, and saturated and trans fats. Look for healthier alternatives to foods that have added sugar.  · Plan ahead for parties so you can still have a good time without going overboard with unhealthy food choices. Set a good  example yourself by bringing a healthy dish to pot ripks.     Choose healthy snacks  When it comes to snacks, we usually think about foods with added sugar and fats. But there are many other options for healthier snack choices. Here are a few snack ideas to choose from:  Snacks with less than 5 grams of carbohydrates  · 1 piece of string cheese  · 3 celery sticks plus 1 tablespoon of peanut butter  · 5 cherry tomatoes plus 1 tablespoon of ranch dressing  · 1 hard-boiled egg  · 1/4 cup of fresh blueberries  ·  5 baby carrots  · 1 cup of light popcorn  · 1/2 cup of sugar-free gelatin  · 15 almonds  Snacks with about 10 to 20 grams of carbohydrates  · 1/3 cup of hummus plus 1 cup of fresh cut nonstarchy vegetables (carrots, green peppers, broccoli, celery, or a combination)  · 1/2 cup of fresh or canned fruit plus 1/4 cup of cottage cheese  · 1/2 cup of tuna salad with 4 crackers  · 2 rice cakes and a tablespoon of peanut butter  · 1 small apple or orange  · 3 cups light popcorn  · 1/2 of a turkey sandwich (1 slice of whole-wheat bread, 2 ounces of turkey, and mustard)  Portion sizes are important to controlling your blood sugar and staying at a healthy weight. Stock up on healthy snack items so you always have them on hand.    When to eat  Your meal plan will likely include breakfast, lunch, dinner, and some snacks.  · Try to eat your meals and snacks at about the same times each day.  · Eat all your meals and snacks. Skipping a meal or snack can make your blood sugar drop too low. It can also cause you to eat too much at the next meal or snack. Then your blood sugar could get too high.    ---------------------------------------------------------------------------------------------------------------------------------------------------    Eating Out When You Have Diabetes  Eating right is an important part of keeping your blood sugar in your target range. You just need to make healthy choices.    Be creative when  eating out. Many places dont make you stick strictly to the menu. Instead of ordering a large entree, choose an appetizer or two and a bowl of soup. A mix of side orders can also make a good meal. Read the descriptions of other entrees and specials. If another entree comes with baby carrots, ask for a side order of them even if they dont come with your entree. Ask how food is prepared or if it can be made differently.  Tips for making the most of your meal out  · Ask to have high-fat, high-calorie extras, such as French fries and potato chips, left off your plate so you wont be tempted.   · Ask what substitutions are available. Instead of French fries, you may be able to have a side of salad with low-calorie dressing.  · Order low-fat milk instead of cream for your coffee.  · Ask for vegetables and main courses to be served without sauces, butter, margarine, or oil.  · Be aware of portion size. You dont have to clean your plate. Take half your meal home in a doggie bag to eat the next day.  · Look for heart-healthy or low-fat entrees that include whole grains, vegetables, or fruits.  · Choose foods that are grilled, broiled, or steamed.  · Avoid dishes that are described on the menu as fried, breaded, smothered, rich, or creamy.    Tips for restaurant meals  When you eat away from home try these tips:  · Try to schedule your dining-out meal at your normal meal time. Make a reservation if possible, so you don't have to wait to eat. If you can't make a reservation, try to arrive at the restaurant at a less-busy time to cut down your wait time. Eat a small fruit or starch snack at your regular mealtime if your restaurant meal is going to be later than usual.   · Call ahead to see if the restaurant can meet your dietary needs if you've never been there before. Or you can go online to see the menu ahead of time.  · Carry some crackers with you in case the restaurant needs you to wait until you can be served.  · Ask  how foods are prepared before you order.  · Instead of fried, sautéed, or breaded foods, choose ones that are broiled, steamed, grilled, or baked.  · Ask for sauces, gravies, and dressings on the side.  · Only eat an amount that fits your meal plan. Remember: You can take home the leftovers.  · Save dessert for special occasions. Then choose a small dessert or share one with a friend or family member.    Make healthy choices  Fast food  · Garden salad with light dressing on the side  · Baked potato with vegetables or herbs  · Broiled, roasted, or grilled chicken sandwich  · Sliced turkey or lean roast beef sandwich    Mexican  · Chicken enchilada, without cheese or sour cream   · Small burrito with whole beans and chicken  · Whole beans (not refried) and rice  · Chicken or fish fajitas    Steakhouse  · Grilled or broiled lean cuts of beef  · Baked potato with vegetables or herbs  · Broiled or baked chicken. Dont eat the skin.  · Steamed vegetables    Asian  · Steamed dumplings or potstickers  · Broiled, boiled, or steamed meats or fish  · Sushi or sashimi  · Steamed rice or boiled noodles. One serving is equal to 1/3 cup.      © 9188-7932 [employer] [address] [place] [address], [place] [address]. All rights reserved. This information is not intended as a substitute for professional medical care. Always follow your healthcare professional's instructions.

## 2022-02-15 ENCOUNTER — OFFICE VISIT (OUTPATIENT)
Dept: FAMILY MEDICINE | Facility: CLINIC | Age: 77
End: 2022-02-15
Payer: MEDICARE

## 2022-02-15 VITALS
WEIGHT: 202.94 LBS | OXYGEN SATURATION: 96 % | SYSTOLIC BLOOD PRESSURE: 114 MMHG | DIASTOLIC BLOOD PRESSURE: 60 MMHG | BODY MASS INDEX: 30.06 KG/M2 | HEIGHT: 69 IN | TEMPERATURE: 99 F | HEART RATE: 58 BPM

## 2022-02-15 DIAGNOSIS — E11.59 HYPERTENSION ASSOCIATED WITH DIABETES: ICD-10-CM

## 2022-02-15 DIAGNOSIS — I15.2 HYPERTENSION ASSOCIATED WITH DIABETES: ICD-10-CM

## 2022-02-15 PROCEDURE — 1126F AMNT PAIN NOTED NONE PRSNT: CPT | Mod: CPTII,S$GLB,, | Performed by: FAMILY MEDICINE

## 2022-02-15 PROCEDURE — 3288F FALL RISK ASSESSMENT DOCD: CPT | Mod: CPTII,S$GLB,, | Performed by: FAMILY MEDICINE

## 2022-02-15 PROCEDURE — 99214 PR OFFICE/OUTPT VISIT, EST, LEVL IV, 30-39 MIN: ICD-10-PCS | Mod: S$GLB,,, | Performed by: FAMILY MEDICINE

## 2022-02-15 PROCEDURE — 1159F MED LIST DOCD IN RCRD: CPT | Mod: CPTII,S$GLB,, | Performed by: FAMILY MEDICINE

## 2022-02-15 PROCEDURE — 1101F PT FALLS ASSESS-DOCD LE1/YR: CPT | Mod: CPTII,S$GLB,, | Performed by: FAMILY MEDICINE

## 2022-02-15 PROCEDURE — 3074F SYST BP LT 130 MM HG: CPT | Mod: CPTII,S$GLB,, | Performed by: FAMILY MEDICINE

## 2022-02-15 PROCEDURE — 3074F PR MOST RECENT SYSTOLIC BLOOD PRESSURE < 130 MM HG: ICD-10-PCS | Mod: CPTII,S$GLB,, | Performed by: FAMILY MEDICINE

## 2022-02-15 PROCEDURE — 3078F DIAST BP <80 MM HG: CPT | Mod: CPTII,S$GLB,, | Performed by: FAMILY MEDICINE

## 2022-02-15 PROCEDURE — 1126F PR PAIN SEVERITY QUANTIFIED, NO PAIN PRESENT: ICD-10-PCS | Mod: CPTII,S$GLB,, | Performed by: FAMILY MEDICINE

## 2022-02-15 PROCEDURE — 3078F PR MOST RECENT DIASTOLIC BLOOD PRESSURE < 80 MM HG: ICD-10-PCS | Mod: CPTII,S$GLB,, | Performed by: FAMILY MEDICINE

## 2022-02-15 PROCEDURE — 1159F PR MEDICATION LIST DOCUMENTED IN MEDICAL RECORD: ICD-10-PCS | Mod: CPTII,S$GLB,, | Performed by: FAMILY MEDICINE

## 2022-02-15 PROCEDURE — 99214 OFFICE O/P EST MOD 30 MIN: CPT | Mod: S$GLB,,, | Performed by: FAMILY MEDICINE

## 2022-02-15 PROCEDURE — 1101F PR PT FALLS ASSESS DOC 0-1 FALLS W/OUT INJ PAST YR: ICD-10-PCS | Mod: CPTII,S$GLB,, | Performed by: FAMILY MEDICINE

## 2022-02-15 PROCEDURE — 3288F PR FALLS RISK ASSESSMENT DOCUMENTED: ICD-10-PCS | Mod: CPTII,S$GLB,, | Performed by: FAMILY MEDICINE

## 2022-02-15 RX ORDER — TAMSULOSIN HYDROCHLORIDE 0.4 MG/1
0.4 CAPSULE ORAL DAILY
Qty: 90 CAPSULE | Refills: 0 | Status: SHIPPED | OUTPATIENT
Start: 2022-02-15 | End: 2022-05-15

## 2022-02-15 RX ORDER — MONTELUKAST SODIUM 10 MG/1
10 TABLET ORAL NIGHTLY
Qty: 30 TABLET | Refills: 0 | Status: SHIPPED | OUTPATIENT
Start: 2022-02-15 | End: 2022-03-14

## 2022-02-15 NOTE — PROGRESS NOTES
" Patient ID: Jin Ngo is a 76 y.o. male.    Chief Complaint: Follow-up    HPI       Jin Ngo is a 76 y.o. male here for follow-up for uncontrolled diabetes.  Patient some complains of increased thirst and increased clear urination.  Hemoglobin A1c has risen since four months ago.    Vitals:    02/15/22 1319   BP: 114/60   BP Location: Left arm   Patient Position: Sitting   Pulse: (!) 58   Temp: 98.6 °F (37 °C)   TempSrc: Oral   SpO2: 96%   Weight: 92 kg (202 lb 14.9 oz)   Height: 5' 9" (1.753 m)            Review of Symptoms      Physical Exam    Constitutional:  Oriented to person, place, and time.appears well-developed and well-nourished.  No distress.      HENT  Head: Normocephalic and atraumatic  Right Ear: External ear normal.   Left Ear: External ear normal.   Nose: External nose normal.   Mouth:  Moist mucus membranes.    Eyes:  Conjunctivae are normal. Right eye exhibits no discharge.  Left eye exhibits no discharge. No scleral icterus.  No periorbital edema    Cardiovascular:  Regular rate and rhythm with normal S1 and S2     Pulmonary/Chest:   Clear to auscultation bilaterally without wheezes, rhonchi or rales      Musculoskeletal:  No edema. No obvious deformity No wasting       Neurological:  Alert and oriented to person, place, and time.   Coordination normal.     Skin:   Skin is warm and dry.  No diaphoresis.   No rash noted.     Psychiatric: Normal mood and affect. Behavior is normal.  Judgment and thought content normal.     Complete Blood Count  Lab Results   Component Value Date    RBC 4.31 (L) 10/06/2021    HGB 12.8 (L) 10/06/2021    HCT 40.1 10/06/2021    MCV 93 10/06/2021    MCH 29.7 10/06/2021    MCHC 31.9 (L) 10/06/2021    RDW 13.9 10/06/2021     10/06/2021    MPV 10.4 10/06/2021    GRAN 3.4 10/06/2021    GRAN 58.2 10/06/2021    LYMPH 1.4 10/06/2021    LYMPH 24.1 10/06/2021    MONO 0.8 10/06/2021    MONO 12.9 10/06/2021    EOS 0.2 10/06/2021    BASO 0.05 10/06/2021    " EOSINOPHIL 3.6 10/06/2021    BASOPHIL 0.9 10/06/2021    DIFFMETHOD Automated 10/06/2021       Comprehensive Metabolic Panel  Lab Results   Component Value Date     (H) 01/31/2022    BUN 37 (H) 01/31/2022    CREATININE 1.31 01/31/2022     01/31/2022    K 4.9 01/31/2022     01/31/2022    PROT 7.9 01/31/2022    ALBUMIN 4.5 01/31/2022    BILITOT 0.6 01/31/2022    AST 30 01/31/2022    ALKPHOS 49 01/31/2022    CO2 28 01/31/2022    ALT 24 01/31/2022    ANIONGAP 12 01/31/2022    EGFRNONAA 52.5 (A) 01/31/2022    ESTGFRAFRICA >60.0 01/31/2022       TSH  No results found for: TSH    Assessment / Plan:      ICD-10-CM ICD-9-CM   1. Uncontrolled type 2 diabetes mellitus with diabetic neuropathy, without long-term current use of insulin  E11.40 250.62    E11.65 357.2   2. Hypertension associated with diabetes  E11.59 250.80    I15.2 401.9     Uncontrolled type 2 diabetes mellitus with diabetic neuropathy, without long-term current use of insulin  -     Ambulatory referral/consult to Ophthalmology; Future; Expected date: 02/22/2022    Hypertension associated with diabetes    Other orders  -     montelukast (SINGULAIR) 10 mg tablet; Take 1 tablet (10 mg total) by mouth every evening. For allergies and cough  Dispense: 30 tablet; Refill: 0  -     tamsulosin (FLOMAX) 0.4 mg Cap; Take 1 capsule (0.4 mg total) by mouth once daily. For urine flow  Dispense: 90 capsule; Refill: 0    Urinary complaints-trial of Flomax    Probable allergic rhinitis-use singular

## 2022-03-10 NOTE — TELEPHONE ENCOUNTER
No new care gaps identified.  Powered by [employer]. Reference number: [id number].   3/10/2022 12:34:50 PM CST

## 2022-03-14 RX ORDER — MONTELUKAST SODIUM 10 MG/1
10 TABLET ORAL NIGHTLY
Qty: 90 TABLET | Refills: 3 | Status: SHIPPED | OUTPATIENT
Start: 2022-03-14 | End: 2022-05-17

## 2022-03-14 NOTE — TELEPHONE ENCOUNTER
Refill Routing Note   Medication(s) are not appropriate for processing by [facility] for the following reason(s):      - Medication is a new start (<3 months)    [facility] action(s):  Defer       Medication Therapy Plan: New start; defer  --->Care Gap information included in message below if applicable.   Medication reconciliation completed: No   Automatic Epic Generated Protocol Data:        Requested Prescriptions   Pending Prescriptions Disp Refills    montelukast (SINGULAIR) 10 mg tablet [Pharmacy Med Name: MONTELUKAST SOD 10 MG TABLET] 90 tablet 3     Sig: Take 1 tablet (10 mg total) by mouth every evening. For allergies and cough       Pulmonology:  Leukotriene Inhibitors Passed - 3/10/2022 12:34 PM        Passed - Patient is at least 18 years old        Passed - Valid encounter within last 15 months     Recent Visits  Date Type Provider Dept   02/15/22 Office Visit Sam Barroso MD [facility]   10/15/21 Office Visit Sam Barroso MD [facility]   05/03/21 Office Visit Sam Barroso MD [facility]   11/03/20 Office Visit Sam Barroso MD [facility]   Showing recent visits within past 720 days and meeting all other requirements  Future Appointments  No visits were found meeting these conditions.  Showing future appointments within next 150 days and meeting all other requirements      Future Appointments              Tomorrow [facility] ENDOCRINE [facility] [address] [facility] [address], [address] [place]    In 1 week [facility], ENDOCRINE [address] - [facility] [address] Fl, [address]    In 2 months Sam Barroso MD [facility], [facility]    In 2 months LAB, SPECIMEN [facility] - Lab, [facility]    In 2 months [facility] [address] - [facility] [address] Fl, [address]                      Appointments  past 12m or future 3m with PCP    Date Provider   Last Visit   2/15/2022 Sam Barroso MD   Next Visit   5/17/2022  Sam Barroso MD   ED visits in past 90 days: 0        Note composed:1:19 PM 03/14/2022

## 2022-03-15 ENCOUNTER — CLINICAL SUPPORT (OUTPATIENT)
Dept: ENDOCRINOLOGY | Facility: CLINIC | Age: 77
End: 2022-03-15
Payer: MEDICARE

## 2022-03-15 NOTE — PROGRESS NOTES
"DIABETES EDUCATOR NOTE   PLACEMENT OF DEXCOM G6 PRO SENSOR  CONTINOUS GLUCOSE MONITORING SYSTEM (CGMS)     Patient is here in clinic today for placement of continuous glucose monitoring sensor.                Each patient verified that they were here for CGMS procedure ordered by their provider and that they have a working glucose meter and supplies at home.   Patient will be provided with a Dexcom G6 Pro sensor, transmitter, and a copy of the Continuous Glucose Monitoring Patient Log to fill out during the study.              A detailed  explanation of Continuous Glucose Monitoring was  provided. Patient informed that this is a blind procedure and that they will not actually see the blood sugar tracing in real time.    Instructed patient to check blood sugar using home glucometer and to record the following on provided patient log sheets:Blood sugar taken at home, Meals and snacks, Activity, and Diabetes medications taken and dosage               Patient was brought to a private location.  Site selected and prepared and allowed to dry. Glucose Transmitter Serial Number [id number]  was inserted to patient's abdomen.               The following forms  were given and reviewed in detail with patient and all questions answered.   · Continuous Glucose Monitoring Patient Log   · Dexcom G6 PRO Patient Handout "Blinded CGM Patient Handout"                 Instructions: Time: 15 min   Insertion of sensor:  Time: 5 minutes     "

## 2022-03-21 ENCOUNTER — CLINICAL SUPPORT (OUTPATIENT)
Dept: ENDOCRINOLOGY | Facility: CLINIC | Age: 77
End: 2022-03-21
Payer: MEDICARE

## 2022-03-21 DIAGNOSIS — E11.65 UNCONTROLLED TYPE 2 DIABETES MELLITUS WITH HYPERGLYCEMIA: ICD-10-CM

## 2022-03-21 PROCEDURE — 95251 CONT GLUC MNTR ANALYSIS I&R: CPT | Mod: S$GLB,,, | Performed by: NURSE PRACTITIONER

## 2022-03-21 PROCEDURE — 95250 CONT GLUC MNTR PHYS/QHP EQP: CPT | Mod: S$GLB,,, | Performed by: NURSE PRACTITIONER

## 2022-03-21 PROCEDURE — 95250 PR GLUCOSE MONITORING,72 HRS,SUB-Q SENSOR: ICD-10-PCS | Mod: S$GLB,,, | Performed by: NURSE PRACTITIONER

## 2022-03-21 PROCEDURE — 95251 PR GLUCOSE MONITOR, 72 HOUR, PHYS INTERP: ICD-10-PCS | Mod: S$GLB,,, | Performed by: NURSE PRACTITIONER

## 2022-03-23 RX ORDER — GLIPIZIDE 5 MG/1
TABLET ORAL
Qty: 140 TABLET | Refills: 11 | Status: SHIPPED | OUTPATIENT
Start: 2022-03-23 | End: 2022-06-09

## 2022-03-23 NOTE — PROGRESS NOTES
Please call patient and let him know  1. His continuous glucose monitor showed some high blood sugars after breakfast   2. Let's increase the glipizide at breakfast to 7.5 mg and continue the glipizide 5 mg at dinner  3. Continue levemir   4. Great job on trying to get your blood sugars better with diet.

## 2022-03-23 NOTE — PROGRESS NOTES
Continuous Glucose Sensor Report     Ordering Provider:  PRISCILLA Mitchell NP     Interpreting Provider: PRISCILLA Mitchell NP     Date of this outpatient clinic study: This sensor was put in place on 3/15/2022 and taken off on 3/21/2022       Reason for CGMS: Currently on anti-hyperglycemic therapy and failing to achieve glycemic goals.     01/31/22   Hemoglobin A1C External: 9.7%     Type of diabetes:2     Current diabetes medications and doses:   Levemir 10 units AM   Glipizide 5mg with breakfast and 5 mg with dinner      Blood glucose, food and exercise log filled out consistently: blood glucose was not documented. Food intake documented. No exercise.     ______________________________________________________________     Medication/Diet/Exercise-related patterns/events:     Medication adherence: Blood glucose declined appropriately with medications     Diet: He is consuming well balanced diet with protein and carbohydrates. Snacks on walnuts     Exercise patterns: N/A     ______________________________________________________________     Patterns/ Events:     Hypoglycemia: 0%   Possible precipitating events:     Hyperglycemia:  51%   Possible precipitating events: delayed blood glucose spike due to high fat meals; need for prandial coverage at lunch.   ________________________________________________________________     SUMMARY of KEY FINDINGS:       Average glucose: 195   Above 180 mg/dL: 50.6%  (Above 250 mg/dL: 16.1%)   Within  mg/dL: 49.4%   Below 70 mg/dL: 0%  (Below 54 mg/dL: 0%)       Jin Ngo is a 76 year old man with diagnosis of Type 2 diabetes complicated by hyperglycemia who is hesitant to change/add diabetic medications. He is having some prolonged postprandial hyperglycemia after breakfast. Overall, his blood glucose is well controlled for age.     Would recommend avoiding hypoglycemia.     RECOMMENDATIONS:   Diabetes education referral for comprehensive education visit.       Confirm medication  compliance - administration technique, timing of administration, administration site.   Medication Changes     Continue   Levemir 10 units AM   Glipizide 5mg with breakfast and 5 mg with dinner      OR   Continue   Levemir 10 units AM   And Change glipizide to 7.5 g with breakfast and 5 mg with dinner      Repeat CGM in future if needed       Will call with below    Please call patient and let him know  1. His continuous glucose monitor showed some high blood sugars after breakfast   2. Let's increase the glipizide at breakfast to 7.5 mg and continue the glipizide 5 mg at dinner  3. Continue levemir   4. Great job on trying to get your blood sugars better with diet.

## 2022-03-25 ENCOUNTER — TELEPHONE (OUTPATIENT)
Dept: ENDOCRINOLOGY | Facility: CLINIC | Age: 77
End: 2022-03-25
Payer: MEDICARE

## 2022-03-30 NOTE — TELEPHONE ENCOUNTER
Care Due:                  Date            Visit Type   Department     Provider  --------------------------------------------------------------------------------                                EP -                              PRIMARY      [facility] FAMILY  Last Visit: 02-      CARE ([facility])   MEDICINE       Sam Barroso                               -                              PRIMARY      [facility] FAMILY  Next Visit: 05-      CARE ([facility])   MEDICINE       Sam Barroso                                                            Last  Test          Frequency    Reason                     Performed    Due Date  --------------------------------------------------------------------------------    Uric Acid...  12 months..  allopurinoL..............  06- 06-    Powered by [employer]. Reference number: [id number].   3/30/2022 3:01:15 PM CDT

## 2022-03-31 RX ORDER — METOPROLOL TARTRATE 25 MG/1
TABLET, FILM COATED ORAL
Qty: 180 TABLET | Refills: 3 | Status: SHIPPED | OUTPATIENT
Start: 2022-03-31 | End: 2022-08-20

## 2022-03-31 RX ORDER — ALLOPURINOL 300 MG/1
TABLET ORAL
Qty: 90 TABLET | Refills: 0 | Status: SHIPPED | OUTPATIENT
Start: 2022-03-31 | End: 2022-08-30

## 2022-03-31 RX ORDER — FENOFIBRATE 160 MG/1
TABLET ORAL
Qty: 90 TABLET | Refills: 1 | Status: SHIPPED | OUTPATIENT
Start: 2022-03-31 | End: 2022-08-16

## 2022-03-31 RX ORDER — CLOPIDOGREL BISULFATE 75 MG/1
TABLET ORAL
Qty: 90 TABLET | Refills: 1 | Status: SHIPPED | OUTPATIENT
Start: 2022-03-31 | End: 2022-08-16

## 2022-03-31 NOTE — TELEPHONE ENCOUNTER
Problem: [facility] Patient Care Overview (Adult)  Goal: Plan of Care Review  Outcome: Ongoing (interventions implemented as appropriate)    12/01/17 9123   Coping/Psychosocial Response Interventions   Plan Of Care Reviewed With patient   Coping/Psychosocial   Patient Agreement with Plan of Care agrees   Patient Care Overview   Progress improving   Outcome Evaluation   Outcome Summary/Follow up Plan Attended and participated in groups and unit activities. Denies suicidal/homicial thoughts. Interacting well with staff and peers. Following unit rules         Problem:  Overarching Goals  Goal: Adheres to Safety Considerations for Self and Others  Outcome: Ongoing (interventions implemented as appropriate)  Goal: Optimized Coping Skills in Response to Life Stressors  Outcome: Ongoing (interventions implemented as appropriate)  Goal: Develops/Participates in Therapeutic Ranger to Support Successful Transition  Outcome: Ongoing (interventions implemented as appropriate)       Provider Staff:     Action is required for this patient.   Please see care gap opportunities below in Care Due Message.     Thanks!  [facility]     Appointments      Date Provider   Last Visit   2/15/2022 Sam Barroso MD   Next Visit   5/17/2022 Sam Barroso MD     Note composed:1:37 PM 03/31/2022

## 2022-03-31 NOTE — TELEPHONE ENCOUNTER
Refill Authorization Note   Jin Ngo  is requesting a refill authorization.  Brief Assessment and Rationale for Refill:  Approve    -Medication-Related Problems Identified: Requires labs  Medication Therapy Plan:       Medication Reconciliation Completed: No   Comments:   --->Care Gap information included below if applicable.       Requested Prescriptions   Pending Prescriptions Disp Refills    fenofibrate 160 MG Tab [Pharmacy Med Name: FENOFIBRATE 160 MG Tablet] 90 tablet 1     Sig: TAKE 1 TABLET EVERY DAY       Cardiovascular:  Antilipid - Fibric Acid Derivatives Passed - 3/31/2022  1:34 PM        Passed - Patient is at least 18 years old        Passed - Valid encounter within last 15 months     Recent Visits  Date Type Provider Dept   02/15/22 Office Visit Sam Barroso MD [facility] Family Medicine   10/15/21 Office Visit Sam Barroso MD [facility]   05/03/21 Office Visit Sam Barroso MD [facility] Family Medicine   11/03/20 Office Visit Sam Barroso MD [facility] Family Medicine   Showing recent visits within past 720 days and meeting all other requirements  Future Appointments  No visits were found meeting these conditions.  Showing future appointments within next 150 days and meeting all other requirements      Future Appointments              In 1 month Sam Barroso MD [facility] - Family Medicine, [place] [facility]    In 2 months LAB, [facility] - Lab, [facility]    In 2 months [facility] - Endo Diabetes 6th Fl, [place]                Passed - Matches previous order       Previous Authorizing Provider: Sam Barroso MD (fenofibrate 160 MG Tab)  Previous Authorizing Provider: Sam Barroso MD (clopidogreL (PLAVIX) 75 mg tablet)  Previous Authorizing Provider: Sam Barroso MD (metoprolol tartrate (LOPRESSOR) 25 MG tablet)  Previous Authorizing Provider: Sam Barroso MD (allopurinoL (ZYLOPRIM) 300 MG  tablet)  Previous Pharmacy: [facility] [place], [place] - [address]  Previous Pharmacy: [facility] [place], [place] [address]   Previous Pharmacy: [facility] [place], [place] - [address]   Previous Pharmacy: [facility] [address]             Passed - No ED/Hospital visits since last PCP visit     Last PCP Visit: 2/15/2022 Last Admission: 12/7/2021 Last ED Visit: 10/6/2021          Passed - ALT is 119 or below and within 360 days     ALT   Date Value Ref Range Status   01/31/2022 24 10 - 44 U/L Final   10/15/2021 21 10 - 44 U/L Final   10/11/2021 22 10 - 44 U/L Final              Passed - AST is 131 or below and within 360 days     AST   Date Value Ref Range Status   01/31/2022 30 15 - 46 U/L Final   10/15/2021 33 15 - 46 U/L Final   10/11/2021 31 15 - 46 U/L Final              Passed - Cr is 1.39 or below and within 360 days     Lab Results   Component Value Date    CREATININE 1.31 01/31/2022    CREATININE 1.60 (H) 10/15/2021    CREATININE 1.32 10/11/2021              Passed - eGFR is 30 or above and within 360 days     Lab Results   Component Value Date    EGFRNONAA 52.5 (A) 01/31/2022    EGFRNONAA 41.5 (A) 10/15/2021    EGFRNONAA 52.4 (A) 10/11/2021                Passed - WBC within 360 days     WBC   Date Value Ref Range Status   10/06/2021 5.88 3.90 - 12.70 K/uL Final   06/17/2021 5.02 3.90 - 12.70 K/uL Final   03/22/2021 6.14 3.90 - 12.70 K/uL Final              Passed - Total Cholesterol within 360 days     Lab Results   Component Value Date    CHOL 128 01/31/2022    CHOL 135 09/29/2021    CHOL 117 (L) 06/30/2020              Passed - LDL within 360 days     LDL Cholesterol   Date Value Ref Range Status   01/31/2022 68.4 63.0 - 159.0 mg/dL Final     Comment:     The National Cholesterol Education Program (NCEP) has set the  following guidelines (reference values) for LDL  Cholesterol:  Optimal.......................<130 mg/dL  Borderline High...............130-159 mg/dL  High..........................160-189 mg/dL  Very High.....................>190 mg/dL              Passed - HDL within 360 days     HDL   Date Value Ref Range Status   01/31/2022 23 (L) 40 - 75 mg/dL Final     Comment:     The National Cholesterol Education Program (NCEP) has set the  following guidelines (reference values) for HDL Cholesterol:  Low...............<40 mg/dL  Optimal...........>60 mg/dL              Passed - Triglycerides within 360 days     Lab Results   Component Value Date    TRIG 183 (H) 01/31/2022    TRIG 146 09/29/2021    TRIG 150 06/30/2020                clopidogreL (PLAVIX) 75 mg tablet [Pharmacy Med Name: CLOPIDOGREL 75 MG Tablet] 90 tablet 1     Sig: TAKE 1 TABLET EVERY DAY       Hematology: Antiplatelets - clopidogrel Passed - 3/31/2022  1:34 PM        Passed - Patient is at least 18 years old        Passed - No contraindicated proton pump inhibitors on active medication list        Passed - Valid encounter within last 15 months     Recent Visits  Date Type Provider Dept   02/15/22 Office Visit Sam Barroso MD [facility] Family Medicine   10/15/21 Office Visit Sam Barroso MD [facility]   05/03/21 Office Visit Sam Barroso MD [facility] Family Medicine   11/03/20 Office Visit Sam Barroso MD [facility] Family Medicine   Showing recent visits within past 720 days and meeting all other requirements  Future Appointments  No visits were found meeting these conditions.  Showing future appointments within next 150 days and meeting all other requirements      Future Appointments              In 1 month Sam Barroso MD [facility] - Family Medicine, [place] [facility]    In 2 months LAB, [facility] - Lab, [facility]    In 2 months [facility] - Endo Diabetes 6th Fl, [place]                Passed - Matches previous order        Previous Authorizing Provider: Sam Barroso MD (fenofibrate 160 MG Tab)  Previous Authorizing Provider: Sam Barroso MD (clopidogreL (PLAVIX) 75 mg tablet)  Previous Authorizing Provider: Sam Barroso MD (metoprolol tartrate (LOPRESSOR) 25 MG tablet)  Previous Authorizing Provider: Sam Barroso MD (allopurinoL (ZYLOPRIM) 300 MG tablet)  Previous Pharmacy: [facility] [address]  [facility] [address]  [facility] [address]  Previous Pharmacy: [facility] [address]            Passed - No ED/Hospital visits since last PCP visit     Last PCP Visit: 2/15/2022 Last Admission: 12/7/2021 Last ED Visit: 10/6/2021          Passed - HCT is between 33 and 75 and within 360 days     Hematocrit   Date Value Ref Range Status   10/06/2021 40.1 40.0 - 54.0 % Final   06/17/2021 44.2 40.0 - 54.0 % Final   03/22/2021 43.3 40.0 - 54.0 % Final              Passed - HGB is 11 or above and within 360 days     Hemoglobin   Date Value Ref Range Status   10/06/2021 12.8 (L) 14.0 - 18.0 g/dL Final   06/17/2021 14.1 14.0 - 18.0 g/dL Final   03/22/2021 13.8 (L) 14.0 - 18.0 g/dL Final              Passed - PLT in normal range and within 360 days     Platelets   Date Value Ref Range Status   10/06/2021 231 150 - 450 K/uL Final   06/17/2021 222 150 - 450 K/uL Final   03/22/2021 217 150 - 350 K/uL Final                metoprolol tartrate (LOPRESSOR) 25 MG tablet [Pharmacy Med Name: METOPROLOL TARTRATE 25 MG Tablet] 180 tablet 3     Sig: TAKE 1 TABLET TWICE DAILY       Cardiovascular:  Beta Blockers Passed - 3/31/2022  1:34 PM        Passed - Patient is at least 18 years old        Passed - Last BP in normal range within 360 days     BP Readings from Last 3 Encounters:   02/15/22 114/60   02/07/22  115/80   01/11/22 (!) 159/91               Passed - Last Heart Rate in normal range within 360 days     Pulse Readings from Last 1 Encounters:   02/15/22 (!) 58              Passed - Valid encounter within last 15 months     Recent Visits  Date Type Provider Dept   02/15/22 Office Visit Sam Barroso MD [facility] Family Medicine   10/15/21 Office Visit Sam Barroso MD [facility]   05/03/21 Office Visit Sam Barroso MD [facility] Family Medicine   11/03/20 Office Visit Sam Barroso MD [facility] Family Medicine   Showing recent visits within past 720 days and meeting all other requirements  Future Appointments  No visits were found meeting these conditions.  Showing future appointments within next 150 days and meeting all other requirements      Future Appointments              In 1 month Sam Barroso MD [facility] - Family Medicine, [place] [facility]    In 2 months LAB, [facility] [place] [facility], [facility] [place]    In 2 months Ester Mitchell, [facility] - Endo Diabetes 6th Fl, [place]                Passed - Matches previous order       Previous Authorizing Provider: Sam Barroso MD (fenofibrate 160 MG Tab)  Previous Authorizing Provider: Sam Barroso MD (clopidogreL (PLAVIX) 75 mg tablet)  Previous Authorizing Provider: Sam Barroso MD (metoprolol tartrate (LOPRESSOR) 25 MG tablet)  Previous Authorizing Provider: Sam Barroso MD (allopurinoL (ZYLOPRIM) 300 MG tablet)  Previous Pharmacy: [facility] [place] [address]  Previous Pharmacy: [facility] [place] [address]  Previous Pharmacy: [facility] [place] [phone]  Previous Pharmacy: [facility] [place] [address]            Passed - No ED/Hospital visits since last PCP visit     Last PCP Visit: 2/15/2022 Last Admission: 12/7/2021 Last ED  Visit: 10/6/2021            allopurinoL (ZYLOPRIM) 300 MG tablet [Pharmacy Med Name: ALLOPURINOL 300 MG Tablet] 90 tablet 0     Sig: TAKE 1 TABLET EVERY DAY       Endocrinology:  Gout Agents - allopurinol Passed - 3/31/2022  1:34 PM        Passed - Patient is at least 18 years old        Passed - Valid encounter within last 15 months     Recent Visits  Date Type Provider Dept   02/15/22 Office Visit Sam Barroso MD [facility] Family Medicine   10/15/21 Office Visit Sam Barroso MD [facility]   05/03/21 Office Visit Sam Barroso MD [facility] Family Medicine   11/03/20 Office Visit Sam Barroso MD [facility] Family Medicine   Showing recent visits within past 720 days and meeting all other requirements  Future Appointments  No visits were found meeting these conditions.  Showing future appointments within next 150 days and meeting all other requirements      Future Appointments              In 1 month Sam Barroso MD [facility], [place] [facility]    In 2 months LAB, [facility] - Lab, [facility]    In 2 months [facility] - Endo Diabetes 6th Fl, [place]                Passed - Matches previous order       Previous Authorizing Provider: Sam Barroso MD (fenofibrate 160 MG Tab)  Previous Authorizing Provider: Sam Barroso MD (clopidogreL (PLAVIX) 75 mg tablet)  Previous Authorizing Provider: Sam Barroso MD (metoprolol tartrate (LOPRESSOR) 25 MG tablet)  Previous Authorizing Provider: Sam Barroso MD (allopurinoL (ZYLOPRIM) 300 MG tablet)  Previous Pharmacy: [facility] [place] [id number]  Previous Pharmacy: [facility] [place] [address] [address]  Previous Pharmacy: [facility] [place] [address]  Previous Pharmacy: [facility] [place] [address]             Passed - No ED/Hospital visits since last PCP visit     Last PCP Visit: 2/15/2022 Last Admission: 12/7/2021 Last ED Visit: 10/6/2021          Passed - Uric Acid within 360 days     Uric Acid   Date Value Ref Range Status   06/17/2021 4.5 3.4 - 7.0 mg/dL Final   06/30/2020 4.6 3.4 - 7.0 mg/dL Final   05/21/2019 5.3 3.4 - 7.0 mg/dL Final              Passed - Cr is 1.39 or below and within 360 days     Lab Results   Component Value Date    CREATININE 1.31 01/31/2022    CREATININE 1.60 (H) 10/15/2021    CREATININE 1.32 10/11/2021              Passed - WBC within 360 days     WBC   Date Value Ref Range Status   10/06/2021 5.88 3.90 - 12.70 K/uL Final   06/17/2021 5.02 3.90 - 12.70 K/uL Final   03/22/2021 6.14 3.90 - 12.70 K/uL Final              Passed - RBC within 360 days     RBC   Date Value Ref Range Status   10/06/2021 4.31 (L) 4.60 - 6.20 M/uL Final   06/17/2021 4.72 4.60 - 6.20 M/uL Final   03/22/2021 4.63 4.60 - 6.20 M/uL Final              Passed - HGB within 360 days     Hemoglobin   Date Value Ref Range Status   10/06/2021 12.8 (L) 14.0 - 18.0 g/dL Final   06/17/2021 14.1 14.0 - 18.0 g/dL Final   03/22/2021 13.8 (L) 14.0 - 18.0 g/dL Final              Passed - HCT within 360 days     Hematocrit   Date Value Ref Range Status   10/06/2021 40.1 40.0 - 54.0 % Final   06/17/2021 44.2 40.0 - 54.0 % Final   03/22/2021 43.3 40.0 - 54.0 % Final              Passed - PLT within 360 days     Platelets   Date Value Ref Range Status   10/06/2021 231 150 - 450 K/uL Final   06/17/2021 222 150 - 450 K/uL Final   03/22/2021 217 150 - 350 K/uL Final              Passed - ALT is 131 or below and within 360 days     ALT   Date Value Ref Range Status   01/31/2022 24 10 - 44 U/L Final   10/15/2021 21 10 - 44 U/L Final   10/11/2021 22 10 - 44 U/L Final              Passed - AST is 119 or below and within 360 days     AST   Date Value Ref Range Status   01/31/2022 30 15 - 46 U/L Final   10/15/2021 33 15 - 46 U/L Final   10/11/2021  31 15 - 46 U/L Final              Passed - eGFR within 360 days     Lab Results   Component Value Date    EGFRNONAA 52.5 (A) 01/31/2022    EGFRNONAA 41.5 (A) 10/15/2021    EGFRNONAA 52.4 (A) 10/11/2021                    Appointments  past 12m or future 3m with PCP    Date Provider   Last Visit   2/15/2022 Sam Barroso MD   Next Visit   5/17/2022 Sam Barroso MD   ED visits in past 90 days: 0     Note composed:1:37 PM 03/31/2022

## 2022-04-07 RX ORDER — APIXABAN 5 MG/1
TABLET, FILM COATED ORAL
Qty: 60 TABLET | Refills: 2 | Status: SHIPPED | OUTPATIENT
Start: 2022-04-07 | End: 2022-07-04

## 2022-05-14 NOTE — TELEPHONE ENCOUNTER
No new care gaps identified.  [facility] Embedded Care Gaps. Reference number: [id number]. 5/14/2022   12:15:03 AM CDT

## 2022-05-15 RX ORDER — TAMSULOSIN HYDROCHLORIDE 0.4 MG/1
0.4 CAPSULE ORAL DAILY
Qty: 90 CAPSULE | Refills: 0 | Status: SHIPPED | OUTPATIENT
Start: 2022-05-15 | End: 2022-08-12

## 2022-05-15 NOTE — TELEPHONE ENCOUNTER
Refill Routing Note   Medication(s) are not appropriate for processing by [facility] for the following reason(s):      - Medication is a new start (<3 months)    [facility] action(s):  Defer       Medication Therapy Plan: New start(2/15/22); Defer to PCP  Medication reconciliation completed: No     Appointments  past 12m or future 3m with PCP    Date Provider   Last Visit   2/15/2022 Sam Barroso MD   Next Visit   5/17/2022 Sam Barroso MD   ED visits in past 90 days: 0        Note composed:11:10 PM 05/14/2022

## 2022-05-17 ENCOUNTER — OFFICE VISIT (OUTPATIENT)
Dept: FAMILY MEDICINE | Facility: CLINIC | Age: 77
End: 2022-05-17
Payer: MEDICARE

## 2022-05-17 VITALS
SYSTOLIC BLOOD PRESSURE: 122 MMHG | BODY MASS INDEX: 29.81 KG/M2 | HEART RATE: 56 BPM | TEMPERATURE: 98 F | OXYGEN SATURATION: 96 % | HEIGHT: 69 IN | DIASTOLIC BLOOD PRESSURE: 52 MMHG | WEIGHT: 201.25 LBS

## 2022-05-17 DIAGNOSIS — I65.23 CAROTID STENOSIS, ASYMPTOMATIC, BILATERAL: ICD-10-CM

## 2022-05-17 DIAGNOSIS — J32.9 SINUSITIS, UNSPECIFIED CHRONICITY, UNSPECIFIED LOCATION: ICD-10-CM

## 2022-05-17 DIAGNOSIS — I10 PRIMARY HYPERTENSION: ICD-10-CM

## 2022-05-17 DIAGNOSIS — R14.0 BLOATING: ICD-10-CM

## 2022-05-17 DIAGNOSIS — K59.00 CONSTIPATION, UNSPECIFIED CONSTIPATION TYPE: ICD-10-CM

## 2022-05-17 DIAGNOSIS — I25.10 CORONARY ARTERY DISEASE INVOLVING NATIVE HEART WITHOUT ANGINA PECTORIS, UNSPECIFIED VESSEL OR LESION TYPE: ICD-10-CM

## 2022-05-17 DIAGNOSIS — R05.9 COUGH: ICD-10-CM

## 2022-05-17 DIAGNOSIS — R79.89 ELEVATED SERUM CREATININE: ICD-10-CM

## 2022-05-17 PROCEDURE — 1159F PR MEDICATION LIST DOCUMENTED IN MEDICAL RECORD: ICD-10-PCS | Mod: CPTII,S$GLB,, | Performed by: FAMILY MEDICINE

## 2022-05-17 PROCEDURE — 1125F PR PAIN SEVERITY QUANTIFIED, PAIN PRESENT: ICD-10-PCS | Mod: CPTII,S$GLB,, | Performed by: FAMILY MEDICINE

## 2022-05-17 PROCEDURE — 3074F SYST BP LT 130 MM HG: CPT | Mod: CPTII,S$GLB,, | Performed by: FAMILY MEDICINE

## 2022-05-17 PROCEDURE — 3288F PR FALLS RISK ASSESSMENT DOCUMENTED: ICD-10-PCS | Mod: CPTII,S$GLB,, | Performed by: FAMILY MEDICINE

## 2022-05-17 PROCEDURE — 3078F DIAST BP <80 MM HG: CPT | Mod: CPTII,S$GLB,, | Performed by: FAMILY MEDICINE

## 2022-05-17 PROCEDURE — 99214 OFFICE O/P EST MOD 30 MIN: CPT | Mod: S$GLB,,, | Performed by: FAMILY MEDICINE

## 2022-05-17 PROCEDURE — 3288F FALL RISK ASSESSMENT DOCD: CPT | Mod: CPTII,S$GLB,, | Performed by: FAMILY MEDICINE

## 2022-05-17 PROCEDURE — 3074F PR MOST RECENT SYSTOLIC BLOOD PRESSURE < 130 MM HG: ICD-10-PCS | Mod: CPTII,S$GLB,, | Performed by: FAMILY MEDICINE

## 2022-05-17 PROCEDURE — 1101F PT FALLS ASSESS-DOCD LE1/YR: CPT | Mod: CPTII,S$GLB,, | Performed by: FAMILY MEDICINE

## 2022-05-17 PROCEDURE — 3078F PR MOST RECENT DIASTOLIC BLOOD PRESSURE < 80 MM HG: ICD-10-PCS | Mod: CPTII,S$GLB,, | Performed by: FAMILY MEDICINE

## 2022-05-17 PROCEDURE — 99214 PR OFFICE/OUTPT VISIT, EST, LEVL IV, 30-39 MIN: ICD-10-PCS | Mod: S$GLB,,, | Performed by: FAMILY MEDICINE

## 2022-05-17 PROCEDURE — 1159F MED LIST DOCD IN RCRD: CPT | Mod: CPTII,S$GLB,, | Performed by: FAMILY MEDICINE

## 2022-05-17 PROCEDURE — 1125F AMNT PAIN NOTED PAIN PRSNT: CPT | Mod: CPTII,S$GLB,, | Performed by: FAMILY MEDICINE

## 2022-05-17 PROCEDURE — 1101F PR PT FALLS ASSESS DOC 0-1 FALLS W/OUT INJ PAST YR: ICD-10-PCS | Mod: CPTII,S$GLB,, | Performed by: FAMILY MEDICINE

## 2022-05-17 RX ORDER — DOXYCYCLINE HYCLATE 100 MG
100 TABLET ORAL 2 TIMES DAILY
Qty: 42 TABLET | Refills: 0 | Status: SHIPPED | OUTPATIENT
Start: 2022-05-17 | End: 2022-06-04

## 2022-05-17 NOTE — PROGRESS NOTES
" Patient ID: Jin Ngo is a 76 y.o. male.    Chief Complaint: Follow-up, Back Pain, Abdominal Pain, and Cough    HPI      Jin Ngo is a 76 y.o. male complain of back pain.  Pain in his lower back radiating inferiorly.  No loss of strength or sensation.  No loss of urine.  However urine stream is decreased.  Complains of abdominal pain bloating and fullness.  Especially needing meals.  Change in bowel habits with decreased frequency.  Complains of cough without productive sputum.  No fever chills.  Chronic in nature.    Vitals:    05/17/22 1402   BP: (!) 122/52   BP Location: Right arm   Patient Position: Sitting   Pulse: (!) 56   Temp: 98.2 °F (36.8 °C)   TempSrc: Oral   SpO2: 96%   Weight: 91.3 kg (201 lb 4.5 oz)   Height: 5' 9" (1.753 m)            Review of Symptoms      Physical Exam    Constitutional:  Oriented to person, place, and time.appears well-developed and well-nourished.  No distress.      HENT  Head: Normocephalic and atraumatic  Right Ear: External ear normal.   Left Ear: External ear normal.   Nose: External nose normal.   Mouth:  Moist mucus membranes.    Eyes:  Conjunctivae are normal. Right eye exhibits no discharge.  Left eye exhibits no discharge. No scleral icterus.  No periorbital edema    Cardiovascular:  Regular rate and rhythm with normal S1 and S2     Pulmonary/Chest:   Clear to auscultation bilaterally without wheezes, rhonchi or rales      Musculoskeletal:  No edema. No obvious deformity No wasting       Neurological:  Alert and oriented to person, place, and time.   Coordination normal.     Skin:   Skin is warm and dry.  No diaphoresis.   No rash noted.     Psychiatric: Normal mood and affect. Behavior is normal.  Judgment and thought content normal.     Complete Blood Count  Lab Results   Component Value Date    RBC 4.31 (L) 10/06/2021    HGB 12.8 (L) 10/06/2021    HCT 40.1 10/06/2021    MCV 93 10/06/2021    MCH 29.7 10/06/2021    MCHC 31.9 (L) 10/06/2021    RDW 13.9 " 10/06/2021     10/06/2021    MPV 10.4 10/06/2021    GRAN 3.4 10/06/2021    GRAN 58.2 10/06/2021    LYMPH 1.4 10/06/2021    LYMPH 24.1 10/06/2021    MONO 0.8 10/06/2021    MONO 12.9 10/06/2021    EOS 0.2 10/06/2021    BASO 0.05 10/06/2021    EOSINOPHIL 3.6 10/06/2021    BASOPHIL 0.9 10/06/2021    DIFFMETHOD Automated 10/06/2021       Comprehensive Metabolic Panel  Lab Results   Component Value Date     (H) 05/18/2022    BUN 26 (H) 05/18/2022    CREATININE 1.50 (H) 05/18/2022     05/18/2022    K 4.9 05/18/2022     05/18/2022    PROT 7.9 01/31/2022    ALBUMIN 4.5 01/31/2022    BILITOT 0.6 01/31/2022    AST 30 01/31/2022    ALKPHOS 49 01/31/2022    CO2 25 05/18/2022    ALT 24 01/31/2022    ANIONGAP 14 05/18/2022    EGFRNONAA 44.6 (A) 05/18/2022    ESTGFRAFRICA 51.5 (A) 05/18/2022       TSH  No results found for: TSH    Assessment / Plan:      ICD-10-CM ICD-9-CM   1. Uncontrolled type 2 diabetes mellitus with diabetic neuropathy, without long-term current use of insulin  E11.40 250.62    E11.65 357.2   2. Elevated serum creatinine  R79.89 790.99   3. Carotid stenosis, asymptomatic, bilateral  I65.23 433.10     433.30   4. Coronary artery disease involving native heart without angina pectoris, unspecified vessel or lesion type  I25.10 414.01   5. Primary hypertension  I10 401.9   6. Sinusitis, unspecified chronicity, unspecified location  J32.9 473.9   7. Cough  R05.9 786.2   8. Constipation, unspecified constipation type  K59.00 564.00   9. Bloating  R14.0 787.3     Uncontrolled type 2 diabetes mellitus with diabetic neuropathy, without long-term current use of insulin    Elevated serum creatinine    Carotid stenosis, asymptomatic, bilateral    Coronary artery disease involving native heart without angina pectoris, unspecified vessel or lesion type  -     X-Ray Chest PA And Lateral; Future; Expected date: 05/17/2022    Primary hypertension    Sinusitis, unspecified chronicity, unspecified  location  -     Ambulatory referral/consult to ENT; Future; Expected date: 05/24/2022    Cough  -     Ambulatory referral/consult to ENT; Future; Expected date: 05/24/2022  -     X-Ray Chest PA And Lateral; Future; Expected date: 05/17/2022    Constipation, unspecified constipation type  -     X-Ray Abdomen Flat And Erect; Future; Expected date: 05/17/2022    Bloating  -     X-Ray Abdomen Flat And Erect; Future; Expected date: 05/17/2022    Other orders  -     doxycycline (VIBRA-TABS) 100 MG tablet; Take 1 tablet (100 mg total) by mouth 2 (two) times daily.  Dispense: 42 tablet; Refill: 0

## 2022-05-18 ENCOUNTER — HOSPITAL ENCOUNTER (OUTPATIENT)
Dept: RADIOLOGY | Facility: HOSPITAL | Age: 77
Discharge: HOME | End: 2022-05-18
Attending: FAMILY MEDICINE
Payer: MEDICARE

## 2022-05-18 DIAGNOSIS — R14.0 BLOATING: ICD-10-CM

## 2022-05-18 DIAGNOSIS — K59.00 CONSTIPATION, UNSPECIFIED CONSTIPATION TYPE: ICD-10-CM

## 2022-05-18 DIAGNOSIS — I25.10 CORONARY ARTERY DISEASE INVOLVING NATIVE HEART WITHOUT ANGINA PECTORIS, UNSPECIFIED VESSEL OR LESION TYPE: ICD-10-CM

## 2022-05-18 DIAGNOSIS — R05.9 COUGH: ICD-10-CM

## 2022-05-18 PROCEDURE — 71046 X-RAY EXAM CHEST 2 VIEWS: CPT | Mod: TC,FY,PO

## 2022-05-18 PROCEDURE — 74019 RADEX ABDOMEN 2 VIEWS: CPT | Mod: TC,FY,PO

## 2022-05-23 ENCOUNTER — TELEPHONE (OUTPATIENT)
Dept: FAMILY MEDICINE | Facility: CLINIC | Age: 77
End: 2022-05-23
Payer: MEDICARE

## 2022-05-23 DIAGNOSIS — G89.29 CHRONIC MIDLINE LOW BACK PAIN WITHOUT SCIATICA: Primary | ICD-10-CM

## 2022-05-23 DIAGNOSIS — M54.50 CHRONIC MIDLINE LOW BACK PAIN WITHOUT SCIATICA: Primary | ICD-10-CM

## 2022-05-24 NOTE — TELEPHONE ENCOUNTER
Your abdominal x-ray shows constipation-please take laxative to make sure that your abdomen is cleared of most of the stool.    Also it appears she may have a fracture of your lumbar spine.  This may be contributing to the pain in your back.  I have ordered x-rays to repeat that specifically look at your lumbar spine.    Make sure your water intake is adequate-your creatinine or kidney function is slightly decreased.

## 2022-05-24 NOTE — TELEPHONE ENCOUNTER
Pt's wife has been notified and verbalized understanding of lab results. Appt for l spine xray has been scheduled.

## 2022-05-25 ENCOUNTER — HOSPITAL ENCOUNTER (OUTPATIENT)
Dept: RADIOLOGY | Facility: HOSPITAL | Age: 77
Discharge: HOME | End: 2022-05-25
Attending: FAMILY MEDICINE
Payer: MEDICARE

## 2022-05-25 DIAGNOSIS — M54.50 CHRONIC MIDLINE LOW BACK PAIN WITHOUT SCIATICA: ICD-10-CM

## 2022-05-25 DIAGNOSIS — G89.29 CHRONIC MIDLINE LOW BACK PAIN WITHOUT SCIATICA: ICD-10-CM

## 2022-05-25 DIAGNOSIS — S32.020B: Primary | ICD-10-CM

## 2022-05-25 PROCEDURE — 72100 X-RAY EXAM L-S SPINE 2/3 VWS: CPT | Mod: TC,FY,PO

## 2022-06-03 ENCOUNTER — HOSPITAL ENCOUNTER (OUTPATIENT)
Dept: RADIOLOGY | Facility: HOSPITAL | Age: 77
Discharge: HOME | End: 2022-06-03
Attending: FAMILY MEDICINE
Payer: MEDICARE

## 2022-06-03 DIAGNOSIS — S32.020B: ICD-10-CM

## 2022-06-03 PROCEDURE — 77080 DXA BONE DENSITY AXIAL: CPT | Mod: TC,PO

## 2022-06-04 RX ORDER — DOXYCYCLINE HYCLATE 100 MG
TABLET ORAL
Qty: 42 TABLET | Refills: 0 | Status: SHIPPED | OUTPATIENT
Start: 2022-06-04 | End: 2022-06-26

## 2022-06-06 ENCOUNTER — OFFICE VISIT (OUTPATIENT)
Dept: PAIN MEDICINE | Facility: CLINIC | Age: 77
End: 2022-06-06
Payer: MEDICARE

## 2022-06-06 VITALS — HEART RATE: 60 BPM | DIASTOLIC BLOOD PRESSURE: 74 MMHG | SYSTOLIC BLOOD PRESSURE: 151 MMHG

## 2022-06-06 DIAGNOSIS — R53.81 PHYSICAL DECONDITIONING: ICD-10-CM

## 2022-06-06 DIAGNOSIS — S32.020B: Primary | ICD-10-CM

## 2022-06-06 DIAGNOSIS — R54 AGE-RELATED PHYSICAL DEBILITY: ICD-10-CM

## 2022-06-06 PROCEDURE — 99999 PR PBB SHADOW E&M-EST. PATIENT-LVL IV: ICD-10-PCS | Mod: PBBFAC,,, | Performed by: PAIN MEDICINE

## 2022-06-06 PROCEDURE — 3078F PR MOST RECENT DIASTOLIC BLOOD PRESSURE < 80 MM HG: ICD-10-PCS | Mod: CPTII,S$GLB,, | Performed by: PAIN MEDICINE

## 2022-06-06 PROCEDURE — 99204 PR OFFICE/OUTPT VISIT, NEW, LEVL IV, 45-59 MIN: ICD-10-PCS | Mod: S$GLB,,, | Performed by: PAIN MEDICINE

## 2022-06-06 PROCEDURE — 99999 PR PBB SHADOW E&M-EST. PATIENT-LVL IV: CPT | Mod: PBBFAC,,, | Performed by: PAIN MEDICINE

## 2022-06-06 PROCEDURE — 3078F DIAST BP <80 MM HG: CPT | Mod: CPTII,S$GLB,, | Performed by: PAIN MEDICINE

## 2022-06-06 PROCEDURE — 3077F PR MOST RECENT SYSTOLIC BLOOD PRESSURE >= 140 MM HG: ICD-10-PCS | Mod: CPTII,S$GLB,, | Performed by: PAIN MEDICINE

## 2022-06-06 PROCEDURE — 1159F PR MEDICATION LIST DOCUMENTED IN MEDICAL RECORD: ICD-10-PCS | Mod: CPTII,S$GLB,, | Performed by: PAIN MEDICINE

## 2022-06-06 PROCEDURE — 1125F AMNT PAIN NOTED PAIN PRSNT: CPT | Mod: CPTII,S$GLB,, | Performed by: PAIN MEDICINE

## 2022-06-06 PROCEDURE — 1125F PR PAIN SEVERITY QUANTIFIED, PAIN PRESENT: ICD-10-PCS | Mod: CPTII,S$GLB,, | Performed by: PAIN MEDICINE

## 2022-06-06 PROCEDURE — 3077F SYST BP >= 140 MM HG: CPT | Mod: CPTII,S$GLB,, | Performed by: PAIN MEDICINE

## 2022-06-06 PROCEDURE — 99204 OFFICE O/P NEW MOD 45 MIN: CPT | Mod: S$GLB,,, | Performed by: PAIN MEDICINE

## 2022-06-06 PROCEDURE — 1159F MED LIST DOCD IN RCRD: CPT | Mod: CPTII,S$GLB,, | Performed by: PAIN MEDICINE

## 2022-06-06 RX ORDER — HYDROCODONE BITARTRATE AND ACETAMINOPHEN 7.5; 325 MG/1; MG/1
1 TABLET ORAL
Qty: 20 TABLET | Refills: 0 | Status: SHIPPED | OUTPATIENT
Start: 2022-06-06 | End: 2023-05-01

## 2022-06-06 NOTE — PROGRESS NOTES
[facility]    Referred by: Dr. Sam Barroso   Reason for referral: Compression fracture of L2 lumbar vertebra, open, initial encounter     CC: No chief complaint on file.    Subjective:   Jin Ngo is a 76 y.o. male who has a past medical history of ANGELIC (acute kidney injury), Anticoagulant long-term use, CKD stage 3 due to type 2 diabetes mellitus, Coronary artery disease, Encounter for blood transfusion, Hyperlipidemia, Hypertension, Myocardial infarction, Obesity, Other chronic pulmonary heart diseases, Paroxysmal atrial fibrillation, Primary osteoarthritis involving multiple joints, Stroke, Type 2 diabetes mellitus without complication, and Uncontrolled type 2 diabetes mellitus with neurologic complication, without long-term current use of insulin. He complains of pain as described below.    Location: low back   Onset: 2-3 weeks  Radiation: None  Timing: persistent  Current Pain Score: 5/10  Weekly Pain Range: 5-10/10  Quality: Aching, Tight and Electric  Worsened by: standing, walking for more than 10-15 minutes and daily activity   Improved by: rest    Previous Therapies:  PT/OT: None in past 3 yrs  HEP: Very sedentary 2/2 chronic arthritis  TENS: No  Interventions:   Surgery:   S/p CABG x4   S/p cardiac stents  Opioids: Rare - Hx of opioid therapy from previous pain MD and prefers to avoid  Adjuvants: Yes    Current Pain Medications:  1. Trying to minimize meds b/c he's on so many meds for other conditions   2. Duloxetine 30  3. Acetaminophen   4. Other: Eliquis & plavix    Assessment & Plan:  Problem List Items Addressed This Visit     Compression fracture of L2 lumbar vertebra, open, initial encounter - Primary    Relevant Medications    HYDROcodone-acetaminophen (NORCO) 7.5-325 mg per tablet    Other Relevant Orders    Ambulatory referral/consult to Physical/Occupational Therapy    WALKER FOR HOME USE    ORTHOPEDIC BRACING FOR HOME USE - UPPER EXTREMITY     Age-related physical debility    Relevant Orders    Ambulatory referral/consult to Physical/Occupational Therapy    WALKER FOR HOME USE    Physical deconditioning    Relevant Orders    Ambulatory referral/consult to Physical/Occupational Therapy    WALKER FOR HOME USE        6/6/22 - Jin Ngo is a 76 y.o. male who  has a past medical history of ANGELIC (acute kidney injury) (7/1/2020), Anticoagulant long-term use, CKD stage 3 due to type 2 diabetes mellitus (3/22/2021), Coronary artery disease, Encounter for blood transfusion, Hyperlipidemia, Hypertension, Myocardial infarction, Obesity, Other chronic pulmonary heart diseases, Paroxysmal atrial fibrillation (11/9/2020), Primary osteoarthritis involving multiple joints (3/8/2017), Stroke, Type 2 diabetes mellitus without complication, and 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  By history and examination this patient has chronic low back pain with intermittent radiculopathy.  The underlying cause cause is compression fracture x2 in the lumbar spine plus facet arthritis, degenerative disc disease and deconditioning.  Pathology is confirmed by imaging.  He also notes s remote Hx of spine fracture in his youth that resulted in hospitalization for 3 months and inability to walk.  Given his poor health, I believe he would be best served by starting with conservative therapy.  I have recommended optimization of his medications and referral to physical therapy for reconditioning.  Interventional procedures such as a kyphoplasty, LESI, or MBB/RFA will be considered for pain refractory to conservative care.    1. Referral to PT for back  2. Start short course of Norco 7.5-325 mg to be taken only prior to PT toward facilitating participation and recovery  3. LSO brace for support  4. Walker for home use due to gait instability  5. MRI will be needed before intervetions    Follow Up: 4  abad Barksdale Jr, MD  Interventional Pain Medicine / Anesthesiology    Disclaimer: This note was partly generated using dictation software which may occasionally result in transcription errors.    [email or web address]  X-Ray Lumbar Spine AP And Lateral  FINDINGS:  Moderate scoliosis, convex to the right and centered at L2-L3. Severe L2 compression fracture, age indeterminate.  Remaining lumbar vertebral bodies are normal in height.  6.2 mm retrolisthesis L2-L3.  8.1 mm retrolisthesis L3-L4.Mild disc space narrowing T12-L1 and L1-L2.  Moderate disc space narrowing L2-3 and L3-4.  Severe disc space narrowing L4-5.  Scattered osteophytes..  Severe facet arthropathy L3-4 through L5-S1.  Severe atherosclerotic calcification of the aorta.  Impression:  1. Severe L2 compression fracture, age indeterminate.  2. Advanced multilevel degenerative changes as detailed above.      Review of Systems:  Review of Systems   Constitutional: Negative for chills and fever.   HENT: Negative for nosebleeds.    Eyes: Negative for blurred vision and pain.   Respiratory: Negative for hemoptysis.    Cardiovascular: Negative for chest pain and palpitations.   Gastrointestinal: Negative for heartburn, nausea and vomiting.   Genitourinary: Negative for dysuria and hematuria.   Musculoskeletal: Positive for back pain. Negative for myalgias.   Skin: Negative for rash.   Neurological: Negative for seizures and loss of consciousness.   Endo/Heme/Allergies: Does not bruise/bleed easily.   Psychiatric/Behavioral: Negative for hallucinations.       Physical Exam:  Vitals:    22 1320   BP: (!) 151/74   Pulse: 60   PainSc:   6     General    Nursing note and vitals reviewed.  Constitutional: He is oriented to person, place, and time. He appears well-developed and well-nourished. No distress.   HENT:   Head: Normocephalic and atraumatic.   Nose: Nose normal.   Eyes: Conjunctivae and EOM are normal. Pupils are equal, round, and  reactive to light. Right eye exhibits no discharge. Left eye exhibits no discharge. No scleral icterus.   Neck: No JVD present.   Cardiovascular: Intact distal pulses.    Pulmonary/Chest: Effort normal. No respiratory distress.   Abdominal: He exhibits no distension.   Neurological: He is alert and oriented to person, place, and time. Coordination normal.   Psychiatric: He has a normal mood and affect. His behavior is normal. Judgment and thought content normal.     General Musculoskeletal Exam   Gait: normal     Back (L-Spine & T-Spine) / Neck (C-Spine) Exam     Tenderness Right paramedian tenderness of the Lower L-Spine. Left paramedian tenderness of the Lower L-Spine.     Back (L-Spine & T-Spine) Range of Motion   Back extension: facet loading is positive and exacerabtes/reproduces the patient's typical low back pain    Back flexion: limited ROM but partial relief of low back pain noted.     Spinal Sensation   Right Side Sensation  L-Spine Level: normal  Left Side Sensation  L-Spine Level: normal    Other He has no scoliosis .      Muscle Strength   Right Lower Extremity   Hip Flexion: 5/5   Hip Extensors: 5/5  Quadriceps:  5/5   Hamstrin/5   Gastrocsoleus:  5/5   Left Lower Extremity   Hip Flexion: 5/5   Hip Extensors: 5/5  Quadriceps:  5/5   Hamstrin/5   Gastrocsoleus:  5/5     Reflexes     Left Side  Achilles:  2+  Quadriceps:  2+    Right Side   Achilles:  2+  Quadriceps:  2+

## 2022-06-06 NOTE — PROGRESS NOTES
Subjective:      Patient ID: Jin Ngo is a 76 y.o. male.    Chief Complaint:  diabetes    History of Present Illness  Jin Ngo with Type 2 diabetes complicated by TIA/CAD with CABG in 2007, hypertension, and dyslipidemia presents today for follow up of Type 2 diabetes. Previous patient of Dr. Diaz and RADHA Langston and myself. Last seen 2/2022    He is accompanied by his wife, Krupa.    At his Feb 2020, he reported that Trulicity was too expensive. We also stopped Metformin due to kidney function. We started Ozempic but he was unable to afford and did not let us know. He was not taking medications for diabetes at his Oct 2020 visit. We started medications below.     States his teeth are falling out. Has not seen dentist yet.     Since his last visit, it was found he has compression fracture. He had bone density.    We performed CGMS in Oct 2021 and noted controlled BGs at 81% in target. We performed another CGMS in March 2022   Average glucose: 195   Above 180 mg/dL: 50.6%  (Above 250 mg/dL: 16.1%)   Within  mg/dL: 49.4%   Below 70 mg/dL: 0%  (Below 54 mg/dL: 0%)   We increased his glipizide to 7.5 mg with breakfast    With regards to diabetes:    Diagnosed: ~2018  Family History of Type 2 DM: mother   Known complications:  DKA -  RN -; saw in Dec 2020 outside [facility] -needs appt  PN -  Nephropathy +; needs appt  CAD +  CABG in 2007  LCA STENT placed in Dec 2020    Current regimen:  Levemir 10 units AM   glipizide 7.5 g with breakfast and 5 mg with dinner       Other medications tried:  MFM - stopped in the past due to CKD  Trulicity-- stopped due to expense    We consulted patient assistance last time for Trulicity but they have not heard anything yet. He is willing to stay on insulin if needed.    Glucose Monitor:   1-2 times a day testing  Log reviewed: oral recall  States BGs in the AM have been in the 150  Throughout the day BGs 130-180    Hypoglycemia:   Denies and denies s/s of  "hypoglycemia  Knows how to correct with 15 grams of carbs- juice, coke, or a peppermint.     Diet/Exercise: He feels that he is "not close as he could be" following diabetic diet. He states I am "constantly looking for food". States he has been eating more.     B: (9-11AM) eggs, toast, and turkey mai for breakfast with iced tea or coffee with splenda.  L: sandwich or salad (3)  D: pasta with meat and vegetable or salad (biggest meal 6-7pm)    Snacks: apples, oranges, bananas, grapes; cakes; cookies  Drinks: iced tea with lemon, not really drinking water but more since last visit; coffee and splenda, milk      Not sleeping well and having a snack at 2AM    Exercise - Trying to walk but had back injury as a child and has been having some pain. He has exercise bike but does not use.     Education - last visit: in the past; 2017  Podiatry: None    Diabetes Management Status    Hemoglobin A1C   Date Value Ref Range Status   05/18/2022 9.1 (H) 4.0 - 5.6 % Final     Comment:     ADA Screening Guidelines:  5.7-6.4%  Consistent with prediabetes  >or=6.5%  Consistent with diabetes    High levels of fetal hemoglobin interfere with the HbA1C  assay. Heterozygous hemoglobin variants (HbS, HgC, etc)do  not significantly interfere with this assay.   However, presence of multiple variants may affect accuracy.     01/31/2022 9.7 (H) 4.0 - 5.6 % Final     Comment:     ADA Screening Guidelines:  5.7-6.4%  Consistent with prediabetes  >or=6.5%  Consistent with diabetes    High levels of fetal hemoglobin interfere with the HbA1C  assay. Heterozygous hemoglobin variants (HbS, HgC, etc)do  not significantly interfere with this assay.   However, presence of multiple variants may affect accuracy.     09/29/2021 7.7 (H) 4.0 - 5.6 % Final     Comment:     ADA Screening Guidelines:  5.7-6.4%  Consistent with prediabetes  >or=6.5%  Consistent with diabetes    High levels of fetal hemoglobin interfere with the HbA1C  assay. Heterozygous " hemoglobin variants (HbS, HgC, etc)do  not significantly interfere with this assay.   However, presence of multiple variants may affect accuracy.       Statin: Taking  ACE/ARB: Taking    Screening or Prevention Patient's value Goal Complete/Controlled?   HgA1C Testing and Control   Lab Results   Component Value Date    HGBA1C 9.1 (H) 05/18/2022      Annually/Less than 8% Yes   Lipid profile : 01/31/2022 Annually Yes   LDL control Lab Results   Component Value Date    LDLCALC 68.4 01/31/2022    Annually/Less than 100 mg/dl  Yes   Nephropathy screening Lab Results   Component Value Date    LABMICR 22.0 10/11/2021     Lab Results   Component Value Date    PROTEINUA Trace (A) 10/11/2021    Annually Yes   Blood pressure BP Readings from Last 1 Encounters:   06/09/22 126/70    Less than 140/90 No   Dilated retinal exam : 12/21/2020 Annually Yes   Foot exam   : 06/09/2022 Annually Yes     With regards to dyslipidemia,     He is on atorvastatin 40 mg daily     Latest Reference Range & Units 01/31/22 09:35   Cholesterol 120 - 199 mg/dL 128   HDL 40 - 75 mg/dL 23 (L)   HDL/Cholesterol Ratio 20.0 - 50.0 % 18.0 (L)   LDL Cholesterol External 63.0 - 159.0 mg/dL 68.4   Non-HDL Cholesterol mg/dL 105   Total Cholesterol/HDL Ratio 2.0 - 5.0  5.6 (H)   Triglycerides 30 - 150 mg/dL 183 (H)     With regards to osteoporosis:     Family history: mother with broken hip in 90's    current medication: denies     Calcium intake- none but has in diet   Vit D intake- none    Weight bearing exercise- none with back pain; he will be seeing rehab  Recent falls ~ around March 2022 fell on back in the rain running after dog    May 2022  FINDINGS:  Moderate scoliosis, convex to the right and centered at L2-L3. Severe L2 compression fracture, age indeterminate.  Remaining lumbar vertebral bodies are normal in height.  6.2 mm retrolisthesis L2-L3.  8.1 mm retrolisthesis L3-L4.Mild disc space narrowing T12-L1 and L1-L2.  Moderate disc space narrowing  L2-3 and L3-4.  Severe disc space narrowing L4-5.  Scattered osteophytes..  Severe facet arthropathy L3-4 through L5-S1.  Severe atherosclerotic calcification of the aorta.     Impression:   1. Severe L2 compression fracture, age indeterminate.  2. Advanced multilevel degenerative changes as detailed above.    They have made fall precautions in house   He has not been to dentist in over one year; no plan to go to dentist soon but may need dental work    Recent fracture as above  States he broke back as a child 15-16 years old after falling out of a tree ~50 feet. He states was hospitalized for 3 months and wore brace    No significant height loss (>2 inches)    tob use -  None     etoh use- Social     No current diarrhea or h/o malabsorption    Denies chronic exposure to steroid therapy, proton pump inhibitors or antiseizure medications.   + anticoagulants    Denies GERD, indigestion, or gastric bypass surgery.     Denies history of thyroid disease, anemia, kidney stones   + kidney disease.     Denies active malignancy, history of malignancy the involved the bone, prior radiation treatment, or unexplained elevations of alk phos on labs     Last BMD dated  6/3/2022   FINDINGS:  The T score associated with the lumbar spine is 4.6.  The T score associated with the left femoral neck is -0.6.  The T score associated with the right femoral neck is -0.2.     Impression:   Normal study    Review of Systems   Constitutional: Positive for fatigue. Negative for unexpected weight change.   Eyes: Negative for visual disturbance.   Endocrine: Positive for polyphagia. Negative for polydipsia and polyuria.   Musculoskeletal: Positive for back pain and gait problem.     Objective:   Physical Exam  Vitals reviewed.   Neck:      Thyroid: No thyromegaly.   Cardiovascular:      Comments: No edema present  Pulmonary:      Effort: Pulmonary effort is normal.   Musculoskeletal:      Comments: No point tenderness to spine     denies  "injection site edema or erythema. No lipo hypertropthy or atrophy.  No sores or lesions to feet  Shoes appropriate  Monofilament sensation intact   Vibratory sensation decreased       Visit Vitals  /70   Pulse (!) 55   Ht 5' 9" (1.753 m)   Wt 92.2 kg (203 lb 4.2 oz)   SpO2 96%   BMI 30.02 kg/m²     Body mass index is 30.02 kg/m².    Lab Review:   Lab Results   Component Value Date    HGBA1C 9.1 (H) 05/18/2022    HGBA1C 9.7 (H) 01/31/2022    HGBA1C 7.7 (H) 09/29/2021       Lab Results   Component Value Date    CHOL 128 01/31/2022    HDL 23 (L) 01/31/2022    LDLCALC 68.4 01/31/2022    TRIG 183 (H) 01/31/2022    CHOLHDL 18.0 (L) 01/31/2022     Lab Results   Component Value Date     05/18/2022    K 4.9 05/18/2022     05/18/2022    CO2 25 05/18/2022     (H) 05/18/2022    BUN 26 (H) 05/18/2022    CREATININE 1.50 (H) 05/18/2022    CALCIUM 9.4 05/18/2022    PROT 7.9 01/31/2022    ALBUMIN 4.5 01/31/2022    BILITOT 0.6 01/31/2022    ALKPHOS 49 01/31/2022    AST 30 01/31/2022    ALT 24 01/31/2022    ANIONGAP 14 05/18/2022    ESTGFRAFRICA 51.5 (A) 05/18/2022    EGFRNONAA 44.6 (A) 05/18/2022    TSH 2.140 05/21/2019     No results found for: [id number]  Assessment and Plan     1. Osteoporosis, unspecified osteoporosis type, unspecified pathological fracture presence  PTH, Intact    Protein Electrophoresis, Serum    Renal Function Panel    Tissue Transglutaminase, IgA    Vitamin D    Calcium, Timed Urine    Creatinine Clearance, Timed 24 Hours   2. Uncontrolled type 2 diabetes mellitus with diabetic neuropathy, without long-term current use of insulin  insulin detemir U-100 (LEVEMIR FLEXTOUCH U-100 INSULN) 100 unit/mL (3 mL) InPn pen    glipiZIDE (GLUCOTROL) 5 MG tablet    Comprehensive Metabolic Panel    Hemoglobin A1C    TSH    Ambulatory referral/consult to Podiatry   3. Coronary artery disease involving native heart without angina pectoris, unspecified vessel or lesion type     4. Primary " hypertension     5. Hyperlipidemia, unspecified hyperlipidemia type     6. CKD stage 3 due to type 2 diabetes mellitus     7. BMI 31.0-31.9,adult     8. Localized osteoporosis with current pathological fracture with delayed healing         Uncontrolled type 2 diabetes mellitus with diabetic neuropathy, without long-term current use of insulin  -- Labs prior to follow up.  -- A1c goal 7-7.5%.  -- Medications discussed:  MFM - stopped in the past due to possible side effects  GLP1-DPP4 - cost prohibitive  AMBROSE   SGLT2   Insulin   -- Reviewed logs/CGM: none today  Reach out to me sooner for any glucose <70 or consistently >200.  -- Patient would benefit from GLP1 and would likely be able to stop SFU or both SFU and insulin. However, medications are very limited.   -- Medication Changes:   Discussed his A1c above goal. His BGs were at goal for age in March 2022. I believe the A1c jump is diet related.  We will let him go back to diabetic diet and recheck CGMS in one month. He is not interested in adding expensive medications for diabetes.     Increase Levemir to 12units Torres   Increase Glipizide to 10 mg with breakfast and 5 mg with dinner      Call me for blood sugar is less than 70.  Check labs on RTC  Consider 70/30 in the future but he has a very large supply of levemir     Kidney function is improved. Needs follow up. Encouraged to make follow up with Dr Isaac  Discussed you may need to go back on lisinopril - smaller dose and monitor potassium for renal protection  Consult podiatry for nail care  We will perform a professional continuous glucose monitor in order to assess blood sugar trends and patterns, highs and lows, and determine treatment plan.     Call me for blood sugar is less than 70.    -- Reviewed goals of therapy are to get the best control we can without hypoglycemia.  -- Reviewed patient's current insulin regimen. Clarified proper insulin dose and timing in relation to meals, etc. Insulin injection sites  and proper rotation instructed.    -- Advised frequent self blood glucose monitoring.  Patient encouraged to document glucose results and bring them to every clinic visit.  -- Hypoglycemia precautions discussed. Instructed on precautions before driving.    -- Call for Bg repeatedly < 90 or > 180.   -- Close adherence to lifestyle changes recommended.   -- Periodic follow ups for eye evaluations, foot care and dental care suggested.    Coronary artery disease  -- Optimize glucose control.     Hypertension  -- No longer on ACEI-- defer to renal  -- Controlled today  -- Blood pressure goals discussed with patient.    Hyperlipidemia  LDL now controlled  On fenofibrate     Continue atorvastatin 40 mg daily      CKD stage 3 due to type 2 diabetes mellitus  Needs f/u with Dr. Isaac    BMI 31.0-31.9,adult  -- Encouraged dietary and lifestyle modifications.  -- Emphasized weight loss goals.    Localized osteoporosis with current pathological fracture with delayed healing  Due to compression fracture found on XRAY in May 2022    -- Risks include  race, +FH,   -- Reviewed basics of quantity versus quality  -- Reassuring he is not fracturing   -- Plan work up of accelerated bone loss to include 24 urine calcium, TSH, PTH, vit D, CMP   -- RDA of calcium (7325-0184 mg /day in divided doses) and vitamin D 2000iu a day  discussed  -- Calcium from food sources preferred. Given list of calcium in foods.  -- Fall precautions emphasized  -- +weight bearing exercise  -- [email or web address] website information given  -- Pharmacological therapy is recommended for patients with osteopenia if the 10 year probability of a hip fracture is >3% and 10 year probability of other major osteoporotic fractures is >20%.   -- Treatment options and potential side effects discussed for PO bisphosphonates, reclast, Denosumab, and Teriparatide.  -- Low risk for ONJ and atypical fractures reviewed and discussed. Alerted that if dental work needs to be done it  should be done prior to initiating therapy   -- Discussed optimal duration of bisphosphonate use is unknown - drug holiday after 5-10 po versus drug hold ay after 3 reclast treatment   -- Repeat DEXA scan in 2 yrs time.     Will obtain secondary workup as above and notify his ortho to come up with plan for treatment        Follow up in about 4 months (around 10/9/2022).  Labs for Monday -osteoporosis   Labs on RTC

## 2022-06-07 PROBLEM — S32.020B: Status: ACTIVE | Noted: 2022-06-07

## 2022-06-07 PROBLEM — R54 AGE-RELATED PHYSICAL DEBILITY: Status: ACTIVE | Noted: 2022-06-07

## 2022-06-07 PROBLEM — R53.81 PHYSICAL DECONDITIONING: Status: ACTIVE | Noted: 2022-06-07

## 2022-06-08 NOTE — ASSESSMENT & PLAN NOTE
-- Labs prior to follow up.  -- A1c goal 7-7.5%.  -- Medications discussed:  MFM - stopped in the past due to possible side effects  GLP1-DPP4 - cost prohibitive  AMBROSE   SGLT2   Insulin   -- Reviewed logs/CGM: none today  Reach out to me sooner for any glucose <70 or consistently >200.  -- Patient would benefit from GLP1 and would likely be able to stop SFU or both SFU and insulin. However, medications are very limited.   -- Medication Changes:   Discussed his A1c above goal. His BGs were at goal for age in March 2022. I believe the A1c jump is diet related.  We will let him go back to diabetic diet and recheck CGMS in one month. He is not interested in adding expensive medications for diabetes.     Increase Levemir to 12units Torres   Increase Glipizide to 10 mg with breakfast and 5 mg with dinner      Call me for blood sugar is less than 70.  Check labs on RTC  Consider 70/30 in the future but he has a very large supply of levemir     Kidney function is improved. Needs follow up. Encouraged to make follow up with Dr Isaac  Discussed you may need to go back on lisinopril - smaller dose and monitor potassium for renal protection  Consult podiatry for nail care  We will perform a professional continuous glucose monitor in order to assess blood sugar trends and patterns, highs and lows, and determine treatment plan.     Call me for blood sugar is less than 70.    -- Reviewed goals of therapy are to get the best control we can without hypoglycemia.  -- Reviewed patient's current insulin regimen. Clarified proper insulin dose and timing in relation to meals, etc. Insulin injection sites and proper rotation instructed.    -- Advised frequent self blood glucose monitoring.  Patient encouraged to document glucose results and bring them to every clinic visit.  -- Hypoglycemia precautions discussed. Instructed on precautions before driving.    -- Call for Bg repeatedly < 90 or > 180.   -- Close adherence to lifestyle changes  recommended.   -- Periodic follow ups for eye evaluations, foot care and dental care suggested.

## 2022-06-09 ENCOUNTER — OFFICE VISIT (OUTPATIENT)
Dept: ENDOCRINOLOGY | Facility: CLINIC | Age: 77
End: 2022-06-09
Payer: MEDICARE

## 2022-06-09 VITALS
SYSTOLIC BLOOD PRESSURE: 126 MMHG | HEIGHT: 69 IN | WEIGHT: 203.25 LBS | HEART RATE: 55 BPM | DIASTOLIC BLOOD PRESSURE: 70 MMHG | OXYGEN SATURATION: 96 % | BODY MASS INDEX: 30.1 KG/M2

## 2022-06-09 DIAGNOSIS — I25.10 CORONARY ARTERY DISEASE INVOLVING NATIVE HEART WITHOUT ANGINA PECTORIS, UNSPECIFIED VESSEL OR LESION TYPE: Chronic | ICD-10-CM

## 2022-06-09 DIAGNOSIS — E78.5 HYPERLIPIDEMIA, UNSPECIFIED HYPERLIPIDEMIA TYPE: ICD-10-CM

## 2022-06-09 DIAGNOSIS — E11.22 CKD STAGE 3 DUE TO TYPE 2 DIABETES MELLITUS: ICD-10-CM

## 2022-06-09 DIAGNOSIS — M80.80XG LOCALIZED OSTEOPOROSIS WITH CURRENT PATHOLOGICAL FRACTURE WITH DELAYED HEALING: ICD-10-CM

## 2022-06-09 DIAGNOSIS — M81.0 OSTEOPOROSIS, UNSPECIFIED OSTEOPOROSIS TYPE, UNSPECIFIED PATHOLOGICAL FRACTURE PRESENCE: Primary | ICD-10-CM

## 2022-06-09 DIAGNOSIS — I10 PRIMARY HYPERTENSION: Chronic | ICD-10-CM

## 2022-06-09 DIAGNOSIS — N18.30 CKD STAGE 3 DUE TO TYPE 2 DIABETES MELLITUS: ICD-10-CM

## 2022-06-09 PROCEDURE — 1160F RVW MEDS BY RX/DR IN RCRD: CPT | Mod: CPTII,S$GLB,, | Performed by: NURSE PRACTITIONER

## 2022-06-09 PROCEDURE — 3288F FALL RISK ASSESSMENT DOCD: CPT | Mod: CPTII,S$GLB,, | Performed by: NURSE PRACTITIONER

## 2022-06-09 PROCEDURE — 1159F PR MEDICATION LIST DOCUMENTED IN MEDICAL RECORD: ICD-10-PCS | Mod: CPTII,S$GLB,, | Performed by: NURSE PRACTITIONER

## 2022-06-09 PROCEDURE — 1159F MED LIST DOCD IN RCRD: CPT | Mod: CPTII,S$GLB,, | Performed by: NURSE PRACTITIONER

## 2022-06-09 PROCEDURE — 1100F PTFALLS ASSESS-DOCD GE2>/YR: CPT | Mod: CPTII,S$GLB,, | Performed by: NURSE PRACTITIONER

## 2022-06-09 PROCEDURE — 3074F PR MOST RECENT SYSTOLIC BLOOD PRESSURE < 130 MM HG: ICD-10-PCS | Mod: CPTII,S$GLB,, | Performed by: NURSE PRACTITIONER

## 2022-06-09 PROCEDURE — 1100F PR PT FALLS ASSESS DOC 2+ FALLS/FALL W/INJURY/YR: ICD-10-PCS | Mod: CPTII,S$GLB,, | Performed by: NURSE PRACTITIONER

## 2022-06-09 PROCEDURE — 99999 PR PBB SHADOW E&M-EST. PATIENT-LVL V: ICD-10-PCS | Mod: PBBFAC,,, | Performed by: NURSE PRACTITIONER

## 2022-06-09 PROCEDURE — 1125F AMNT PAIN NOTED PAIN PRSNT: CPT | Mod: CPTII,S$GLB,, | Performed by: NURSE PRACTITIONER

## 2022-06-09 PROCEDURE — 3078F DIAST BP <80 MM HG: CPT | Mod: CPTII,S$GLB,, | Performed by: NURSE PRACTITIONER

## 2022-06-09 PROCEDURE — 99214 PR OFFICE/OUTPT VISIT, EST, LEVL IV, 30-39 MIN: ICD-10-PCS | Mod: S$GLB,,, | Performed by: NURSE PRACTITIONER

## 2022-06-09 PROCEDURE — 3074F SYST BP LT 130 MM HG: CPT | Mod: CPTII,S$GLB,, | Performed by: NURSE PRACTITIONER

## 2022-06-09 PROCEDURE — 3288F PR FALLS RISK ASSESSMENT DOCUMENTED: ICD-10-PCS | Mod: CPTII,S$GLB,, | Performed by: NURSE PRACTITIONER

## 2022-06-09 PROCEDURE — 99214 OFFICE O/P EST MOD 30 MIN: CPT | Mod: S$GLB,,, | Performed by: NURSE PRACTITIONER

## 2022-06-09 PROCEDURE — 1125F PR PAIN SEVERITY QUANTIFIED, PAIN PRESENT: ICD-10-PCS | Mod: CPTII,S$GLB,, | Performed by: NURSE PRACTITIONER

## 2022-06-09 PROCEDURE — 1160F PR REVIEW ALL MEDS BY PRESCRIBER/CLIN PHARMACIST DOCUMENTED: ICD-10-PCS | Mod: CPTII,S$GLB,, | Performed by: NURSE PRACTITIONER

## 2022-06-09 PROCEDURE — 3078F PR MOST RECENT DIASTOLIC BLOOD PRESSURE < 80 MM HG: ICD-10-PCS | Mod: CPTII,S$GLB,, | Performed by: NURSE PRACTITIONER

## 2022-06-09 PROCEDURE — 99999 PR PBB SHADOW E&M-EST. PATIENT-LVL V: CPT | Mod: PBBFAC,,, | Performed by: NURSE PRACTITIONER

## 2022-06-09 RX ORDER — GLIPIZIDE 5 MG/1
TABLET ORAL
Qty: 270 TABLET | Refills: 2 | Status: SHIPPED | OUTPATIENT
Start: 2022-06-09 | End: 2024-02-26 | Stop reason: SDUPTHER

## 2022-06-09 RX ORDER — INSULIN DETEMIR 100 [IU]/ML
12 INJECTION, SOLUTION SUBCUTANEOUS NIGHTLY
Qty: 15 ML | Refills: 3 | Status: SHIPPED | OUTPATIENT
Start: 2022-06-09 | End: 2022-10-24 | Stop reason: SDUPTHER

## 2022-06-09 NOTE — ASSESSMENT & PLAN NOTE
Due to compression fracture found on XRAY in May 2022    -- Risks include  race, +FH,   -- Reviewed basics of quantity versus quality  -- Reassuring he is not fracturing   -- Plan work up of accelerated bone loss to include 24 urine calcium, TSH, PTH, vit D, CMP   -- RDA of calcium (3164-6291 mg /day in divided doses) and vitamin D 2000iu a day  discussed  -- Calcium from food sources preferred. Given list of calcium in foods.  -- Fall precautions emphasized  -- +weight bearing exercise  -- [email or web address] website information given  -- Pharmacological therapy is recommended for patients with osteopenia if the 10 year probability of a hip fracture is >3% and 10 year probability of other major osteoporotic fractures is >20%.   -- Treatment options and potential side effects discussed for PO bisphosphonates, reclast, Denosumab, and Teriparatide.  -- Low risk for ONJ and atypical fractures reviewed and discussed. Alerted that if dental work needs to be done it should be done prior to initiating therapy   -- Discussed optimal duration of bisphosphonate use is unknown - drug holiday after 5-10 po versus drug hold ay after 3 reclast treatment   -- Repeat DEXA scan in 2 yrs time.     Will obtain secondary workup as above and notify his ortho to come up with plan for treatment

## 2022-06-09 NOTE — PATIENT INSTRUCTIONS
Cholesterol               Continue atorvastatin 40 mg daily     Diabetes     Discussed his A1c above goal. His BGs were at goal for age in March 2022. I believe the A1c jump is diet related.  We will let him go back to diabetic diet and recheck CGMS in one month. He is not interested in adding expensive medications for diabetes.     Increase Levemir to 12units Torres   Increase Glipizide to 10 mg with breakfast and 5 mg with dinner      Call me for blood sugar is less than 70.  Check labs on RTC  Consider 70/30 in the future but he has a very large supply of levemir     Kidney function is improved. Needs follow up. Encouraged to make follow up with Dr Isaac  Discussed you may need to go back on lisinopril - smaller dose and monitor potassium for renal protection  Consult podiatry for nail care     Osteoporosis    Check secondary causes of osteoporosis -labs and urine   Recommended daily allowance of calcium is 5724-5805 mg daily, preferably from food   Encouraged exercise as tolerated   Avoid alcohol and tobacco  HOW TO COLLECT AN ACCURATE   24 HOUR URINE SPECIMEN     I want you to collect EVERY drop of your urine during a 24 hour period. I do not care what the volume of the urine is as long as it represents every drop that you pass during that 24 hour period. If you need to have a bowel movement, you may separate the urine but I want every drop.     Begin the collection on a morning which is convenient for you. At that time, pass your urine, flush it down the toilet and note the exact time. Now you have an empty bladder and empty bottle. That starts the collection. Collect every drop all during the day and night until exactly the same time the next morning, when you should pass your urine and add it to the bottle.     Bring the closed container back to the same laboratory that issued the empty container.     Osteoporosis medications  These are the medications we discussed for osteoporosis treatment:    -  "Bisphosphonates:         - these slow down bone loss         - oral forms (Fosamax and Actonel are examples) - taken once weekly or once monthly         - IV form (Reclast) - given intravenously once yearly    - Prolia (denosumab):          - slows down bone loss           - it is a subcutaneous injection (under the skin) every 6 months, given in the office    - Forteo (teriparatide) or Tymlos (abaloparatide):           - medications that "build bone" or increases bone formation           - subcutaneous injection (under the skin) taken once daily that you self-administer at home for 18-24 months    -Evenity (romosozumab)   -medications that "build bone" or increases bone formation   - subcutaneous injection (under the skin) taken once monthly for one year    For more information on medications: https://www.nof.org/patients/treatment/medicationadherence/    Estimated Calcium Content of Foods:  Produce  Serving Size Estimated Calcium*    [place] greens, frozen 8 oz 360 mg   Broccoli adry 8 oz 200 mg   Kale, frozen 8 oz 180 mg   Soy Beans, green, boiled 8 oz 175 mg   Bok [place], cooked, boiled 8 oz 160 mg   Figs, dried 2 figs 65 mg   Broccoli, fresh, cooked 8 oz 60 mg   Oranges 1 whole 55 mg   Seafood Serving Size Estimated Calcium*    Sardines, canned with bones 3 oz 325 mg   [place], canned with bones 3 oz 180 mg   Shrimp, canned 3 oz 125 mg   Dairy Serving Size Estimated Calcium*    Ricotta, part-skim 4 oz 335 mg   Yogurt, plain, low-fat 6 oz 310 mg   Milk, skim, low-fat, whole 8 oz 300 mg   Yogurt with fruit, low-fat 6 oz 260 mg   Mozzarella, part-skim 1 oz 210 mg   Cheddar 1 oz 205 mg   Yogurt, Greek 6 oz 200 mg   American Cheese 1 oz 195 mg   Feta Cheese 4 oz 140 mg   Cottage Cheese, 2% 4 oz 105 mg   Frozen yogurt, vanilla 8 oz 105 mg   Ice Cream, vanilla 8 oz 85 mg   Parmesan 1 tbsp 55 mg   Fortified Food Serving Size Estimated Calcium*   [place] milk, rice milk or soy milk, fortified 8 oz 300 mg   Orange juice and " other fruit juices, fortified 8 oz 300 mg   Tofu, prepared with calcium 4 oz 205 mg   Waffle, frozen, fortified 2 pieces 200 mg   Oatmeal, fortified 1 packet 140 mg   English muffin, fortified 1 muffin 100 mg   Cereal, fortified 8 oz 100-1,000 mg   Other Serving Size Estimated Calcium*   Mac & cheese, frozen 1 package 325 mg   Pizza, cheese, frozen 1 serving 115 mg   Pudding, chocolate, prepared with 2% milk 4 oz 160 mg   Beans, baked, canned 4 oz 160 mg   *The calcium content listed for most foods is estimated and can vary due to multiple factors. Check the food label to determine how much calcium is in a particular product.  If you read the nutrition label for a food source, it lists the % calcium in that food.  For an 8 oz glass of milk, for example, the label states calcium 30%.  This is equivalent to 300 mg of calcium (multiply the listed number by 10).   **Table from the National Osteoporosis Foundation

## 2022-06-13 ENCOUNTER — LAB VISIT (OUTPATIENT)
Dept: LAB | Facility: HOSPITAL | Age: 77
End: 2022-06-13
Attending: NURSE PRACTITIONER
Payer: MEDICARE

## 2022-06-13 DIAGNOSIS — M81.0 OSTEOPOROSIS, UNSPECIFIED OSTEOPOROSIS TYPE, UNSPECIFIED PATHOLOGICAL FRACTURE PRESENCE: ICD-10-CM

## 2022-06-13 LAB
25(OH)D3+25(OH)D2 SERPL-MCNC: 20 NG/ML (ref 30–96)
ALBUMIN SERPL BCP-MCNC: 4.2 G/DL (ref 3.5–5.2)
ANION GAP SERPL CALC-SCNC: 13 MMOL/L (ref 8–16)
CALCIUM SERPL-MCNC: 8.7 MG/DL (ref 8.7–10.5)
CHLORIDE SERPL-SCNC: 104 MMOL/L (ref 95–110)
CO2 SERPL-SCNC: 22 MMOL/L (ref 23–29)
CREAT SERPL-MCNC: 1.46 MG/DL (ref 0.5–1.4)
EST. GFR  (AFRICAN AMERICAN): 53.2 ML/MIN/1.73 M^2
EST. GFR  (NON AFRICAN AMERICAN): 46.1 ML/MIN/1.73 M^2
GLUCOSE SERPL-MCNC: 216 MG/DL (ref 70–110)
PHOSPHATE SERPL-MCNC: 4 MG/DL (ref 2.7–4.5)
POTASSIUM SERPL-SCNC: 4.2 MMOL/L (ref 3.5–5.1)
PTH-INTACT SERPL-MCNC: 69.5 PG/ML (ref 9–77)
SODIUM SERPL-SCNC: 139 MMOL/L (ref 136–145)
TSH SERPL DL<=0.005 MIU/L-ACNC: 1.72 UIU/ML (ref 0.4–4)
UUN UR-MCNC: 24 MG/DL (ref 2–20)

## 2022-06-13 PROCEDURE — 83970 ASSAY OF PARATHORMONE: CPT | Mod: PO | Performed by: NURSE PRACTITIONER

## 2022-06-13 PROCEDURE — 84165 PATHOLOGIST INTERPRETATION SPE: ICD-10-PCS | Mod: 26,,, | Performed by: PATHOLOGY

## 2022-06-13 PROCEDURE — 84165 PROTEIN E-PHORESIS SERUM: CPT | Mod: PO | Performed by: NURSE PRACTITIONER

## 2022-06-13 PROCEDURE — 82306 VITAMIN D 25 HYDROXY: CPT | Mod: PO | Performed by: NURSE PRACTITIONER

## 2022-06-13 PROCEDURE — 80069 RENAL FUNCTION PANEL: CPT | Mod: PO | Performed by: NURSE PRACTITIONER

## 2022-06-13 PROCEDURE — 84443 ASSAY THYROID STIM HORMONE: CPT | Mod: PO | Performed by: NURSE PRACTITIONER

## 2022-06-13 PROCEDURE — 84165 PROTEIN E-PHORESIS SERUM: CPT | Mod: 26,,, | Performed by: PATHOLOGY

## 2022-06-13 PROCEDURE — 83516 IMMUNOASSAY NONANTIBODY: CPT | Mod: PO | Performed by: NURSE PRACTITIONER

## 2022-06-14 ENCOUNTER — TELEPHONE (OUTPATIENT)
Dept: ENDOCRINOLOGY | Facility: CLINIC | Age: 77
End: 2022-06-14
Payer: MEDICARE

## 2022-06-14 LAB
ALBUMIN SERPL ELPH-MCNC: 3.71 G/DL (ref 3.35–5.55)
ALPHA1 GLOB SERPL ELPH-MCNC: 0.23 G/DL (ref 0.17–0.41)
ALPHA2 GLOB SERPL ELPH-MCNC: 0.64 G/DL (ref 0.43–0.99)
B-GLOBULIN SERPL ELPH-MCNC: 0.76 G/DL (ref 0.5–1.1)
GAMMA GLOB SERPL ELPH-MCNC: 1.06 G/DL (ref 0.67–1.58)
PATHOLOGIST INTERPRETATION SPE: NORMAL
PROT SERPL-MCNC: 6.4 G/DL (ref 6–8.4)

## 2022-06-14 NOTE — PROGRESS NOTES
Please call patient and let him know  1. His urine test was normal  2. His Vitamin D was low- please start over the counter vitamin D 5000 IU daily  3. Kidney function decreased but stable  4. His glucose was high at 216  5. His electrolytes were normal  6. Other labs were normal

## 2022-06-14 NOTE — PROGRESS NOTES
Latest Reference Range & Units 06/13/22 13:54 06/13/22 13:56   Sodium 136 - 145 mmol/L 139    Potassium 3.5 - 5.1 mmol/L 4.2    Chloride 95 - 110 mmol/L 104    CO2 23 - 29 mmol/L 22 (L)    Anion Gap 8 - 16 mmol/L 13    BUN 2 - 20 mg/dL 24 (H)    Creatinine 0.5 - 1.4 mg/dL 1.46 (H) >30.0 (H)   eGFR if non African American >60 mL/min/1.73 m^2 46.1 !    eGFR if African American >60 mL/min/1.73 m^2 53.2 !    Glucose 70 - 110 mg/dL 216 (H)    Calcium 8.7 - 10.5 mg/dL 8.7    Phosphorus 2.7 - 4.5 mg/dL 4.0    Albumin 3.5 - 5.2 g/dL 4.2    Vit D, 25-Hydroxy 30 - 96 ng/mL 20 (L)    TSH 0.400 - 4.000 uIU/mL 1.720    PTH 9.0 - 77.0 pg/mL 69.5    Protein, Serum 6.0 - 8.4 g/dL 6.4    CREATININE, URINE (SEND OUT) 23.0 - 375.0 mg/dL  62.0   Calcium, Urine 0.0 - 15.0 mg/dL  4.9   Calcium, 24 Hr Urine 4 - 12 mg/Hr  5   Calcium, Urine (mg/spec) mg/Spec  127   Creatinine, Urinr (mg/spec) mg/Spec  1612.0   Creatinine Clearance 70 - 110 mL/min  Unable to calculate   Urine Collection Duration Hr  24  24     MA to call patient and let him know  1. His urine test was normal  2. His Vitamin D was low- please start over the counter vitamin D 5000 IU daily  3. Kidney function decreased but stable  4. His glucose was high at 216  5. His electrolytes were normal  6. Other labs were normal

## 2022-06-17 LAB — TTG IGA SER-ACNC: 6 UNITS

## 2022-06-26 RX ORDER — DOXYCYCLINE HYCLATE 100 MG
TABLET ORAL
Qty: 42 TABLET | Refills: 0 | Status: SHIPPED | OUTPATIENT
Start: 2022-06-26 | End: 2022-07-21

## 2022-06-27 ENCOUNTER — CLINICAL SUPPORT (OUTPATIENT)
Dept: REHABILITATION | Facility: HOSPITAL | Age: 77
End: 2022-06-27
Attending: PAIN MEDICINE
Payer: MEDICARE

## 2022-06-27 DIAGNOSIS — S32.020B: ICD-10-CM

## 2022-06-27 DIAGNOSIS — R53.81 PHYSICAL DECONDITIONING: ICD-10-CM

## 2022-06-27 DIAGNOSIS — R54 AGE-RELATED PHYSICAL DEBILITY: ICD-10-CM

## 2022-06-27 DIAGNOSIS — Z74.09 IMPAIRED FUNCTIONAL MOBILITY, BALANCE, GAIT, AND ENDURANCE: ICD-10-CM

## 2022-06-27 DIAGNOSIS — R29.898 DECREASED STRENGTH OF LOWER EXTREMITY: ICD-10-CM

## 2022-06-27 PROCEDURE — 97163 PT EVAL HIGH COMPLEX 45 MIN: CPT | Mod: PO

## 2022-06-28 PROBLEM — Z74.09 IMPAIRED FUNCTIONAL MOBILITY, BALANCE, GAIT, AND ENDURANCE: Status: ACTIVE | Noted: 2022-06-28

## 2022-06-28 PROBLEM — R29.898 DECREASED STRENGTH OF LOWER EXTREMITY: Status: ACTIVE | Noted: 2022-06-28

## 2022-06-28 PROBLEM — R26.9 GAIT ABNORMALITY: Status: ACTIVE | Noted: 2022-06-28

## 2022-06-28 NOTE — PLAN OF CARE
[facility]   Physical Therapy Initial Evaluation     Date: 6/27/2022   Name: Jin Ngo  Clinic Number: [id number]    Therapy Diagnosis:   Encounter Diagnoses   Name Primary?    Compression fracture of L2 lumbar vertebra, open, initial encounter     Age-related physical debility     Physical deconditioning     Decreased strength of lower extremity     Impaired functional mobility, balance, gait, and endurance      Physician: Sami Barksdale Jr.,*    Physician Orders: PT Eval and Treat compression fracture of lumbar spine & neurogenic claudication  Medical Diagnosis from Referral:   S32.020B (ICD-10-CM) - Compression fracture of L2 lumbar vertebra, open, initial encounter   R54 (ICD-10-CM) - Age-related physical debility   R53.81 (ICD-10-CM) - Physical deconditioning   Evaluation Date: 6/27/2022  Authorization Period Expiration: 6/6/2023  Plan of Care Expiration: 8/27/2022  Progress Note Due: 7/27/2022  Visit # / Visits authorized: 1/ 1   FOTO: 0/5    Precautions: Standard, Diabetes and blood thinners     Time In: 1:11  Time Out: 2:00  Total Appointment Time (timed & untimed codes): 49 minutes      SUBJECTIVE     Date of onset: chronic- with a new flare up in February    History of current condition - Jin reports: Back has been hurting since 1961. He was a kid and climbed into a tree and fell out of it. He fell 50 ft out of a tree and was told he broke his back in 3 places. He was initially unable to walk and they had him in a Striker bed that turned around in all the directions and upright. But he was back walking and in school 6 months later. Then years later he slipped on wet grass and fell backward and that is what the new compression fracture is from. This happened sometime in February. Pain is in the back and wraps around his R abdomen. Denies N/T in sciatic distribution, though does have some numbness in his R big toe from another accident where he kicked a piece of  heavy machinery. Has trouble getting up from laying down or out of a chair, has trouble sleeping. When he retired a lot of his back pain increased. Has some trouble bending over to pull socks and shoes on. His MD ordered a brace for him but they have not heard anything more about that. Also cracked his pelvis in the first fall- doesn't remember which side of his pelvis. Has trouble with his whole stomach area- has difficulty with constipation, and a feeling of tightness in his stomach, as well as the trouble with his back. Has pain that wraps around from R side of low back around to his stomach. Had heart surgery (bypass) years ago and learned how to appropriately pull himself out of bed.    Falls: most recent time was in February     Imaging, X-Ray: lumbar spine: Impression: 1. Severe L2 compression fracture, age indeterminate. 2. Advanced multilevel degenerative changes as detailed above.    DXA: bone scan: Impression:     Normal study    Prior Therapy: yes for his back  Social History: single story house. There is a step down into the living room. 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  Prior Level of Function: has had back problems for years, but was able to work and do everything- started declining after assisted  Current Level of Function: difficulty getting up out of bed/chairs, difficulty ambulating     Pain:  Current 6/10, worst unclear/10, best unclear/10   Location: bilateral back (R>L)  Description: Aching  Aggravating Factors: Sitting, Standing, Walking, Lifting and Getting out of bed/chair  Easing Factors: rest    Patients goals: reduce pain, improve mobility     Medical History:   Past Medical History:   Diagnosis Date    ANGELIC (acute kidney injury) 7/1/2020    Anticoagulant long-term use     CKD stage 3 due to type 2 diabetes mellitus 3/22/2021    Coronary artery disease     Encounter for blood transfusion     Hyperlipidemia     Hypertension     Localized  osteoporosis with current pathological fracture with delayed healing 6/9/2022    Myocardial infarction     Obesity     Other chronic pulmonary heart diseases     Paroxysmal atrial fibrillation 11/9/2020    Primary osteoarthritis involving multiple joints 3/8/2017    Stroke     Type 2 diabetes mellitus without complication     Uncontrolled type 2 diabetes mellitus with neurologic complication, without long-term current use of insulin 3/8/2017       Surgical History:   Jin Ngo  has a past surgical history that includes Coronary artery bypass graft; Colonoscopy; Eye surgery (06/2014); Colonoscopy (N/A, 3/22/2016); Cataract extraction, bilateral; Cardiac surgery; Percutaneous transluminal angioplasty (PTA) of carotid artery (N/A, 12/10/2020); and Colonoscopy (N/A, 12/7/2021).    Medications:   Jin has a current medication list which includes the following prescription(s): accu-chek wandy plus test strp, allopurinol, amlodipine, atorvastatin, bd ultra-fine aminata pen needle, blood-glucose meter, clopidogrel, doxycycline, duloxetine, eliquis, fenofibrate, fluticasone propionate, glipizide, hydrocodone-acetaminophen, levemir flextouch u-100 insuln, lancets, metoprolol tartrate, and tamsulosin.    Allergies:   Review of patient's allergies indicates:   Allergen Reactions    Metformin      Caused stroke    Pcn [penicillins]           OBJECTIVE     Observation: shuffling gait pattern, decreased lumbar lordosis, increased thoracic kyphosis. Pt had taken pain medication prior to evaluation and required some re-direction and moved and talked slowly    Lumbar Range of Motion: pt required seated rest breaks between each motion due to back pain   Degrees Pain   Flexion Lower knee   More difficult coming back up        Extension 0 deg   back        Left Side Bending Upper thigh R low back        Right Side Bending Mid thigh R low back        Left rotation   Minimal rotation R low back        Right Rotation    Minimal rotation R low back        Posterior quadrants Difficulty getting into position B low back      Lower Extremity Strength  Right LE  Left LE    Knee extension: 4+/5 Knee extension: 4+/5   Knee flexion: 4+/5 Knee flexion: 4/5   Hip flexion: 4/5 Hip flexion: 4-/5   Hip extension:  NT Hip extension: NT   Hip abduction: NT Hip abduction: NT   Hip adduction: NT Hip adduction NT   Ankle dorsiflexion: 5/5 Ankle dorsiflexion: 5/5   Ankle plantarflexion: NT Ankle plantarflexion: NT       Special Tests: NT    DTR:   Right Left Comment   Patellar (L3-4) 1+ 1+ Pt unable to relax muscles enough to adequately test   Achilles (S1) 1+ 1+ Pt unable to relax muscles enough to adequately test       Neuro Dynamic Testing:    Sciatic nerve:      SLR: R = negative for disc and neural involvement, positive for tight hamstrings     L = negative for disc and neural involvement, positive for tight hamstrings      Joint Mobility: NT due to recent fracture    Palpation: increased tone and tenderness in his R thoracic and lumbar paraspinals, QL, glute med. Increased tone in L lumbar paraspinals, but not nearly as much as the R side.    Sensation: light touch grossly intact through B LEs, not tested in feet    Flexibility: tight hamstrings    Function:  -30 sec STS- unable to test today due to time constraints, but will test next visit  -TUG- unable to test today due to time constraints but will test next visit  -bed mobility: max A for supine to sit  -STS: increased reliance on B UE to push to standing, took increased time       Limitation/Restriction for FOTO Lumbar Spine Survey    Therapist reviewed FOTO scores for Jin Ngo on 6/27/2022.   FOTO documents entered into [employer] - see Media section.    Limitation Score: 55%         TREATMENT     Total Treatment time (time-based codes) separate from Evaluation: 5 minutes      Jin received the treatments listed below:      therapeutic exercises to develop strength, endurance,  flexibility and core stabilization for 5 minutes including: HEP instruction  Date  6/27/2022   VISIT 1/1   FOTO 0/5   POC EXP. DATE 8/27/2022   FACE-TO-FACE 7/27/2022       TABLE:    TA     TA w/march    LTR    bridges    SLR w/TA    Hip abd (supine clams)                SEATED:    LAQ    Hamstring curls        STANDING:    marching    Mini squats    Hip 3 way            Initials LT       manual therapy techniques: Soft tissue Mobilization were applied to the: low back for 0 minutes, including:  -STM to lumbar/thoracic paraspinals, QL      PATIENT EDUCATION AND HOME EXERCISES     Education provided:   - role of PT and goals for therapy  - importance of continuing HEP    Written Home Exercises Provided: yes. Exercises were reviewed and Jin was able to demonstrate them prior to the end of the session.  Jin demonstrated good  understanding of the education provided. See EMR under Patient Instructions for exercises provided during therapy sessions.    ASSESSMENT     Jin is a 76 y.o. male referred to outpatient Physical Therapy with a medical diagnosis of Compression fracture of L2 lumbar vertebra, open, initial encounter, age related physical debility, and physical deconditioning. Patient presents to the clinic with a chief complaint of chronic back pain that was exacerbated by a fall onto his back earlier this year. He fractured 3 vertebrae in a fall about 60 years ago and has had back pain since then, however he was able to perform all of his usual household and work ADLs. Since he retiring he noticed his health beginning to decline, and then has had even more increased back pain since a fall earlier this year. He was noted to have decreased strength in B LEs, L>R, decreased core strength, increased tone and tenderness in B lumbar musculature, and ambulated with a shuffling gait pattern with slow emani and short step/stride length. He was also noted to require help with bed mobility transitioning to/from  supine to sit. Did not have time to perform additional functional testing, however will continue to assess this on subsequent visits. These impairments are impacting his independence with his ability to perform his usual ADLs. He would benefit from skilled physical therapy to address these impairments and reduce pain to improve functional mobility.     Patient prognosis is Fair.   Patient will benefit from skilled outpatient Physical Therapy to address the deficits stated above and in the chart below, provide patient /family education, and to maximize patientt's level of independence.     Plan of care discussed with patient: Yes  Patient's spiritual, cultural and educational needs considered and patient is agreeable to the plan of care and goals as stated below:     Anticipated Barriers for therapy: chronicity of symptoms    Medical Necessity is demonstrated by the following  History  Co-morbidities and personal factors that may impact the plan of care Co-morbidities:   CAD, CKD stage 3, diabetes, high BMI, history of CVA, HTN and anticoagulant use, history of MI, Atrial fibrillation     Personal Factors:   lifestyle     high   Examination  Body Structures and Functions, activity limitations and participation restrictions that may impact the plan of care Body Regions:   back  lower extremities    Body Systems:    ROM  strength  balance  gait  transfers  transitions    Participation Restrictions:   Difficulty standing for ambulating long to perform ADLs    Activity limitations:   Learning and applying knowledge  no deficits    General Tasks and Commands  no deficits    Communication  no deficits    Mobility  lifting and carrying objects  walking  driving (bike, car, motorcycle)    Self care  washing oneself (bathing, drying, washing hands)  toileting  dressing    Domestic Life  shopping  cooking  doing house work (cleaning house, washing dishes, laundry)  assisting others    Interactions/Relationships  no  deficits    Life Areas  no deficits    Community and Social Life  recreation and leisure         high   Clinical Presentation evolving clinical presentation with changing clinical characteristics moderate   Decision Making/ Complexity Score: high     Goals:  Short Term Goals: 4 weeks   1. Pt will be compliant with HEP to promote independent management of current diagnosis.  2. Pt will have improved B knee strength to 5/5 to improve stability with ambulation.  3. Pt will be able to ambulate household distances with improved mechanics to improve independence with ADLs.  4. Pt will report subjective pain ratings no greater than 6/10 with ADLs.    Long Term Goals: 8 weeks   1. Pt will have reduced FOTO limitation score from 55% to no greater than 45% to indicate improved functional mobility.  2. Pt will have improved B hip strength by at least 1 grade to improve his ability to perform sit to stand transfers without UE assist.  3. Pt will be able to stand for at least 20 minutes without pain to improve tolerance for ADLs like showering.  4. Pt will report subjective pain ratings no greater than 2/10 with ADLs.    PLAN   Plan of care Certification: 6/27/2022 to 8/27/2022.    Outpatient Physical Therapy 2 times weekly for 8 weeks to include the following interventions: Manual Therapy, Moist Heat/ Ice, Neuromuscular Re-ed, Patient Education, Therapeutic Activities, Therapeutic Exercise and kinesiotaping.     Willow Plaza, PT      I CERTIFY THE NEED FOR THESE SERVICES FURNISHED UNDER THIS PLAN OF TREATMENT AND WHILE UNDER MY CARE   Physician's comments:     Physician's Signature: ___________________________________________________

## 2022-07-01 ENCOUNTER — TELEPHONE (OUTPATIENT)
Dept: CARDIOLOGY | Facility: CLINIC | Age: 77
End: 2022-07-01
Payer: MEDICARE

## 2022-07-01 NOTE — TELEPHONE ENCOUNTER
Called pt to reschedule an appointment with Dr. Sotelo.  Pt agreed to be seen on Monday 07-25-22 at 9:20 am    Mailed appt reminder to the pt    ND

## 2022-07-04 RX ORDER — APIXABAN 5 MG/1
TABLET, FILM COATED ORAL
Qty: 60 TABLET | Refills: 2 | Status: SHIPPED | OUTPATIENT
Start: 2022-07-04 | End: 2022-10-02

## 2022-07-05 NOTE — PROGRESS NOTES
"[facility]   Physical Therapy Treatment Note     Name: Jin Ngo  Clinic Number: [id number]    Therapy Diagnosis:   Encounter Diagnoses   Name Primary?    Decreased strength of lower extremity Yes    Gait abnormality     Impaired functional mobility, balance, gait, and endurance      Physician: Sami Barksdale Jr.,*    Visit Date: 7/6/2022    Physician Orders: PT Eval and Treat compression fracture of lumbar spine & neurogenic claudication  Medical Diagnosis from Referral:   S32.020B (ICD-10-CM) - Compression fracture of L2 lumbar vertebra, open, initial encounter   R54 (ICD-10-CM) - Age-related physical debility   R53.81 (ICD-10-CM) - Physical deconditioning   Evaluation Date: 6/27/2022  Authorization Period Expiration: 6/6/2023  Plan of Care Expiration: 8/27/2022  Progress Note Due: 7/27/2022  Visit # / Visits authorized: 1/ 20   FOTO: 1/5     Precautions: Standard, Diabetes and blood thinners         PTA Visit #: 1/5     Time In: 1:00  Time Out: 2:00  Total Billable Time: 30 minutes    SUBJECTIVE     Pt reports: feels tight.  He was compliant with home exercise program.  Response to previous treatment: none  Functional change: none    Pain: 5/10  Location: bilateral back      OBJECTIVE     Objective Measures updated at progress report unless specified.     Treatment     Jin received the treatments listed below:      therapeutic exercises to develop strength, endurance, flexibility and core stabilization for 60 minutes including:   Date  7/6/2022 6/27/2022   VISIT 1/20 1/1   FOTO 1/5 0/5   POC EXP. DATE  8/27/2022   FACE-TO-FACE  7/27/2022    nustep  5 min L4      TABLE:      TA       TA w/march      LTR 2 min      bridges 2 x 10      SLR w/TA 2 x 10 B   extensor lag noted      Hip abd (supine clams) 3 x 10 GTB       HS stretch  3 x 30" B       Piriformis stretch 3 x 30" B       DKC   2 min      SEATED:      LAQ      Hamstring curls             STANDING:      marching    "   Mini squats      Hip 3 way                    Initials EM LT         manual therapy techniques: Soft tissue Mobilization were applied to the: low back for 0 minutes, including:  -STM to lumbar/thoracic paraspinals, QL        PATIENT EDUCATION AND HOME EXERCISES      Education provided:   - role of PT and goals for therapy  - importance of continuing HEP     Written Home Exercises Provided: yes. Exercises were reviewed and Jin was able to demonstrate them prior to the end of the session.  Jin demonstrated good  understanding of the education provided. See EMR under Patient Instructions for exercises provided during therapy sessions.       ASSESSMENT     Patient arrived with chief complaints of B lower back tightness. Patient was able to progress through session with VCs for exercise form but note patient had B LE tightness. Patient needs TCs for TA activation with hand under back. Patient ended session with no increases in pain and notes he feels better than when he arrived.     Patient seen in double which led to less charges     Jin Is progressing well towards his goals.   Pt prognosis is Good.     Pt will continue to benefit from skilled outpatient physical therapy to address the deficits listed in the problem list box on initial evaluation, provide pt/family education and to maximize pt's level of independence in the home and community environment.     Pt's spiritual, cultural and educational needs considered and pt agreeable to plan of care and goals.     Anticipated barriers to physical therapy: none    Goals:  Short Term Goals: 4 weeks   1. Pt will be compliant with HEP to promote independent management of current diagnosis.  2. Pt will have improved B knee strength to 5/5 to improve stability with ambulation.  3. Pt will be able to ambulate household distances with improved mechanics to improve independence with ADLs.  4. Pt will report subjective pain ratings no greater than 6/10 with  ADLs.     Long Term Goals: 8 weeks   1. Pt will have reduced FOTO limitation score from 55% to no greater than 45% to indicate improved functional mobility.  2. Pt will have improved B hip strength by at least 1 grade to improve his ability to perform sit to stand transfers without UE assist.  3. Pt will be able to stand for at least 20 minutes without pain to improve tolerance for ADLs like showering.  4. Pt will report subjective pain ratings no greater than 2/10 with ADLs.    PLAN     Follow up on patient response to session     Merlin Osei, PTA

## 2022-07-06 ENCOUNTER — CLINICAL SUPPORT (OUTPATIENT)
Dept: REHABILITATION | Facility: HOSPITAL | Age: 77
End: 2022-07-06
Attending: PAIN MEDICINE
Payer: MEDICARE

## 2022-07-06 DIAGNOSIS — R29.898 DECREASED STRENGTH OF LOWER EXTREMITY: Primary | ICD-10-CM

## 2022-07-06 DIAGNOSIS — Z74.09 IMPAIRED FUNCTIONAL MOBILITY, BALANCE, GAIT, AND ENDURANCE: ICD-10-CM

## 2022-07-06 DIAGNOSIS — R26.9 GAIT ABNORMALITY: ICD-10-CM

## 2022-07-06 PROCEDURE — 97110 THERAPEUTIC EXERCISES: CPT | Mod: PO,CQ

## 2022-07-07 NOTE — PROGRESS NOTES
[facility]   Physical Therapy Treatment Note     Name: Jin Ngo  Clinic Number: [id number]    Therapy Diagnosis:   Encounter Diagnoses   Name Primary?    Decreased strength of lower extremity Yes    Gait abnormality     Impaired functional mobility, balance, gait, and endurance      Physician: Sami Barksdale Jr.,*    Visit Date: 7/11/2022    Physician Orders: PT Eval and Treat compression fracture of lumbar spine & neurogenic claudication  Medical Diagnosis from Referral:   S32.020B (ICD-10-CM) - Compression fracture of L2 lumbar vertebra, open, initial encounter   R54 (ICD-10-CM) - Age-related physical debility   R53.81 (ICD-10-CM) - Physical deconditioning   Evaluation Date: 6/27/2022  Authorization Period Expiration: 6/6/2023  Plan of Care Expiration: 8/27/2022  Progress Note Due: 7/27/2022  Visit # / Visits authorized: 2/ 20   FOTO: 2/5     Precautions: Standard, Diabetes and blood thinners         PTA Visit #: 0/5     Time In: 8:00 am  Time Out: 8:55  Total Billable Time: 55 minutes total, 40 billable minutes as one on one care not provided the entire session    SUBJECTIVE     Pt reports: sore today in the same spot from his back around to the front  He was compliant with home exercise program.  Response to previous treatment: none  Functional change: none    Pain: 8/10  Location: bilateral back      OBJECTIVE     Objective Measures updated at progress report unless specified.     Treatment     Jni received the treatments listed below:      therapeutic exercises to develop strength, endurance, flexibility and core stabilization for 46 minutes including:   Date  7/11/2022 7/6/2022 6/27/2022   VISIT 2/20 1/20 1/1   FOTO 2/5 1/5 0/5   POC EXP. DATE 8/27/20222 8/27/2022   FACE-TO-FACE 7/27/2022 7/27/2022    nustep   5 min L4      TABLE:       TA        TA w/march LTR 3' 2 min      bridges 1 x 10 2 x 10      SLR w/TA  2 x 10 B   extensor lag noted      Hip abd  "(supine clams) 3 x 10 GTB 3 x 10 GTB       HS stretch   3 x 30" B       Piriformis stretch  3 x 30" B       DKC    2 min      SKTC 3 x 15" B     SEATED:       LAQ 2 x 10 x 2#      Hamstring curls               STANDING:       marching       Mini squats       Hip 3 way                       Initials LT EM LT         manual therapy techniques: Soft tissue Mobilization were applied to the: low back for 9 minutes, including:  -STM to lumbar/thoracic paraspinals, QL        PATIENT EDUCATION AND HOME EXERCISES      Education provided:   - role of PT and goals for therapy  - importance of continuing HEP     Written Home Exercises Provided: yes. Exercises were reviewed and Jin was able to demonstrate them prior to the end of the session.  Jin demonstrated good  understanding of the education provided. See EMR under Patient Instructions for exercises provided during therapy sessions.       ASSESSMENT     Patient arrived with chief complaints of B lower back tightness. Started today's session with STM to reduce pain and stiffness in his low back. He tolerated all exercises well with no reports of increased symptoms at the end of today's session. Moved a little more slowly through exercises today. Will continue to focus on core strengthening and lumbar tissue extensibility as tolerated.    Jin Is progressing well towards his goals.   Pt prognosis is Good.     Pt will continue to benefit from skilled outpatient physical therapy to address the deficits listed in the problem list box on initial evaluation, provide pt/family education and to maximize pt's level of independence in the home and community environment.     Pt's spiritual, cultural and educational needs considered and pt agreeable to plan of care and goals.     Anticipated barriers to physical therapy: none    Goals:  Short Term Goals: 4 weeks   1. Pt will be compliant with HEP to promote independent management of current diagnosis.  2. Pt will have " improved B knee strength to 5/5 to improve stability with ambulation.  3. Pt will be able to ambulate household distances with improved mechanics to improve independence with ADLs.  4. Pt will report subjective pain ratings no greater than 6/10 with ADLs.     Long Term Goals: 8 weeks   1. Pt will have reduced FOTO limitation score from 55% to no greater than 45% to indicate improved functional mobility.  2. Pt will have improved B hip strength by at least 1 grade to improve his ability to perform sit to stand transfers without UE assist.  3. Pt will be able to stand for at least 20 minutes without pain to improve tolerance for ADLs like showering.  4. Pt will report subjective pain ratings no greater than 2/10 with ADLs.    PLAN     Continue with PT POC, progressing as tolerated.    Willow Plaza, PT

## 2022-07-11 ENCOUNTER — CLINICAL SUPPORT (OUTPATIENT)
Dept: REHABILITATION | Facility: HOSPITAL | Age: 77
End: 2022-07-11
Attending: PAIN MEDICINE
Payer: MEDICARE

## 2022-07-11 DIAGNOSIS — R29.898 DECREASED STRENGTH OF LOWER EXTREMITY: Primary | ICD-10-CM

## 2022-07-11 DIAGNOSIS — Z74.09 IMPAIRED FUNCTIONAL MOBILITY, BALANCE, GAIT, AND ENDURANCE: ICD-10-CM

## 2022-07-11 DIAGNOSIS — R26.9 GAIT ABNORMALITY: ICD-10-CM

## 2022-07-11 PROCEDURE — 97140 MANUAL THERAPY 1/> REGIONS: CPT | Mod: PO

## 2022-07-11 PROCEDURE — 97110 THERAPEUTIC EXERCISES: CPT | Mod: PO

## 2022-07-13 ENCOUNTER — CLINICAL SUPPORT (OUTPATIENT)
Dept: REHABILITATION | Facility: HOSPITAL | Age: 77
End: 2022-07-13
Attending: PAIN MEDICINE
Payer: MEDICARE

## 2022-07-13 DIAGNOSIS — R29.898 DECREASED STRENGTH OF LOWER EXTREMITY: Primary | ICD-10-CM

## 2022-07-13 DIAGNOSIS — R26.9 GAIT ABNORMALITY: ICD-10-CM

## 2022-07-13 DIAGNOSIS — Z74.09 IMPAIRED FUNCTIONAL MOBILITY, BALANCE, GAIT, AND ENDURANCE: ICD-10-CM

## 2022-07-13 PROCEDURE — 97110 THERAPEUTIC EXERCISES: CPT | Mod: PO

## 2022-07-13 NOTE — PROGRESS NOTES
[facility]   Physical Therapy Treatment Note     Name: Jin Ngo  Clinic Number: [id number]    Therapy Diagnosis:   Encounter Diagnoses   Name Primary?    Decreased strength of lower extremity Yes    Gait abnormality     Impaired functional mobility, balance, gait, and endurance      Physician: Sami Barksdale Jr.,*    Visit Date: 7/13/2022    Physician Orders: PT Eval and Treat compression fracture of lumbar spine & neurogenic claudication  Medical Diagnosis from Referral:   S32.020B (ICD-10-CM) - Compression fracture of L2 lumbar vertebra, open, initial encounter   R54 (ICD-10-CM) - Age-related physical debility   R53.81 (ICD-10-CM) - Physical deconditioning   Evaluation Date: 6/27/2022  Authorization Period Expiration: 6/6/2023  Plan of Care Expiration: 8/27/2022  Progress Note Due: 7/27/2022  Visit # / Visits authorized: 3/ 20   FOTO: 3/5     Precautions: Standard, Diabetes and blood thinners         PTA Visit #: 0/5     Time In: 1:00 pm  Time Out: 1:55 pm  Total Billable Time: 55 minutes total, 25 billable minutes as one on one care not provided the entire session    SUBJECTIVE     Pt reports: his back is feeling better today, has been trying not to move certain ways that aggravate it  He was compliant with home exercise program.  Response to previous treatment: none  Functional change: none    Pain: 2/10  Location: bilateral back      OBJECTIVE     Objective Measures updated at progress report unless specified.     Treatment     Jin received the treatments listed below:      therapeutic exercises to develop strength, endurance, flexibility and core stabilization for 44 minutes including:   Date  7/13/2022 7/11/2022 7/6/2022 6/27/2022   VISIT 3/20 2/20 1/20 1/1   FOTO 3/5 2/5 1/5 0/5   POC EXP. DATE 8/27/2022 8/27/20222 8/27/2022   FACE-TO-FACE 7/27/2022 7/27/2022 7/27/2022    nustep L3 x 10'   5 min L4      TABLE:        TA         TA w/march        LTR 3' 3' 2 min   "    bridges 2 x 10 1 x 10 2 x 10      SLR w/TA 2 x 10 B  2 x 10 B   extensor lag noted      Hip abd (supine clams) 25x GTB 3 x 10 GTB 3 x 10 GTB       HS stretch    3 x 30" B       Piriformis stretch   3 x 30" B       DKC     2 min      SKTC 3 x 15" B 3 x 15" B     SEATED:        LAQ  2 x 10 x 2#      Hamstring curls         physioball rollouts        Sit to stands       STANDING:        marching        Mini squats        Hip 3 way                          Initials LT LT EM LT         manual therapy techniques: Soft tissue Mobilization were applied to the: low back for 6 minutes, including:  -STM to lumbar/thoracic paraspinals, QL    therapeutic activities to improve functional performance for 5  minutes, including:  - supine to sit using log roll and pushing himself up (instead of pulling on his bed post which is aggravating his shoulder)        PATIENT EDUCATION AND HOME EXERCISES      Education provided:   - role of PT and goals for therapy  - importance of continuing HEP     Written Home Exercises Provided: yes. Exercises were reviewed and Jin was able to demonstrate them prior to the end of the session.  Jin demonstrated good  understanding of the education provided. See EMR under Patient Instructions for exercises provided during therapy sessions.       ASSESSMENT     Jin arrived to therapy reporting significantly lower pain levels in his back today. Started today's session with STM to reduce pain and stiffness in low back. He tolerated all exercises well today with no reports of increased symptoms at the end of today's session. Worked on how to get in and out of his bed more efficiently to cause less strain on his arms and back while trying to pull himself up.    Jin Is progressing well towards his goals.   Pt prognosis is Good.     Pt will continue to benefit from skilled outpatient physical therapy to address the deficits listed in the problem list box on initial evaluation, provide " pt/family education and to maximize pt's level of independence in the home and community environment.     Pt's spiritual, cultural and educational needs considered and pt agreeable to plan of care and goals.     Anticipated barriers to physical therapy: none    Goals:  Short Term Goals: 4 weeks   1. Pt will be compliant with HEP to promote independent management of current diagnosis.  2. Pt will have improved B knee strength to 5/5 to improve stability with ambulation.  3. Pt will be able to ambulate household distances with improved mechanics to improve independence with ADLs.  4. Pt will report subjective pain ratings no greater than 6/10 with ADLs.     Long Term Goals: 8 weeks   1. Pt will have reduced FOTO limitation score from 55% to no greater than 45% to indicate improved functional mobility.  2. Pt will have improved B hip strength by at least 1 grade to improve his ability to perform sit to stand transfers without UE assist.  3. Pt will be able to stand for at least 20 minutes without pain to improve tolerance for ADLs like showering.  4. Pt will report subjective pain ratings no greater than 2/10 with ADLs.    PLAN     Continue with PT POC, progressing as tolerated.    Willow Plaza, PT

## 2022-07-18 ENCOUNTER — OFFICE VISIT (OUTPATIENT)
Dept: PAIN MEDICINE | Facility: CLINIC | Age: 77
End: 2022-07-18
Payer: MEDICARE

## 2022-07-18 VITALS — SYSTOLIC BLOOD PRESSURE: 119 MMHG | DIASTOLIC BLOOD PRESSURE: 69 MMHG | HEART RATE: 68 BPM

## 2022-07-18 DIAGNOSIS — R53.81 PHYSICAL DECONDITIONING: ICD-10-CM

## 2022-07-18 DIAGNOSIS — S32.020B: Primary | ICD-10-CM

## 2022-07-18 DIAGNOSIS — R54 AGE-RELATED PHYSICAL DEBILITY: ICD-10-CM

## 2022-07-18 PROCEDURE — 99214 OFFICE O/P EST MOD 30 MIN: CPT | Mod: S$GLB,,, | Performed by: NURSE PRACTITIONER

## 2022-07-18 PROCEDURE — 3078F DIAST BP <80 MM HG: CPT | Mod: CPTII,S$GLB,, | Performed by: NURSE PRACTITIONER

## 2022-07-18 PROCEDURE — 3078F PR MOST RECENT DIASTOLIC BLOOD PRESSURE < 80 MM HG: ICD-10-PCS | Mod: CPTII,S$GLB,, | Performed by: NURSE PRACTITIONER

## 2022-07-18 PROCEDURE — 99999 PR PBB SHADOW E&M-EST. PATIENT-LVL III: CPT | Mod: PBBFAC,,, | Performed by: NURSE PRACTITIONER

## 2022-07-18 PROCEDURE — 3074F SYST BP LT 130 MM HG: CPT | Mod: CPTII,S$GLB,, | Performed by: NURSE PRACTITIONER

## 2022-07-18 PROCEDURE — 1125F PR PAIN SEVERITY QUANTIFIED, PAIN PRESENT: ICD-10-PCS | Mod: CPTII,S$GLB,, | Performed by: NURSE PRACTITIONER

## 2022-07-18 PROCEDURE — 1159F MED LIST DOCD IN RCRD: CPT | Mod: CPTII,S$GLB,, | Performed by: NURSE PRACTITIONER

## 2022-07-18 PROCEDURE — 1160F PR REVIEW ALL MEDS BY PRESCRIBER/CLIN PHARMACIST DOCUMENTED: ICD-10-PCS | Mod: CPTII,S$GLB,, | Performed by: NURSE PRACTITIONER

## 2022-07-18 PROCEDURE — 3074F PR MOST RECENT SYSTOLIC BLOOD PRESSURE < 130 MM HG: ICD-10-PCS | Mod: CPTII,S$GLB,, | Performed by: NURSE PRACTITIONER

## 2022-07-18 PROCEDURE — 99214 PR OFFICE/OUTPT VISIT, EST, LEVL IV, 30-39 MIN: ICD-10-PCS | Mod: S$GLB,,, | Performed by: NURSE PRACTITIONER

## 2022-07-18 PROCEDURE — 99999 PR PBB SHADOW E&M-EST. PATIENT-LVL III: ICD-10-PCS | Mod: PBBFAC,,, | Performed by: NURSE PRACTITIONER

## 2022-07-18 PROCEDURE — 1159F PR MEDICATION LIST DOCUMENTED IN MEDICAL RECORD: ICD-10-PCS | Mod: CPTII,S$GLB,, | Performed by: NURSE PRACTITIONER

## 2022-07-18 PROCEDURE — 1160F RVW MEDS BY RX/DR IN RCRD: CPT | Mod: CPTII,S$GLB,, | Performed by: NURSE PRACTITIONER

## 2022-07-18 PROCEDURE — 1125F AMNT PAIN NOTED PAIN PRSNT: CPT | Mod: CPTII,S$GLB,, | Performed by: NURSE PRACTITIONER

## 2022-07-18 NOTE — PROGRESS NOTES
[facility] Established clinic visit    Referred by: No ref. provider found   Reason for referral: * No diagnoses found *     CC:   Chief Complaint   Patient presents with    Back Pain       Interval Updates:   7/18/2022 - Mr. Ngo is following up for Back pain. Pain is currently rate 2/10 with a weekly range 2-10/10.  It is described as Aching.   Pain is worsened by nothing in particular and improved by rest.  He is reporting improvement of his pain and functionality with the attendance of physical therapy, he reports overall feeling better since starting physical therapy.  He is also establish home exercise plan at.  See pain is described below    Subjective:  Jin Ngo is a 76 y.o. 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 He complains of pain as described below.    Location: low back   Onset: 2-3 weeks  Radiation: None  Timing: persistent  Current Pain Score: 5/10  Weekly Pain Range: 5-10/10  Quality: Aching, Tight and Electric  Worsened by: standing, walking for more than 10-15 minutes and daily activity   Improved by: rest    Previous Therapies:  PT/OT: None in past 3 yrs  HEP: Very sedentary 2/2 chronic arthritis  TENS: No  Interventions:   Surgery:   S/p CABG x4   S/p cardiac stents  Opioids: Rare - Hx of opioid therapy from previous pain MD and prefers to avoid  Adjuvants: Yes    Current Pain  Medications:  1. Trying to minimize meds b/c he's on so many meds for other conditions   2. Duloxetine 30  3. Acetaminophen   4. Other: Eliquis & plavix    Assessment & Plan:  Problem List Items Addressed This Visit        Other    Compression fracture of L2 lumbar vertebra, open, initial encounter - Primary    Relevant Orders    Back/Cervical Brace For Home Use    Age-related physical debility    Relevant Orders    Back/Cervical Brace For Home Use    Physical deconditioning    Relevant Orders    Back/Cervical Brace For Home Use        6/6/22 - Jin Ngo is a 76 y.o. 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  By history and examination this patient has chronic low back pain with intermittent radiculopathy.  The underlying cause cause is compression fracture x2 in the lumbar spine plus facet arthritis, degenerative disc disease and deconditioning.  Pathology is confirmed by imaging.  He also notes s remote Hx of spine fracture in his youth that resulted in hospitalization for 3 months and inability to walk.  Given his poor health, I believe he would be best served by starting with conservative therapy.  I have recommended optimization of his medications and referral to physical therapy for reconditioning.  Interventional procedures such as a kyphoplasty, LESI, or  MBB/RFA will be considered for pain refractory to conservative care.    [id number]-old male with a history of a compression fracture and facet arthritis degenerative disc disease and deconditioning.  He has since started physical therapy which is improving his low back pain.  He reports not getting his LSO brace last clinic visit discussed that I will resubmitted order for this low back, brace educated patient that I would prefer  him wearing it all the time as it will weak in his lumbar and core strength.    1. Continue  PT for back   2. Reordered LSO brace for support  3. Walker for home use due to gait instability  4. MRI will be needed before intervetions    Follow Up: 8 weeks    AILYN Degroot  Interventional Pain Management      Disclaimer: This note was partly generated using dictation software which may occasionally result in transcription errors.    [email or web address]  X-Ray Lumbar Spine AP And Lateral  FINDINGS:  Moderate scoliosis, convex to the right and centered at L2-L3. Severe L2 compression fracture, age indeterminate.  Remaining lumbar vertebral bodies are normal in height.  6.2 mm retrolisthesis L2-L3.  8.1 mm retrolisthesis L3-L4.Mild disc space narrowing T12-L1 and L1-L2.  Moderate disc space narrowing L2-3 and L3-4.  Severe disc space narrowing L4-5.  Scattered osteophytes..  Severe facet arthropathy L3-4 through L5-S1.  Severe atherosclerotic calcification of the aorta.  Impression:  1. Severe L2 compression fracture, age indeterminate.  2. Advanced multilevel degenerative changes as detailed above.      Review of Systems:  Review of Systems   Constitutional: Negative for chills and fever.   HENT: Negative for nosebleeds.    Eyes: Negative for blurred vision and pain.   Respiratory: Negative for hemoptysis.    Cardiovascular: Negative for chest pain and palpitations.   Gastrointestinal: Negative for heartburn, nausea and vomiting.   Genitourinary: Negative for dysuria and hematuria.    Musculoskeletal: Positive for back pain. Negative for myalgias.   Skin: Negative for rash.   Neurological: Negative for seizures and loss of consciousness.   Endo/Heme/Allergies: Does not bruise/bleed easily.   Psychiatric/Behavioral: Negative for hallucinations.       Physical Exam:  Vitals:    22 1312   BP: 119/69   Pulse: 68   PainSc:   2     General    Nursing note and vitals reviewed.  Constitutional: He is oriented to person, place, and time. He appears well-developed and well-nourished. No distress.   HENT:   Head: Normocephalic and atraumatic.   Nose: Nose normal.   Eyes: Conjunctivae and EOM are normal. Pupils are equal, round, and reactive to light. Right eye exhibits no discharge. Left eye exhibits no discharge. No scleral icterus.   Neck: No JVD present.   Cardiovascular: Intact distal pulses.    Pulmonary/Chest: Effort normal. No respiratory distress.   Abdominal: He exhibits no distension.   Neurological: He is alert and oriented to person, place, and time. Coordination normal.   Psychiatric: He has a normal mood and affect. His behavior is normal. Judgment and thought content normal.     General Musculoskeletal Exam   Gait: normal     Back (L-Spine & T-Spine) / Neck (C-Spine) Exam     Tenderness Right paramedian tenderness of the Lower L-Spine. Left paramedian tenderness of the Lower L-Spine.     Back (L-Spine & T-Spine) Range of Motion   Back extension: facet loading is positive and exacerabtes/reproduces the patient's typical low back pain    Back flexion: limited ROM but partial relief of low back pain noted.     Spinal Sensation   Right Side Sensation  L-Spine Level: normal  Left Side Sensation  L-Spine Level: normal    Other He has no scoliosis .      Muscle Strength   Right Lower Extremity   Hip Flexion: 5/5   Hip Extensors: 5/5  Quadriceps:  5/5   Hamstrin/5   Gastrocsoleus:  5/5   Left Lower Extremity   Hip Flexion: 5/5   Hip Extensors: 5/5  Quadriceps:  5/5   Hamstrin/5    Gastrocsoleus:  5/5     Reflexes     Left Side  Achilles:  2+  Quadriceps:  2+    Right Side   Achilles:  2+  Quadriceps:  2+

## 2022-07-19 ENCOUNTER — OFFICE VISIT (OUTPATIENT)
Dept: FAMILY MEDICINE | Facility: CLINIC | Age: 77
End: 2022-07-19
Payer: MEDICARE

## 2022-07-19 VITALS
TEMPERATURE: 98 F | HEIGHT: 69 IN | HEART RATE: 80 BPM | BODY MASS INDEX: 30.02 KG/M2 | WEIGHT: 202.69 LBS | DIASTOLIC BLOOD PRESSURE: 70 MMHG | OXYGEN SATURATION: 95 % | SYSTOLIC BLOOD PRESSURE: 120 MMHG

## 2022-07-19 PROCEDURE — 3074F SYST BP LT 130 MM HG: CPT | Mod: CPTII,S$GLB,, | Performed by: FAMILY MEDICINE

## 2022-07-19 PROCEDURE — 3288F FALL RISK ASSESSMENT DOCD: CPT | Mod: CPTII,S$GLB,, | Performed by: FAMILY MEDICINE

## 2022-07-19 PROCEDURE — 99214 PR OFFICE/OUTPT VISIT, EST, LEVL IV, 30-39 MIN: ICD-10-PCS | Mod: S$GLB,,, | Performed by: FAMILY MEDICINE

## 2022-07-19 PROCEDURE — 1159F PR MEDICATION LIST DOCUMENTED IN MEDICAL RECORD: ICD-10-PCS | Mod: CPTII,S$GLB,, | Performed by: FAMILY MEDICINE

## 2022-07-19 PROCEDURE — 99214 OFFICE O/P EST MOD 30 MIN: CPT | Mod: S$GLB,,, | Performed by: FAMILY MEDICINE

## 2022-07-19 PROCEDURE — 1101F PT FALLS ASSESS-DOCD LE1/YR: CPT | Mod: CPTII,S$GLB,, | Performed by: FAMILY MEDICINE

## 2022-07-19 PROCEDURE — 1126F PR PAIN SEVERITY QUANTIFIED, NO PAIN PRESENT: ICD-10-PCS | Mod: CPTII,S$GLB,, | Performed by: FAMILY MEDICINE

## 2022-07-19 PROCEDURE — 3078F DIAST BP <80 MM HG: CPT | Mod: CPTII,S$GLB,, | Performed by: FAMILY MEDICINE

## 2022-07-19 PROCEDURE — 3288F PR FALLS RISK ASSESSMENT DOCUMENTED: ICD-10-PCS | Mod: CPTII,S$GLB,, | Performed by: FAMILY MEDICINE

## 2022-07-19 PROCEDURE — 1101F PR PT FALLS ASSESS DOC 0-1 FALLS W/OUT INJ PAST YR: ICD-10-PCS | Mod: CPTII,S$GLB,, | Performed by: FAMILY MEDICINE

## 2022-07-19 PROCEDURE — 3078F PR MOST RECENT DIASTOLIC BLOOD PRESSURE < 80 MM HG: ICD-10-PCS | Mod: CPTII,S$GLB,, | Performed by: FAMILY MEDICINE

## 2022-07-19 PROCEDURE — 1159F MED LIST DOCD IN RCRD: CPT | Mod: CPTII,S$GLB,, | Performed by: FAMILY MEDICINE

## 2022-07-19 PROCEDURE — 3074F PR MOST RECENT SYSTOLIC BLOOD PRESSURE < 130 MM HG: ICD-10-PCS | Mod: CPTII,S$GLB,, | Performed by: FAMILY MEDICINE

## 2022-07-19 PROCEDURE — 1126F AMNT PAIN NOTED NONE PRSNT: CPT | Mod: CPTII,S$GLB,, | Performed by: FAMILY MEDICINE

## 2022-07-19 NOTE — PROGRESS NOTES
[facility] did reach out to scheduled eye exam. Pt wants Dr. Suarez but he is still on vaction. Wife will call next week to schedule. CHRIST is signed

## 2022-07-20 ENCOUNTER — CLINICAL SUPPORT (OUTPATIENT)
Dept: REHABILITATION | Facility: HOSPITAL | Age: 77
End: 2022-07-20
Attending: PAIN MEDICINE
Payer: MEDICARE

## 2022-07-20 ENCOUNTER — LAB VISIT (OUTPATIENT)
Dept: LAB | Facility: HOSPITAL | Age: 77
End: 2022-07-20
Attending: FAMILY MEDICINE
Payer: MEDICARE

## 2022-07-20 DIAGNOSIS — R26.9 GAIT ABNORMALITY: ICD-10-CM

## 2022-07-20 DIAGNOSIS — R29.898 DECREASED STRENGTH OF LOWER EXTREMITY: Primary | ICD-10-CM

## 2022-07-20 DIAGNOSIS — Z74.09 IMPAIRED FUNCTIONAL MOBILITY, BALANCE, GAIT, AND ENDURANCE: ICD-10-CM

## 2022-07-20 LAB
ESTIMATED AVG GLUCOSE: 206 MG/DL (ref 68–131)
HBA1C MFR BLD: 8.8 % (ref 4–5.6)

## 2022-07-20 PROCEDURE — 83036 HEMOGLOBIN GLYCOSYLATED A1C: CPT | Performed by: FAMILY MEDICINE

## 2022-07-20 PROCEDURE — 36415 COLL VENOUS BLD VENIPUNCTURE: CPT | Mod: PO | Performed by: FAMILY MEDICINE

## 2022-07-20 PROCEDURE — 97110 THERAPEUTIC EXERCISES: CPT | Mod: PO

## 2022-07-20 NOTE — PROGRESS NOTES
"[facility]   Physical Therapy Treatment Note     Name: Jin Ngo  Clinic Number: [id number]    Therapy Diagnosis:   Encounter Diagnoses   Name Primary?    Decreased strength of lower extremity Yes    Gait abnormality     Impaired functional mobility, balance, gait, and endurance      Physician: Sami Barksdale Jr.,*    Visit Date: 7/20/2022    Physician Orders: PT Eval and Treat compression fracture of lumbar spine & neurogenic claudication  Medical Diagnosis from Referral:   S32.020B (ICD-10-CM) - Compression fracture of L2 lumbar vertebra, open, initial encounter   R54 (ICD-10-CM) - Age-related physical debility   R53.81 (ICD-10-CM) - Physical deconditioning   Evaluation Date: 6/27/2022  Authorization Period Expiration: 6/6/2023  Plan of Care Expiration: 8/27/2022  Progress Note Due: 7/27/2022  Visit # / Visits authorized: 4/ 20   FOTO: 4/5     Precautions: Standard, Diabetes and blood thinners         PTA Visit #: 0/5     Time In: 1:00 pm  Time Out: 1:55 pm  Total Billable Time: 55 minutes total    SUBJECTIVE     Pt reports: had some blood work done today,   He was compliant with home exercise program.  Response to previous treatment: none  Functional change: none    Pain: 2/10  Location: bilateral back      OBJECTIVE     Objective Measures updated at progress report unless specified.     Treatment     Jin received the treatments listed below:      therapeutic exercises to develop strength, endurance, flexibility and core stabilization for 55 minutes including:   Date  7/20/2022 7/13/2022 7/11/2022 7/6/2022 6/27/2022   VISIT 4/20 3/20 2/20 1/20 1/1   FOTO 4/5 3/5 2/5 1/5 0/5   POC EXP. DATE 8/27/2022 8/27/2022 8/27/20222 8/27/2022   FACE-TO-FACE 7/27/2022 7/27/2022 7/27/2022 7/27/2022    nustep  L3 x 10'   5 min L4      TABLE:         TA          TA w/march         LTR 2' 30" 3' 3' 2 min      bridges 2 x 10 2 x 10 1 x 10 2 x 10      SLR w/TA  2 x 10 B  2 x 10 B " "  extensor lag noted      Hip abd (supine clams) 30x GTB 25x GTB 3 x 10 GTB 3 x 10 GTB       HS stretch     3 x 30" B       Piriformis stretch    3 x 30" B       DKC      2 min      SKTC 3 x 15" B 3 x 15" B 3 x 15" B     SEATED:         LAQ   2 x 10 x 2#      Hamstring curls          physioball rollouts         Sit to stands        STANDING:         marching 2 x 10        Mini squats 1 x 10        Hip 3 way                             Initials LT LT LT EM LT         manual therapy techniques: Soft tissue Mobilization were applied to the: low back for 6 minutes, including:  -STM to lumbar/thoracic paraspinals, QL    therapeutic activities to improve functional performance for 5  minutes, including:  - supine to sit using log roll and pushing himself up (instead of pulling on his bed post which is aggravating his shoulder)        PATIENT EDUCATION AND HOME EXERCISES      Education provided:   - role of PT and goals for therapy  - importance of continuing HEP     Written Home Exercises Provided: yes. Exercises were reviewed and Jin was able to demonstrate them prior to the end of the session.  Jin demonstrated good  understanding of the education provided. See EMR under Patient Instructions for exercises provided during therapy sessions.       ASSESSMENT     Jin arrived to therapy continuing to report low levels of pain in his back today. He continues to have stiffness in his back so continued to address that and general strengthening/functional activities. Pt briefly mentioned at the end of last session and during this one about getting a bike. Advised patient that therapist does not think this is a good idea, as you have to be able to get on/off safely, be able to balance while riding, and be prepared to stop quickly and safely in case of emergency.     Jin Is progressing well towards his goals.   Pt prognosis is Good.     Pt will continue to benefit from skilled outpatient physical therapy to address " the deficits listed in the problem list box on initial evaluation, provide pt/family education and to maximize pt's level of independence in the home and community environment.     Pt's spiritual, cultural and educational needs considered and pt agreeable to plan of care and goals.     Anticipated barriers to physical therapy: none    Goals:  Short Term Goals: 4 weeks   1. Pt will be compliant with HEP to promote independent management of current diagnosis.  2. Pt will have improved B knee strength to 5/5 to improve stability with ambulation.  3. Pt will be able to ambulate household distances with improved mechanics to improve independence with ADLs.  4. Pt will report subjective pain ratings no greater than 6/10 with ADLs.     Long Term Goals: 8 weeks   1. Pt will have reduced FOTO limitation score from 55% to no greater than 45% to indicate improved functional mobility.  2. Pt will have improved B hip strength by at least 1 grade to improve his ability to perform sit to stand transfers without UE assist.  3. Pt will be able to stand for at least 20 minutes without pain to improve tolerance for ADLs like showering.  4. Pt will report subjective pain ratings no greater than 2/10 with ADLs.    PLAN     Continue with PT POC, progressing as tolerated.    Willow Plaza, PT

## 2022-07-21 ENCOUNTER — CLINICAL SUPPORT (OUTPATIENT)
Dept: REHABILITATION | Facility: HOSPITAL | Age: 77
End: 2022-07-21
Attending: PAIN MEDICINE
Payer: MEDICARE

## 2022-07-21 DIAGNOSIS — R29.898 DECREASED STRENGTH OF LOWER EXTREMITY: Primary | ICD-10-CM

## 2022-07-21 DIAGNOSIS — Z74.09 IMPAIRED FUNCTIONAL MOBILITY, BALANCE, GAIT, AND ENDURANCE: ICD-10-CM

## 2022-07-21 DIAGNOSIS — R26.9 GAIT ABNORMALITY: ICD-10-CM

## 2022-07-21 PROCEDURE — 97110 THERAPEUTIC EXERCISES: CPT | Mod: PO,CQ

## 2022-07-21 RX ORDER — DOXYCYCLINE HYCLATE 100 MG
TABLET ORAL
Qty: 42 TABLET | Refills: 0 | Status: SHIPPED | OUTPATIENT
Start: 2022-07-21 | End: 2022-08-10

## 2022-07-21 NOTE — PROGRESS NOTES
"[facility]   Physical Therapy Treatment Note     Name: Jin Ngo  Clinic Number: [id number]    Therapy Diagnosis:   Encounter Diagnoses   Name Primary?    Decreased strength of lower extremity Yes    Gait abnormality     Impaired functional mobility, balance, gait, and endurance      Physician: Sami Barksdale Jr.,*    Visit Date: 7/21/2022    Physician Orders: PT Eval and Treat compression fracture of lumbar spine & neurogenic claudication  Medical Diagnosis from Referral:   S32.020B (ICD-10-CM) - Compression fracture of L2 lumbar vertebra, open, initial encounter   R54 (ICD-10-CM) - Age-related physical debility   R53.81 (ICD-10-CM) - Physical deconditioning   Evaluation Date: 6/27/2022  Authorization Period Expiration: 6/6/2023  Plan of Care Expiration: 8/27/2022  Progress Note Due: 7/27/2022  Visit # / Visits authorized: 5/ 20   FOTO: 5/5     Precautions: Standard, Diabetes and blood thinners         PTA Visit #: 1/5     Time In: 2:00 pm  Time Out: 2:55 pm  Total Billable Time: 55 minutes total    SUBJECTIVE     Pt reports: the R lower back feels tight   He was compliant with home exercise program.  Response to previous treatment: none  Functional change: none    Pain: 2/10  Location: bilateral back      OBJECTIVE     Objective Measures updated at progress report unless specified.     Treatment     Jin received the treatments listed below:      therapeutic exercises to develop strength, endurance, flexibility and core stabilization for 55 minutes including:   Date  7/21/2022 7/20/2022 7/13/2022 7/11/2022 7/6/2022 6/27/2022   VISIT 5/20 4/20 3/20 2/20 1/20 1/1   FOTO DONE  4/5 3/5 2/5 1/5 0/5   POC EXP. DATE  8/27/2022 8/27/2022 8/27/20222 8/27/2022   FACE-TO-FACE  7/27/2022 7/27/2022 7/27/2022 7/27/2022    nustep L3 x 10'   W/MH to lower back   L3 x 10'   5 min L4      TABLE:          TA  2 x 10   W/PPT          TA w/march          LTR 2' 2' 30" 3' 3' 2 min    " "  bridges 2 x 10 2 x 10 2 x 10 1 x 10 2 x 10      SLR w/TA   2 x 10 B  2 x 10 B   extensor lag noted      Hip abd (supine clams) 30x GTB  30x GTB 25x GTB 3 x 10 GTB 3 x 10 GTB       HS stretch      3 x 30" B       Piriformis stretch     3 x 30" B       DKC       2 min      SKTC 3 x 15" B  3 x 15" B 3 x 15" B 3 x 15" B     SEATED:          LAQ    2 x 10 x 2#      Hamstring curls           physioball rollouts 10 x 5" hold          Sit to stands         STANDING:          marching 2 x 10  2 x 10        Mini squats 2 x 10  1 x 10        Hip 3 way 10x B ea                               Initials EM LT LT LT EM LT         manual therapy techniques: Soft tissue Mobilization were applied to the: low back for 0 minutes, including:  -STM to lumbar/thoracic paraspinals, QL    therapeutic activities to improve functional performance for 0  minutes, including:  - supine to sit using log roll and pushing himself up (instead of pulling on his bed post which is aggravating his shoulder)        PATIENT EDUCATION AND HOME EXERCISES      Education provided:   - role of PT and goals for therapy  - importance of continuing HEP     Written Home Exercises Provided: yes. Exercises were reviewed and Jin was able to demonstrate them prior to the end of the session.  Jin demonstrated good  understanding of the education provided. See EMR under Patient Instructions for exercises provided during therapy sessions.       ASSESSMENT     Patient arrived to session reporting an increase in R lower back pain. Patient was able to progress through exercises with no increases in pain. Patient ended session stating he felt fine.     Patient has a FOTO limitation score of 57%     Jin Is progressing well towards his goals.   Pt prognosis is Good.     Pt will continue to benefit from skilled outpatient physical therapy to address the deficits listed in the problem list box on initial evaluation, provide pt/family education and to maximize pt's " level of independence in the home and community environment.     Pt's spiritual, cultural and educational needs considered and pt agreeable to plan of care and goals.     Anticipated barriers to physical therapy: none    Goals:  Short Term Goals: 4 weeks   1. Pt will be compliant with HEP to promote independent management of current diagnosis.  2. Pt will have improved B knee strength to 5/5 to improve stability with ambulation.  3. Pt will be able to ambulate household distances with improved mechanics to improve independence with ADLs.  4. Pt will report subjective pain ratings no greater than 6/10 with ADLs.     Long Term Goals: 8 weeks   1. Pt will have reduced FOTO limitation score from 55% to no greater than 45% to indicate improved functional mobility.  2. Pt will have improved B hip strength by at least 1 grade to improve his ability to perform sit to stand transfers without UE assist.  3. Pt will be able to stand for at least 20 minutes without pain to improve tolerance for ADLs like showering.  4. Pt will report subjective pain ratings no greater than 2/10 with ADLs.    PLAN     Continue with PT POC, progressing as tolerated.    Merlin Osei, PTA

## 2022-07-24 ENCOUNTER — TELEPHONE (OUTPATIENT)
Dept: FAMILY MEDICINE | Facility: CLINIC | Age: 77
End: 2022-07-24
Payer: MEDICARE

## 2022-07-25 ENCOUNTER — CLINICAL SUPPORT (OUTPATIENT)
Dept: REHABILITATION | Facility: HOSPITAL | Age: 77
End: 2022-07-25
Attending: PAIN MEDICINE
Payer: MEDICARE

## 2022-07-25 ENCOUNTER — OFFICE VISIT (OUTPATIENT)
Dept: CARDIOLOGY | Facility: CLINIC | Age: 77
End: 2022-07-25
Payer: MEDICARE

## 2022-07-25 VITALS
WEIGHT: 201.63 LBS | DIASTOLIC BLOOD PRESSURE: 65 MMHG | HEIGHT: 69 IN | BODY MASS INDEX: 29.86 KG/M2 | SYSTOLIC BLOOD PRESSURE: 134 MMHG | HEART RATE: 50 BPM | OXYGEN SATURATION: 95 %

## 2022-07-25 DIAGNOSIS — I10 PRIMARY HYPERTENSION: Chronic | ICD-10-CM

## 2022-07-25 DIAGNOSIS — R29.898 DECREASED STRENGTH OF LOWER EXTREMITY: Primary | ICD-10-CM

## 2022-07-25 DIAGNOSIS — I65.23 CAROTID STENOSIS, ASYMPTOMATIC, BILATERAL: ICD-10-CM

## 2022-07-25 DIAGNOSIS — I48.0 PAROXYSMAL ATRIAL FIBRILLATION: ICD-10-CM

## 2022-07-25 DIAGNOSIS — Z74.09 IMPAIRED FUNCTIONAL MOBILITY, BALANCE, GAIT, AND ENDURANCE: ICD-10-CM

## 2022-07-25 DIAGNOSIS — Z95.1 S/P CABG (CORONARY ARTERY BYPASS GRAFT): ICD-10-CM

## 2022-07-25 DIAGNOSIS — E78.2 MIXED HYPERLIPIDEMIA: ICD-10-CM

## 2022-07-25 DIAGNOSIS — R26.9 GAIT ABNORMALITY: ICD-10-CM

## 2022-07-25 DIAGNOSIS — I70.0 ABDOMINAL AORTIC ATHEROSCLEROSIS: ICD-10-CM

## 2022-07-25 PROCEDURE — 3075F SYST BP GE 130 - 139MM HG: CPT | Mod: CPTII,S$GLB,, | Performed by: INTERNAL MEDICINE

## 2022-07-25 PROCEDURE — 1125F PR PAIN SEVERITY QUANTIFIED, PAIN PRESENT: ICD-10-PCS | Mod: CPTII,S$GLB,, | Performed by: INTERNAL MEDICINE

## 2022-07-25 PROCEDURE — 1159F PR MEDICATION LIST DOCUMENTED IN MEDICAL RECORD: ICD-10-PCS | Mod: CPTII,S$GLB,, | Performed by: INTERNAL MEDICINE

## 2022-07-25 PROCEDURE — 99214 PR OFFICE/OUTPT VISIT, EST, LEVL IV, 30-39 MIN: ICD-10-PCS | Mod: S$GLB,,, | Performed by: INTERNAL MEDICINE

## 2022-07-25 PROCEDURE — 1160F PR REVIEW ALL MEDS BY PRESCRIBER/CLIN PHARMACIST DOCUMENTED: ICD-10-PCS | Mod: CPTII,S$GLB,, | Performed by: INTERNAL MEDICINE

## 2022-07-25 PROCEDURE — 3078F PR MOST RECENT DIASTOLIC BLOOD PRESSURE < 80 MM HG: ICD-10-PCS | Mod: CPTII,S$GLB,, | Performed by: INTERNAL MEDICINE

## 2022-07-25 PROCEDURE — 3078F DIAST BP <80 MM HG: CPT | Mod: CPTII,S$GLB,, | Performed by: INTERNAL MEDICINE

## 2022-07-25 PROCEDURE — 3075F PR MOST RECENT SYSTOLIC BLOOD PRESS GE 130-139MM HG: ICD-10-PCS | Mod: CPTII,S$GLB,, | Performed by: INTERNAL MEDICINE

## 2022-07-25 PROCEDURE — 1125F AMNT PAIN NOTED PAIN PRSNT: CPT | Mod: CPTII,S$GLB,, | Performed by: INTERNAL MEDICINE

## 2022-07-25 PROCEDURE — 99999 PR PBB SHADOW E&M-EST. PATIENT-LVL III: CPT | Mod: PBBFAC,,, | Performed by: INTERNAL MEDICINE

## 2022-07-25 PROCEDURE — 1159F MED LIST DOCD IN RCRD: CPT | Mod: CPTII,S$GLB,, | Performed by: INTERNAL MEDICINE

## 2022-07-25 PROCEDURE — 1160F RVW MEDS BY RX/DR IN RCRD: CPT | Mod: CPTII,S$GLB,, | Performed by: INTERNAL MEDICINE

## 2022-07-25 PROCEDURE — 97110 THERAPEUTIC EXERCISES: CPT | Mod: PO

## 2022-07-25 PROCEDURE — 99214 OFFICE O/P EST MOD 30 MIN: CPT | Mod: S$GLB,,, | Performed by: INTERNAL MEDICINE

## 2022-07-25 PROCEDURE — 99999 PR PBB SHADOW E&M-EST. PATIENT-LVL III: ICD-10-PCS | Mod: PBBFAC,,, | Performed by: INTERNAL MEDICINE

## 2022-07-25 NOTE — ASSESSMENT & PLAN NOTE
His left carotid stent is patent.  There is mild disease on the right side.  He will continue clopidogrel long-term.  History of TIAs in the past.

## 2022-07-25 NOTE — ASSESSMENT & PLAN NOTE
2007 CABG.  He states he had MI preceding bypass as 1st presentation of coronary disease.  He did have chest pain during his myocardial infarction but none currently.

## 2022-07-25 NOTE — ASSESSMENT & PLAN NOTE
Eliquis tolerated well.  He is in sinus rhythm today.  There is history of frequent premature atrial beats and his Holter in November, 2020 supported runs of AFib.      He does not have classic awareness of palpitations during AFib.

## 2022-07-25 NOTE — TELEPHONE ENCOUNTER
Hemoglobin A1c ordered for two months-hemoglobin A1c is above eight-please increase her Lantus by 5 units-bring me readings of your glucose so we can continue to adjust her insulin.

## 2022-07-25 NOTE — ASSESSMENT & PLAN NOTE
He is taking amlodipine, metoprolol for hypertension.  Current dosages to continue.  Blood pressure today 134/65 mm Hg.

## 2022-07-25 NOTE — PROGRESS NOTES
[facility]   Physical Therapy Treatment Note     Name: Jin Ngo  Clinic Number: [id number]    Therapy Diagnosis:   Encounter Diagnoses   Name Primary?    Decreased strength of lower extremity Yes    Gait abnormality     Impaired functional mobility, balance, gait, and endurance      Physician: Sami Barksdale Jr.,*    Visit Date: 7/25/2022    Physician Orders: PT Eval and Treat compression fracture of lumbar spine & neurogenic claudication  Medical Diagnosis from Referral:   S32.020B (ICD-10-CM) - Compression fracture of L2 lumbar vertebra, open, initial encounter   R54 (ICD-10-CM) - Age-related physical debility   R53.81 (ICD-10-CM) - Physical deconditioning   Evaluation Date: 6/27/2022  Authorization Period Expiration: 6/6/2023  Plan of Care Expiration: 8/27/2022  Progress Note Due: 7/27/2022  Visit # / Visits authorized: 6/ 20   FOTO: 5/5     Precautions: Standard, Diabetes and blood thinners         PTA Visit #: 0/5     Time In: 1:00 pm  Time Out: 1:55 pm  Total Billable Time: 55 minutes total    SUBJECTIVE     Pt reports: his back is feeling alright  He was compliant with home exercise program.  Response to previous treatment: none  Functional change: none    Pain: 2/10  Location: bilateral back      OBJECTIVE     Objective Measures updated at progress report unless specified.     Treatment     Jin received the treatments listed below:      therapeutic exercises to develop strength, endurance, flexibility and core stabilization for 55 minutes including:   Date  7/25/2022 7/21/2022 7/20/2022 7/13/2022 7/11/2022 7/6/2022 6/27/2022   VISIT 6/20 5/20 4/20 3/20 2/20 1/20 1/1   FOTO  DONE  4/5 3/5 2/5 1/5 0/5   POC EXP. DATE 8/27/2022 8/27/2022 8/27/2022 8/27/20222 8/27/2022   FACE-TO-FACE 7/27/2022 7/27/2022 7/27/2022 7/27/2022 7/27/2022    nustep L3 x 10' L3 x 10'   W/MH to lower back   L3 x 10'   5 min L4      TABLE:           TA   2 x 10   W/PPT          TA w/march    "        LTR 2' 2' 2' 30" 3' 3' 2 min      bridges 2 x 10 2 x 10 2 x 10 2 x 10 1 x 10 2 x 10      SLR w/TA 1 x 10 B   2 x 10 B  2 x 10 B   extensor lag noted      Hip abd (supine clams) 30x GTB 30x GTB  30x GTB 25x GTB 3 x 10 GTB 3 x 10 GTB       HS stretch       3 x 30" B       Piriformis stretch 2 x 30" B     3 x 30" B       DKC        2 min      SKTC 3 x 15" B 3 x 15" B  3 x 15" B 3 x 15" B 3 x 15" B     SEATED:           LAQ     2 x 10 x 2#      Hamstring curls            physioball rollouts  10 x 5" hold          Sit to stands          STANDING:           marching 2 x 10 2 x 10  2 x 10        Mini squats 2 x 10 2 x 10  1 x 10        Hip 3 way 15x B 10x B ea                                 Initials LT EM LT LT LT EM LT         manual therapy techniques: Soft tissue Mobilization were applied to the: low back for 0 minutes, including:  -STM to lumbar/thoracic paraspinals, QL    therapeutic activities to improve functional performance for 0  minutes, including:  - supine to sit using log roll and pushing himself up (instead of pulling on his bed post which is aggravating his shoulder)        PATIENT EDUCATION AND HOME EXERCISES      Education provided:   - role of PT and goals for therapy  - importance of continuing HEP     Written Home Exercises Provided: yes. Exercises were reviewed and Jin was able to demonstrate them prior to the end of the session.  Jin demonstrated good  understanding of the education provided. See EMR under Patient Instructions for exercises provided during therapy sessions.       ASSESSMENT     Patient arrived to therapy reporting low levels of pain in his low back today. He tolerated all exercises well with no reports of increased pain at the end of today's session. He continues to move slowly through exercises, but continued to work on core strength.     Patient has a FOTO limitation score of 57%     Jin Is progressing well towards his goals.   Pt prognosis is Good.     Pt will " continue to benefit from skilled outpatient physical therapy to address the deficits listed in the problem list box on initial evaluation, provide pt/family education and to maximize pt's level of independence in the home and community environment.     Pt's spiritual, cultural and educational needs considered and pt agreeable to plan of care and goals.     Anticipated barriers to physical therapy: none    Goals:  Short Term Goals: 4 weeks   1. Pt will be compliant with HEP to promote independent management of current diagnosis.  2. Pt will have improved B knee strength to 5/5 to improve stability with ambulation.  3. Pt will be able to ambulate household distances with improved mechanics to improve independence with ADLs.  4. Pt will report subjective pain ratings no greater than 6/10 with ADLs.     Long Term Goals: 8 weeks   1. Pt will have reduced FOTO limitation score from 55% to no greater than 45% to indicate improved functional mobility.  2. Pt will have improved B hip strength by at least 1 grade to improve his ability to perform sit to stand transfers without UE assist.  3. Pt will be able to stand for at least 20 minutes without pain to improve tolerance for ADLs like showering.  4. Pt will report subjective pain ratings no greater than 2/10 with ADLs.    PLAN     Continue with PT POC, progressing as tolerated.    [place], PT

## 2022-07-25 NOTE — ASSESSMENT & PLAN NOTE
He is on atorvastatin 40, fenofibrate 160 mg. Condition controlled.  Most recent labs confirm LDL 68, HDL 23, triglycerides 183 mg%.

## 2022-07-25 NOTE — PROGRESS NOTES
" Patient ID: Jin Ngo is a 76 y.o. male.    Chief Complaint: Follow-up    HPI      Jin Ngo is a 76 y.o. male complains of continued diabetes without under control.  Is clearance thing neuropathy because of diabetes.  Does have chronic lower back pain for which he has follow-up.    Vitals:    07/19/22 1403   BP: 120/70   BP Location: Right arm   Patient Position: Sitting   Pulse: 80   Temp: 98.4 °F (36.9 °C)   TempSrc: Oral   SpO2: 95%   Weight: 92 kg (202 lb 11.4 oz)   Height: 5' 9" (1.753 m)            Review of Symptoms      Physical Exam    Constitutional:  Oriented to person, place, and time.appears well-developed and well-nourished.  No distress.      HENT  Head: Normocephalic and atraumatic  Right Ear: External ear normal.   Left Ear: External ear normal.   Nose: External nose normal.   Mouth:  Moist mucus membranes.    Eyes:  Conjunctivae are normal. Right eye exhibits no discharge.  Left eye exhibits no discharge. No scleral icterus.  No periorbital edema    Cardiovascular:  Regular rate and rhythm with normal S1 and S2     Pulmonary/Chest:   Clear to auscultation bilaterally without wheezes, rhonchi or rales      Musculoskeletal:  No edema. No obvious deformity No wasting       Neurological:  Alert and oriented to person, place, and time.   Coordination normal.     Skin:   Skin is warm and dry.  No diaphoresis.   No rash noted.     Psychiatric: Normal mood and affect. Behavior is normal.  Judgment and thought content normal.     Complete Blood Count  Lab Results   Component Value Date    RBC 4.31 (L) 10/06/2021    HGB 12.8 (L) 10/06/2021    HCT 40.1 10/06/2021    MCV 93 10/06/2021    MCH 29.7 10/06/2021    MCHC 31.9 (L) 10/06/2021    RDW 13.9 10/06/2021     10/06/2021    MPV 10.4 10/06/2021    GRAN 3.4 10/06/2021    GRAN 58.2 10/06/2021    LYMPH 1.4 10/06/2021    LYMPH 24.1 10/06/2021    MONO 0.8 10/06/2021    MONO 12.9 10/06/2021    EOS 0.2 10/06/2021    BASO 0.05 10/06/2021    " EOSINOPHIL 3.6 10/06/2021    BASOPHIL 0.9 10/06/2021    DIFFMETHOD Automated 10/06/2021       Comprehensive Metabolic Panel  Lab Results   Component Value Date     (H) 06/13/2022    BUN 24 (H) 06/13/2022    CREATININE >30.0 (H) 06/13/2022     06/13/2022    K 4.2 06/13/2022     06/13/2022    PROT 7.9 01/31/2022    ALBUMIN 4.2 06/13/2022    BILITOT 0.6 01/31/2022    AST 30 01/31/2022    ALKPHOS 49 01/31/2022    CO2 22 (L) 06/13/2022    ALT 24 01/31/2022    ANIONGAP 13 06/13/2022    EGFRNONAA 46.1 (A) 06/13/2022    ESTGFRAFRICA 53.2 (A) 06/13/2022       TSH  Lab Results   Component Value Date    TSH 1.720 06/13/2022       Assessment / Plan:      ICD-10-CM ICD-9-CM   1. Uncontrolled type 2 diabetes mellitus with diabetic neuropathy, without long-term current use of insulin  E11.40 250.62    E11.65 357.2     Uncontrolled type 2 diabetes mellitus with diabetic neuropathy, without long-term current use of insulin  -     Hemoglobin A1C; Standing    Long discussion with he and wife regarding diabetes chronic pain current medications

## 2022-07-25 NOTE — PROGRESS NOTES
[facility]     Subjective:    Patient ID:  Jin Ngo is a 76 y.o. male who presents for follow-up of Carotid Artery Disease, Coronary Artery Disease, Hyperlipidemia, Hypertension, and Diabetes Mellitus    Review of patient's allergies indicates:   Allergen Reactions    Metformin      Caused stroke    Pcn [penicillins]       He is followed for coronary disease and carotid disease.  He is diabetic.  He had a left carotid stent December 2020. He has done well since then.  He has a history of paroxysmal AFib confirmed by Holter.  He is on Eliquis.  His follow-up carotid ultrasounds have been negative.  The contralateral side shows 40-50% plaquing only.  He does not ambulate much.  He has chronic back problems.  He fell and had a vertebral fracture.  He is diabetic.  He is on statin/fibrate treatment for mixed hyperlipidemia.      His bypass surgery was 2007. He had NSTEMI preceding his bypass.  His last stress test was 2016-negative for ischemia.  He has not had a stress test since his bypass.  His wife thinks he is doing well.  He did have chest pain with his myocardial infarction.  His blood pressures have been stable.  He has not had bruising or bleeding.        Review of Systems   Constitutional: Negative for chills, decreased appetite, diaphoresis, fever, malaise/fatigue, night sweats, weight gain and weight loss.   HENT: Negative for congestion, ear discharge, ear pain, hearing loss, hoarse voice, nosebleeds, odynophagia, sore throat, stridor and tinnitus.    Eyes: Negative for blurred vision, discharge, double vision, pain, photophobia, redness, vision loss in left eye, vision loss in right eye, visual disturbance and visual halos.   Cardiovascular: Negative for chest pain, claudication, cyanosis, dyspnea on exertion, irregular heartbeat, leg swelling, near-syncope, orthopnea, palpitations, paroxysmal nocturnal dyspnea and  "syncope.   Respiratory: Negative for cough, hemoptysis, shortness of breath, sleep disturbances due to breathing, snoring, sputum production and wheezing.    Endocrine: Negative for cold intolerance, heat intolerance, polydipsia, polyphagia and polyuria.   Hematologic/Lymphatic: Negative for adenopathy and bleeding problem. Does not bruise/bleed easily.   Skin: Negative for color change, dry skin, flushing, itching, nail changes, poor wound healing, rash, skin cancer, suspicious lesions and unusual hair distribution.   Musculoskeletal: Positive for back pain and joint pain. Negative for arthritis, falls, gout, joint swelling, muscle cramps, muscle weakness, myalgias, neck pain and stiffness.   Gastrointestinal: Negative for bloating, abdominal pain, anorexia, change in bowel habit, bowel incontinence, constipation, diarrhea, dysphagia, excessive appetite, flatus, heartburn, hematemesis, hematochezia, hemorrhoids, jaundice, melena, nausea and vomiting.   Genitourinary: Negative for bladder incontinence, decreased libido, dysuria, flank pain, frequency, genital sores, hematuria, hesitancy, incomplete emptying, nocturia and urgency.   Neurological: Positive for numbness and weakness. Negative for aphonia, brief paralysis, difficulty with concentration, disturbances in coordination, excessive daytime sleepiness, dizziness, focal weakness, headaches, light-headedness, loss of balance, paresthesias, seizures, sensory change, tremors and vertigo.   Psychiatric/Behavioral: Negative for altered mental status, depression, hallucinations, memory loss, substance abuse, suicidal ideas and thoughts of violence. The patient does not have insomnia and is not nervous/anxious.    Allergic/Immunologic: Negative for hives and persistent infections.        Objective:       Vitals:    07/25/22 0929   BP: 134/65   Pulse: (!) 50   SpO2: 95%   Weight: 91.4 kg (201 lb 9.6 oz)   Height: 5' 9" (1.753 m)    Physical Exam  Constitutional:       " General: He is not in acute distress.     Appearance: He is well-developed. He is not diaphoretic.   HENT:      Head: Normocephalic and atraumatic.      Nose: Nose normal.   Eyes:      General: No scleral icterus.        Right eye: No discharge.      Conjunctiva/sclera: Conjunctivae normal.      Pupils: Pupils are equal, round, and reactive to light.   Neck:      Thyroid: No thyromegaly.      Vascular: No JVD.      Trachea: No tracheal deviation.   Cardiovascular:      Rate and Rhythm: Normal rate and regular rhythm.      Pulses:           Carotid pulses are 2+ on the right side and 2+ on the left side.       Radial pulses are 2+ on the right side and 2+ on the left side.        Dorsalis pedis pulses are 2+ on the right side and 2+ on the left side.        Posterior tibial pulses are 2+ on the right side and 2+ on the left side.      Heart sounds: Normal heart sounds. No murmur heard.    No friction rub. No gallop.   Pulmonary:      Effort: Pulmonary effort is normal. No respiratory distress.      Breath sounds: Normal breath sounds. No stridor. No wheezing or rales.   Chest:      Chest wall: No tenderness.   Abdominal:      General: Bowel sounds are normal. There is no distension.      Palpations: Abdomen is soft. There is no mass.      Tenderness: There is no abdominal tenderness. There is no guarding or rebound.   Musculoskeletal:         General: No tenderness. Normal range of motion.      Cervical back: Normal range of motion and neck supple.   Lymphadenopathy:      Cervical: No cervical adenopathy.   Skin:     General: Skin is warm and dry.      Coloration: Skin is not pale.      Findings: No erythema or rash.   Neurological:      Mental Status: He is alert and oriented to person, place, and time.      Cranial Nerves: No cranial nerve deficit.      Coordination: Coordination normal.   Psychiatric:         Behavior: Behavior normal.         Thought Content: Thought content normal.         Judgment: Judgment  "normal.           Assessment:       1. Paroxysmal atrial fibrillation    2. S/P CABG (coronary artery bypass graft)    3. Mixed hyperlipidemia    4. Primary hypertension    5. Carotid stenosis, asymptomatic, bilateral    6. Abdominal aortic atherosclerosis    7. Uncontrolled type 2 diabetes mellitus with diabetic neuropathy, without long-term current use of insulin      Results for orders placed or performed in visit on 07/20/22   Hemoglobin A1C   Result Value Ref Range    Hemoglobin A1C 8.8 (H) 4.0 - 5.6 %    Estimated Avg Glucose 206 (H) 68 - 131 mg/dL         Current Outpatient Medications:     ACCU-CHEK VERONICA PLUS TEST STRP Strp, TEST BLOOD SUGAR FOUR TIMES DAILY, Disp: 400 strip, Rfl: 3    allopurinoL (ZYLOPRIM) 300 MG tablet, TAKE 1 TABLET EVERY DAY, Disp: 90 tablet, Rfl: 0    amLODIPine (NORVASC) 5 MG tablet, TAKE 1 TABLET EVERY DAY, Disp: 90 tablet, Rfl: 4    atorvastatin (LIPITOR) 40 MG tablet, Take 1 tablet (40 mg total) by mouth once daily., Disp: 90 tablet, Rfl: 3    BD ULTRA-FINE TEJINDER PEN NEEDLE 32 gauge x 5/32" Ndle, USE WITH LEVEMIR DAILY, Disp: 100 each, Rfl: 3    blood-glucose meter kit, To check BG 4 times daily, to use with insurance preferred meter, Disp: 1 each, Rfl: 0    clopidogreL (PLAVIX) 75 mg tablet, TAKE 1 TABLET EVERY DAY, Disp: 90 tablet, Rfl: 1    doxycycline (VIBRA-TABS) 100 MG tablet, TAKE 1 TABLET BY MOUTH TWICE A DAY, Disp: 42 tablet, Rfl: 0    DULoxetine (CYMBALTA) 30 MG capsule, TAKE 1 CAPSULE (30 MG TOTAL) ONCE DAILY. TO HELP REDUCE FEELING OF BACK PAIN, Disp: 90 capsule, Rfl: 3    ELIQUIS 5 mg Tab, TAKE 1 TABLET BY MOUTH TWICE A DAY, Disp: 60 tablet, Rfl: 2    fenofibrate 160 MG Tab, TAKE 1 TABLET EVERY DAY, Disp: 90 tablet, Rfl: 1    fluticasone propionate (FLONASE) 50 mcg/actuation nasal spray, INHALE 1 SPRAY IN EACH NOSTRIL ROUTE ONCE DAILY., Disp: 48 mL, Rfl: 2    glipiZIDE (GLUCOTROL) 5 MG tablet, Take 2 tablets (10 mg total) by mouth daily with breakfast AND " 1 tablet (5 mg total) daily with dinner or evening meal. Take 7.5 mg with breakfast and 5 mg with dinner., Disp: 270 tablet, Rfl: 2    HYDROcodone-acetaminophen (NORCO) 7.5-325 mg per tablet, Take 1 tablet by mouth every 24 hours as needed for Pain (take 30 minutes prior to parcipating in physical therapy)., Disp: 20 tablet, Rfl: 0    insulin detemir U-100 (LEVEMIR FLEXTOUCH U-100 INSULN) 100 unit/mL (3 mL) InPn pen, Inject 12 Units into the skin every evening., Disp: 15 mL, Rfl: 3    lancets (ACCU-CHEK SOFTCLIX LANCETS) Misc, To check BG 4 times daily, to use with insurance preferred meter., Disp: 200 each, Rfl: 0    metoprolol tartrate (LOPRESSOR) 25 MG tablet, TAKE 1 TABLET TWICE DAILY, Disp: 180 tablet, Rfl: 3    tamsulosin (FLOMAX) 0.4 mg Cap, TAKE 1 CAPSULE (0.4 MG TOTAL) BY MOUTH ONCE DAILY. FOR URINE FLOW, Disp: 90 capsule, Rfl: 0     Lab Results   Component Value Date    WBC 5.88 10/06/2021    RBC 4.31 (L) 10/06/2021    HGB 12.8 (L) 10/06/2021    HCT 40.1 10/06/2021    MCV 93 10/06/2021    MCH 29.7 10/06/2021    MCHC 31.9 (L) 10/06/2021    RDW 13.9 10/06/2021     10/06/2021    MPV 10.4 10/06/2021    GRAN 3.4 10/06/2021    GRAN 58.2 10/06/2021    LYMPH 1.4 10/06/2021    LYMPH 24.1 10/06/2021    MONO 0.8 10/06/2021    MONO 12.9 10/06/2021    EOS 0.2 10/06/2021    BASO 0.05 10/06/2021    EOSINOPHIL 3.6 10/06/2021    BASOPHIL 0.9 10/06/2021    MG 1.7 06/17/2021        CMP  Lab Results   Component Value Date     06/13/2022    K 4.2 06/13/2022     06/13/2022    CO2 22 (L) 06/13/2022     (H) 06/13/2022    BUN 24 (H) 06/13/2022    CREATININE >30.0 (H) 06/13/2022    CALCIUM 8.7 06/13/2022    PROT 7.9 01/31/2022    ALBUMIN 4.2 06/13/2022    BILITOT 0.6 01/31/2022    ALKPHOS 49 01/31/2022    AST 30 01/31/2022    ALT 24 01/31/2022    ANIONGAP 13 06/13/2022    ESTGFRAFRICA 53.2 (A) 06/13/2022    EGFRNONAA 46.1 (A) 06/13/2022        No results found for: LABBLOO, LABURIN, RESPIRATORYC,  GSRESP         Results for orders placed or performed during the hospital encounter of 10/06/21   EKG 12-lead    Collection Time: 10/06/21  8:31 PM    Narrative    Test Reason : E87.5,    Vent. Rate : 063 BPM     Atrial Rate : 063 BPM     P-R Int : 296 ms          QRS Dur : 104 ms      QT Int : 402 ms       P-R-T Axes : 054 034 115 degrees     QTc Int : 411 ms    Sinus rhythm with 1st degree A-V block with Premature atrial complexes  T wave abnormality, consider lateral ischemia  Abnormal ECG  When compared with ECG of 10-DEC-2020 06:16,  No significant change was found  Confirmed by FREEDOM DORSEY, GAURANG ([id number]) on 10/7/2021 2:18:59 PM    Referred By: JAVIER   SELF           Confirmed By:GAURANG LOJA MD                  Plan:       Problem List Items Addressed This Visit        Cardiac/Vascular    Hypertension (Chronic)     He is taking amlodipine, metoprolol for hypertension.  Current dosages to continue.  Blood pressure today 134/65 mm Hg.           S/P CABG (coronary artery bypass graft)     2007 CABG.  He states he had MI preceding bypass as 1st presentation of coronary disease.  He did have chest pain during his myocardial infarction but none currently.           Mixed hyperlipidemia     He is on atorvastatin 40, fenofibrate 160 mg. Condition controlled.  Most recent labs confirm LDL 68, HDL 23, triglycerides 183 mg%.           Abdominal aortic atherosclerosis     Condition unchanged.           Paroxysmal atrial fibrillation     Eliquis tolerated well.  He is in sinus rhythm today.  There is history of frequent premature atrial beats and his Holter in November, 2020 supported runs of AFib.      He does not have classic awareness of palpitations during AFib.           Carotid stenosis, asymptomatic, bilateral     His left carotid stent is patent.  There is mild disease on the right side.  He will continue clopidogrel long-term.  History of TIAs in the past.              Endocrine    Uncontrolled type 2 diabetes  mellitus with diabetic neuropathy, without long-term current use of insulin (Chronic)     Most recent hemoglobin A1c 8.8, GFR 46, serum creatinine 1.46.  He has diabetic neuropathy.                    By exam, he is in sinus rhythm.  He is tolerating Eliquis and clopidogrel.  His current medications will be continued.  I will see him back in 6 months.  We discussed his coronary artery disease status.  I do not advise stress testing since he has done well since his bypass.             Marvin Sotelo MD  07/25/2022   9:57 AM

## 2022-07-27 ENCOUNTER — DOCUMENTATION ONLY (OUTPATIENT)
Dept: REHABILITATION | Facility: HOSPITAL | Age: 77
End: 2022-07-27
Payer: MEDICARE

## 2022-07-27 ENCOUNTER — CLINICAL SUPPORT (OUTPATIENT)
Dept: REHABILITATION | Facility: HOSPITAL | Age: 77
End: 2022-07-27
Attending: PAIN MEDICINE
Payer: MEDICARE

## 2022-07-27 DIAGNOSIS — R26.9 GAIT ABNORMALITY: ICD-10-CM

## 2022-07-27 DIAGNOSIS — Z74.09 IMPAIRED FUNCTIONAL MOBILITY, BALANCE, GAIT, AND ENDURANCE: ICD-10-CM

## 2022-07-27 DIAGNOSIS — R29.898 DECREASED STRENGTH OF LOWER EXTREMITY: Primary | ICD-10-CM

## 2022-07-27 PROCEDURE — 97110 THERAPEUTIC EXERCISES: CPT | Mod: PO

## 2022-07-27 NOTE — TELEPHONE ENCOUNTER
Spoke to pt's wife Candelaria and went over A1c results. Informed wife to have pt increase Levemir to 17 units daily. Pt is to bring glucose reading the week of 8/8/22 do to MD being out next week   Anesthesia Volume In Cc: 3

## 2022-07-27 NOTE — PROGRESS NOTES
"[facility]   Physical Therapy Treatment Note     Name: Jin Ngo  Clinic Number: [id number]    Therapy Diagnosis:   Encounter Diagnoses   Name Primary?    Decreased strength of lower extremity Yes    Gait abnormality     Impaired functional mobility, balance, gait, and endurance      Physician: Sami Barksdale Jr.,*    Visit Date: 7/27/2022    Physician Orders: PT Eval and Treat compression fracture of lumbar spine & neurogenic claudication  Medical Diagnosis from Referral:   S32.020B (ICD-10-CM) - Compression fracture of L2 lumbar vertebra, open, initial encounter   R54 (ICD-10-CM) - Age-related physical debility   R53.81 (ICD-10-CM) - Physical deconditioning   Evaluation Date: 6/27/2022  Authorization Period Expiration: 6/6/2023  Plan of Care Expiration: 8/27/2022  Progress Note Due: 7/27/2022  Visit # / Visits authorized: 7/ 20   FOTO: 5/5     Precautions: Standard, Diabetes and blood thinners         PTA Visit #: 0/5     Time In: 1:00 pm  Time Out: 1:55 pm  Total Billable Time: 55 minutes total    SUBJECTIVE     Pt reports: "I feel like yesterday warmed over", stiff  He was compliant with home exercise program.  Response to previous treatment: none  Functional change: none    Pain: 2/10  Location: bilateral back      OBJECTIVE     Objective Measures updated at progress report unless specified.     Treatment     Jin received the treatments listed below:      therapeutic exercises to develop strength, endurance, flexibility and core stabilization for 55 minutes including:   Date  7/27/2022 7/25/2022 7/21/2022 7/20/2022 7/13/2022 7/11/2022 7/6/2022 6/27/2022   VISIT 7/20 6/20 5/20 4/20 3/20 2/20 1/20 1/1   FOTO   DONE  4/5 3/5 2/5 1/5 0/5   POC EXP. DATE 8/27/2022 8/27/2022 8/27/2022 8/27/2022 8/27/20222 8/27/2022   FACE-TO-FACE 8/27/2022 7/27/2022 7/27/2022 7/27/2022 7/27/2022 7/27/2022    nustep L3 x 10' bike L3 x 10' L3 x 10'   W/MH to lower back   L3 x 10'   5 min " "L4      TABLE:            TA    2 x 10   W/PPT          TA w/march            LTR 2' 2' 2' 2' 30" 3' 3' 2 min      bridges 2 x 10 2 x 10 2 x 10 2 x 10 2 x 10 1 x 10 2 x 10      SLR w/TA  1 x 10 B   2 x 10 B  2 x 10 B   extensor lag noted      Hip abd (supine clams) 30x GTB 30x GTB 30x GTB  30x GTB 25x GTB 3 x 10 GTB 3 x 10 GTB       HS stretch        3 x 30" B       Piriformis stretch 2 x 30" B 2 x 30" B     3 x 30" B       DKC         2 min      SKTC 3 x 15" B 3 x 15" B 3 x 15" B  3 x 15" B 3 x 15" B 3 x 15" B     SEATED:            LAQ      2 x 10 x 2#      Hamstring curls             physioball rollouts   10 x 5" hold          Sit to stands           STANDING:            marching 2 x 10 2 x 10 2 x 10  2 x 10        Mini squats 2 x 10  2 x 10 2 x 10  1 x 10        Hip 3 way 20x B 15x B 10x B ea                      Initials LT LT EM LT LT LT EM LT         manual therapy techniques: Soft tissue Mobilization were applied to the: low back for 0 minutes, including:  -STM to lumbar/thoracic paraspinals, QL    therapeutic activities to improve functional performance for 0  minutes, including:  - supine to sit using log roll and pushing himself up (instead of pulling on his bed post which is aggravating his shoulder)        PATIENT EDUCATION AND HOME EXERCISES      Education provided:   - role of PT and goals for therapy  - importance of continuing HEP     Written Home Exercises Provided: yes. Exercises were reviewed and Jin was able to demonstrate them prior to the end of the session.  Jin demonstrated good  understanding of the education provided. See EMR under Patient Instructions for exercises provided during therapy sessions.       ASSESSMENT     Patient arrived to therapy reporting low levels of pain in his low back today. Continued to work on general LE strengthening, and core strength. Tolerated all exercises well with no reports of increased symptoms.     Patient has a FOTO limitation score of 57% "     Jin Is progressing well towards his goals.   Pt prognosis is Good.     Pt will continue to benefit from skilled outpatient physical therapy to address the deficits listed in the problem list box on initial evaluation, provide pt/family education and to maximize pt's level of independence in the home and community environment.     Pt's spiritual, cultural and educational needs considered and pt agreeable to plan of care and goals.     Anticipated barriers to physical therapy: none    Goals:  Short Term Goals: 4 weeks   1. Pt will be compliant with HEP to promote independent management of current diagnosis.  2. Pt will have improved B knee strength to 5/5 to improve stability with ambulation.  3. Pt will be able to ambulate household distances with improved mechanics to improve independence with ADLs.  4. Pt will report subjective pain ratings no greater than 6/10 with ADLs.     Long Term Goals: 8 weeks   1. Pt will have reduced FOTO limitation score from 55% to no greater than 45% to indicate improved functional mobility.  2. Pt will have improved B hip strength by at least 1 grade to improve his ability to perform sit to stand transfers without UE assist.  3. Pt will be able to stand for at least 20 minutes without pain to improve tolerance for ADLs like showering.  4. Pt will report subjective pain ratings no greater than 2/10 with ADLs.    PLAN     Continue with PT POC, progressing as tolerated.    Willow Plaza, PT

## 2022-07-27 NOTE — PROGRESS NOTES
PT/PTA met face to face to discuss pt's treatment plan and progress towards established goals. Pt will be seen by a physical therapist minimally every 6th visit or every 30 days.    Willow Plaza PT  7/27/2022    Face to Face PTA Conference performed with Willow Plaza PT regarding patient's current status, overall progress, and plan of care. Pt will be seen by a physical therapist minimally every 6th visit or every 30 days.    Merlin Osei,PTA  7/27/2022

## 2022-07-29 NOTE — PROGRESS NOTES
[facility]   Physical Therapy Treatment Note     Name: Jin Ngo  Clinic Number: [id number]    Therapy Diagnosis:   Encounter Diagnoses   Name Primary?    Decreased strength of lower extremity Yes    Gait abnormality     Impaired functional mobility, balance, gait, and endurance      Physician: Sami Barksdale Jr.,*    Visit Date: 8/1/2022    Physician Orders: PT Eval and Treat compression fracture of lumbar spine & neurogenic claudication  Medical Diagnosis from Referral:   S32.020B (ICD-10-CM) - Compression fracture of L2 lumbar vertebra, open, initial encounter   R54 (ICD-10-CM) - Age-related physical debility   R53.81 (ICD-10-CM) - Physical deconditioning   Evaluation Date: 6/27/2022  Authorization Period Expiration: 6/6/2023  Plan of Care Expiration: 8/27/2022  Progress Note Due: 7/27/2022  Visit # / Visits authorized: 8/ 20   FOTO: 5/5     Precautions: Standard, Diabetes and blood thinners         PTA Visit #: 1/5     Time In: 1:00 pm  Time Out: 1:55 pm  Total Billable Time: 30 minutes total    SUBJECTIVE     Pt reports: I'm alright just not sleeping well   He was compliant with home exercise program.  Response to previous treatment: none  Functional change: none    Pain: 2/10  Location: bilateral back      OBJECTIVE     Objective Measures updated at progress report unless specified.     Treatment     Jin received the treatments listed below:      therapeutic exercises to develop strength, endurance, flexibility and core stabilization for 55 minutes including:   Date  8/1/2022 7/27/2022 7/25/2022 7/21/2022 7/20/2022 7/13/2022 7/11/2022 7/6/2022 6/27/2022   VISIT 8/20 7/20 6/20 5/20 4/20 3/20 2/20 1/20 1/1   FOTO    DONE  4/5 3/5 2/5 1/5 0/5   POC EXP. DATE  8/27/2022 8/27/2022 8/27/2022 8/27/2022 8/27/20222 8/27/2022   FACE-TO-FACE  8/27/2022 7/27/2022 7/27/2022 7/27/2022 7/27/2022 7/27/2022    nustep L3 x 10'  L3 x 10' bike L3 x 10' L3 x 10'   W/MH to lower back    "L3 x 10'   5 min L4      TABLE:             TA     2 x 10   W/PPT          TA w/march             SLR   2 x 10 B            LTR 2' 2' 2' 2' 2' 30" 3' 3' 2 min      bridges 2' 2 x 10 2 x 10 2 x 10 2 x 10 2 x 10 1 x 10 2 x 10      SLR w/TA   1 x 10 B   2 x 10 B  2 x 10 B   extensor lag noted      Hip abd (supine clams) 30x BTB  30x GTB 30x GTB 30x GTB  30x GTB 25x GTB 3 x 10 GTB 3 x 10 GTB       HS stretch         3 x 30" B       Piriformis stretch 2 x 30" B  2 x 30" B 2 x 30" B     3 x 30" B       DKC          2 min      SKTC 3 x 15" B  3 x 15" B 3 x 15" B 3 x 15" B  3 x 15" B 3 x 15" B 3 x 15" B     SEATED:             LAQ       2 x 10 x 2#      Hamstring curls              physioball rollouts    10 x 5" hold          Sit to stands            STANDING:             marching 2 x 10  2 x 10 2 x 10 2 x 10  2 x 10        Mini squats 2 x 10  2 x 10  2 x 10 2 x 10  1 x 10        Hip 3 way 20x B   W/use of step for better foot clearance 20x B 15x B 10x B ea                       Initials EM LT LT EM LT LT LT EM LT         manual therapy techniques: Soft tissue Mobilization were applied to the: low back for 0 minutes, including:  -STM to lumbar/thoracic paraspinals, QL    therapeutic activities to improve functional performance for 0  minutes, including:  - supine to sit using log roll and pushing himself up (instead of pulling on his bed post which is aggravating his shoulder)        PATIENT EDUCATION AND HOME EXERCISES      Education provided:   - role of PT and goals for therapy  - importance of continuing HEP     Written Home Exercises Provided: yes. Exercises were reviewed and Jin was able to demonstrate them prior to the end of the session.  Jin demonstrated good  understanding of the education provided. See EMR under Patient Instructions for exercises provided during therapy sessions.       ASSESSMENT     Patient arrived to therapy reporting low levels of pain in his low back today. Progressed through " exercises with no increases in discomfort. Patient ended session stating he felt fine.     Jin Is progressing well towards his goals.   Pt prognosis is Good.     Pt will continue to benefit from skilled outpatient physical therapy to address the deficits listed in the problem list box on initial evaluation, provide pt/family education and to maximize pt's level of independence in the home and community environment.     Pt's spiritual, cultural and educational needs considered and pt agreeable to plan of care and goals.     Anticipated barriers to physical therapy: none    Goals:  Short Term Goals: 4 weeks   1. Pt will be compliant with HEP to promote independent management of current diagnosis.  2. Pt will have improved B knee strength to 5/5 to improve stability with ambulation.  3. Pt will be able to ambulate household distances with improved mechanics to improve independence with ADLs.  4. Pt will report subjective pain ratings no greater than 6/10 with ADLs.     Long Term Goals: 8 weeks   1. Pt will have reduced FOTO limitation score from 55% to no greater than 45% to indicate improved functional mobility.  2. Pt will have improved B hip strength by at least 1 grade to improve his ability to perform sit to stand transfers without UE assist.  3. Pt will be able to stand for at least 20 minutes without pain to improve tolerance for ADLs like showering.  4. Pt will report subjective pain ratings no greater than 2/10 with ADLs.    PLAN     Continue with PT POC, progressing as tolerated.    Merlin Osei, PTA

## 2022-08-01 ENCOUNTER — PES CALL (OUTPATIENT)
Dept: ADMINISTRATIVE | Facility: OTHER | Age: 77
End: 2022-08-01
Payer: MEDICARE

## 2022-08-01 ENCOUNTER — CLINICAL SUPPORT (OUTPATIENT)
Dept: REHABILITATION | Facility: HOSPITAL | Age: 77
End: 2022-08-01
Attending: PAIN MEDICINE
Payer: MEDICARE

## 2022-08-01 DIAGNOSIS — R26.9 GAIT ABNORMALITY: ICD-10-CM

## 2022-08-01 DIAGNOSIS — R29.898 DECREASED STRENGTH OF LOWER EXTREMITY: Primary | ICD-10-CM

## 2022-08-01 DIAGNOSIS — Z74.09 IMPAIRED FUNCTIONAL MOBILITY, BALANCE, GAIT, AND ENDURANCE: ICD-10-CM

## 2022-08-01 PROCEDURE — 97110 THERAPEUTIC EXERCISES: CPT | Mod: PO,CQ

## 2022-08-01 NOTE — PROGRESS NOTES
[facility]   Physical Therapy Treatment Note     Name: Jin Ngo  Clinic Number: [id number]    Therapy Diagnosis:   Encounter Diagnoses   Name Primary?    Decreased strength of lower extremity Yes    Gait abnormality     Impaired functional mobility, balance, gait, and endurance      Physician: Sami Barksdale Jr.,*    Visit Date: 8/3/2022    Physician Orders: PT Eval and Treat compression fracture of lumbar spine & neurogenic claudication  Medical Diagnosis from Referral:   S32.020B (ICD-10-CM) - Compression fracture of L2 lumbar vertebra, open, initial encounter   R54 (ICD-10-CM) - Age-related physical debility   R53.81 (ICD-10-CM) - Physical deconditioning   Evaluation Date: 6/27/2022  Authorization Period Expiration: 6/6/2023  Plan of Care Expiration: 8/27/2022  Progress Note Due: 7/27/2022  Visit # / Visits authorized: 9/ 20   FOTO: 5/5     Precautions: Standard, Diabetes and blood thinners         PTA Visit #: 2/5     Time In: 1:00 pm  Time Out: 1:55 pm  Total Billable Time: 30 minutes total    SUBJECTIVE     Pt reports: I'm alright about the same   He was compliant with home exercise program.  Response to previous treatment: none  Functional change: none    Pain: 2/10  Location: bilateral back      OBJECTIVE     Objective Measures updated at progress report unless specified.     Treatment     Jin received the treatments listed below:      therapeutic exercises to develop strength, endurance, flexibility and core stabilization for 55 minutes including:   Date  8/3/2022 8/1/2022 7/27/2022 7/25/2022 7/21/2022 7/20/2022 7/13/2022 7/11/2022 7/6/2022 6/27/2022   VISIT 9/20 8/20 7/20 6/20 5/20 4/20 3/20 2/20 1/20 1/1   FOTO     DONE  4/5 3/5 2/5 1/5 0/5   POC EXP. DATE   8/27/2022 8/27/2022 8/27/2022 8/27/2022 8/27/20222 8/27/2022   FACE-TO-FACE   8/27/2022 7/27/2022 7/27/2022 7/27/2022 7/27/2022 7/27/2022    nustep L3 x 10'*  L3 x 10'  L3 x 10' bike L3 x 10' L3 x 10'  "  W/MH to lower back   L3 x 10'   5 min L4      TABLE:              TA  2 x 10   W/PPT     2 x 10   W/PPT          TA w/march              SLR              LTR 2'* 2' 2' 2' 2' 2' 30" 3' 3' 2 min      bridges 2'* 2' 2 x 10 2 x 10 2 x 10 2 x 10 2 x 10 1 x 10 2 x 10      SLR w/TA 2 x 10 B    1 x 10 B   2 x 10 B  2 x 10 B   extensor lag noted      Hip abd (supine clams) 30x BTB  30x BTB  30x GTB 30x GTB 30x GTB  30x GTB 25x GTB 3 x 10 GTB 3 x 10 GTB       HS stretch  3 x 30" B         3 x 30" B       Piriformis stretch 3 x 30" B  2 x 30" B  2 x 30" B 2 x 30" B     3 x 30" B       DKC           2 min      SKTC 10 x 10" hold B  3 x 15" B  3 x 15" B 3 x 15" B 3 x 15" B  3 x 15" B 3 x 15" B 3 x 15" B     SEATED:              LAQ        2 x 10 x 2#      Hamstring curls               physioball rollouts     10 x 5" hold          Sit to stands             STANDING:              marching 2 x 10 2 x 10  2 x 10 2 x 10 2 x 10  2 x 10        Mini squats 2 x 10  2 x 10  2 x 10  2 x 10 2 x 10  1 x 10        Hip 3 way  20x B   W/use of step for better foot clearance 20x B 15x B 10x B ea                        Initials EM EM LT LT EM LT LT LT EM LT      *-non-1-on-1 time      manual therapy techniques: Soft tissue Mobilization were applied to the: low back for 0 minutes, including:  -STM to lumbar/thoracic paraspinals, QL    therapeutic activities to improve functional performance for 0  minutes, including:  - supine to sit using log roll and pushing himself up (instead of pulling on his bed post which is aggravating his shoulder)        PATIENT EDUCATION AND HOME EXERCISES      Education provided:   - role of PT and goals for therapy  - importance of continuing HEP     Written Home Exercises Provided: yes. Exercises were reviewed and Jin was able to demonstrate them prior to the end of the session.  Jin demonstrated good  understanding of the education provided. See EMR under Patient Instructions for exercises provided during " therapy sessions.       ASSESSMENT     Patient arrived to therapy reporting low levels of pain in his low back today. Progressed through exercises with no increases in discomfort. Patient ended session stating he felt fine.     Jin Is progressing well towards his goals.   Pt prognosis is Good.     Pt will continue to benefit from skilled outpatient physical therapy to address the deficits listed in the problem list box on initial evaluation, provide pt/family education and to maximize pt's level of independence in the home and community environment.     Pt's spiritual, cultural and educational needs considered and pt agreeable to plan of care and goals.     Anticipated barriers to physical therapy: none    Goals:  Short Term Goals: 4 weeks   1. Pt will be compliant with HEP to promote independent management of current diagnosis.  2. Pt will have improved B knee strength to 5/5 to improve stability with ambulation.  3. Pt will be able to ambulate household distances with improved mechanics to improve independence with ADLs.  4. Pt will report subjective pain ratings no greater than 6/10 with ADLs.     Long Term Goals: 8 weeks   1. Pt will have reduced FOTO limitation score from 55% to no greater than 45% to indicate improved functional mobility.  2. Pt will have improved B hip strength by at least 1 grade to improve his ability to perform sit to stand transfers without UE assist.  3. Pt will be able to stand for at least 20 minutes without pain to improve tolerance for ADLs like showering.  4. Pt will report subjective pain ratings no greater than 2/10 with ADLs.    PLAN     Continue with PT POC, progressing as tolerated.    Merlin Osei, PTA

## 2022-08-02 NOTE — PROGRESS NOTES
"  Jin Ngo presented for a  Medicare AWV and comprehensive Health Risk Assessment today. The following components were reviewed and updated:    · Medical history  · Family History  · Social history  · Allergies and Current Medications  · Health Risk Assessment  · Health Maintenance  · Care Team         ** See Completed Assessments for Annual Wellness Visit within the encounter summary.**         The following assessments were completed:  · Living Situation  · CAGE  · Depression Screening  · Timed Get Up and Go  · Whisper Test  · Cognitive Function Screening  · Nutrition Screening  · ADL Screening  · PAQ Screening        Vitals:    08/12/22 1045   BP: 116/68   Pulse: (!) 57   SpO2: 96%   Weight: 90.4 kg (199 lb 4.7 oz)   Height: 5' 9" (1.753 m)     Body mass index is 29.43 kg/m².  Physical Exam  Vitals and nursing note reviewed.   Constitutional:       General: He is not in acute distress.     Appearance: Normal appearance. He is obese. He is not ill-appearing.   HENT:      Head: Normocephalic and atraumatic.   Eyes:      General: No scleral icterus.        Right eye: No discharge.         Left eye: No discharge.      Extraocular Movements: Extraocular movements intact.      Conjunctiva/sclera: Conjunctivae normal.   Cardiovascular:      Rate and Rhythm: Regular rhythm. Bradycardia present.      Heart sounds: Normal heart sounds. No murmur heard.  Pulmonary:      Effort: Pulmonary effort is normal. No respiratory distress.      Breath sounds: Normal breath sounds. No wheezing, rhonchi or rales.   Musculoskeletal:      Cervical back: Normal range of motion.      Right lower leg: No edema.      Left lower leg: No edema.   Skin:     General: Skin is warm and dry.      Findings: Bruising present. No rash.   Neurological:      Mental Status: He is alert and oriented to person, place, and time.      Comments: Left hand resting tremor   Psychiatric:         Mood and Affect: Mood normal.         Behavior: Behavior " normal. Behavior is cooperative.         Cognition and Memory: Cognition and memory normal.               Diagnoses and health risks identified today and associated recommendations/orders:    1. Encounter for preventive health examination  - Chart reviewed. Problem list updated. Discussed current medical diagnosis, current medications, medical/surgical/family/social history; updated provider list; documented vital signs; identified any cognitive impairment; and updated risk factor list. Addressed any outstanding health maintenance. Provided patient with personalized health advice. Continue to follow up with PCP and any specialists.       2. Paroxysmal atrial fibrillation  Chronic; stable on current treatment plan; follow up with PCP  -taking metoprolol and eliquis    3. Abdominal aortic atherosclerosis  Chronic; stable on current treatment plan; follow up with PCP  - taking statin, eliquis, and plavix     4. Pulmonary HTN  Chronic; stable on current treatment plan; follow up with PCP    5. CKD stage 3 due to type 2 diabetes mellitus  Chronic; stable on current treatment plan; follow up with PCP  - recommend avoiding NSAIDS     6. Senile purpura  Chronic; stable on current treatment plan; follow up with PCP  - taking eliquis and plavix    7. Carotid stenosis, asymptomatic, bilateral  Chronic; stable on current treatment plan; follow up with PCP  - taking eliquis and plavix, on statin     8. Uncontrolled type 2 diabetes mellitus with diabetic neuropathy, without long-term current use of insulin  Chronic; stable on current treatment plan; follow up with PCP  - recommend compliance with medications and diabetic diet     9. Hypertension associated with type 2 diabetes mellitus  Chronic; stable on current treatment plan; follow up with PCP  - recommend low sodium diet; due for microalbumin, scheduled   - Microalbumin/Creatinine Ratio, Urine; Future    10. Type 2 diabetes mellitus with hyperlipidemia  Chronic; stable on  current treatment plan; follow up with PCP  - taking statin     11. Localized osteoporosis with current pathological fracture with delayed healing  Chronic; stable on current treatment plan; follow up with PCP    12. Compression fracture of L2 lumbar vertebra, open, initial encounter  Chronic; stable on current treatment plan; follow up with PCP  - doing physical therapy     13. Sciatica of right side associated with disorder of lumbosacral spine  Chronic; stable on current treatment plan; follow up with PCP  - doing physical therapy     14. Primary osteoarthritis involving multiple joints  Chronic; stable on current treatment plan; follow up with PCP  - doing physical therapy     15. BMI 29.0-29.9,adult  - Recommendation for healthy diet and increasing exercise as tolerated with goal of 150min/week . Recommend weight loss        Provided Jin with a 5-10 year written screening schedule and personal prevention plan. Recommendations were developed using the USPSTF age appropriate recommendations. Education, counseling, and referrals were provided as needed. After Visit Summary printed and given to patient which includes a list of additional screenings\tests needed.    Follow up in about 1 year (around 8/12/2023) for your next annual wellness visit.    Michelle Maurice, MSN, APRN, FNP-C  Family Medicine  Office [phone]    Advance Care Planning         I offered to discuss advanced care planning, including how to pick a person who would make decisions for you if you were unable to make them for yourself, called a health care power of , and what kind of decisions you might make such as use of life sustaining treatments such as ventilators and tube feeding when faced with a life limiting illness recorded on a living will that they will need to know. (How you want to be cared for as you near the end of your natural life)     X Patient is interested in learning more about how to make advanced  directives.  I provided them paperwork and offered to discuss this with them.

## 2022-08-03 ENCOUNTER — CLINICAL SUPPORT (OUTPATIENT)
Dept: REHABILITATION | Facility: HOSPITAL | Age: 77
End: 2022-08-03
Attending: PAIN MEDICINE
Payer: MEDICARE

## 2022-08-03 DIAGNOSIS — R29.898 DECREASED STRENGTH OF LOWER EXTREMITY: Primary | ICD-10-CM

## 2022-08-03 DIAGNOSIS — Z74.09 IMPAIRED FUNCTIONAL MOBILITY, BALANCE, GAIT, AND ENDURANCE: ICD-10-CM

## 2022-08-03 DIAGNOSIS — R26.9 GAIT ABNORMALITY: ICD-10-CM

## 2022-08-03 PROCEDURE — 97110 THERAPEUTIC EXERCISES: CPT | Mod: PO,CQ

## 2022-08-08 ENCOUNTER — CLINICAL SUPPORT (OUTPATIENT)
Dept: REHABILITATION | Facility: HOSPITAL | Age: 77
End: 2022-08-08
Attending: PAIN MEDICINE
Payer: MEDICARE

## 2022-08-08 DIAGNOSIS — R29.898 DECREASED STRENGTH OF LOWER EXTREMITY: Primary | ICD-10-CM

## 2022-08-08 DIAGNOSIS — R26.9 GAIT ABNORMALITY: ICD-10-CM

## 2022-08-08 DIAGNOSIS — Z74.09 IMPAIRED FUNCTIONAL MOBILITY, BALANCE, GAIT, AND ENDURANCE: ICD-10-CM

## 2022-08-08 PROCEDURE — 97112 NEUROMUSCULAR REEDUCATION: CPT | Mod: PO

## 2022-08-08 PROCEDURE — 97110 THERAPEUTIC EXERCISES: CPT | Mod: PO

## 2022-08-08 NOTE — PROGRESS NOTES
[facility]   Physical Therapy Treatment Note     Name: Jin Ngo  Clinic Number: [id number]    Therapy Diagnosis:   Encounter Diagnoses   Name Primary?    Decreased strength of lower extremity Yes    Gait abnormality     Impaired functional mobility, balance, gait, and endurance      Physician: Sami Barksdale Jr.,*    Visit Date: 8/8/2022    Physician Orders: PT Eval and Treat compression fracture of lumbar spine & neurogenic claudication  Medical Diagnosis from Referral:   S32.020B (ICD-10-CM) - Compression fracture of L2 lumbar vertebra, open, initial encounter   R54 (ICD-10-CM) - Age-related physical debility   R53.81 (ICD-10-CM) - Physical deconditioning   Evaluation Date: 6/27/2022  Authorization Period Expiration: 6/6/2023  Plan of Care Expiration: 8/27/2022  Progress Note Due: 7/27/2022  Visit # / Visits authorized: 10/ 20   FOTO: 5/5     Precautions: Standard, Diabetes and blood thinners         PTA Visit #: 0/5     Time In: 1:00 pm   Time Out: 1:55 pm  Total Billable Time: 55 minutes total, 35 billable minutes as one on one care not provided the entire session    SUBJECTIVE     Pt reports: his back is achy and it lets him know it is still there  He was compliant with home exercise program.  Response to previous treatment: none  Functional change: none    Pain: 2/10  Location: bilateral back      OBJECTIVE     Objective Measures updated at progress report unless specified.     Treatment     Jin received the treatments listed below:      therapeutic exercises to develop strength, endurance, flexibility and core stabilization for 45 minutes including:   Date  8/8/2022 8/3/2022 8/1/2022 7/27/2022 7/25/2022 7/21/2022 7/20/2022 7/13/2022 7/11/2022 7/6/2022 6/27/2022   VISIT 10/20 9/20 8/20 7/20 6/20 5/20 4/20 3/20 2/20 1/20 1/1   FOTO      DONE  4/5 3/5 2/5 1/5 0/5   POC EXP. DATE 8/27/2022 8/27/2022 8/27/2022 8/27/2022 8/27/2022 8/27/20222 8/27/2022   FACE-TO-FACE  "8/27/200 8/27/2022 7/27/2022 7/27/2022 7/27/2022 7/27/2022 7/27/2022    nustep  L3 x 10'*  L3 x 10'  L3 x 10' bike L3 x 10' L3 x 10'   W/MH to lower back   L3 x 10'   5 min L4      TABLE:               TA  2 x 10* w/PPT 2 x 10   W/PPT     2 x 10   W/PPT          TA w/march               SLR               LTR 2' * 2'* 2' 2' 2' 2' 2' 30" 3' 3' 2 min      bridges 2 x 10* 2'* 2' 2 x 10 2 x 10 2 x 10 2 x 10 2 x 10 1 x 10 2 x 10      SLR w/TA 2 x 10 w/PPT B 2 x 10 B    1 x 10 B   2 x 10 B  2 x 10 B   extensor lag noted      Hip abd (supine clams)  30x BTB  30x BTB  30x GTB 30x GTB 30x GTB  30x GTB 25x GTB 3 x 10 GTB 3 x 10 GTB       HS stretch   3 x 30" B         3 x 30" B       Piriformis stretch  3 x 30" B  2 x 30" B  2 x 30" B 2 x 30" B     3 x 30" B       DKC            2 min      SKTC  10 x 10" hold B  3 x 15" B  3 x 15" B 3 x 15" B 3 x 15" B  3 x 15" B 3 x 15" B 3 x 15" B     SEATED:               LAQ         2 x 10 x 2#      Hamstring curls                physioball rollouts      10 x 5" hold          Sit to stands              STANDING:               marching 2 x 10 2 x 10 2 x 10  2 x 10 2 x 10 2 x 10  2 x 10        Mini squats 2 x 10 2 x 10  2 x 10  2 x 10  2 x 10 2 x 10  1 x 10        Hip 3 way 20x B on step  20x B   W/use of step for better foot clearance 20x B 15x B 10x B ea                         Initials LT EM EM LT LT EM LT LT LT EM LT      *-non-1-on-1 time      manual therapy techniques: Soft tissue Mobilization were applied to the: low back for 0 minutes, including:  -STM to lumbar/thoracic paraspinals, QL    therapeutic activities to improve functional performance for 0  minutes, including:  - supine to sit using log roll and pushing himself up (instead of pulling on his bed post which is aggravating his shoulder)    neuromuscular re-education activities to improve: Balance for 10 minutes. The following activities were included:  -marching gait- 4 x 12 ft  -side stepping- 4 x 12 ft        PATIENT " EDUCATION AND HOME EXERCISES      Education provided:   - role of PT and goals for therapy  - importance of continuing HEP     Written Home Exercises Provided: yes. Exercises were reviewed and Jin was able to demonstrate them prior to the end of the session.  Jin demonstrated good  understanding of the education provided. See EMR under Patient Instructions for exercises provided during therapy sessions.       ASSESSMENT     Jin arrived to therapy reporting his usual low levels of pain in his low back today. He tolerated all exercises well with no reports of increased symptoms at the end of today's session. Will continue to work on core strengthening, progressing as tolerated.    Jin Is progressing well towards his goals.   Pt prognosis is Good.     Pt will continue to benefit from skilled outpatient physical therapy to address the deficits listed in the problem list box on initial evaluation, provide pt/family education and to maximize pt's level of independence in the home and community environment.     Pt's spiritual, cultural and educational needs considered and pt agreeable to plan of care and goals.     Anticipated barriers to physical therapy: none    Goals:  Short Term Goals: 4 weeks   1. Pt will be compliant with HEP to promote independent management of current diagnosis.  2. Pt will have improved B knee strength to 5/5 to improve stability with ambulation.  3. Pt will be able to ambulate household distances with improved mechanics to improve independence with ADLs.  4. Pt will report subjective pain ratings no greater than 6/10 with ADLs.     Long Term Goals: 8 weeks   1. Pt will have reduced FOTO limitation score from 55% to no greater than 45% to indicate improved functional mobility.  2. Pt will have improved B hip strength by at least 1 grade to improve his ability to perform sit to stand transfers without UE assist.  3. Pt will be able to stand for at least 20 minutes without pain to  improve tolerance for ADLs like showering.  4. Pt will report subjective pain ratings no greater than 2/10 with ADLs.    PLAN     Continue with PT POC, progressing as tolerated.    Willow Plaza, PT

## 2022-08-09 NOTE — PROGRESS NOTES
"[facility] OUTPATIENT THERAPY AND WELLNESS   Physical Therapy Treatment Note     Name: Jin Ngo  Clinic Number: [id number]    Therapy Diagnosis:   Encounter Diagnoses   Name Primary?    Decreased strength of lower extremity Yes    Gait abnormality     Impaired functional mobility, balance, gait, and endurance      Physician: Sami Barksdale Jr.,*    Visit Date: 8/10/2022    Physician Orders: PT Eval and Treat compression fracture of lumbar spine & neurogenic claudication  Medical Diagnosis from Referral:   S32.020B (ICD-10-CM) - Compression fracture of L2 lumbar vertebra, open, initial encounter   R54 (ICD-10-CM) - Age-related physical debility   R53.81 (ICD-10-CM) - Physical deconditioning   Evaluation Date: 6/27/2022  Authorization Period Expiration: 6/6/2023  Plan of Care Expiration: 8/27/2022  Progress Note Due: 7/27/2022  Visit # / Visits authorized: 11/ 20   FOTO: 5/5     Precautions: Standard, Diabetes and blood thinners         PTA Visit #: 0/5     Time In: 1:00 pm   Time Out: 1:55 pm  Total Billable Time: 55 minutes total, 30 billable minutes as one on one care not provided the entire session    SUBJECTIVE     Pt reports: "arthritis". His back is stiff. He got maybe 20 mins of sleep last night.  He was compliant with home exercise program.  Response to previous treatment: none  Functional change: none    Pain: 2/10  Location: bilateral back      OBJECTIVE     Objective Measures updated at progress report unless specified.     Treatment     Jin received the treatments listed below:      therapeutic exercises to develop strength, endurance, flexibility and core stabilization for 49 minutes including:   Date  8/10/2022 8/8/2022 8/3/2022 8/1/2022 7/27/2022 7/25/2022 7/21/2022 7/20/2022 7/13/2022 7/11/2022 7/6/2022 6/27/2022   VISIT 11/20 10/20 9/20 8/20 7/20 6/20 5/20 4/20 3/20 2/20 1/20 1/1   FOTO       DONE  4/5 3/5 2/5 1/5 0/5   POC EXP. DATE 8/27/2022 8/27/2022 8/27/2022 8/27/2022 8/27/2022 " "8/27/2022 8/27/20222 8/27/2022   FACE-TO-FACE 8/27/2022 8/27/200 8/27/2022 7/27/2022 7/27/2022 7/27/2022 7/27/2022 7/27/2022    nustep L3 x 10'  L3 x 10'*  L3 x 10'  L3 x 10' bike L3 x 10' L3 x 10'   W/MH to lower back   L3 x 10'   5 min L4      TABLE:                TA  2 x 10 w/PPT 2 x 10* w/PPT 2 x 10   W/PPT     2 x 10   W/PPT          TA w/march                SLR                LTR 2' 2' * 2'* 2' 2' 2' 2' 2' 30" 3' 3' 2 min      bridges 2 x 10 RTB knees 2 x 10* 2'* 2' 2 x 10 2 x 10 2 x 10 2 x 10 2 x 10 1 x 10 2 x 10      SLR w/TA  2 x 10 w/PPT B 2 x 10 B    1 x 10 B   2 x 10 B  2 x 10 B   extensor lag noted      Hip abd (supine clams)   30x BTB  30x BTB  30x GTB 30x GTB 30x GTB  30x GTB 25x GTB 3 x 10 GTB 3 x 10 GTB       HS stretch    3 x 30" B         3 x 30" B       Piriformis stretch 2 x 30" B  3 x 30" B  2 x 30" B  2 x 30" B 2 x 30" B     3 x 30" B       DKC             2 min      SKTC 5x 10" hold B  10 x 10" hold B  3 x 15" B  3 x 15" B 3 x 15" B 3 x 15" B  3 x 15" B 3 x 15" B 3 x 15" B     SEATED:                LAQ          2 x 10 x 2#      Hamstring curls                 physioball rollouts       10 x 5" hold          Sit to stands               STANDING:                marching 2 x 10 2 x 10 2 x 10 2 x 10  2 x 10 2 x 10 2 x 10  2 x 10        Mini squats 2 x 10  2 x 10 2 x 10  2 x 10  2 x 10  2 x 10 2 x 10  1 x 10        Hip 3 way 2 x 20 B on step 20x B on step  20x B   W/use of step for better foot clearance 20x B 15x B 10x B ea                          Initials LT LT EM EM LT LT EM LT LT LT EM LT      *-non-1-on-1 time      manual therapy techniques: Soft tissue Mobilization were applied to the: low back for 0 minutes, including:  -STM to lumbar/thoracic paraspinals, QL    therapeutic activities to improve functional performance for 0  minutes, including:  - supine to sit using log roll and pushing himself up (instead of pulling on his bed post which is aggravating his " shoulder)    neuromuscular re-education activities to improve: Balance for 6 minutes. The following activities were included: **bolded exercises performed today**  -marching gait- 4 x 12 ft  -side stepping- 4 x 12 ft        PATIENT EDUCATION AND HOME EXERCISES      Education provided:   - role of PT and goals for therapy  - importance of continuing HEP     Written Home Exercises Provided: yes. Exercises were reviewed and Jin was able to demonstrate them prior to the end of the session.  Jin demonstrated good  understanding of the education provided. See EMR under Patient Instructions for exercises provided during therapy sessions.       ASSESSMENT     Jin arrived to therapy reporting his usual low levels of pain in his low back today, though it is feeling stiff today. He tolerated all exercises well with no reports of increased symptoms at the end of today's session. Will continue to work on core strengthening and balance, progressing as tolerated. He has been ambulating with a shuffling gait pattern lately so continued to work on exercises like marching to encourage him to pick his feet up. Also patient noted to have very limited extension with standing hip ext exercises so provided cues to maximize this movement and increase strength gains.     Jin Is progressing well towards his goals.   Pt prognosis is Good.     Pt will continue to benefit from skilled outpatient physical therapy to address the deficits listed in the problem list box on initial evaluation, provide pt/family education and to maximize pt's level of independence in the home and community environment.     Pt's spiritual, cultural and educational needs considered and pt agreeable to plan of care and goals.     Anticipated barriers to physical therapy: none    Goals:  Short Term Goals: 4 weeks   1. Pt will be compliant with HEP to promote independent management of current diagnosis.  2. Pt will have improved B knee strength to 5/5 to  improve stability with ambulation.  3. Pt will be able to ambulate household distances with improved mechanics to improve independence with ADLs.  4. Pt will report subjective pain ratings no greater than 6/10 with ADLs.     Long Term Goals: 8 weeks   1. Pt will have reduced FOTO limitation score from 55% to no greater than 45% to indicate improved functional mobility.  2. Pt will have improved B hip strength by at least 1 grade to improve his ability to perform sit to stand transfers without UE assist.  3. Pt will be able to stand for at least 20 minutes without pain to improve tolerance for ADLs like showering.  4. Pt will report subjective pain ratings no greater than 2/10 with ADLs.    PLAN     Continue with PT POC, progressing as tolerated.    Willow Plaza, PT

## 2022-08-10 ENCOUNTER — CLINICAL SUPPORT (OUTPATIENT)
Dept: REHABILITATION | Facility: HOSPITAL | Age: 77
End: 2022-08-10
Attending: PAIN MEDICINE
Payer: MEDICARE

## 2022-08-10 DIAGNOSIS — R29.898 DECREASED STRENGTH OF LOWER EXTREMITY: Primary | ICD-10-CM

## 2022-08-10 DIAGNOSIS — R26.9 GAIT ABNORMALITY: ICD-10-CM

## 2022-08-10 DIAGNOSIS — Z74.09 IMPAIRED FUNCTIONAL MOBILITY, BALANCE, GAIT, AND ENDURANCE: ICD-10-CM

## 2022-08-10 PROCEDURE — 97110 THERAPEUTIC EXERCISES: CPT | Mod: PO

## 2022-08-10 RX ORDER — DOXYCYCLINE HYCLATE 100 MG
TABLET ORAL
Qty: 42 TABLET | Refills: 0 | Status: ON HOLD | OUTPATIENT
Start: 2022-08-10 | End: 2022-08-20 | Stop reason: HOSPADM

## 2022-08-12 ENCOUNTER — OFFICE VISIT (OUTPATIENT)
Dept: INTERNAL MEDICINE | Facility: CLINIC | Age: 77
End: 2022-08-12
Payer: MEDICARE

## 2022-08-12 VITALS
WEIGHT: 199.31 LBS | HEIGHT: 69 IN | DIASTOLIC BLOOD PRESSURE: 68 MMHG | HEART RATE: 57 BPM | SYSTOLIC BLOOD PRESSURE: 116 MMHG | BODY MASS INDEX: 29.52 KG/M2 | OXYGEN SATURATION: 96 %

## 2022-08-12 DIAGNOSIS — N18.30 CKD STAGE 3 DUE TO TYPE 2 DIABETES MELLITUS: ICD-10-CM

## 2022-08-12 DIAGNOSIS — M53.87 SCIATICA OF RIGHT SIDE ASSOCIATED WITH DISORDER OF LUMBOSACRAL SPINE: ICD-10-CM

## 2022-08-12 DIAGNOSIS — Z00.00 ENCOUNTER FOR PREVENTIVE HEALTH EXAMINATION: Primary | ICD-10-CM

## 2022-08-12 DIAGNOSIS — I48.0 PAROXYSMAL ATRIAL FIBRILLATION: ICD-10-CM

## 2022-08-12 DIAGNOSIS — I27.20 PULMONARY HTN: ICD-10-CM

## 2022-08-12 DIAGNOSIS — E78.5 TYPE 2 DIABETES MELLITUS WITH HYPERLIPIDEMIA: ICD-10-CM

## 2022-08-12 DIAGNOSIS — I15.2 HYPERTENSION ASSOCIATED WITH TYPE 2 DIABETES MELLITUS: ICD-10-CM

## 2022-08-12 DIAGNOSIS — E11.22 CKD STAGE 3 DUE TO TYPE 2 DIABETES MELLITUS: ICD-10-CM

## 2022-08-12 DIAGNOSIS — I65.23 CAROTID STENOSIS, ASYMPTOMATIC, BILATERAL: ICD-10-CM

## 2022-08-12 DIAGNOSIS — M15.9 PRIMARY OSTEOARTHRITIS INVOLVING MULTIPLE JOINTS: ICD-10-CM

## 2022-08-12 DIAGNOSIS — I70.0 ABDOMINAL AORTIC ATHEROSCLEROSIS: ICD-10-CM

## 2022-08-12 DIAGNOSIS — E11.59 HYPERTENSION ASSOCIATED WITH TYPE 2 DIABETES MELLITUS: ICD-10-CM

## 2022-08-12 DIAGNOSIS — D69.2 SENILE PURPURA: ICD-10-CM

## 2022-08-12 DIAGNOSIS — S32.020B: ICD-10-CM

## 2022-08-12 DIAGNOSIS — E11.69 TYPE 2 DIABETES MELLITUS WITH HYPERLIPIDEMIA: ICD-10-CM

## 2022-08-12 DIAGNOSIS — M80.80XG LOCALIZED OSTEOPOROSIS WITH CURRENT PATHOLOGICAL FRACTURE WITH DELAYED HEALING: ICD-10-CM

## 2022-08-12 PROCEDURE — 1101F PR PT FALLS ASSESS DOC 0-1 FALLS W/OUT INJ PAST YR: ICD-10-PCS | Mod: CPTII,S$GLB,, | Performed by: NURSE PRACTITIONER

## 2022-08-12 PROCEDURE — 1170F FXNL STATUS ASSESSED: CPT | Mod: CPTII,S$GLB,, | Performed by: NURSE PRACTITIONER

## 2022-08-12 PROCEDURE — 1170F PR FUNCTIONAL STATUS ASSESSED: ICD-10-PCS | Mod: CPTII,S$GLB,, | Performed by: NURSE PRACTITIONER

## 2022-08-12 PROCEDURE — 1126F PR PAIN SEVERITY QUANTIFIED, NO PAIN PRESENT: ICD-10-PCS | Mod: CPTII,S$GLB,, | Performed by: NURSE PRACTITIONER

## 2022-08-12 PROCEDURE — 1159F MED LIST DOCD IN RCRD: CPT | Mod: CPTII,S$GLB,, | Performed by: NURSE PRACTITIONER

## 2022-08-12 PROCEDURE — G0439 PR MEDICARE ANNUAL WELLNESS SUBSEQUENT VISIT: ICD-10-PCS | Mod: S$GLB,,, | Performed by: NURSE PRACTITIONER

## 2022-08-12 PROCEDURE — 1159F PR MEDICATION LIST DOCUMENTED IN MEDICAL RECORD: ICD-10-PCS | Mod: CPTII,S$GLB,, | Performed by: NURSE PRACTITIONER

## 2022-08-12 PROCEDURE — 1101F PT FALLS ASSESS-DOCD LE1/YR: CPT | Mod: CPTII,S$GLB,, | Performed by: NURSE PRACTITIONER

## 2022-08-12 PROCEDURE — G0439 PPPS, SUBSEQ VISIT: HCPCS | Mod: S$GLB,,, | Performed by: NURSE PRACTITIONER

## 2022-08-12 PROCEDURE — 1126F AMNT PAIN NOTED NONE PRSNT: CPT | Mod: CPTII,S$GLB,, | Performed by: NURSE PRACTITIONER

## 2022-08-12 PROCEDURE — 3078F DIAST BP <80 MM HG: CPT | Mod: CPTII,S$GLB,, | Performed by: NURSE PRACTITIONER

## 2022-08-12 PROCEDURE — 1160F RVW MEDS BY RX/DR IN RCRD: CPT | Mod: CPTII,S$GLB,, | Performed by: NURSE PRACTITIONER

## 2022-08-12 PROCEDURE — 3074F SYST BP LT 130 MM HG: CPT | Mod: CPTII,S$GLB,, | Performed by: NURSE PRACTITIONER

## 2022-08-12 PROCEDURE — 3288F FALL RISK ASSESSMENT DOCD: CPT | Mod: CPTII,S$GLB,, | Performed by: NURSE PRACTITIONER

## 2022-08-12 PROCEDURE — 3074F PR MOST RECENT SYSTOLIC BLOOD PRESSURE < 130 MM HG: ICD-10-PCS | Mod: CPTII,S$GLB,, | Performed by: NURSE PRACTITIONER

## 2022-08-12 PROCEDURE — 99999 PR PBB SHADOW E&M-EST. PATIENT-LVL V: ICD-10-PCS | Mod: PBBFAC,,, | Performed by: NURSE PRACTITIONER

## 2022-08-12 PROCEDURE — 1160F PR REVIEW ALL MEDS BY PRESCRIBER/CLIN PHARMACIST DOCUMENTED: ICD-10-PCS | Mod: CPTII,S$GLB,, | Performed by: NURSE PRACTITIONER

## 2022-08-12 PROCEDURE — 99999 PR PBB SHADOW E&M-EST. PATIENT-LVL V: CPT | Mod: PBBFAC,,, | Performed by: NURSE PRACTITIONER

## 2022-08-12 PROCEDURE — 3078F PR MOST RECENT DIASTOLIC BLOOD PRESSURE < 80 MM HG: ICD-10-PCS | Mod: CPTII,S$GLB,, | Performed by: NURSE PRACTITIONER

## 2022-08-12 PROCEDURE — 3288F PR FALLS RISK ASSESSMENT DOCUMENTED: ICD-10-PCS | Mod: CPTII,S$GLB,, | Performed by: NURSE PRACTITIONER

## 2022-08-12 RX ORDER — TAMSULOSIN HYDROCHLORIDE 0.4 MG/1
CAPSULE ORAL
Qty: 90 CAPSULE | Refills: 3 | Status: SHIPPED | OUTPATIENT
Start: 2022-08-12 | End: 2023-08-03

## 2022-08-12 NOTE — TELEPHONE ENCOUNTER
Refill Authorization Note   Jin Ngo  is requesting a refill authorization.  Brief Assessment and Rationale for Refill:  Approve    -Medication-Related Problems Identified: Requires labs  Medication Therapy Plan:       Medication Reconciliation Completed: No   Comments:     Provider Staff:     Action is required for this patient.   Please see care gap opportunities below in Care Due Message.     Thanks!  [facility]     Appointments      Date Provider   Last Visit   7/19/2022 Sam Barroso MD   Next Visit   1/20/2023 Sam Barroso MD     Note composed:6:21 AM 08/12/2022           Note composed:6:21 AM 08/12/2022

## 2022-08-12 NOTE — TELEPHONE ENCOUNTER
Care Due:                  Date            Visit Type   Department     Provider  --------------------------------------------------------------------------------                                EP -                              PRIMARY      [facility] FAMILY  Last Visit: 07-      CARE ([facility])   MEDICINE       Sam Barroso                               -                              PRIMARY      [facility] FAM[facility]  Next Visit: 01-      CARE ([facility])   MEDICINE       Sam Barroso                                                            Last  Test          Frequency    Reason                     Performed    Due Date  --------------------------------------------------------------------------------    CBC.........  12 months..  allopurinoL, fenofibrate.  10-   10-    Uric Acid...  12 months..  allopurinoL..............  06- 06-    [facility] Embedded Care Gaps. Reference number: [id number]. 8/12/2022   2:18:48 AM CDT

## 2022-08-12 NOTE — PATIENT INSTRUCTIONS
Counseling and Referral of Other Preventative  (Italic type indicates deductible and co-insurance are waived)    Patient Name: Jin Ngo  Today's Date: 8/12/2022    Health Maintenance       Date Due Completion Date    Diabetes Urine Screening 10/11/2022 10/11/2021    Eye Exam 08/19/2022 (Originally 12/21/2021) 12/21/2020    Override on 1/1/2014: Done    Shingles Vaccine (1 of 2) 08/12/2023 (Originally 12/29/1995) ---    Influenza Vaccine (1) 09/01/2022 11/23/2021    Hemoglobin A1c 10/20/2022 7/20/2022    Lipid Panel 01/31/2023 1/31/2022    Foot Exam 06/09/2023 6/9/2022 (Done)    Override on 6/9/2022: Done    Override on 5/3/2021: Done    Override on 2/27/2020: Done    Override on 5/20/2019: Done    Override on 7/11/2017: Done    Colonoscopy 12/07/2024 12/7/2021    Override on 1/1/2011: Done    TETANUS VACCINE 08/31/2025 8/31/2015        Orders Placed This Encounter   Procedures    Microalbumin/Creatinine Ratio, Urine     The following information is provided to all patients.  This information is to help you find resources for any of the problems found today that may be affecting your health:                Living healthy guide: www.[facility].[place].gov      Understanding Diabetes: www.diabetes.org      Eating healthy: www.cdc.gov/healthyweight      CDC home safety checklist: www.cdc.gov/steadi/patient.html      Agency on Aging: www.goea.[place].[facility]      Alcoholics anonymous (AA): www.aa.org      Physical Activity: www.any.nih.gov/oc5tzqn      Tobacco use: www.quitwithusla.org

## 2022-08-15 NOTE — TELEPHONE ENCOUNTER
No new care gaps identified.  [facility] Embedded Care Gaps. Reference number: [id number]. 8/15/2022   4:02:55 PM CDT

## 2022-08-16 ENCOUNTER — HOSPITAL ENCOUNTER (INPATIENT)
Facility: HOSPITAL | Age: 77
LOS: 4 days | Discharge: HOME HEALTH | DRG: 246 | End: 2022-08-20
Attending: EMERGENCY MEDICINE | Admitting: HOSPITALIST
Payer: MEDICARE

## 2022-08-16 DIAGNOSIS — R07.9 CHEST PAIN: ICD-10-CM

## 2022-08-16 DIAGNOSIS — Z98.62 S/P ANGIOPLASTY: ICD-10-CM

## 2022-08-16 DIAGNOSIS — I21.4 NSTEMI (NON-ST ELEVATED MYOCARDIAL INFARCTION): Primary | ICD-10-CM

## 2022-08-16 DIAGNOSIS — M53.87 SCIATICA OF RIGHT SIDE ASSOCIATED WITH DISORDER OF LUMBOSACRAL SPINE: ICD-10-CM

## 2022-08-16 DIAGNOSIS — M80.80XG LOCALIZED OSTEOPOROSIS WITH CURRENT PATHOLOGICAL FRACTURE WITH DELAYED HEALING: ICD-10-CM

## 2022-08-16 DIAGNOSIS — I25.10 CAD (CORONARY ARTERY DISEASE): ICD-10-CM

## 2022-08-16 DIAGNOSIS — R06.02 SOB (SHORTNESS OF BREATH): ICD-10-CM

## 2022-08-16 DIAGNOSIS — I50.41 ACUTE COMBINED SYSTOLIC AND DIASTOLIC CONGESTIVE HEART FAILURE: ICD-10-CM

## 2022-08-16 LAB
ALBUMIN SERPL BCP-MCNC: 4.1 G/DL (ref 3.5–5.2)
ALP SERPL-CCNC: 36 U/L (ref 38–126)
ALT SERPL W/O P-5'-P-CCNC: 62 U/L (ref 10–44)
ANION GAP SERPL CALC-SCNC: 10 MMOL/L (ref 8–16)
APTT BLDCRRT: 31.9 SEC (ref 21–32)
AST SERPL-CCNC: 145 U/L (ref 15–46)
BASOPHILS # BLD AUTO: 0.03 K/UL (ref 0–0.2)
BASOPHILS NFR BLD: 0.3 % (ref 0–1.9)
BILIRUB SERPL-MCNC: 0.9 MG/DL (ref 0.1–1)
CALCIUM SERPL-MCNC: 9.5 MG/DL (ref 8.7–10.5)
CHLORIDE SERPL-SCNC: 106 MMOL/L (ref 95–110)
CO2 SERPL-SCNC: 20 MMOL/L (ref 23–29)
CREAT SERPL-MCNC: 1.7 MG/DL (ref 0.5–1.4)
DIFFERENTIAL METHOD: ABNORMAL
EOSINOPHIL # BLD AUTO: 0 K/UL (ref 0–0.5)
EOSINOPHIL NFR BLD: 0 % (ref 0–8)
ERYTHROCYTE [DISTWIDTH] IN BLOOD BY AUTOMATED COUNT: 14.6 % (ref 11.5–14.5)
EST. GFR  (NO RACE VARIABLE): 41.3 ML/MIN/1.73 M^2
GLUCOSE SERPL-MCNC: 245 MG/DL (ref 70–110)
HCT VFR BLD AUTO: 40.8 % (ref 40–54)
HGB BLD-MCNC: 13.3 G/DL (ref 14–18)
IMM GRANULOCYTES # BLD AUTO: 0.03 K/UL (ref 0–0.04)
IMM GRANULOCYTES NFR BLD AUTO: 0.3 % (ref 0–0.5)
INFLUENZA A, MOLECULAR: NEGATIVE
INFLUENZA B, MOLECULAR: NEGATIVE
INR PPP: 1.3 (ref 0.8–1.2)
LIPASE SERPL-CCNC: 49 U/L (ref 23–300)
LYMPHOCYTES # BLD AUTO: 0.9 K/UL (ref 1–4.8)
LYMPHOCYTES NFR BLD: 9.4 % (ref 18–48)
MCH RBC QN AUTO: 29.7 PG (ref 27–31)
MCHC RBC AUTO-ENTMCNC: 32.6 G/DL (ref 32–36)
MCV RBC AUTO: 91 FL (ref 82–98)
MONOCYTES # BLD AUTO: 0.7 K/UL (ref 0.3–1)
MONOCYTES NFR BLD: 7.7 % (ref 4–15)
NEUTROPHILS # BLD AUTO: 7.6 K/UL (ref 1.8–7.7)
NEUTROPHILS NFR BLD: 82.3 % (ref 38–73)
NRBC BLD-RTO: 0 /100 WBC
NT-PROBNP SERPL-MCNC: 8080 PG/ML (ref 5–1800)
PLATELET # BLD AUTO: 207 K/UL (ref 150–450)
PMV BLD AUTO: 11.2 FL (ref 9.2–12.9)
POCT GLUCOSE: 172 MG/DL (ref 70–110)
POCT GLUCOSE: 186 MG/DL (ref 70–110)
POCT GLUCOSE: 265 MG/DL (ref 70–110)
POTASSIUM SERPL-SCNC: 5.1 MMOL/L (ref 3.5–5.1)
PROT SERPL-MCNC: 6.9 G/DL (ref 6–8.4)
PROTHROMBIN TIME: 12.8 SEC (ref 9–12.5)
RBC # BLD AUTO: 4.48 M/UL (ref 4.6–6.2)
SARS-COV-2 RDRP RESP QL NAA+PROBE: NEGATIVE
SODIUM SERPL-SCNC: 136 MMOL/L (ref 136–145)
SPECIMEN SOURCE: NORMAL
TROPONIN I SERPL DL<=0.01 NG/ML-MCNC: 10.76 NG/ML (ref 0–0.03)
TROPONIN I SERPL DL<=0.01 NG/ML-MCNC: 12.12 NG/ML (ref 0–0.03)
TROPONIN I SERPL-MCNC: 8.4 NG/ML (ref 0.01–0.03)
UUN UR-MCNC: 38 MG/DL (ref 2–20)
WBC # BLD AUTO: 9.25 K/UL (ref 3.9–12.7)

## 2022-08-16 PROCEDURE — A4216 STERILE WATER/SALINE, 10 ML: HCPCS | Performed by: HOSPITALIST

## 2022-08-16 PROCEDURE — 63600175 PHARM REV CODE 636 W HCPCS: Performed by: NURSE PRACTITIONER

## 2022-08-16 PROCEDURE — 25000242 PHARM REV CODE 250 ALT 637 W/ HCPCS: Mod: ER | Performed by: EMERGENCY MEDICINE

## 2022-08-16 PROCEDURE — 99900035 HC TECH TIME PER 15 MIN (STAT): Mod: ER

## 2022-08-16 PROCEDURE — 80053 COMPREHEN METABOLIC PANEL: CPT | Mod: ER | Performed by: EMERGENCY MEDICINE

## 2022-08-16 PROCEDURE — 93010 ELECTROCARDIOGRAM REPORT: CPT | Mod: ,,, | Performed by: INTERNAL MEDICINE

## 2022-08-16 PROCEDURE — 25000003 PHARM REV CODE 250: Mod: ER | Performed by: HOSPITALIST

## 2022-08-16 PROCEDURE — 63600175 PHARM REV CODE 636 W HCPCS: Mod: ER | Performed by: EMERGENCY MEDICINE

## 2022-08-16 PROCEDURE — 83880 ASSAY OF NATRIURETIC PEPTIDE: CPT | Mod: ER | Performed by: EMERGENCY MEDICINE

## 2022-08-16 PROCEDURE — A4216 STERILE WATER/SALINE, 10 ML: HCPCS | Mod: ER | Performed by: HOSPITALIST

## 2022-08-16 PROCEDURE — 94640 AIRWAY INHALATION TREATMENT: CPT | Mod: ER

## 2022-08-16 PROCEDURE — 83690 ASSAY OF LIPASE: CPT | Mod: ER | Performed by: EMERGENCY MEDICINE

## 2022-08-16 PROCEDURE — 84484 ASSAY OF TROPONIN QUANT: CPT | Mod: 91 | Performed by: HOSPITALIST

## 2022-08-16 PROCEDURE — 85610 PROTHROMBIN TIME: CPT | Performed by: NURSE PRACTITIONER

## 2022-08-16 PROCEDURE — 85025 COMPLETE CBC W/AUTO DIFF WBC: CPT | Mod: ER | Performed by: EMERGENCY MEDICINE

## 2022-08-16 PROCEDURE — 36415 COLL VENOUS BLD VENIPUNCTURE: CPT | Performed by: HOSPITALIST

## 2022-08-16 PROCEDURE — 27000221 HC OXYGEN, UP TO 24 HOURS: Mod: ER

## 2022-08-16 PROCEDURE — 25000003 PHARM REV CODE 250: Performed by: HOSPITALIST

## 2022-08-16 PROCEDURE — 25000003 PHARM REV CODE 250: Mod: ER | Performed by: EMERGENCY MEDICINE

## 2022-08-16 PROCEDURE — C9399 UNCLASSIFIED DRUGS OR BIOLOG: HCPCS | Performed by: HOSPITALIST

## 2022-08-16 PROCEDURE — 63600175 PHARM REV CODE 636 W HCPCS: Mod: ER | Performed by: HOSPITALIST

## 2022-08-16 PROCEDURE — 99285 EMERGENCY DEPT VISIT HI MDM: CPT | Mod: 25,ER

## 2022-08-16 PROCEDURE — U0002 COVID-19 LAB TEST NON-CDC: HCPCS | Mod: ER | Performed by: EMERGENCY MEDICINE

## 2022-08-16 PROCEDURE — 25000003 PHARM REV CODE 250: Performed by: NURSE PRACTITIONER

## 2022-08-16 PROCEDURE — 63600175 PHARM REV CODE 636 W HCPCS: Performed by: HOSPITALIST

## 2022-08-16 PROCEDURE — 11000001 HC ACUTE MED/SURG PRIVATE ROOM

## 2022-08-16 PROCEDURE — 99900035 HC TECH TIME PER 15 MIN (STAT)

## 2022-08-16 PROCEDURE — 93010 EKG 12-LEAD: ICD-10-PCS | Mod: ,,, | Performed by: INTERNAL MEDICINE

## 2022-08-16 PROCEDURE — 94761 N-INVAS EAR/PLS OXIMETRY MLT: CPT | Mod: ER

## 2022-08-16 PROCEDURE — 84484 ASSAY OF TROPONIN QUANT: CPT | Mod: ER | Performed by: EMERGENCY MEDICINE

## 2022-08-16 PROCEDURE — 93005 ELECTROCARDIOGRAM TRACING: CPT | Mod: ER

## 2022-08-16 PROCEDURE — 87502 INFLUENZA DNA AMP PROBE: CPT | Mod: ER | Performed by: EMERGENCY MEDICINE

## 2022-08-16 PROCEDURE — 85730 THROMBOPLASTIN TIME PARTIAL: CPT | Performed by: NURSE PRACTITIONER

## 2022-08-16 RX ORDER — ACETAMINOPHEN 325 MG/1
650 TABLET ORAL EVERY 4 HOURS PRN
Status: DISCONTINUED | OUTPATIENT
Start: 2022-08-16 | End: 2022-08-20 | Stop reason: HOSPADM

## 2022-08-16 RX ORDER — INSULIN ASPART 100 [IU]/ML
1-10 INJECTION, SOLUTION INTRAVENOUS; SUBCUTANEOUS
Status: DISCONTINUED | OUTPATIENT
Start: 2022-08-16 | End: 2022-08-20 | Stop reason: HOSPADM

## 2022-08-16 RX ORDER — TALC
6 POWDER (GRAM) TOPICAL NIGHTLY PRN
Status: DISCONTINUED | OUTPATIENT
Start: 2022-08-16 | End: 2022-08-20 | Stop reason: HOSPADM

## 2022-08-16 RX ORDER — SODIUM CHLORIDE 0.9 % (FLUSH) 0.9 %
10 SYRINGE (ML) INJECTION EVERY 8 HOURS
Status: DISCONTINUED | OUTPATIENT
Start: 2022-08-16 | End: 2022-08-20 | Stop reason: HOSPADM

## 2022-08-16 RX ORDER — AMLODIPINE BESYLATE 5 MG/1
5 TABLET ORAL DAILY
Status: DISCONTINUED | OUTPATIENT
Start: 2022-08-17 | End: 2022-08-18

## 2022-08-16 RX ORDER — NALOXONE HCL 0.4 MG/ML
0.02 VIAL (ML) INJECTION
Status: DISCONTINUED | OUTPATIENT
Start: 2022-08-16 | End: 2022-08-20 | Stop reason: HOSPADM

## 2022-08-16 RX ORDER — PREDNISONE 20 MG/1
40 TABLET ORAL
Status: COMPLETED | OUTPATIENT
Start: 2022-08-16 | End: 2022-08-16

## 2022-08-16 RX ORDER — ONDANSETRON 2 MG/ML
4 INJECTION INTRAMUSCULAR; INTRAVENOUS EVERY 8 HOURS PRN
Status: DISCONTINUED | OUTPATIENT
Start: 2022-08-16 | End: 2022-08-20 | Stop reason: HOSPADM

## 2022-08-16 RX ORDER — IBUPROFEN 200 MG
16 TABLET ORAL
Status: DISCONTINUED | OUTPATIENT
Start: 2022-08-16 | End: 2022-08-20 | Stop reason: HOSPADM

## 2022-08-16 RX ORDER — ASPIRIN 325 MG
325 TABLET ORAL
Status: COMPLETED | OUTPATIENT
Start: 2022-08-16 | End: 2022-08-16

## 2022-08-16 RX ORDER — IBUPROFEN 200 MG
24 TABLET ORAL
Status: DISCONTINUED | OUTPATIENT
Start: 2022-08-16 | End: 2022-08-20 | Stop reason: HOSPADM

## 2022-08-16 RX ORDER — NAPROXEN SODIUM 220 MG/1
81 TABLET, FILM COATED ORAL DAILY
Status: DISCONTINUED | OUTPATIENT
Start: 2022-08-17 | End: 2022-08-18

## 2022-08-16 RX ORDER — FENOFIBRATE 160 MG/1
TABLET ORAL
Qty: 90 TABLET | Refills: 1 | Status: SHIPPED | OUTPATIENT
Start: 2022-08-16 | End: 2023-04-19

## 2022-08-16 RX ORDER — FUROSEMIDE 10 MG/ML
40 INJECTION INTRAMUSCULAR; INTRAVENOUS
Status: DISCONTINUED | OUTPATIENT
Start: 2022-08-16 | End: 2022-08-19

## 2022-08-16 RX ORDER — ATORVASTATIN CALCIUM 40 MG/1
40 TABLET, FILM COATED ORAL DAILY
Status: DISCONTINUED | OUTPATIENT
Start: 2022-08-17 | End: 2022-08-16

## 2022-08-16 RX ORDER — GLUCAGON 1 MG
1 KIT INJECTION
Status: DISCONTINUED | OUTPATIENT
Start: 2022-08-16 | End: 2022-08-20 | Stop reason: HOSPADM

## 2022-08-16 RX ORDER — FUROSEMIDE 10 MG/ML
40 INJECTION INTRAMUSCULAR; INTRAVENOUS
Status: DISCONTINUED | OUTPATIENT
Start: 2022-08-16 | End: 2022-08-16

## 2022-08-16 RX ORDER — ALLOPURINOL 100 MG/1
300 TABLET ORAL DAILY
Status: DISCONTINUED | OUTPATIENT
Start: 2022-08-17 | End: 2022-08-20 | Stop reason: HOSPADM

## 2022-08-16 RX ORDER — CLOPIDOGREL BISULFATE 75 MG/1
TABLET ORAL
Qty: 90 TABLET | Refills: 0 | Status: ON HOLD | OUTPATIENT
Start: 2022-08-16 | End: 2022-08-20 | Stop reason: HOSPADM

## 2022-08-16 RX ORDER — TAMSULOSIN HYDROCHLORIDE 0.4 MG/1
0.4 CAPSULE ORAL DAILY
Status: DISCONTINUED | OUTPATIENT
Start: 2022-08-17 | End: 2022-08-20 | Stop reason: HOSPADM

## 2022-08-16 RX ORDER — HEPARIN SODIUM,PORCINE/D5W 25000/250
0-40 INTRAVENOUS SOLUTION INTRAVENOUS CONTINUOUS
Status: DISCONTINUED | OUTPATIENT
Start: 2022-08-16 | End: 2022-08-17

## 2022-08-16 RX ORDER — FUROSEMIDE 10 MG/ML
40 INJECTION INTRAMUSCULAR; INTRAVENOUS
Status: COMPLETED | OUTPATIENT
Start: 2022-08-16 | End: 2022-08-16

## 2022-08-16 RX ORDER — METOPROLOL TARTRATE 25 MG/1
25 TABLET, FILM COATED ORAL 2 TIMES DAILY
Status: DISCONTINUED | OUTPATIENT
Start: 2022-08-16 | End: 2022-08-19

## 2022-08-16 RX ORDER — POLYETHYLENE GLYCOL 3350 17 G/17G
17 POWDER, FOR SOLUTION ORAL DAILY
Status: DISCONTINUED | OUTPATIENT
Start: 2022-08-16 | End: 2022-08-20 | Stop reason: HOSPADM

## 2022-08-16 RX ORDER — ATORVASTATIN CALCIUM 40 MG/1
40 TABLET, FILM COATED ORAL DAILY
Status: DISCONTINUED | OUTPATIENT
Start: 2022-08-16 | End: 2022-08-20 | Stop reason: HOSPADM

## 2022-08-16 RX ORDER — IPRATROPIUM BROMIDE AND ALBUTEROL SULFATE 2.5; .5 MG/3ML; MG/3ML
3 SOLUTION RESPIRATORY (INHALATION) ONCE
Status: COMPLETED | OUTPATIENT
Start: 2022-08-16 | End: 2022-08-16

## 2022-08-16 RX ORDER — CLOPIDOGREL BISULFATE 75 MG/1
75 TABLET ORAL DAILY
Status: DISCONTINUED | OUTPATIENT
Start: 2022-08-17 | End: 2022-08-20 | Stop reason: HOSPADM

## 2022-08-16 RX ADMIN — PREDNISONE 40 MG: 20 TABLET ORAL at 01:08

## 2022-08-16 RX ADMIN — INSULIN DETEMIR 12 UNITS: 100 INJECTION, SOLUTION SUBCUTANEOUS at 08:08

## 2022-08-16 RX ADMIN — METOPROLOL TARTRATE 25 MG: 25 TABLET, FILM COATED ORAL at 08:08

## 2022-08-16 RX ADMIN — ATORVASTATIN CALCIUM 40 MG: 40 TABLET, FILM COATED ORAL at 06:08

## 2022-08-16 RX ADMIN — Medication 10 ML: at 04:08

## 2022-08-16 RX ADMIN — FUROSEMIDE 40 MG: 10 INJECTION, SOLUTION INTRAMUSCULAR; INTRAVENOUS at 02:08

## 2022-08-16 RX ADMIN — ASPIRIN 325 MG ORAL TABLET 325 MG: 325 PILL ORAL at 02:08

## 2022-08-16 RX ADMIN — INSULIN ASPART 2 UNITS: 100 INJECTION, SOLUTION INTRAVENOUS; SUBCUTANEOUS at 06:08

## 2022-08-16 RX ADMIN — Medication 10 ML: at 10:08

## 2022-08-16 RX ADMIN — HEPARIN SODIUM 12 UNITS/KG/HR: 10000 INJECTION, SOLUTION INTRAVENOUS at 08:08

## 2022-08-16 RX ADMIN — FUROSEMIDE 40 MG: 10 INJECTION, SOLUTION INTRAMUSCULAR; INTRAVENOUS at 04:08

## 2022-08-16 RX ADMIN — INSULIN ASPART 3 UNITS: 100 INJECTION, SOLUTION INTRAVENOUS; SUBCUTANEOUS at 08:08

## 2022-08-16 RX ADMIN — IPRATROPIUM BROMIDE AND ALBUTEROL SULFATE 3 ML: 2.5; .5 SOLUTION RESPIRATORY (INHALATION) at 01:08

## 2022-08-16 NOTE — TELEPHONE ENCOUNTER
Refill Routing Note   Medication(s) are not appropriate for processing by [facility] for the following reason(s):      - Required laboratory values are abnormal    [facility] action(s):  Defer  Approve       Medication Therapy Plan: Defer fenofibrate - high Cr. Approve plavix.  Medication reconciliation completed: No     Appointments  past 12m or future 3m with PCP    Date Provider   Last Visit   7/19/2022 Sam Barroso MD   Next Visit   1/20/2023 Sam Braroso MD   ED visits in past 90 days: 0        Note composed:11:44 AM 08/16/2022

## 2022-08-16 NOTE — HPI
Jin Ngo is a 76 year old male with PMHx of CKD, CAD (MI, S/p CABG on Plavix and Eliquis), CVA, Left Carotid Stent, hypertension, hyperlipidemia, pulmonary hypertension and diabetes who presented to [facility] ED today with reports of worsening shortness of breath since yesterday with associated cough productive of phlegm and nasal congestion along with some nausea.  Patient and wife states that yesterday he had episode of shortness of breath where he fell into his chair. ECG with no STEMI, unchanged from prior. Chest xray with evidence of Pulmonary Edema. Labs significant for Troponin of 8.4, Pro-BNP 8080, mildly elevated BUN/Cr.  with cardiology was consulted from the ED.  He was given Aspirin, Lasix 40mg x 2 and Duoneb in the ED. He is admitted to [facility] for NSTEMI

## 2022-08-16 NOTE — Clinical Note
50 ml of contrast were injected throughout the case. 150 mL of contrast was the total wasted during the case. 200 mL was the total amount used during the case.  
A percutaneous stick to the right femoral artery was performed. Ultrasound guidance was used to obtain access. 
A pre-sedation assessment was completed by the physician immediately prior to sedation start.   
A pulse oximeter was placed on the patient's left middle finger.
An airway assessment has been completed by MD.  
An angiography of the  right femoral artery was performed to evaluate for the placement of a closure device. 
Diagnosis: NSTEMI (non-ST elevated myocardial infarction) [[id number]]   Future Attending Provider: REGINO PAYTON [[id number]]   Admitting Provider:: REGINO PAYTON [[id number]]  
ID band present and verified. Family is not present. 
Phone report was given to GALE Elizondo to let RN aware we are bringing pt to recovery.   
The ECG showed sinus rhythm. 
The ECG showed sinus rhythm. 
The catheter was inserted into the aorta. 
The catheter was inserted into the mid   right coronary artery. An angiography was performed of the right coronary arteries. ivus performed
The catheter was inserted into the ostial LIMA graft. An angiography was performed of the left coronary arteries. Multiple views were taken. The angiography was performed via hand injection with 20 mL of contrast.  LIMA to LAD
The catheter was inserted into the ostial SVG graft. An angiography was performed of the graft. The angiography was performed via hand injection with 5 mL of contrast.  
The catheter was inserted into the ostial SVG graft. SVG TO RCA
The catheter was inserted into the ostium   left main. An angiography was performed of the left coronary arteries. Multiple views were taken. The angiography was performed via hand injection with 15 mL of contrast.  
The catheter was inserted into the ostium   right coronary artery. An angiography was performed of the right coronary arteries. Multiple views were taken. The angiography was performed via hand injection with 3 mL of contrast.  
The catheter was removed from the aorta. 
The catheter was removed from the ostial SVG graft. SVG TO RCA
The catheter was repositioned into the left ventricle. Hemodynamics were performed.  and Pullback was recorded.  
The closure device was deployed in the right femoral artery. 
The defib pads were placed on the patient's chest and back.  
The dilator was inserted into the  right femoral artery. 
The physician was paged.  
The procedural consent was signed. A history and physical note was completed in the chart. 
The sheath was inserted into the right femoral artery. 
The sheath was removed from the right femoral artery. 
The site was marked. The groin was prepped. The site was prepped with ChloraPrep. The site was clipped. The patient was draped. The patient was positioned supine. 
The wire was inserted into the aorta.
The wire was inserted into the aorta.
The wire was inserted into the ostial SVG-SVG TO RCA.  Filter deployed  .
The wire was removed from the distal SVGSVG TO RCA.
dry, intact, no bleeding and no hematoma. 
Speaking Coherently

## 2022-08-16 NOTE — PLAN OF CARE
Problem: Adult Inpatient Plan of Care  Goal: Plan of Care Review  Outcome: Ongoing, Progressing      08/16/22 1748   Admission   Initial VN Admission Questions Complete  (Patient arrived to unit, AAOx4. Admission questions completed, with exception of home medications, wife to assist with completion of med rec, per pt, wife will come to bedside soon. Instructed pt to call for assistance.)   Communication Issues? None   Shift   Virtual Nurse - Rounding Complete   Virtual Nurse - Patient Verbalized Approval Of Camera Use;VN Rounding   Type of Frequent Check   Type Patient Rounds   Safety/Activity   Patient Rounds call light in patient/parent reach;visualized patient   Safety Promotion/Fall Prevention instructed to call staff for mobility   Pain/Comfort/Sleep   Sleep/Rest/Relaxation awake

## 2022-08-16 NOTE — ED PROVIDER NOTES
Encounter Date: 8/16/2022       History     Chief Complaint   Patient presents with    Shortness of Breath     Headache, nasal congestion, productive cough with yellow sputum, sob and nausea since yesterday. Denies fever.     76-year-old  male with significant medical history which includes but not limited to CKD, CAD post bypass along with MI on Plavix and Eliquis, CVA, hypertension, hyperlipidemia, pulmonary hypertension and diabetes who presents to the ED today with reports of worsening shortness of breath since yesterday with associated cough productive of phlegm and nasal congestion along with some nausea.  Patient and wife states that yesterday he had episode of shortness of breath where he fell into his chair.  Wife states that they called EMS and evaluated the patient.  The patient states he felt fine.  He said prior to falling he felt some shortness of breath coming on.  He denies any associated chest pain.  No current chest pain.  He states he has been taking all of his medications.  Denies any DVT/PE risk factors.  He does not use home oxygen.  No other complaints at this time.        Review of patient's allergies indicates:   Allergen Reactions    Metformin      Caused stroke    Pcn [penicillins]      Past Medical History:   Diagnosis Date    ANGELIC (acute kidney injury) 7/1/2020    Anticoagulant long-term use     CKD stage 3 due to type 2 diabetes mellitus 3/22/2021    Coronary artery disease     Encounter for blood transfusion     Hyperlipidemia     Hypertension     Localized osteoporosis with current pathological fracture with delayed healing 6/9/2022    Myocardial infarction     Obesity     Other chronic pulmonary heart diseases     Paroxysmal atrial fibrillation 11/9/2020    Primary osteoarthritis involving multiple joints 3/8/2017    Stroke     Type 2 diabetes mellitus without complication     Uncontrolled type 2 diabetes mellitus with neurologic complication, without  long-term current use of insulin 3/8/2017     Past Surgical History:   Procedure Laterality Date    CARDIAC SURGERY      CATARACT EXTRACTION, BILATERAL      COLONOSCOPY          COLONOSCOPY N/A 3/22/2016    Procedure: COLONOSCOPY;  Surgeon: Sridhar Reynoso MD;  Location: [facility] ENDO;  Service: Endoscopy;  Laterality: N/A;    COLONOSCOPY N/A 2021    Procedure: Colonoscopy;  Surgeon: Bebeto Perry MD;  Location: [facility] ENDO;  Service: Endoscopy;  Laterality: N/A;    CORONARY ARTERY BYPASS GRAFT      EYE SURGERY  2014    Cataracts    PERCUTANEOUS TRANSLUMINAL ANGIOPLASTY (PTA) OF CAROTID ARTERY N/A 12/10/2020    Procedure: PTA, ARTERY, CAROTID;  Surgeon: Cheko Norris MD;  Location: [facility] CATH LAB;  Service: Cardiology;  Laterality: N/A;     Family History   Problem Relation Age of Onset    Stroke Mother     Heart disease Mother     Hypertension Mother     Diabetes Mother     Hyperlipidemia Mother     Arthritis Father     Hyperlipidemia Father     Heart disease Father     Hypertension Father     Heart attack Father     Coronary artery disease Father     No Known Problems Sister     Hyperlipidemia Brother     Hypertension Brother     Stroke Brother     Thyroid disease Brother     Hypertension Son      Social History     Tobacco Use    Smoking status: Former Smoker     Packs/day: 3.00     Years: 15.00     Pack years: 45.00     Types: Cigarettes     Quit date:      Years since quittin.6    Smokeless tobacco: Never Used   Substance Use Topics    Alcohol use: Yes     Alcohol/week: 1.0 standard drink     Types: 1 Shots of liquor per week     Comment: 2-3 drinks per year    Drug use: No     Review of Systems   Constitutional: Negative for fever.   HENT: Positive for congestion. Negative for sore throat.    Respiratory: Positive for cough and shortness of breath.    Cardiovascular: Negative for chest pain.   Gastrointestinal: Positive for nausea. Negative for  abdominal pain and vomiting.   Genitourinary: Negative for dysuria.   Musculoskeletal: Negative for back pain.   Skin: Negative for rash.   Neurological: Negative for weakness.   Hematological: Does not bruise/bleed easily.   All other systems reviewed and are negative.      Physical Exam     Initial Vitals   BP Pulse Resp Temp SpO2   08/16/22 1305 08/16/22 1305 08/16/22 1305 08/16/22 1416 08/16/22 1305   115/64 74 (!) 28 98.2 °F (36.8 °C) (!) 93 %      MAP       --                Physical Exam    Nursing note and vitals reviewed.  Constitutional: He appears well-developed and well-nourished. No distress.   HENT:   Head: Normocephalic and atraumatic.   Eyes: EOM are normal. Pupils are equal, round, and reactive to light.   Neck: Neck supple.   Normal range of motion.  Cardiovascular: Normal rate, regular rhythm, normal heart sounds and intact distal pulses. Exam reveals no gallop and no friction rub.    No murmur heard.  Pulmonary/Chest: Breath sounds normal. No stridor. No respiratory distress. He has no wheezes. He has no rhonchi. He has no rales. He exhibits no tenderness.   93% on room air improved to 97% with 2 L nasal cannula oxygen   Abdominal: Abdomen is soft. Bowel sounds are normal. He exhibits no distension and no mass. There is no abdominal tenderness. There is no rebound and no guarding.   Musculoskeletal:         General: No tenderness or edema. Normal range of motion.      Cervical back: Normal range of motion and neck supple.      Comments: No lower extremity edema/calf tenderness noted.     Neurological: He is alert and oriented to person, place, and time.   Skin: Skin is warm and dry.   Psychiatric: He has a normal mood and affect. His behavior is normal. Judgment and thought content normal.         ED Course   Procedures  Labs Reviewed   CBC W/ AUTO DIFFERENTIAL - Abnormal; Notable for the following components:       Result Value    RBC 4.48 (*)     Hemoglobin 13.3 (*)     RDW 14.6 (*)     Lymph #  0.9 (*)     Gran % 82.3 (*)     Lymph % 9.4 (*)     All other components within normal limits   COMPREHENSIVE METABOLIC PANEL - Abnormal; Notable for the following components:    CO2 20 (*)     Glucose 245 (*)     BUN 38 (*)     Creatinine 1.70 (*)     Alkaline Phosphatase 36 (*)      (*)     ALT 62 (*)     eGFR 41.3 (*)     All other components within normal limits   NT-PRO NATRIURETIC PEPTIDE - Abnormal; Notable for the following components:    NT-proBNP 8080 (*)     All other components within normal limits   TROPONIN I - Abnormal; Notable for the following components:    Troponin I 8.400 (*)     All other components within normal limits   POCT GLUCOSE - Abnormal; Notable for the following components:    POCT Glucose 186 (*)     All other components within normal limits   INFLUENZA A & B BY MOLECULAR   SARS-COV-2 RNA AMPLIFICATION, QUAL    Narrative:     Is the patient symptomatic?->Yes   LIPASE   TROPONIN I   TROPONIN I   POCT GLUCOSE MONITORING CONTINUOUS     EKG Readings: (Independently Interpreted)   Initial Reading: No STEMI. Previous EKG: Compared with most recent EKG Previous EKG Date: 10/6/2021. Rhythm: Normal Sinus Rhythm. Heart Rate: 74. Conduction: 1st Degree AV Block. ST Segments: Non-Specific ST Segment Depression. ST Segment Depression: I. T Waves Flipped: AVL and I. Axis: Normal.     ECG Results          EKG 12-lead (In process)  Result time 08/16/22 16:28:33    In process by Interface, Lab In [facility] (08/16/22 16:28:33)                 Narrative:    Test Reason : R06.02,    Vent. Rate : 072 BPM     Atrial Rate : 072 BPM     P-R Int : 316 ms          QRS Dur : 098 ms      QT Int : 376 ms       P-R-T Axes : 041 117 155 degrees     QTc Int : 411 ms    Sinus rhythm with 1st degree A-V block  Left posterior fascicular block  Nonspecific ST and T wave abnormality  Abnormal ECG  When compared with ECG of 16-AUG-2022 13:08,  No significant change was found    Referred By: [email or web address]   SELF            Confirmed By:                              EKG 12-lead (In process)  Result time 08/16/22 16:28:20    In process by Interface, Lab In [facility] (08/16/22 16:28:20)                 Narrative:    Test Reason : R06.02,    Vent. Rate : 074 BPM     Atrial Rate : 074 BPM     P-R Int : 328 ms          QRS Dur : 098 ms      QT Int : 414 ms       P-R-T Axes : 044 121 161 degrees     QTc Int : 459 ms    Sinus rhythm with 1st degree A-V block  Left posterior fascicular block  Nonspecific ST and T wave abnormality  Abnormal ECG  When compared with ECG of 06-OCT-2021 20:31,  Premature atrial complexes are no longer Present  Nonspecific T wave abnormality now evident in Inferior leads    Referred By: [email or web address]   SELF           Confirmed By:                             Imaging Results          X-Ray Chest AP Portable (Final result)  Result time 08/16/22 13:35:34    Final result by Inocencio Sheppard MD (08/16/22 13:35:34)                 Impression:      1.  Cardiac silhouette size enlargement.  Small effusions.  Vascular congestion versus reticular interstitial changes throughout the lungs.  Interstitial pulmonary edema versus interstitial infectious process must be considered.    2.  Stable findings as noted above.      Electronically signed by: Inocencio Sheppard MD  Date:    08/16/2022  Time:    13:35             Narrative:    EXAMINATION:  XR CHEST AP PORTABLE    CLINICAL HISTORY:  sob;    COMPARISON:  May 18, 2022    FINDINGS:  EKG leads overlie the chest.  Small effusions.  The lungs are free of alveolar opacities.  The cardiac silhouette size is enlarged.  The trachea is midline and the mediastinal width is normal. Negative for pneumothorax.  Pulmonary vasculature is mildly congested.  Negative for osseous abnormalities. Median sternotomy wires and CABG changes.  Tortuous aorta with calcifications of the aortic knob.  There are degenerative changes of the spine and both shoulder girdles.                                 Medications    allopurinoL tablet 300 mg (has no administration in time range)   amLODIPine tablet 5 mg (has no administration in time range)   atorvastatin tablet 40 mg (has no administration in time range)   clopidogreL tablet 75 mg (has no administration in time range)   insulin detemir U-100 pen 12 Units (has no administration in time range)   metoprolol tartrate (LOPRESSOR) tablet 25 mg (has no administration in time range)   tamsulosin 24 hr capsule 0.4 mg (has no administration in time range)   sodium chloride 0.9% flush 10 mL (10 mLs Intravenous Given 8/16/22 1607)   melatonin tablet 6 mg (has no administration in time range)   polyethylene glycol packet 17 g (0 g Oral Hold 8/16/22 1500)   naloxone 0.4 mg/mL injection 0.02 mg (has no administration in time range)   glucose chewable tablet 16 g (has no administration in time range)   glucose chewable tablet 24 g (has no administration in time range)   glucagon (human recombinant) injection 1 mg (has no administration in time range)   dextrose 10% bolus 125 mL (has no administration in time range)   dextrose 10% bolus 250 mL (has no administration in time range)   ondansetron injection 4 mg (has no administration in time range)   acetaminophen tablet 650 mg (has no administration in time range)   insulin aspart U-100 pen 1-10 Units (has no administration in time range)   aspirin chewable tablet 81 mg (has no administration in time range)   furosemide injection 40 mg (40 mg Intravenous Given 8/16/22 1608)   albuterol-ipratropium 2.5 mg-0.5 mg/3 mL nebulizer solution 3 mL (3 mLs Nebulization Given 8/16/22 1348)   predniSONE tablet 40 mg (40 mg Oral Given 8/16/22 1341)   furosemide injection 40 mg (40 mg Intravenous Given 8/16/22 1448)   aspirin tablet 325 mg (325 mg Oral Given 8/16/22 1449)     Medical Decision Making:   Initial Assessment:   76-year-old  male with significant medical history which includes but not limited to CKD, CAD post bypass along with MI on  Plavix and Eliquis, CVA, hypertension, hyperlipidemia, pulmonary hypertension and diabetes who presents to the ED today with reports of worsening shortness of breath since yesterday with associated cough productive of phlegm and nasal congestion along with some nausea.   Differential Diagnosis:   Includes not limited to ACS versus CHF versus worsening pulmonary edema versus COPD/reactive airway versus metabolic derangement versus COVID-19 versus influenza versus viral URI.  ED Management:  Will get workup and treat symptoms.  Will reassess.  Disposition pending results.    1:31 PM 8/16/2022  Gabi Camacho MD    Update note:  With reassessment, patient states he feels improved after neb treatment.  Labs show significantly elevated troponin at 8.4 with a BNP of 8080.  Chest x-ray shows signs of pulmonary edema.  Patient with mildly elevated LFTs and creatinine function 1.7.  Will give Lasix.  Patient without any chest pain at this time.  Vital signs stable.  Will contact Cardiology/Hospital Medicine for admission.  Patient updated on results and plan of care.  Patient verbalized understanding agrees plan of care.    2:33 PM 8/16/2022  Gabi Camacho MD    Update note:  Spoke with Cardiology, Dr. Ramesh, reviewed the patient's chart and agrees the patient should be admitted for NSTEMI.  Will continue with current plan of Lasix 40 mg per discussion with Cardiology and aspirin.  Hold off on heparin at this time per Cardiology.  Cardiology recommends consulting Hospital Medicine for admission and Cardiology and consult.  Will await Hospital Medicine consult.    2:41 PM 8/16/2022  aGbi Camacho MD    Update note:  Spoke with Hospital Medicine Dr. Cronin, who will accept the patient at this time.  Will await EMS transfer.    2:44 PM 8/16/2022  Gabi Camacho MD      Update note:  EMS has arrived for transport with transport in process.    4:29 PM 8/16/2022  Gabi Camacho  MD                          DISCLAIMER: This note was prepared with [employer] voice recognition transcription software. Garbled syntax, mangled pronouns, and other bizarre constructions may be attributed to that software system                               Clinical Impression:   Final diagnoses:  [R06.02] SOB (shortness of breath)  [I21.4] NSTEMI (non-ST elevated myocardial infarction) (Primary)          ED Disposition Condition    Observation               Gal Camacho MD  08/16/22 [id number]

## 2022-08-17 ENCOUNTER — TELEPHONE (OUTPATIENT)
Dept: FAMILY MEDICINE | Facility: CLINIC | Age: 77
End: 2022-08-17
Payer: MEDICARE

## 2022-08-17 PROBLEM — J81.0 ACUTE PULMONARY EDEMA: Status: ACTIVE | Noted: 2022-08-17

## 2022-08-17 LAB
ALBUMIN SERPL BCP-MCNC: 3.5 G/DL (ref 3.5–5.2)
ALP SERPL-CCNC: 33 U/L (ref 55–135)
ALT SERPL W/O P-5'-P-CCNC: 51 U/L (ref 10–44)
ANION GAP SERPL CALC-SCNC: 13 MMOL/L (ref 8–16)
AORTIC ROOT ANNULUS: 2.9 CM
APTT BLDCRRT: 35.9 SEC (ref 21–32)
APTT BLDCRRT: 43.1 SEC (ref 21–32)
APTT BLDCRRT: 44.8 SEC (ref 21–32)
ASCENDING AORTA: 2.82 CM
AST SERPL-CCNC: 88 U/L (ref 10–40)
AV INDEX (PROSTH): 0.5
AV MEAN GRADIENT: 3 MMHG
AV PEAK GRADIENT: 5 MMHG
AV VALVE AREA: 1.4 CM2
AV VELOCITY RATIO: 0.56
BASOPHILS # BLD AUTO: 0.01 K/UL (ref 0–0.2)
BASOPHILS NFR BLD: 0.1 % (ref 0–1.9)
BILIRUB SERPL-MCNC: 0.7 MG/DL (ref 0.1–1)
BSA FOR ECHO PROCEDURE: 2.1 M2
BUN SERPL-MCNC: 39 MG/DL (ref 8–23)
CALCIUM SERPL-MCNC: 9.7 MG/DL (ref 8.7–10.5)
CHLORIDE SERPL-SCNC: 105 MMOL/L (ref 95–110)
CO2 SERPL-SCNC: 22 MMOL/L (ref 23–29)
CREAT SERPL-MCNC: 1.8 MG/DL (ref 0.5–1.4)
CV ECHO LV RWT: 0.32 CM
DIFFERENTIAL METHOD: ABNORMAL
DOP CALC AO PEAK VEL: 1.08 M/S
DOP CALC AO VTI: 18.39 CM
DOP CALC LVOT AREA: 2.8 CM2
DOP CALC LVOT DIAMETER: 1.88 CM
DOP CALC LVOT PEAK VEL: 0.6 M/S
DOP CALC LVOT STROKE VOLUME: 25.66 CM3
DOP CALCLVOT PEAK VEL VTI: 9.25 CM
ECHO LV POSTERIOR WALL: 0.92 CM (ref 0.6–1.1)
EJECTION FRACTION: 30 %
EOSINOPHIL # BLD AUTO: 0 K/UL (ref 0–0.5)
EOSINOPHIL NFR BLD: 0 % (ref 0–8)
ERYTHROCYTE [DISTWIDTH] IN BLOOD BY AUTOMATED COUNT: 14.8 % (ref 11.5–14.5)
EST. GFR  (NO RACE VARIABLE): 39 ML/MIN/1.73 M^2
ESTIMATED AVG GLUCOSE: 166 MG/DL (ref 68–131)
FRACTIONAL SHORTENING: 19 % (ref 28–44)
GLUCOSE SERPL-MCNC: 122 MG/DL (ref 70–110)
HBA1C MFR BLD: 7.4 % (ref 4–5.6)
HCT VFR BLD AUTO: 39.2 % (ref 40–54)
HGB BLD-MCNC: 13.1 G/DL (ref 14–18)
IMM GRANULOCYTES # BLD AUTO: 0.07 K/UL (ref 0–0.04)
IMM GRANULOCYTES NFR BLD AUTO: 0.6 % (ref 0–0.5)
INTERVENTRICULAR SEPTUM: 0.96 CM (ref 0.6–1.1)
LA MAJOR: 7.35 CM
LA MINOR: 7.68 CM
LA WIDTH: 4.3 CM
LEFT ATRIUM SIZE: 5.12 CM
LEFT ATRIUM VOLUME INDEX MOD: 36.2 ML/M2
LEFT ATRIUM VOLUME INDEX: 67.9 ML/M2
LEFT ATRIUM VOLUME MOD: 74.85 CM3
LEFT ATRIUM VOLUME: 140.56 CM3
LEFT INTERNAL DIMENSION IN SYSTOLE: 4.65 CM (ref 2.1–4)
LEFT VENTRICLE DIASTOLIC VOLUME INDEX: 79.48 ML/M2
LEFT VENTRICLE DIASTOLIC VOLUME: 164.53 ML
LEFT VENTRICLE MASS INDEX: 103 G/M2
LEFT VENTRICLE SYSTOLIC VOLUME INDEX: 48.3 ML/M2
LEFT VENTRICLE SYSTOLIC VOLUME: 100.05 ML
LEFT VENTRICULAR INTERNAL DIMENSION IN DIASTOLE: 5.77 CM (ref 3.5–6)
LEFT VENTRICULAR MASS: 213.26 G
LYMPHOCYTES # BLD AUTO: 1.1 K/UL (ref 1–4.8)
LYMPHOCYTES NFR BLD: 8.6 % (ref 18–48)
MAGNESIUM SERPL-MCNC: 1.7 MG/DL (ref 1.6–2.6)
MCH RBC QN AUTO: 30.1 PG (ref 27–31)
MCHC RBC AUTO-ENTMCNC: 33.4 G/DL (ref 32–36)
MCV RBC AUTO: 90 FL (ref 82–98)
MONOCYTES # BLD AUTO: 1.1 K/UL (ref 0.3–1)
MONOCYTES NFR BLD: 8.8 % (ref 4–15)
NEUTROPHILS # BLD AUTO: 10.4 K/UL (ref 1.8–7.7)
NEUTROPHILS NFR BLD: 81.9 % (ref 38–73)
NRBC BLD-RTO: 0 /100 WBC
PHOSPHATE SERPL-MCNC: 3.8 MG/DL (ref 2.7–4.5)
PISA MRMAX VEL: 0.05 M/S
PISA TR MAX VEL: 3.41 M/S
PLATELET # BLD AUTO: 206 K/UL (ref 150–450)
PMV BLD AUTO: 11.2 FL (ref 9.2–12.9)
POC ACTIVATED CLOTTING TIME K: 231 SEC (ref 74–137)
POC ACTIVATED CLOTTING TIME K: 329 SEC (ref 74–137)
POCT GLUCOSE: 122 MG/DL (ref 70–110)
POCT GLUCOSE: 133 MG/DL (ref 70–110)
POCT GLUCOSE: 149 MG/DL (ref 70–110)
POCT GLUCOSE: 191 MG/DL (ref 70–110)
POTASSIUM SERPL-SCNC: 4.1 MMOL/L (ref 3.5–5.1)
PROT SERPL-MCNC: 7 G/DL (ref 6–8.4)
PV PEAK VELOCITY: 0.6 CM/S
RA MAJOR: 6.33 CM
RA PRESSURE: 8 MMHG
RA WIDTH: 3.62 CM
RBC # BLD AUTO: 4.35 M/UL (ref 4.6–6.2)
RIGHT VENTRICULAR END-DIASTOLIC DIMENSION: 2.73 CM
RV TISSUE DOPPLER FREE WALL SYSTOLIC VELOCITY 1 (APICAL 4 CHAMBER VIEW): 8.05 CM/S
SAMPLE: ABNORMAL
SAMPLE: ABNORMAL
SODIUM SERPL-SCNC: 140 MMOL/L (ref 136–145)
STJ: 2.55 CM
TR MAX PG: 47 MMHG
TROPONIN I SERPL DL<=0.01 NG/ML-MCNC: 11.54 NG/ML (ref 0–0.03)
TROPONIN I SERPL DL<=0.01 NG/ML-MCNC: 18.82 NG/ML (ref 0–0.03)
TV REST PULMONARY ARTERY PRESSURE: 55 MMHG
WBC # BLD AUTO: 12.69 K/UL (ref 3.9–12.7)

## 2022-08-17 PROCEDURE — 83735 ASSAY OF MAGNESIUM: CPT | Performed by: HOSPITALIST

## 2022-08-17 PROCEDURE — 25000003 PHARM REV CODE 250: Performed by: INTERNAL MEDICINE

## 2022-08-17 PROCEDURE — 92978 PR IVUS, CORONARY, 1ST VESSEL: ICD-10-PCS | Mod: 26,RC,, | Performed by: INTERNAL MEDICINE

## 2022-08-17 PROCEDURE — C1725 CATH, TRANSLUMIN NON-LASER: HCPCS | Performed by: INTERNAL MEDICINE

## 2022-08-17 PROCEDURE — C1769 GUIDE WIRE: HCPCS | Performed by: INTERNAL MEDICINE

## 2022-08-17 PROCEDURE — C1760 CLOSURE DEV, VASC: HCPCS | Performed by: INTERNAL MEDICINE

## 2022-08-17 PROCEDURE — 93459 L HRT ART/GRFT ANGIO: CPT | Performed by: INTERNAL MEDICINE

## 2022-08-17 PROCEDURE — 93459: ICD-10-PCS | Mod: 26,59,51, | Performed by: INTERNAL MEDICINE

## 2022-08-17 PROCEDURE — 99152 MOD SED SAME PHYS/QHP 5/>YRS: CPT | Performed by: INTERNAL MEDICINE

## 2022-08-17 PROCEDURE — 93010 EKG 12-LEAD: ICD-10-PCS | Mod: ,,, | Performed by: INTERNAL MEDICINE

## 2022-08-17 PROCEDURE — 93010 ELECTROCARDIOGRAM REPORT: CPT | Mod: ,,, | Performed by: INTERNAL MEDICINE

## 2022-08-17 PROCEDURE — 25000003 PHARM REV CODE 250: Performed by: HOSPITALIST

## 2022-08-17 PROCEDURE — C1757 CATH, THROMBECTOMY/EMBOLECT: HCPCS | Performed by: INTERNAL MEDICINE

## 2022-08-17 PROCEDURE — 63600175 PHARM REV CODE 636 W HCPCS: Performed by: HOSPITALIST

## 2022-08-17 PROCEDURE — A4216 STERILE WATER/SALINE, 10 ML: HCPCS | Performed by: HOSPITALIST

## 2022-08-17 PROCEDURE — 27000221 HC OXYGEN, UP TO 24 HOURS

## 2022-08-17 PROCEDURE — 92978 ENDOLUMINL IVUS OCT C 1ST: CPT | Mod: RC | Performed by: INTERNAL MEDICINE

## 2022-08-17 PROCEDURE — 25500020 PHARM REV CODE 255: Performed by: INTERNAL MEDICINE

## 2022-08-17 PROCEDURE — 85347 COAGULATION TIME ACTIVATED: CPT | Performed by: INTERNAL MEDICINE

## 2022-08-17 PROCEDURE — 92937 PRQ TRLUML REVSC CAB GRF 1: CPT | Mod: RC,,, | Performed by: INTERNAL MEDICINE

## 2022-08-17 PROCEDURE — C1874 STENT, COATED/COV W/DEL SYS: HCPCS | Performed by: INTERNAL MEDICINE

## 2022-08-17 PROCEDURE — 99223 1ST HOSP IP/OBS HIGH 75: CPT | Mod: 25,FS,, | Performed by: INTERNAL MEDICINE

## 2022-08-17 PROCEDURE — 84484 ASSAY OF TROPONIN QUANT: CPT

## 2022-08-17 PROCEDURE — C1753 CATH, INTRAVAS ULTRASOUND: HCPCS | Performed by: INTERNAL MEDICINE

## 2022-08-17 PROCEDURE — 92937 PR BYPASS (IN OR THROUGH): ICD-10-PCS | Mod: RC,,, | Performed by: INTERNAL MEDICINE

## 2022-08-17 PROCEDURE — C1894 INTRO/SHEATH, NON-LASER: HCPCS | Performed by: INTERNAL MEDICINE

## 2022-08-17 PROCEDURE — 11000001 HC ACUTE MED/SURG PRIVATE ROOM

## 2022-08-17 PROCEDURE — 36415 COLL VENOUS BLD VENIPUNCTURE: CPT | Performed by: HOSPITALIST

## 2022-08-17 PROCEDURE — 94761 N-INVAS EAR/PLS OXIMETRY MLT: CPT

## 2022-08-17 PROCEDURE — 84100 ASSAY OF PHOSPHORUS: CPT | Performed by: HOSPITALIST

## 2022-08-17 PROCEDURE — 63600175 PHARM REV CODE 636 W HCPCS: Performed by: INTERNAL MEDICINE

## 2022-08-17 PROCEDURE — 99900035 HC TECH TIME PER 15 MIN (STAT)

## 2022-08-17 PROCEDURE — 27201423 OPTIME MED/SURG SUP & DEVICES STERILE SUPPLY: Performed by: INTERNAL MEDICINE

## 2022-08-17 PROCEDURE — 93005 ELECTROCARDIOGRAM TRACING: CPT

## 2022-08-17 PROCEDURE — 83036 HEMOGLOBIN GLYCOSYLATED A1C: CPT | Performed by: NURSE PRACTITIONER

## 2022-08-17 PROCEDURE — 92978 ENDOLUMINL IVUS OCT C 1ST: CPT | Mod: 26,RC,, | Performed by: INTERNAL MEDICINE

## 2022-08-17 PROCEDURE — 25000003 PHARM REV CODE 250: Performed by: NURSE PRACTITIONER

## 2022-08-17 PROCEDURE — 85025 COMPLETE CBC W/AUTO DIFF WBC: CPT | Performed by: HOSPITALIST

## 2022-08-17 PROCEDURE — 99152 PR MOD CONSCIOUS SEDATION, SAME PHYS, 5+ YRS, FIRST 15 MIN: ICD-10-PCS | Mod: ,,, | Performed by: INTERNAL MEDICINE

## 2022-08-17 PROCEDURE — C1887 CATHETER, GUIDING: HCPCS | Performed by: INTERNAL MEDICINE

## 2022-08-17 PROCEDURE — 99223 PR INITIAL HOSPITAL CARE,LEVL III: ICD-10-PCS | Mod: 25,FS,, | Performed by: INTERNAL MEDICINE

## 2022-08-17 PROCEDURE — C9604 PERC D-E COR REVASC T CABG S: HCPCS | Mod: RC | Performed by: INTERNAL MEDICINE

## 2022-08-17 PROCEDURE — 85730 THROMBOPLASTIN TIME PARTIAL: CPT | Performed by: HOSPITALIST

## 2022-08-17 PROCEDURE — 99153 MOD SED SAME PHYS/QHP EA: CPT | Performed by: INTERNAL MEDICINE

## 2022-08-17 PROCEDURE — 99152 MOD SED SAME PHYS/QHP 5/>YRS: CPT | Mod: ,,, | Performed by: INTERNAL MEDICINE

## 2022-08-17 PROCEDURE — 93459 L HRT ART/GRFT ANGIO: CPT | Mod: 26,59,51, | Performed by: INTERNAL MEDICINE

## 2022-08-17 PROCEDURE — 80053 COMPREHEN METABOLIC PANEL: CPT | Performed by: HOSPITALIST

## 2022-08-17 DEVICE — STENT RONYX35018UX RESOLUTE ONYX 3.50X18
Type: IMPLANTABLE DEVICE | Site: CORONARY | Status: FUNCTIONAL
Brand: RESOLUTE ONYX™

## 2022-08-17 RX ORDER — SODIUM CHLORIDE 9 MG/ML
INJECTION, SOLUTION INTRAVENOUS CONTINUOUS
Status: ACTIVE | OUTPATIENT
Start: 2022-08-17 | End: 2022-08-17

## 2022-08-17 RX ORDER — IODIXANOL 320 MG/ML
INJECTION, SOLUTION INTRAVASCULAR
Status: DISCONTINUED | OUTPATIENT
Start: 2022-08-17 | End: 2022-08-17 | Stop reason: HOSPADM

## 2022-08-17 RX ORDER — FENTANYL CITRATE 50 UG/ML
INJECTION, SOLUTION INTRAMUSCULAR; INTRAVENOUS
Status: DISCONTINUED | OUTPATIENT
Start: 2022-08-17 | End: 2022-08-17 | Stop reason: HOSPADM

## 2022-08-17 RX ORDER — HEPARIN SODIUM 200 [USP'U]/100ML
INJECTION, SOLUTION INTRAVENOUS
Status: DISCONTINUED | OUTPATIENT
Start: 2022-08-17 | End: 2022-08-20 | Stop reason: HOSPADM

## 2022-08-17 RX ORDER — MIDAZOLAM HYDROCHLORIDE 1 MG/ML
INJECTION, SOLUTION INTRAMUSCULAR; INTRAVENOUS
Status: DISCONTINUED | OUTPATIENT
Start: 2022-08-17 | End: 2022-08-17 | Stop reason: HOSPADM

## 2022-08-17 RX ORDER — VANCOMYCIN HYDROCHLORIDE 1 G/20ML
INJECTION, POWDER, LYOPHILIZED, FOR SOLUTION INTRAVENOUS
Status: DISCONTINUED | OUTPATIENT
Start: 2022-08-17 | End: 2022-08-17 | Stop reason: HOSPADM

## 2022-08-17 RX ORDER — ADENOSINE 3 MG/ML
INJECTION, SOLUTION INTRAVENOUS
Status: DISCONTINUED | OUTPATIENT
Start: 2022-08-17 | End: 2022-08-17 | Stop reason: HOSPADM

## 2022-08-17 RX ORDER — HEPARIN SODIUM 1000 [USP'U]/ML
INJECTION, SOLUTION INTRAVENOUS; SUBCUTANEOUS
Status: DISCONTINUED | OUTPATIENT
Start: 2022-08-17 | End: 2022-08-17 | Stop reason: HOSPADM

## 2022-08-17 RX ORDER — LIDOCAINE HYDROCHLORIDE 10 MG/ML
INJECTION, SOLUTION EPIDURAL; INFILTRATION; INTRACAUDAL; PERINEURAL
Status: DISCONTINUED | OUTPATIENT
Start: 2022-08-17 | End: 2022-08-17 | Stop reason: HOSPADM

## 2022-08-17 RX ADMIN — METOPROLOL TARTRATE 25 MG: 25 TABLET, FILM COATED ORAL at 08:08

## 2022-08-17 RX ADMIN — Medication 10 ML: at 06:08

## 2022-08-17 RX ADMIN — TAMSULOSIN HYDROCHLORIDE 0.4 MG: 0.4 CAPSULE ORAL at 09:08

## 2022-08-17 RX ADMIN — POLYETHYLENE GLYCOL 3350 17 G: 17 POWDER, FOR SOLUTION ORAL at 09:08

## 2022-08-17 RX ADMIN — ALLOPURINOL 300 MG: 100 TABLET ORAL at 09:08

## 2022-08-17 RX ADMIN — AMLODIPINE BESYLATE 5 MG: 5 TABLET ORAL at 09:08

## 2022-08-17 RX ADMIN — ASPIRIN 81 MG: 81 TABLET, CHEWABLE ORAL at 09:08

## 2022-08-17 RX ADMIN — CLOPIDOGREL 75 MG: 75 TABLET, FILM COATED ORAL at 09:08

## 2022-08-17 RX ADMIN — Medication 10 ML: at 09:08

## 2022-08-17 RX ADMIN — FUROSEMIDE 40 MG: 10 INJECTION, SOLUTION INTRAMUSCULAR; INTRAVENOUS at 04:08

## 2022-08-17 RX ADMIN — ATORVASTATIN CALCIUM 40 MG: 40 TABLET, FILM COATED ORAL at 09:08

## 2022-08-17 RX ADMIN — METOPROLOL TARTRATE 25 MG: 25 TABLET, FILM COATED ORAL at 09:08

## 2022-08-17 RX ADMIN — INSULIN DETEMIR 12 UNITS: 100 INJECTION, SOLUTION SUBCUTANEOUS at 08:08

## 2022-08-17 NOTE — PLAN OF CARE
Problem: Adult Inpatient Plan of Care  Goal: Plan of Care Review  Outcome: Ongoing, Progressing     Problem: Fluid and Electrolyte Imbalance (Acute Kidney Injury/Impairment)  Goal: Fluid and Electrolyte Balance  Outcome: Ongoing, Progressing

## 2022-08-17 NOTE — ASSESSMENT & PLAN NOTE
Chronic, controlled.  Latest blood pressure and vitals reviewed-   Temp:  [97.7 °F (36.5 °C)-99.1 °F (37.3 °C)]   Pulse:  [65-91]   Resp:  [18-30]   BP: ()/(56-82)   SpO2:  [92 %-96 %] .   Home meds for hypertension were reviewed and noted below.   Hypertension Medications             amLODIPine (NORVASC) 5 MG tablet TAKE 1 TABLET EVERY DAY    metoprolol tartrate (LOPRESSOR) 25 MG tablet TAKE 1 TABLET TWICE DAILY          - Continue home antihypertensive regimen  - Will utilize PRN blood pressure medication only if patient's blood pressure greater than 180/110 and he develops symptoms such as worsening chest pain or shortness of breath.

## 2022-08-17 NOTE — PLAN OF CARE
Patient transferred to floor on tele monitor.  VSS. No c/o pain. Patient attached to tele monitor upon arrival in room 421.  Right groin gauze/tegaderm CDI. No bleeding or hematoma noted.   Site reviewed with isabella, RN at bedside.  All questions answered. Updated pt's wife at bedside.

## 2022-08-17 NOTE — ASSESSMENT & PLAN NOTE
Previously diagnosed  Takes eliquis and lopressor at home  ECG showed NSR   Denies palpitations  [id number] score is 6, annual risk of stroke is 9.7%  HAS-BLED score is 2, annual bleeding risk is 4.1%    - Hold eliquis  - Continue lopressor  - Continue heparin gtt

## 2022-08-17 NOTE — ASSESSMENT & PLAN NOTE
Home antihyperglycemic regimen: glipizide with long acting insulin  Last A1c reviewed-   Lab Results   Component Value Date    HGBA1C 8.8 (H) 07/20/2022     Most recent fingerstick glucose reviewed-   Recent Labs     08/16/22  1526 08/16/22  1712 08/16/22  2043 08/17/22  0409 08/17/22  1132   POCTGLUCOSE 186* 172* 265* 122* 133*     - Hold home oral antihyperglycemics while in hospital  - Patient's FSGs are controlled on current medication regimen.  - Continue 10 detemir QHS  - Current correctional scale  Medium  - POCT accuchecks ACHS  - HgbA1C pending   - NPO for LHC, Diabetic diet when able   - BG goals: Preprandial <140 mg/dL, Random  <180 mg/dL    Antihyperglycemics (From admission, onward)            Start     Stop Route Frequency Ordered    08/16/22 2100  insulin detemir U-100 pen 12 Units         -- SubQ Nightly 08/16/22 1451    08/16/22 1549  insulin aspart U-100 pen 1-10 Units         -- SubQ Before meals & nightly PRN 08/16/22 1451

## 2022-08-17 NOTE — H&P
[facility] Medicine  History & Physical    Patient Name: Jin Ngo  MRN: [id number]  Patient Class: IP- Inpatient  Admission Date: 8/16/2022  Attending Physician: Clarita Cronin MD   Primary Care Provider: Sam Barroso MD         Patient information was obtained from patient, spouse/SO, past medical records and ER records.     Subjective:     Principal Problem:NSTEMI (non-ST elevated myocardial infarction)    Chief Complaint:   Chief Complaint   Patient presents with    Shortness of Breath     Headache, nasal congestion, productive cough with yellow sputum, sob and nausea since yesterday. Denies fever.        HPI: Jin Ngo is a 76 year old male with PMHx of CKD, CAD (MI, S/p CABG on Plavix and Eliquis), CVA, Left Carotid Stent, hypertension, hyperlipidemia, pulmonary hypertension and diabetes who presented to [facility] ED today with reports of worsening shortness of breath since yesterday with associated cough productive of phlegm and nasal congestion along with some nausea.  Patient and wife states that yesterday he had episode of shortness of breath where he fell into his chair. ECG with no STEMI, unchanged from prior. Chest xray with evidence of Pulmonary Edema. Labs significant for Troponin of 8.4, Pro-BNP 8080, mildly elevated BUN/Cr.  with cardiology was consulted from the ED.  He was given Aspirin, Lasix 40mg x 2 and Duoneb in the ED. He is admitted to [facility] for NSTEMI      Past Medical History:   Diagnosis Date    ANGELIC (acute kidney injury) 7/1/2020    Anticoagulant long-term use     CKD stage 3 due to type 2 diabetes mellitus 3/22/2021    Coronary artery disease     Encounter for blood transfusion     Hyperlipidemia     Hypertension     Localized osteoporosis with current pathological fracture with delayed healing 6/9/2022    Myocardial infarction     Obesity     Other chronic pulmonary heart diseases     Paroxysmal atrial fibrillation  "11/9/2020    Primary osteoarthritis involving multiple joints 3/8/2017    Stroke     Type 2 diabetes mellitus without complication     Uncontrolled type 2 diabetes mellitus with neurologic complication, without long-term current use of insulin 3/8/2017       Past Surgical History:   Procedure Laterality Date    CARDIAC SURGERY      CATARACT EXTRACTION, BILATERAL      COLONOSCOPY      2011    COLONOSCOPY N/A 3/22/2016    Procedure: COLONOSCOPY;  Surgeon: Sridhar Reynoso MD;  Location: [facility] ENDO;  Service: Endoscopy;  Laterality: N/A;    COLONOSCOPY N/A 12/7/2021    Procedure: Colonoscopy;  Surgeon: Bebeto Perry MD;  Location: [facility] ENDO;  Service: Endoscopy;  Laterality: N/A;    CORONARY ARTERY BYPASS GRAFT      EYE SURGERY  06/2014    Cataracts    PERCUTANEOUS TRANSLUMINAL ANGIOPLASTY (PTA) OF CAROTID ARTERY N/A 12/10/2020    Procedure: PTA, ARTERY, CAROTID;  Surgeon: Cheko Norris MD;  Location: [facility] CATH LAB;  Service: Cardiology;  Laterality: N/A;       Review of patient's allergies indicates:   Allergen Reactions    Metformin      Caused stroke    Pcn [penicillins]        No current facility-administered medications on file prior to encounter.     Current Outpatient Medications on File Prior to Encounter   Medication Sig    ACCU-CHEK VERONICA PLUS TEST STRP Strp TEST BLOOD SUGAR FOUR TIMES DAILY    allopurinoL (ZYLOPRIM) 300 MG tablet TAKE 1 TABLET EVERY DAY    amLODIPine (NORVASC) 5 MG tablet TAKE 1 TABLET EVERY DAY    atorvastatin (LIPITOR) 40 MG tablet Take 1 tablet (40 mg total) by mouth once daily.    BD ULTRA-FINE TEJINDER PEN NEEDLE 32 gauge x 5/32" Ndle USE WITH LEVEMIR DAILY    blood-glucose meter kit To check BG 4 times daily, to use with insurance preferred meter    clopidogreL (PLAVIX) 75 mg tablet TAKE 1 TABLET EVERY DAY    doxycycline (VIBRA-TABS) 100 MG tablet TAKE 1 TABLET BY MOUTH TWICE A DAY    DULoxetine (CYMBALTA) 30 MG capsule TAKE 1 CAPSULE (30 MG TOTAL) " ONCE DAILY. TO HELP REDUCE FEELING OF BACK PAIN    ELIQUIS 5 mg Tab TAKE 1 TABLET BY MOUTH TWICE A DAY    fenofibrate 160 MG Tab TAKE 1 TABLET EVERY DAY    fluticasone propionate (FLONASE) 50 mcg/actuation nasal spray INHALE 1 SPRAY IN EACH NOSTRIL ROUTE ONCE DAILY.    glipiZIDE (GLUCOTROL) 5 MG tablet Take 2 tablets (10 mg total) by mouth daily with breakfast AND 1 tablet (5 mg total) daily with dinner or evening meal. Take 7.5 mg with breakfast and 5 mg with dinner.    HYDROcodone-acetaminophen (NORCO) 7.5-325 mg per tablet Take 1 tablet by mouth every 24 hours as needed for Pain (take 30 minutes prior to parcipating in physical therapy).    insulin detemir U-100 (LEVEMIR FLEXTOUCH U-100 INSULN) 100 unit/mL (3 mL) InPn pen Inject 12 Units into the skin every evening.    lancets (ACCU-CHEK SOFTCLIX LANCETS) Misc To check BG 4 times daily, to use with insurance preferred meter.    metoprolol tartrate (LOPRESSOR) 25 MG tablet TAKE 1 TABLET TWICE DAILY    tamsulosin (FLOMAX) 0.4 mg Cap TAKE 1 CAPSULE BY MOUTH ONCE DAILY FOR URINE FLOW    [DISCONTINUED] clopidogreL (PLAVIX) 75 mg tablet TAKE 1 TABLET EVERY DAY    [DISCONTINUED] fenofibrate 160 MG Tab TAKE 1 TABLET EVERY DAY     Family History       Problem Relation (Age of Onset)    Arthritis Father    Coronary artery disease Father    Diabetes Mother    Heart attack Father    Heart disease Mother, Father    Hyperlipidemia Mother, Father, Brother    Hypertension Mother, Father, Brother, Son    No Known Problems Sister    Stroke Mother, Brother    Thyroid disease Brother          Tobacco Use    Smoking status: Former Smoker     Packs/day: 3.00     Years: 15.00     Pack years: 45.00     Types: Cigarettes     Quit date:      Years since quittin.6    Smokeless tobacco: Never Used   Substance and Sexual Activity    Alcohol use: Yes     Alcohol/week: 1.0 standard drink     Types: 1 Shots of liquor per week     Comment: 2-3 drinks per year    Drug use:  No    Sexual activity: Not Currently     Partners: Female     Review of Systems   Constitutional:  Negative for chills, diaphoresis, fever and unexpected weight change.   HENT:  Negative for congestion, sore throat, trouble swallowing and voice change.    Eyes:  Negative for photophobia and visual disturbance.   Respiratory:  Positive for cough (Dry), chest tightness and shortness of breath. Negative for wheezing.    Cardiovascular:  Positive for chest pain (Sharp Epigrastic. Resolved since admit). Negative for palpitations and leg swelling.   Gastrointestinal:  Negative for abdominal distention, abdominal pain, blood in stool, constipation, diarrhea, nausea and vomiting.   Endocrine: Negative for polydipsia, polyphagia and polyuria.   Genitourinary:  Negative for decreased urine volume, difficulty urinating, hematuria and urgency.   Musculoskeletal:  Negative for back pain, joint swelling, neck pain and neck stiffness.   Skin:  Negative for color change, rash and wound.   Neurological:  Negative for speech difficulty, weakness and headaches.   Psychiatric/Behavioral:  Negative for agitation, decreased concentration and sleep disturbance. The patient is not nervous/anxious.    Objective:     Vital Signs (Most Recent):  Temp: 97.7 °F (36.5 °C) (08/16/22 2041)  Pulse: 78 (08/16/22 2041)  Resp: (!) 22 (08/16/22 2041)  BP: 130/68 (08/16/22 2041)  SpO2: (!) 93 % (08/16/22 2043)   Vital Signs (24h Range):  Temp:  [97.7 °F (36.5 °C)-98.3 °F (36.8 °C)] 97.7 °F (36.5 °C)  Pulse:  [65-83] 78  Resp:  [18-30] 22  SpO2:  [92 %-96 %] 93 %  BP: (107-134)/(62-82) 130/68     Weight: 90.7 kg (200 lb)  Body mass index is 29.53 kg/m².    Physical Exam  Vitals and nursing note reviewed.   Constitutional:       General: He is not in acute distress.     Appearance: He is well-developed. He is not diaphoretic.   HENT:      Head: Normocephalic and atraumatic.      Mouth/Throat:      Pharynx: No oropharyngeal exudate.   Eyes:      General:  No scleral icterus.     Conjunctiva/sclera: Conjunctivae normal.      Pupils: Pupils are equal, round, and reactive to light.   Neck:      Trachea: No tracheal deviation.   Cardiovascular:      Rate and Rhythm: Normal rate and regular rhythm.      Heart sounds: Normal heart sounds. No murmur heard.  Pulmonary:      Effort: Pulmonary effort is normal. No respiratory distress.      Breath sounds: Normal breath sounds. No wheezing or rales.   Chest:      Chest wall: No tenderness.   Abdominal:      General: Bowel sounds are normal. There is no distension.      Palpations: Abdomen is soft. There is no mass.      Tenderness: There is no abdominal tenderness.   Musculoskeletal:         General: No deformity.      Cervical back: Neck supple.   Skin:     General: Skin is warm and dry.      Capillary Refill: Capillary refill takes less than 2 seconds.      Coloration: Skin is not pale.      Findings: No erythema or rash.   Neurological:      Mental Status: He is alert and oriented to person, place, and time.   Psychiatric:         Behavior: Behavior normal.         CRANIAL NERVES     CN III, IV, VI   Pupils are equal, round, and reactive to light.     Significant Labs: All pertinent labs within the past 24 hours have been reviewed.  A1C:   Recent Labs   Lab 05/18/22  1010 07/20/22  1145   HGBA1C 9.1* 8.8*     CBC:   Recent Labs   Lab 08/16/22  1340   WBC 9.25   HGB 13.3*   HCT 40.8        CMP:   Recent Labs   Lab 08/16/22  1340      K 5.1      CO2 20*   *   BUN 38*   CREATININE 1.70*   CALCIUM 9.5   PROT 6.9   ALBUMIN 4.1   BILITOT 0.9   ALKPHOS 36*   *   ALT 62*   ANIONGAP 10     Cardiac Markers: No results for input(s): CKMB, MYOGLOBIN, BNP, TROPISTAT in the last 48 hours.  Coagulation:   Recent Labs   Lab 08/16/22 2003   INR 1.3*   APTT 31.9     Lipid Panel: No results for input(s): CHOL, HDL, LDLCALC, TRIG, CHOLHDL in the last 48 hours.  Magnesium: No results for input(s): MG in the last  48 hours.  POCT Glucose:   Recent Labs   Lab 08/16/22  1526 08/16/22  1712 08/16/22  2043   POCTGLUCOSE 186* 172* 265*       Significant Imaging: I have reviewed all pertinent imaging results/findings within the past 24 hours.  Imaging Results              X-Ray Chest AP Portable (Final result)  Result time 08/16/22 13:35:34      Final result by Inocencio Sheppard MD (08/16/22 13:35:34)                   Impression:      1.  Cardiac silhouette size enlargement.  Small effusions.  Vascular congestion versus reticular interstitial changes throughout the lungs.  Interstitial pulmonary edema versus interstitial infectious process must be considered.    2.  Stable findings as noted above.      Electronically signed by: Inocencio Sheppard MD  Date:    08/16/2022  Time:    13:35               Narrative:    EXAMINATION:  XR CHEST AP PORTABLE    CLINICAL HISTORY:  sob;    COMPARISON:  May 18, 2022    FINDINGS:  EKG leads overlie the chest.  Small effusions.  The lungs are free of alveolar opacities.  The cardiac silhouette size is enlarged.  The trachea is midline and the mediastinal width is normal. Negative for pneumothorax.  Pulmonary vasculature is mildly congested.  Negative for osseous abnormalities. Median sternotomy wires and CABG changes.  Tortuous aorta with calcifications of the aortic knob.  There are degenerative changes of the spine and both shoulder girdles.                                    ECG: Sinus rhythm with 1st degree A-V block, Left posterior When compared with ECG of 16-AUG-2022 13:08, No significant change was found     Assessment/Plan:     * NSTEMI (non-ST elevated myocardial infarction)  Hypertension,  Pulmonary Hypertension  PAF, S/P Ablation on Eliquis  S/P CABP  Complaints of SOB and Stabbing Epigastric Pain. Since Resolved  ECG no change obvious changes from prior. No STEMI  Troponin 8.4 >>12.1>>10.75  proBNP 8080, Pulmonary Edema on CXR  Received Lasix 40 mg x 2 in ED  C/w Lasix 40 mg IV daily and  Home Aldactone  Received  mg in ED, C/w ASA 81 mg  C/w Home Plavix  C/w Home statin   C/w home amlodipine and metoprolol  Holding home Eliquis, Last dose AM 8/16/22  Heparin infusion  Echocardiogram  NPO after MN  Appreciate Cardiology        Pulmonary HTN          VTE Risk Mitigation (From admission, onward)         Ordered     heparin 25,000 units in dextrose 5% (100 units/ml) IV bolus from bag - ADDITIONAL PRN BOLUS - 60 units/kg (max bolus 4000 units)  As needed (PRN)        Question:  Heparin Infusion Adjustment (DO NOT MODIFY ANSWER)  Answer:  \\[email or web address]\epic\Images\Pharmacy\HeparinInfusions\heparin LOW INTENSITY nomogram for [facility] [id number]    08/16/22 2000     heparin 25,000 units in dextrose 5% (100 units/ml) IV bolus from bag - ADDITIONAL PRN BOLUS - 30 units/kg (max bolus 4000 units)  As needed (PRN)        Question:  Heparin Infusion Adjustment (DO NOT MODIFY ANSWER)  Answer:  \\[email or web address]\epic\Images\Pharmacy\HeparinInfusions\heparin LOW INTENSITY nomogram for [facility] [id number].pdf    08/16/22 2000     heparin 25,000 units in dextrose 5% 250 mL (100 units/mL) infusion LOW INTENSITY nomogram - [facility]  Continuous        Question Answer Comment   Heparin Infusion Adjustment (DO NOT MODIFY ANSWER) \\[email or web address]\epic\Images\Pharmacy\HeparinInfusions\heparin LOW INTENSITY nomogram for [facility] [id number]    Begin at (in units/kg/hr) 12        08/16/22 2000     Reason for No Pharmacological VTE Prophylaxis  Once        Question:  Reasons:  Answer:  Already adequately anticoagulated on oral Anticoagulants    08/16/22 1451     IP VTE HIGH RISK PATIENT  Once         08/16/22 1451     Place sequential compression device  Until discontinued         08/16/22 1451                   Denia Zepeda NP  Department of Hospital Medicine   [facility]

## 2022-08-17 NOTE — SUBJECTIVE & OBJECTIVE
Interval History: On 2 L NC. Reports improvement in shortness of breath. Trop 18 this AM. NPO since midnight, plan for C today. TTE pending.     Review of Systems   Constitutional:  Negative for chills, diaphoresis and fever.   HENT:  Negative for congestion, sore throat, trouble swallowing and voice change.    Eyes:  Negative for visual disturbance.   Respiratory:  Positive for cough (non-productive) and shortness of breath. Negative for wheezing.    Cardiovascular:  Positive for chest pain (reports improvement since admission). Negative for palpitations and leg swelling.   Gastrointestinal:  Negative for abdominal pain, blood in stool, constipation, diarrhea, nausea and vomiting.   Genitourinary:  Negative for decreased urine volume, dysuria, hematuria and urgency.   Skin:  Negative for pallor and wound.   Neurological:  Negative for dizziness, speech difficulty, weakness, light-headedness and headaches.   Psychiatric/Behavioral:  Negative for agitation and decreased concentration. The patient is not nervous/anxious.    Objective:     Vital Signs (Most Recent):  Temp: 98 °F (36.7 °C) (08/17/22 0952)  Pulse: 91 (08/17/22 0952)  Resp: 18 (08/17/22 0952)  BP: 126/72 (08/17/22 0952)  SpO2: (!) 94 % (08/17/22 0952)   Vital Signs (24h Range):  Temp:  [97.7 °F (36.5 °C)-99.1 °F (37.3 °C)] 98 °F (36.7 °C)  Pulse:  [65-91] 91  Resp:  [18-30] 18  SpO2:  [92 %-96 %] 94 %  BP: (102-134)/(56-82) 126/72     Weight: 90.7 kg (200 lb)  Body mass index is 29.53 kg/m².    Intake/Output Summary (Last 24 hours) at 8/17/2022 1048  Last data filed at 8/17/2022 0710  Gross per 24 hour   Intake 250 ml   Output 2325 ml   Net -2075 ml      Physical Exam  Vitals and nursing note reviewed.   Constitutional:       General: He is not in acute distress.     Appearance: He is well-developed. He is not diaphoretic.   HENT:      Head: Normocephalic and atraumatic.      Mouth/Throat:      Pharynx: No oropharyngeal exudate.   Eyes:      General: No  scleral icterus.     Pupils: Pupils are equal, round, and reactive to light.   Neck:      Trachea: No tracheal deviation.   Cardiovascular:      Rate and Rhythm: Normal rate and regular rhythm.      Heart sounds: Normal heart sounds. No murmur heard.  Pulmonary:      Effort: Pulmonary effort is normal. No respiratory distress.      Breath sounds: Rales present. No wheezing.   Chest:      Chest wall: No tenderness.   Abdominal:      General: Bowel sounds are normal. There is no distension.      Palpations: Abdomen is soft. There is no mass.      Tenderness: There is no abdominal tenderness.   Musculoskeletal:         General: No deformity.      Cervical back: Neck supple.   Skin:     General: Skin is warm and dry.      Coloration: Skin is not pale.      Findings: No erythema or rash.   Neurological:      Mental Status: He is alert and oriented to person, place, and time.   Psychiatric:         Behavior: Behavior normal.       Significant Labs: All pertinent labs within the past 24 hours have been reviewed.  A1C:   Recent Labs   Lab 05/18/22  1010 07/20/22  1145   HGBA1C 9.1* 8.8*     BMP:   Recent Labs   Lab 08/17/22  0342   *      K 4.1      CO2 22*   BUN 39*   CREATININE 1.8*   CALCIUM 9.7   MG 1.7     CBC:   Recent Labs   Lab 08/16/22  1340 08/17/22  0343   WBC 9.25 12.69   HGB 13.3* 13.1*   HCT 40.8 39.2*    206     CMP:   Recent Labs   Lab 08/16/22  1340 08/17/22  0342    140   K 5.1 4.1    105   CO2 20* 22*   * 122*   BUN 38* 39*   CREATININE 1.70* 1.8*   CALCIUM 9.5 9.7   PROT 6.9 7.0   ALBUMIN 4.1 3.5   BILITOT 0.9 0.7   ALKPHOS 36* 33*   * 88*   ALT 62* 51*   ANIONGAP 10 13     Troponin:   Recent Labs   Lab 08/16/22 2003 08/16/22  2339 08/17/22  0820   TROPONINI 10.755* 11.543* 18.825*       Significant Imaging: I have reviewed all pertinent imaging results/findings within the past 24 hours.

## 2022-08-17 NOTE — SUBJECTIVE & OBJECTIVE
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    Coronary artery disease     Encounter for blood transfusion     Hyperlipidemia     Hypertension     Localized osteoporosis with current pathological fracture with delayed healing 6/9/2022    Myocardial infarction     Obesity     Other chronic pulmonary heart diseases     Paroxysmal atrial fibrillation 11/9/2020    Primary osteoarthritis involving multiple joints 3/8/2017    Stroke     Type 2 diabetes mellitus without complication     Uncontrolled type 2 diabetes mellitus with neurologic complication, without long-term current use of insulin 3/8/2017       Past Surgical History:   Procedure Laterality Date    CARDIAC SURGERY      CATARACT EXTRACTION, BILATERAL      COLONOSCOPY      2011    COLONOSCOPY N/A 3/22/2016    Procedure: COLONOSCOPY;  Surgeon: Sridhar Reynoso MD;  Location: [facility] ENDO;  Service: Endoscopy;  Laterality: N/A;    COLONOSCOPY N/A 12/7/2021    Procedure: Colonoscopy;  Surgeon: Bebeto Perry MD;  Location: [facility] ENDO;  Service: Endoscopy;  Laterality: N/A;    CORONARY ARTERY BYPASS GRAFT      EYE SURGERY  06/2014    Cataracts    PERCUTANEOUS TRANSLUMINAL ANGIOPLASTY (PTA) OF CAROTID ARTERY N/A 12/10/2020    Procedure: PTA, ARTERY, CAROTID;  Surgeon: Cheko Norris MD;  Location: [facility] CATH LAB;  Service: Cardiology;  Laterality: N/A;       Review of patient's allergies indicates:   Allergen Reactions    Metformin      Caused stroke    Pcn [penicillins]        No current facility-administered medications on file prior to encounter.     Current Outpatient Medications on File Prior to Encounter   Medication Sig    ACCU-CHEK VERONICA PLUS TEST STRP Strp TEST BLOOD SUGAR FOUR TIMES DAILY    allopurinoL (ZYLOPRIM) 300 MG tablet TAKE 1 TABLET EVERY DAY    amLODIPine (NORVASC) 5 MG tablet TAKE 1 TABLET EVERY DAY    atorvastatin  "(LIPITOR) 40 MG tablet Take 1 tablet (40 mg total) by mouth once daily.    BD ULTRA-FINE TEJINDER PEN NEEDLE 32 gauge x 5/32" Ndle USE WITH LEVEMIR DAILY    blood-glucose meter kit To check BG 4 times daily, to use with insurance preferred meter    clopidogreL (PLAVIX) 75 mg tablet TAKE 1 TABLET EVERY DAY    doxycycline (VIBRA-TABS) 100 MG tablet TAKE 1 TABLET BY MOUTH TWICE A DAY    DULoxetine (CYMBALTA) 30 MG capsule TAKE 1 CAPSULE (30 MG TOTAL) ONCE DAILY. TO HELP REDUCE FEELING OF BACK PAIN    ELIQUIS 5 mg Tab TAKE 1 TABLET BY MOUTH TWICE A DAY    fenofibrate 160 MG Tab TAKE 1 TABLET EVERY DAY    fluticasone propionate (FLONASE) 50 mcg/actuation nasal spray INHALE 1 SPRAY IN EACH NOSTRIL ROUTE ONCE DAILY.    glipiZIDE (GLUCOTROL) 5 MG tablet Take 2 tablets (10 mg total) by mouth daily with breakfast AND 1 tablet (5 mg total) daily with dinner or evening meal. Take 7.5 mg with breakfast and 5 mg with dinner.    HYDROcodone-acetaminophen (NORCO) 7.5-325 mg per tablet Take 1 tablet by mouth every 24 hours as needed for Pain (take 30 minutes prior to parcipating in physical therapy).    insulin detemir U-100 (LEVEMIR FLEXTOUCH U-100 INSULN) 100 unit/mL (3 mL) InPn pen Inject 12 Units into the skin every evening.    lancets (ACCU-CHEK SOFTCLIX LANCETS) Misc To check BG 4 times daily, to use with insurance preferred meter.    metoprolol tartrate (LOPRESSOR) 25 MG tablet TAKE 1 TABLET TWICE DAILY    tamsulosin (FLOMAX) 0.4 mg Cap TAKE 1 CAPSULE BY MOUTH ONCE DAILY FOR URINE FLOW     Family History       Problem Relation (Age of Onset)    Arthritis Father    Coronary artery disease Father    Diabetes Mother    Heart attack Father    Heart disease Mother, Father    Hyperlipidemia Mother, Father, Brother    Hypertension Mother, Father, Brother, Son    No Known Problems Sister    Stroke Mother, Brother    Thyroid disease Brother          Tobacco Use    Smoking status: Former Smoker     Packs/day: 3.00     Years: 15.00     Pack " years: 45.00     Types: Cigarettes     Quit date:      Years since quittin.6    Smokeless tobacco: Never Used   Substance and Sexual Activity    Alcohol use: Yes     Alcohol/week: 1.0 standard drink     Types: 1 Shots of liquor per week     Comment: 2-3 drinks per year    Drug use: No    Sexual activity: Not Currently     Partners: Female     Review of Systems   Constitutional: Negative. Negative for diaphoresis.   HENT: Negative.     Eyes: Negative.    Cardiovascular:  Positive for chest pain, dyspnea on exertion and orthopnea (resolved). Negative for irregular heartbeat, leg swelling, near-syncope, palpitations, paroxysmal nocturnal dyspnea and syncope.   Respiratory:  Positive for cough and shortness of breath.    Endocrine: Negative.    Skin: Negative.    Musculoskeletal: Negative.    Gastrointestinal:  Positive for nausea. Negative for melena and vomiting.   Genitourinary: Negative.    Neurological: Negative.    Psychiatric/Behavioral: Negative.     Allergic/Immunologic: Negative.    Objective:     Vital Signs (Most Recent):  Temp: 98.5 °F (36.9 °C) (22 0403)  Pulse: 85 (22 07)  Resp: 19 (22)  BP: 115/71 (22 0403)  SpO2: (!) 93 % (22)   Vital Signs (24h Range):  Temp:  [97.7 °F (36.5 °C)-99.1 °F (37.3 °C)] 98.5 °F (36.9 °C)  Pulse:  [65-91] 85  Resp:  [18-30] 19  SpO2:  [92 %-96 %] 93 %  BP: (102-134)/(56-82) 115/71     Weight: 90.7 kg (200 lb)  Body mass index is 29.53 kg/m².    SpO2: (!) 93 %  O2 Device (Oxygen Therapy): nasal cannula      Intake/Output Summary (Last 24 hours) at 2022 0833  Last data filed at 2022 0710  Gross per 24 hour   Intake 250 ml   Output 2325 ml   Net -2075 ml       Lines/Drains/Airways       Peripheral Intravenous Line  Duration                  Peripheral IV - Single Lumen 22 1339 20 G Right Forearm <1 day                    Physical Exam  Constitutional:       General: He is not in acute distress.     Appearance: He  is not diaphoretic.   HENT:      Head: Atraumatic.   Eyes:      General:         Right eye: No discharge.         Left eye: No discharge.   Cardiovascular:      Rate and Rhythm: Normal rate and regular rhythm.   Pulmonary:      Effort: Pulmonary effort is normal.      Breath sounds: No rales.   Abdominal:      General: Bowel sounds are normal.      Palpations: Abdomen is soft.   Skin:     General: Skin is warm and dry.      Capillary Refill: Capillary refill takes less than 2 seconds.   Neurological:      Mental Status: He is alert. Mental status is at baseline.       Significant Labs: BMP:   Recent Labs   Lab 08/16/22  1340 08/17/22  0342   * 122*    140   K 5.1 4.1    105   CO2 20* 22*   BUN 38* 39*   CREATININE 1.70* 1.8*   CALCIUM 9.5 9.7   MG  --  1.7   , CMP   Recent Labs   Lab 08/16/22  1340 08/17/22  0342    140   K 5.1 4.1    105   CO2 20* 22*   * 122*   BUN 38* 39*   CREATININE 1.70* 1.8*   CALCIUM 9.5 9.7   PROT 6.9 7.0   ALBUMIN 4.1 3.5   BILITOT 0.9 0.7   ALKPHOS 36* 33*   * 88*   ALT 62* 51*   ANIONGAP 10 13   , CBC   Recent Labs   Lab 08/16/22  1340 08/17/22  0343   WBC 9.25 12.69   HGB 13.3* 13.1*   HCT 40.8 39.2*    206   , INR   Recent Labs   Lab 08/16/22 2003   INR 1.3*   , Lipid Panel No results for input(s): CHOL, HDL, LDLCALC, TRIG, CHOLHDL in the last 48 hours., Troponin   Recent Labs   Lab 08/16/22  1803 08/16/22 2003 08/16/22  2339   TROPONINI 12.123* 10.755* 11.543*   , and All pertinent lab results from the last 24 hours have been reviewed.    Significant Imaging: Echocardiogram: Transthoracic echo (TTE) complete (Cupid Only): No results found for this or any previous visit.

## 2022-08-17 NOTE — NURSING
Care plans reviewed. No c/o of SOB or chest pains. /64 physician contacted to see if she wanted his lasix held, per physician orders hold lasix. Safety maintained, bed in lowest position, bed alarm on, bed wheels locked, call light with in reach, wife at the bedside. Will continue to monitor.

## 2022-08-17 NOTE — NURSING
Patient returned from the cath lab, report received from Graciela Cath lab RN. Patient had right groin with CDI, Angiogram, stent RAYNA bypass graaft. No bleeding to site. Vitals WNL. Safety maintained, bed in lowest position, bed alarm on, bed wheels locked, call light with in reach. Wife at the bed side. Will continue to monitor.

## 2022-08-17 NOTE — ASSESSMENT & PLAN NOTE
S/P CABG (2007)  Coronary artery disease  Complained of SOB and Stabbing Epigastric Pain. Since Resolved  ECG no change obvious changes from prior. No STEMI  Troponin 8.4 >>12.1>>10.75  proBNP 8080, Pulmonary Edema on CXR  Received  mg and lasix 40 mg x2  in ED    - Continue Lasix 40 mg IV daily  - Troponin 18 this AM, continue to trend  - Continue ASA, plavix, statin  - Continue heparin gtt  - TTE pending  - Cardiology consulted in ED, plan for LHC today

## 2022-08-17 NOTE — PLAN OF CARE
08/17/22 0954   Post-Acute Status   Post-Acute Authorization Other   Other Status Awaiting f/u Appts      Follow-up Information       Sam Barroso MD. Go in 1 week(s).    Specialty: Family Medicine  Why: FOLLOW UP WITH PCP  Contact information:  [address] [facility] [address]  [phone]               [facility] Cardiology. Go in 2 week(s).    Specialty: Cardiology  Why: FOLLOW UP WITH CARDIOLOGIST AFTER DISCHARGE  Contact information:  [address], [address]  [facility] [address] [phone]  Additional information:  Please park in surface lot and check in at [address].

## 2022-08-17 NOTE — ASSESSMENT & PLAN NOTE
Body mass index is 29.53 kg/m². Morbid obesity complicates all aspects of disease management from diagnostic modalities to treatment. Weight loss encouraged and health benefits explained to patient.

## 2022-08-17 NOTE — ASSESSMENT & PLAN NOTE
Presented with CP, SOB  Troponin up to 12  On DAPT and heparin gtt as well as statin and BB  NPO for LHC today   TTE pending

## 2022-08-17 NOTE — CONSULTS
[facility] - Telemetry  Cardiology  Consult Note    Patient Name: Jin Ngo  MRN: [id number]  Admission Date: 8/16/2022  Hospital Length of Stay: 1 days  Code Status: Full Code   Attending Provider: Clarita Cronin MD   Consulting Provider: Parminder Riggs NP  Primary Care Physician: Sam Barroso MD  Principal Problem:NSTEMI (non-ST elevated myocardial infarction)    Patient information was obtained from patient, past medical records and ER records.     Inpatient consult to Cardiology  Consult performed by: Parminder Riggs NP  Consult ordered by: Clarita Cronin MD        Subjective:     Chief Complaint:  Chest pain, SOB     HPI:   Jin Ngo is a 76 year old male with PMHx of CKD, CAD S/p CABG in 2007, LICA stent in 2020, CVA,  hypertension, hyperlipidemia, pulmonary hypertension and diabetes. Patient presented to the ED with SOB, CP, nausea. SOB has markedly improved this AM with diuresis. Chest pain resolved prior to transfer to [facility].  Patient reports that yesterday he had episode of shortness of breath where he fell into his chair. ECG SR without acute ischemic changes. Troponin up to 12 this AM. Chest xray with evidence of Pulmonary Edema. He is on DAPT and a heparin gtt. NPO for C today. TTE is pending. Diuresing with IV Lasix and is net -1.6L overnight.       Past Medical History:   Diagnosis Date    ANGELIC (acute kidney injury) 7/1/2020    Anticoagulant long-term use     CKD stage 3 due to type 2 diabetes mellitus 3/22/2021    Coronary artery disease     Encounter for blood transfusion     Hyperlipidemia     Hypertension     Localized osteoporosis with current pathological fracture with delayed healing 6/9/2022    Myocardial infarction     Obesity     Other chronic pulmonary heart diseases     Paroxysmal atrial fibrillation 11/9/2020    Primary osteoarthritis involving multiple joints 3/8/2017    Stroke     Type 2 diabetes mellitus without complication     Uncontrolled type 2  "diabetes mellitus with neurologic complication, without long-term current use of insulin 3/8/2017       Past Surgical History:   Procedure Laterality Date    CARDIAC SURGERY      CATARACT EXTRACTION, BILATERAL      COLONOSCOPY      2011    COLONOSCOPY N/A 3/22/2016    Procedure: COLONOSCOPY;  Surgeon: Sridhar Reynoso MD;  Location: [facility] ENDO;  Service: Endoscopy;  Laterality: N/A;    COLONOSCOPY N/A 12/7/2021    Procedure: Colonoscopy;  Surgeon: Bebeto Perry MD;  Location: [facility] ENDO;  Service: Endoscopy;  Laterality: N/A;    CORONARY ARTERY BYPASS GRAFT      EYE SURGERY  06/2014    Cataracts    PERCUTANEOUS TRANSLUMINAL ANGIOPLASTY (PTA) OF CAROTID ARTERY N/A 12/10/2020    Procedure: PTA, ARTERY, CAROTID;  Surgeon: Cheko Norris MD;  Location: [facility] CATH LAB;  Service: Cardiology;  Laterality: N/A;       Review of patient's allergies indicates:   Allergen Reactions    Metformin      Caused stroke    Pcn [penicillins]        No current facility-administered medications on file prior to encounter.     Current Outpatient Medications on File Prior to Encounter   Medication Sig    ACCU-CHEK VERONICA PLUS TEST STRP Strp TEST BLOOD SUGAR FOUR TIMES DAILY    allopurinoL (ZYLOPRIM) 300 MG tablet TAKE 1 TABLET EVERY DAY    amLODIPine (NORVASC) 5 MG tablet TAKE 1 TABLET EVERY DAY    atorvastatin (LIPITOR) 40 MG tablet Take 1 tablet (40 mg total) by mouth once daily.    BD ULTRA-FINE TEJINDER PEN NEEDLE 32 gauge x 5/32" Ndle USE WITH LEVEMIR DAILY    blood-glucose meter kit To check BG 4 times daily, to use with insurance preferred meter    clopidogreL (PLAVIX) 75 mg tablet TAKE 1 TABLET EVERY DAY    doxycycline (VIBRA-TABS) 100 MG tablet TAKE 1 TABLET BY MOUTH TWICE A DAY    DULoxetine (CYMBALTA) 30 MG capsule TAKE 1 CAPSULE (30 MG TOTAL) ONCE DAILY. TO HELP REDUCE FEELING OF BACK PAIN    ELIQUIS 5 mg Tab TAKE 1 TABLET BY MOUTH TWICE A DAY    fenofibrate 160 MG Tab TAKE 1 TABLET EVERY DAY    " fluticasone propionate (FLONASE) 50 mcg/actuation nasal spray INHALE 1 SPRAY IN EACH NOSTRIL ROUTE ONCE DAILY.    glipiZIDE (GLUCOTROL) 5 MG tablet Take 2 tablets (10 mg total) by mouth daily with breakfast AND 1 tablet (5 mg total) daily with dinner or evening meal. Take 7.5 mg with breakfast and 5 mg with dinner.    HYDROcodone-acetaminophen (NORCO) 7.5-325 mg per tablet Take 1 tablet by mouth every 24 hours as needed for Pain (take 30 minutes prior to parcipating in physical therapy).    insulin detemir U-100 (LEVEMIR FLEXTOUCH U-100 INSULN) 100 unit/mL (3 mL) InPn pen Inject 12 Units into the skin every evening.    lancets (ACCU-CHEK SOFTCLIX LANCETS) Misc To check BG 4 times daily, to use with insurance preferred meter.    metoprolol tartrate (LOPRESSOR) 25 MG tablet TAKE 1 TABLET TWICE DAILY    tamsulosin (FLOMAX) 0.4 mg Cap TAKE 1 CAPSULE BY MOUTH ONCE DAILY FOR URINE FLOW     Family History       Problem Relation (Age of Onset)    Arthritis Father    Coronary artery disease Father    Diabetes Mother    Heart attack Father    Heart disease Mother, Father    Hyperlipidemia Mother, Father, Brother    Hypertension Mother, Father, Brother, Son    No Known Problems Sister    Stroke Mother, Brother    Thyroid disease Brother          Tobacco Use    Smoking status: Former Smoker     Packs/day: 3.00     Years: 15.00     Pack years: 45.00     Types: Cigarettes     Quit date:      Years since quittin.6    Smokeless tobacco: Never Used   Substance and Sexual Activity    Alcohol use: Yes     Alcohol/week: 1.0 standard drink     Types: 1 Shots of liquor per week     Comment: 2-3 drinks per year    Drug use: No    Sexual activity: Not Currently     Partners: Female     Review of Systems   Constitutional: Negative. Negative for diaphoresis.   HENT: Negative.     Eyes: Negative.    Cardiovascular:  Positive for chest pain, dyspnea on exertion and orthopnea (resolved). Negative for irregular heartbeat, leg  swelling, near-syncope, palpitations, paroxysmal nocturnal dyspnea and syncope.   Respiratory:  Positive for cough and shortness of breath.    Endocrine: Negative.    Skin: Negative.    Musculoskeletal: Negative.    Gastrointestinal:  Positive for nausea. Negative for melena and vomiting.   Genitourinary: Negative.    Neurological: Negative.    Psychiatric/Behavioral: Negative.     Allergic/Immunologic: Negative.    Objective:     Vital Signs (Most Recent):  Temp: 98.5 °F (36.9 °C) (08/17/22 0403)  Pulse: 85 (08/17/22 0720)  Resp: 19 (08/17/22 0720)  BP: 115/71 (08/17/22 0403)  SpO2: (!) 93 % (08/17/22 0720)   Vital Signs (24h Range):  Temp:  [97.7 °F (36.5 °C)-99.1 °F (37.3 °C)] 98.5 °F (36.9 °C)  Pulse:  [65-91] 85  Resp:  [18-30] 19  SpO2:  [92 %-96 %] 93 %  BP: (102-134)/(56-82) 115/71     Weight: 90.7 kg (200 lb)  Body mass index is 29.53 kg/m².    SpO2: (!) 93 %  O2 Device (Oxygen Therapy): nasal cannula      Intake/Output Summary (Last 24 hours) at 8/17/2022 0833  Last data filed at 8/17/2022 0710  Gross per 24 hour   Intake 250 ml   Output 2325 ml   Net -2075 ml       Lines/Drains/Airways       Peripheral Intravenous Line  Duration                  Peripheral IV - Single Lumen 08/16/22 1339 20 G Right Forearm <1 day                    Physical Exam  Constitutional:       General: He is not in acute distress.     Appearance: He is not diaphoretic.   HENT:      Head: Atraumatic.   Eyes:      General:         Right eye: No discharge.         Left eye: No discharge.   Cardiovascular:      Rate and Rhythm: Normal rate and regular rhythm.   Pulmonary:      Effort: Pulmonary effort is normal.      Breath sounds: No rales.   Abdominal:      General: Bowel sounds are normal.      Palpations: Abdomen is soft.   Skin:     General: Skin is warm and dry.      Capillary Refill: Capillary refill takes less than 2 seconds.   Neurological:      Mental Status: He is alert. Mental status is at baseline.       Significant Labs:  BMP:   Recent Labs   Lab 08/16/22  1340 08/17/22  0342   * 122*    140   K 5.1 4.1    105   CO2 20* 22*   BUN 38* 39*   CREATININE 1.70* 1.8*   CALCIUM 9.5 9.7   MG  --  1.7   , CMP   Recent Labs   Lab 08/16/22  1340 08/17/22  0342    140   K 5.1 4.1    105   CO2 20* 22*   * 122*   BUN 38* 39*   CREATININE 1.70* 1.8*   CALCIUM 9.5 9.7   PROT 6.9 7.0   ALBUMIN 4.1 3.5   BILITOT 0.9 0.7   ALKPHOS 36* 33*   * 88*   ALT 62* 51*   ANIONGAP 10 13   , CBC   Recent Labs   Lab 08/16/22  1340 08/17/22  0343   WBC 9.25 12.69   HGB 13.3* 13.1*   HCT 40.8 39.2*    206   , INR   Recent Labs   Lab 08/16/22 2003   INR 1.3*   , Lipid Panel No results for input(s): CHOL, HDL, LDLCALC, TRIG, CHOLHDL in the last 48 hours., Troponin   Recent Labs   Lab 08/16/22  1803 08/16/22 2003 08/16/22  2339   TROPONINI 12.123* 10.755* 11.543*   , and All pertinent lab results from the last 24 hours have been reviewed.    Significant Imaging: Echocardiogram: Transthoracic echo (TTE) complete (Cupid Only): No results found for this or any previous visit.    Assessment and Plan:     * NSTEMI (non-ST elevated myocardial infarction)  Presented with CP, SOB  Troponin up to 12  On DAPT and heparin gtt as well as statin and BB  NPO for [facility] today   TTE pending     Acute pulmonary edema  Pulmonary edema on CXR  Orthopnea resolved  Diuresing with IV Lasix   Net -1.6 L   TTE pending     CKD stage 3 due to type 2 diabetes mellitus  Cr 1.8 ~ baseline     Carotid stenosis, asymptomatic, bilateral  S/p LICA stent in 2020  Continue DAPT and statin     Paroxysmal atrial fibrillation  Eliquis on hold  Continue BB, heparin gtt    Uncontrolled type 2 diabetes mellitus with diabetic neuropathy, without long-term current use of insulin  HA1c 07/22 8.8; repeat pending   AccuChecks AC/HS with SSI     BMI 31.0-31.9,adult  Weight loss encouraged     Mixed hyperlipidemia  Continue statin     Coronary artery disease  S/p  CABG in 2007  DAPT, statin, BB  LHC today   TTE pending     Hypertension  SBP 100s-130s  Continue BB and Norvasc         VTE Risk Mitigation (From admission, onward)         Ordered     heparin 25,000 units in dextrose 5% (100 units/ml) IV bolus from bag - ADDITIONAL PRN BOLUS - 60 units/kg (max bolus 4000 units)  As needed (PRN)        Question:  Heparin Infusion Adjustment (DO NOT MODIFY ANSWER)  Answer:  [email or web address]\epic\Images\Pharmacy\HeparinInfusions\heparin LOW INTENSITY nomogram for [facility] [id number]    08/16/22 2000     heparin 25,000 units in dextrose 5% (100 units/ml) IV bolus from bag - ADDITIONAL PRN BOLUS - 30 units/kg (max bolus 4000 units)  As needed (PRN)        Question:  Heparin Infusion Adjustment (DO NOT MODIFY ANSWER)  Answer:  [email or web address]\epic\Images\Pharmacy\HeparinInfusions\heparin LOW INTENSITY nomogram for [facility] [id number].pdf    08/16/22 2000     heparin 25,000 units in dextrose 5% 250 mL (100 units/mL) infusion LOW INTENSITY nomogram - [facility]  Continuous        Question Answer Comment   Heparin Infusion Adjustment (DO NOT MODIFY ANSWER) \\[email or web address]\epic\Images\Pharmacy\HeparinInfusions\heparin LOW INTENSITY nomogram for [facility] [id number].pdf    Begin at (in units/kg/hr) 12        08/16/22 2000     Reason for No Pharmacological VTE Prophylaxis  Once        Question:  Reasons:  Answer:  Already adequately anticoagulated on oral Anticoagulants    08/16/22 1451     IP VTE HIGH RISK PATIENT  Once         08/16/22 1451     Place sequential compression device  Until discontinued         08/16/22 1451                Thank you for your consult. I will follow-up with patient. Please contact us if you have any additional questions.    Parminder Riggs, CASSI  Cardiology   [facility] - Telemetry

## 2022-08-17 NOTE — HPI
Jin Ngo is a 76 year old male with PMHx of CKD, CAD S/p CABG in 2007, LICA stent in 2020, CVA,  hypertension, hyperlipidemia, pulmonary hypertension and diabetes. Patient presented to the ED with SOB, CP, nausea. SOB has markedly improved this AM with diuresis. Chest pain resolved prior to transfer to [facility].  Patient reports that yesterday he had episode of shortness of breath where he fell into his chair. ECG SR without acute ischemic changes. Troponin up to 12 this AM. Chest xray with evidence of Pulmonary Edema. He is on DAPT and a heparin gtt. NPO for C today. TTE is pending. Diuresing with IV Lasix and is net -1.6L overnight.

## 2022-08-17 NOTE — HOSPITAL COURSE
08/17/2022 Per HPI   07/18/2022 s/p SVG-RCA RAYNA. Diuresing with IV lasix. Inaccurate intake and output documented. Norvasc discontinued, low dose Entresto initiated.   07/19/2022 Breathing back to baseline. Lasix converted to p.o.. No chest pain reported. Entresto on hold. BP marginal overnight.

## 2022-08-17 NOTE — ASSESSMENT & PLAN NOTE
Lipid profile on   Lab Results   Component Value Date    LDLCALC 68.4 01/31/2022    HDL 23 (L) 01/31/2022    TRIG 183 (H) 01/31/2022    CHOL 128 01/31/2022     Takes Atorvastatin at home    - Continue home dose statin  - Lipid panel pending  - Reports compliance with hyperlipidemia treatment as prescribed  - Denies adverse effects of medications

## 2022-08-17 NOTE — PROCEDURES
Brief Operative Note:    : Pasquale Ramesh MD     Referring Physician: Self,Aaareferral     All Operators: Surgeon(s):  MD Pasquale Adorno MD     Preoperative Diagnosis: CAD (coronary artery disease) [I25.10]NSTEMI (non-ST elevated myocardial infarction) [I21.4]Chest pain [R07.9]     Postop Diagnosis: CAD (coronary artery disease) [I25.10]NSTEMI (non-ST elevated myocardial infarction) [I21.4]Chest pain [R07.9]    Treatments/Procedures: Procedure(s) (LRB):  Left heart cath (Left)  Bypass graft study  ANGIOGRAM, CORONARY ARTERY (N/A)  IVUS, Coronary  Stent RAYNA bypass graft    Access: Right CFA    Findings:Severe coronary artery disease is present.   LM with diffuse disease  LIMA to LAD patent  Left circumflex with non obstructive disease  Major OM with two tandem lesions  SVG to RCA with proximal significant disease  Native RCA  in mid segment  SVG button imaged, occluded likely to left system    See catheterization report for full details.    Intervention:   SVG to RCA proximal graft s/p 1 3.5x18mm RAYNA  Embolic protection utilized    See catheterization report for full details.    Closure device: Perclose       Plan:  - Post cath protocol   - IVF @ 100 cc/hr x 2 hours  - Bed rest x 2 hours   - Continue aspirin 81 mg daily indefinitely  - Continue plavix 75mg PO daily for 1 year  - Continue high intensity statin therapy (LDL goal < 70)  - Risk factor reduction (BP <130/80 mmHg, glycemic control, etc)  - Cardiac rehab referral  - Follow up with outpatient cardiologist    Estimated Blood loss: 20 cc    Specimens removed: No    Felipa Gibson MD  Interventional Cardiology PGY-8  08/17/2022

## 2022-08-17 NOTE — PLAN OF CARE
Patient transferred to recovery cath lab slot 5 via stretcher with side rails up x2 .  Pt AAO and able to follow commands. Pt is stable when connecting to cardiac monitors.  VSS. Right groin with c.d.i. no bleeding or hematoma noted. +2 jakub radial pulses palpated. +2 jakub pedal pulses.Skin normal in color and warm to touch, <3 sec cap refill.  Fall risk precautions given and patient acknowledges.  AIDET completed to pt.  Will continue to monitor patient.  Updated pt's spouse via telephone while she is in patients room.

## 2022-08-17 NOTE — PROGRESS NOTES
[facility] Medicine  Progress Note    Patient Name: Jin Ngo  MRN: [id number]  Patient Class: IP- Inpatient   Admission Date: 8/16/2022  Length of Stay: 1 days  Attending Physician: Clarita Cronin MD  Primary Care Provider: Sam Barroso MD        Subjective:     Principal Problem:NSTEMI (non-ST elevated myocardial infarction)        HPI:  Jin Ngo is a 76 year old male with PMHx of CKD, CAD (MI, S/p CABG on Plavix and Eliquis), CVA, Left Carotid Stent, hypertension, hyperlipidemia, pulmonary hypertension and diabetes who presented to [facility] ED today with reports of worsening shortness of breath since yesterday with associated cough productive of phlegm and nasal congestion along with some nausea.  Patient and wife states that yesterday he had episode of shortness of breath where he fell into his chair. ECG with no STEMI, unchanged from prior. Chest xray with evidence of Pulmonary Edema. Labs significant for Troponin of 8.4, Pro-BNP 8080, mildly elevated BUN/Cr.  with cardiology was consulted from the ED.  He was given Aspirin, Lasix 40mg x 2 and Duoneb in the ED. He is admitted to [facility] for NSTEMI      Overview/Hospital Course:  Patient presented with chief complaint of SOB. Troponin trended up to 12. ECG showed NSR with 1st degree A-V block and nonspecific ST and T wave abnormality. Admitted for NSTEMI. TTE pending. Cardiology consulted in ED, started on heparin gtt, plan for LHC today.       Interval History: On 2 L NC. Reports improvement in shortness of breath. Trop 18 this AM. NPO since midnight, plan for LHC today. TTE pending.     Review of Systems   Constitutional:  Negative for chills, diaphoresis and fever.   HENT:  Negative for congestion, sore throat, trouble swallowing and voice change.    Eyes:  Negative for visual disturbance.   Respiratory:  Positive for cough (non-productive) and shortness of breath. Negative for wheezing.     Cardiovascular:  Positive for chest pain (reports improvement since admission). Negative for palpitations and leg swelling.   Gastrointestinal:  Negative for abdominal pain, blood in stool, constipation, diarrhea, nausea and vomiting.   Genitourinary:  Negative for decreased urine volume, dysuria, hematuria and urgency.   Skin:  Negative for pallor and wound.   Neurological:  Negative for dizziness, speech difficulty, weakness, light-headedness and headaches.   Psychiatric/Behavioral:  Negative for agitation and decreased concentration. The patient is not nervous/anxious.    Objective:     Vital Signs (Most Recent):  Temp: 98 °F (36.7 °C) (08/17/22 0952)  Pulse: 91 (08/17/22 0952)  Resp: 18 (08/17/22 0952)  BP: 126/72 (08/17/22 0952)  SpO2: (!) 94 % (08/17/22 0952)   Vital Signs (24h Range):  Temp:  [97.7 °F (36.5 °C)-99.1 °F (37.3 °C)] 98 °F (36.7 °C)  Pulse:  [65-91] 91  Resp:  [18-30] 18  SpO2:  [92 %-96 %] 94 %  BP: (102-134)/(56-82) 126/72     Weight: 90.7 kg (200 lb)  Body mass index is 29.53 kg/m².    Intake/Output Summary (Last 24 hours) at 8/17/2022 1048  Last data filed at 8/17/2022 0710  Gross per 24 hour   Intake 250 ml   Output 2325 ml   Net -2075 ml      Physical Exam  Vitals and nursing note reviewed.   Constitutional:       General: He is not in acute distress.     Appearance: He is well-developed. He is not diaphoretic.   HENT:      Head: Normocephalic and atraumatic.      Mouth/Throat:      Pharynx: No oropharyngeal exudate.   Eyes:      General: No scleral icterus.     Pupils: Pupils are equal, round, and reactive to light.   Neck:      Trachea: No tracheal deviation.   Cardiovascular:      Rate and Rhythm: Normal rate and regular rhythm.      Heart sounds: Normal heart sounds. No murmur heard.  Pulmonary:      Effort: Pulmonary effort is normal. No respiratory distress.      Breath sounds: Rales present. No wheezing.   Chest:      Chest wall: No tenderness.   Abdominal:      General: Bowel  sounds are normal. There is no distension.      Palpations: Abdomen is soft. There is no mass.      Tenderness: There is no abdominal tenderness.   Musculoskeletal:         General: No deformity.      Cervical back: Neck supple.   Skin:     General: Skin is warm and dry.      Coloration: Skin is not pale.      Findings: No erythema or rash.   Neurological:      Mental Status: He is alert and oriented to person, place, and time.   Psychiatric:         Behavior: Behavior normal.       Significant Labs: All pertinent labs within the past 24 hours have been reviewed.  A1C:   Recent Labs   Lab 05/18/22  1010 07/20/22  1145   HGBA1C 9.1* 8.8*     BMP:   Recent Labs   Lab 08/17/22  0342   *      K 4.1      CO2 22*   BUN 39*   CREATININE 1.8*   CALCIUM 9.7   MG 1.7     CBC:   Recent Labs   Lab 08/16/22  1340 08/17/22  0343   WBC 9.25 12.69   HGB 13.3* 13.1*   HCT 40.8 39.2*    206     CMP:   Recent Labs   Lab 08/16/22  1340 08/17/22  0342    140   K 5.1 4.1    105   CO2 20* 22*   * 122*   BUN 38* 39*   CREATININE 1.70* 1.8*   CALCIUM 9.5 9.7   PROT 6.9 7.0   ALBUMIN 4.1 3.5   BILITOT 0.9 0.7   ALKPHOS 36* 33*   * 88*   ALT 62* 51*   ANIONGAP 10 13     Troponin:   Recent Labs   Lab 08/16/22 2003 08/16/22  2339 08/17/22  0820   TROPONINI 10.755* 11.543* 18.825*       Significant Imaging: I have reviewed all pertinent imaging results/findings within the past 24 hours.      Assessment/Plan:      * NSTEMI (non-ST elevated myocardial infarction)  S/P CABG (2007)  Coronary artery disease  Complained of SOB and Stabbing Epigastric Pain. Since Resolved  ECG no change obvious changes from prior. No STEMI  Troponin 8.4 >>12.1>>10.75  proBNP 8080, Pulmonary Edema on CXR  Received  mg and lasix 40 mg x2  in ED    - Continue Lasix 40 mg IV daily  - Troponin 18 this AM, continue to trend  - Continue ASA, plavix, statin  - Continue heparin gtt  - TTE pending  - Cardiology  consulted in ED, plan for C today      Acute pulmonary edema  CXR showed pulmonary edema    - Continue IV lasix  - TTE pending    Physical deconditioning  -PT/OT consulted    CKD stage 3 due to type 2 diabetes mellitus  Chronic  No acute kidney injury or evidence of acute dysfunction    Carotid stenosis, asymptomatic, bilateral  S/P left ICA stent in 2020    - Continue ASA, plavix and statin    Paroxysmal atrial fibrillation  Previously diagnosed  Takes eliquis and lopressor at home  ECG showed NSR   Denies palpitations  [id number] score is 6, annual risk of stroke is 9.7%  HAS-BLED score is 2, annual bleeding risk is 4.1%    - Hold eliquis  - Continue lopressor  - Continue heparin gtt    Uncontrolled type 2 diabetes mellitus with diabetic neuropathy, without long-term current use of insulin  Home antihyperglycemic regimen: glipizide with long acting insulin  Last A1c reviewed-   Lab Results   Component Value Date    HGBA1C 8.8 (H) 07/20/2022     Most recent fingerstick glucose reviewed-   Recent Labs     08/16/22  1526 08/16/22  1712 08/16/22  2043 08/17/22  0409 08/17/22  1132   POCTGLUCOSE 186* 172* 265* 122* 133*     - Hold home oral antihyperglycemics while in hospital  - Patient's FSGs are controlled on current medication regimen.  - Continue 10 detemir QHS  - Current correctional scale  Medium  - POCT accuchecks ACHS  - HgbA1C pending   - NPO for [facility], Diabetic diet when able   - BG goals: Preprandial <140 mg/dL, Random  <180 mg/dL    Antihyperglycemics (From admission, onward)            Start     Stop Route Frequency Ordered    08/16/22 2100  insulin detemir U-100 pen 12 Units         -- SubQ Nightly 08/16/22 1451    08/16/22 1549  insulin aspart U-100 pen 1-10 Units         -- SubQ Before meals & nightly PRN 08/16/22 1451          BMI 31.0-31.9,adult  Body mass index is 29.53 kg/m². Morbid obesity complicates all aspects of disease management from diagnostic modalities to treatment. Weight loss  encouraged and health benefits explained to patient.    Mixed hyperlipidemia  Lipid profile on   Lab Results   Component Value Date    LDLCALC 68.4 01/31/2022    HDL 23 (L) 01/31/2022    TRIG 183 (H) 01/31/2022    CHOL 128 01/31/2022     Takes Atorvastatin at home    - Continue home dose statin  - Lipid panel pending  - Reports compliance with hyperlipidemia treatment as prescribed  - Denies adverse effects of medications      Pulmonary HTN  Noted on TTE    S/P CABG (coronary artery bypass graft)  See NSTEMI    Coronary artery disease  See NSTEMI    Essential hypertension  Chronic, controlled.  Latest blood pressure and vitals reviewed-   Temp:  [97.7 °F (36.5 °C)-99.1 °F (37.3 °C)]   Pulse:  [65-91]   Resp:  [18-30]   BP: ()/(56-82)   SpO2:  [92 %-96 %] .   Home meds for hypertension were reviewed and noted below.   Hypertension Medications             amLODIPine (NORVASC) 5 MG tablet TAKE 1 TABLET EVERY DAY    metoprolol tartrate (LOPRESSOR) 25 MG tablet TAKE 1 TABLET TWICE DAILY          - Continue home antihypertensive regimen  - Will utilize PRN blood pressure medication only if patient's blood pressure greater than 180/110 and he develops symptoms such as worsening chest pain or shortness of breath.          VTE Risk Mitigation (From admission, onward)         Ordered     heparin infusion 1,000 units/500 ml in 0.9% NaCl (pressure line flush)  Intra-op continuous PRN         08/17/22 1231     heparin 25,000 units in dextrose 5% (100 units/ml) IV bolus from bag - ADDITIONAL PRN BOLUS - 60 units/kg (max bolus 4000 units)  As needed (PRN)        Question:  Heparin Infusion Adjustment (DO NOT MODIFY ANSWER)  Answer:  \\[email or web address]\epic\Images\Pharmacy\HeparinInfusions\heparin LOW INTENSITY nomogram for [facility] [id number].pdf    08/16/22 2000     heparin 25,000 units in dextrose 5% (100 units/ml) IV bolus from bag - ADDITIONAL PRN BOLUS - 30 units/kg (max bolus 4000 units)  As needed (PRN)        Question:  Heparin  Infusion Adjustment (DO NOT MODIFY ANSWER)  Answer:  \\[email or web address]\epic\Images\Pharmacy\HeparinInfusions\heparin LOW INTENSITY nomogram for [facility] [id number].pdf    08/16/22 2000     heparin 25,000 units in dextrose 5% 250 mL (100 units/mL) infusion LOW INTENSITY nomogram - [facility]  Continuous        Question Answer Comment   Heparin Infusion Adjustment (DO NOT MODIFY ANSWER) \\[email or web address]\epic\Images\Pharmacy\HeparinInfusions\heparin LOW INTENSITY nomogram for [facility] [id number].pdf    Begin at (in units/kg/hr) 12        08/16/22 2000     Reason for No Pharmacological VTE Prophylaxis  Once        Question:  Reasons:  Answer:  Already adequately anticoagulated on oral Anticoagulants    08/16/22 1451     IP VTE HIGH RISK PATIENT  Once         08/16/22 1451     Place sequential compression device  Until discontinued         08/16/22 1451                Discharge Planning   HARISH: 8/17/2022     Code Status: Full Code   Is the patient medically ready for discharge?:     Reason for patient still in hospital (select all that apply): Patient trending condition  Discharge Plan A: Home, Home Health, Home with family                  Cassi Ibrahim PA-C  Department of Hospital Medicine   [facility] ([facility])

## 2022-08-17 NOTE — HOSPITAL COURSE
Patient presented with chief complaint of SOB. Work-up in ED with Troponin trended up and peaked 33 and ECG showed NSR with 1st degree A-V block and nonspecific ST and T wave abnormality. Also patient was noted to be with hypoxia associated with elevated NT-proBNP 8080 and chest x-ray showed signs of pulmonary edema. He was admitted for NSTEMI and HFrEF decompensation. TTE showed EF 30% with moderate left ventricular global hypokinesis and atrial fibrillation. Cardiology consulted in ED, started on heparin gtt, underwent LHC with SVG to RCA proximal graft s/p 1 3.5x18mm RAYNA. Post-cath on 3 L NC. Continuing IV diuresis with plan to transition to oral upon discharge. Developed ANGELIC, likely due to dehydration. Kidney function was back to near baseline after IV fluid. Transitioned to oral lasix. Patient's symptom was improved and was able to wean off oxygen. Patient was stable and cleared for discharge by Cardiology with plan of continuing Plavix/DOAC, statin, lasix 40 mg daily, and Toprol. He would need to follow up outpatient with Cardiology to re-initiate Entresto once renal function stabilized. Patient was evaluated by PT/OT who recommended  PT/OT. Patient also needed to start on Cardiac Rehab outpatient. Social Work and Case Management were consulted for discharge planning. Discharge plan was discussed with patient at the bedside and he agreed with plan. Patient was stable for discharge.

## 2022-08-17 NOTE — TELEPHONE ENCOUNTER
Will schedule pt for Tuesday 8/23/22 at 9:40    ----- Message from Ailyn Moses sent at 8/17/2022 12:04 PM CDT -----  Regarding: MAIKOLU  Hello, patient is preparing for discharge from [facility] and is requiring a hospital follow up appointment with Dr. Rod one week from today. I'm unable to schedule an appointment within that time frame. If possible, can you please assist with scheduling this patient and message me back?    DX: NSTEMI (non-ST elevated myocardial infarction)      Additional Info: If pt can not be scheduled with Dr. Rod within this time frame please have patient scheduled for the 1st available with another provider.    Thank you,     Ailyn Moses  Access Navigator/ [facility]

## 2022-08-17 NOTE — ASSESSMENT & PLAN NOTE
Hypertension,  Pulmonary Hypertension  PAF, S/P Ablation on Eliquis  S/P CABP  Complaints of SOB and Stabbing Epigastric Pain. Since Resolved  ECG no change obvious changes from prior. No STEMI  Troponin 8.4 >>12.1>>10.75  proBNP 8080, Pulmonary Edema on CXR  Received Lasix 40 mg x 2 in ED  C/w Lasix 40 mg IV daily and Home Aldactone  Received  mg in ED, C/w ASA 81 mg  C/w Home Plavix  C/w Home statin   C/w home amlodipine and metoprolol  Holding home Eliquis, Last dose AM 8/16/22  Heparin infusion  Echocardiogram  NPO after MN  Appreciate Cardiology

## 2022-08-17 NOTE — PLAN OF CARE
[facility] - Telemetry  Initial Discharge Assessment       Primary Care Provider: Sam Barroso MD    Admission Diagnosis: CAD (coronary artery disease) [I25.10]  SOB (shortness of breath) [R06.02]  NSTEMI (non-ST elevated myocardial infarction) [I21.4]  Chest pain [R07.9]    Admission Date: 8/16/2022  Expected Discharge Date:     Pt oriented. Pt under Covid isolation. DCA done via telephone with pt on telephone. Demographics/Ins/NextofKin/PCP verified with patient/family. Denies any DME needs at present from pt/family. Patient/family plans to return home with family. Educated on bedside RX. Patient consents for [place] to discuss discharge plan with next of kin. Preferences appointment times and location obtained. Patient reports they will have transportation home upon discharge.    Pt lives with spouse in [facility]. Reports he is independent with ADL. Wife helps with some care at home if needed and helps coordinate care.     Discharge Barriers Identified: (P) None    Payor: [employer] MANAGED MEDICARE / Plan: [employer] MEDICARE HMO / Product Type: Capitation /     Extended Emergency Contact Information  Primary Emergency Contact: Candelaria Ngo  Address: [address] [place] [address] [address] [facility]  Home Phone: [phone]  Mobile Phone: [phone]  Relation: Spouse    Discharge Plan A: (P) Home, Home Health, Home with family         [facility] ([facility]) - [place], [place] - [address]  [address] [address]  Phone: [phone] Fax: [phone]    [id number] - [place], [place] - [address] [address]  Phone: [phone] Fax: [phone]      Initial Assessment (most recent)       Adult Discharge Assessment - 08/17/22 0944          Discharge Assessment    Assessment Type Discharge Planning Assessment (P)      Confirmed/corrected address, phone number  and insurance Yes (P)      Confirmed Demographics Correct on Facesheet (P)      Source of Information patient (P)      Communicated HARISH with patient/caregiver Yes (P)      Lives With spouse (P)      Do you expect to return to your current living situation? Yes (P)      Do you have help at home or someone to help you manage your care at home? No (P)      Prior to hospitilization cognitive status: Alert/Oriented;No Deficits (P)      Current cognitive status: Alert/Oriented;No Deficits (P)      Walking or Climbing Stairs Difficulty none (P)      Dressing/Bathing Difficulty none (P)      Equipment Currently Used at Home none (P)      Readmission within 30 days? No (P)      Patient currently being followed by outpatient case management? No (P)      Do you currently have service(s) that help you manage your care at home? No (P)      Do you take prescription medications? Yes (P)      Do you have any problems affording any of your prescribed medications? No (P)      Is the patient taking medications as prescribed? yes (P)      How do you get to doctors appointments? family or friend will provide (P)      Discharge Plan A Home;Home Health;Home with family (P)      DME Needed Upon Discharge  none (P)      Discharge Plan discussed with: Patient (P)      Discharge Barriers Identified None (P)                    Future Appointments   Date Time Provider Department Center   8/19/2022  9:20 AM Oneyda Matthew MD [facility] MARICARMEN [facility]   8/22/2022  2:00 PM Rahel Alvarado, PT [facility]   8/24/2022  1:00 PM Merlin Osei PTA [facility]   8/29/2022  1:00 PM Willow Plaza, PT [facility]   8/31/2022  1:00 PM Merlin Osei PTA [facility]   9/12/2022 10:00 AM HAYES Watts [facility] PAINMGT [facility]   10/10/2022 12:00 PM LAB, [facility]   10/10/2022 12:10 PM LAB, [facility]   10/17/2022 10:00 AM Ester Mitchell NP  "[facility] [address]   1/20/2023  9:00 AM Sam Barroso MD CC [facility] MED [facility]     /71 (BP Location: Left arm, Patient Position: Lying)   Pulse 85   Temp 98.5 °F (36.9 °C) (Oral)   Resp 19   Ht 5' 9" (1.753 m)   Wt 90.7 kg (200 lb)   SpO2 (!) 93%   BMI 29.53 kg/m²           allopurinoL  300 mg Oral Daily    amLODIPine  5 mg Oral Daily    aspirin  81 mg Oral Daily    atorvastatin  40 mg Oral Daily    clopidogreL  75 mg Oral Daily    furosemide (LASIX) injection  40 mg Intravenous Q12H    insulin detemir U-100  12 Units Subcutaneous QHS    metoprolol tartrate  25 mg Oral BID    polyethylene glycol  17 g Oral Daily    sodium chloride 0.9%  10 mL Intravenous Q8H    tamsulosin  0.4 mg Oral Daily               "

## 2022-08-17 NOTE — SUBJECTIVE & OBJECTIVE
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    Coronary artery disease     Encounter for blood transfusion     Hyperlipidemia     Hypertension     Localized osteoporosis with current pathological fracture with delayed healing 6/9/2022    Myocardial infarction     Obesity     Other chronic pulmonary heart diseases     Paroxysmal atrial fibrillation 11/9/2020    Primary osteoarthritis involving multiple joints 3/8/2017    Stroke     Type 2 diabetes mellitus without complication     Uncontrolled type 2 diabetes mellitus with neurologic complication, without long-term current use of insulin 3/8/2017       Past Surgical History:   Procedure Laterality Date    CARDIAC SURGERY      CATARACT EXTRACTION, BILATERAL      COLONOSCOPY      2011    COLONOSCOPY N/A 3/22/2016    Procedure: COLONOSCOPY;  Surgeon: Sridhar Reynoso MD;  Location: [facility] ENDO;  Service: Endoscopy;  Laterality: N/A;    COLONOSCOPY N/A 12/7/2021    Procedure: Colonoscopy;  Surgeon: Bebeto Perry MD;  Location: [facility] ENDO;  Service: Endoscopy;  Laterality: N/A;    CORONARY ARTERY BYPASS GRAFT      EYE SURGERY  06/2014    Cataracts    PERCUTANEOUS TRANSLUMINAL ANGIOPLASTY (PTA) OF CAROTID ARTERY N/A 12/10/2020    Procedure: PTA, ARTERY, CAROTID;  Surgeon: Cheko Norris MD;  Location: [facility] CATH LAB;  Service: Cardiology;  Laterality: N/A;       Review of patient's allergies indicates:   Allergen Reactions    Metformin      Caused stroke    Pcn [penicillins]        No current facility-administered medications on file prior to encounter.     Current Outpatient Medications on File Prior to Encounter   Medication Sig    ACCU-CHEK VERONICA PLUS TEST STRP Strp TEST BLOOD SUGAR FOUR TIMES DAILY    allopurinoL (ZYLOPRIM) 300 MG tablet TAKE 1 TABLET EVERY DAY    amLODIPine (NORVASC) 5 MG tablet TAKE 1 TABLET EVERY DAY    atorvastatin  "(LIPITOR) 40 MG tablet Take 1 tablet (40 mg total) by mouth once daily.    BD ULTRA-FINE TEJINDER PEN NEEDLE 32 gauge x 5/32" Ndle USE WITH LEVEMIR DAILY    blood-glucose meter kit To check BG 4 times daily, to use with insurance preferred meter    clopidogreL (PLAVIX) 75 mg tablet TAKE 1 TABLET EVERY DAY    doxycycline (VIBRA-TABS) 100 MG tablet TAKE 1 TABLET BY MOUTH TWICE A DAY    DULoxetine (CYMBALTA) 30 MG capsule TAKE 1 CAPSULE (30 MG TOTAL) ONCE DAILY. TO HELP REDUCE FEELING OF BACK PAIN    ELIQUIS 5 mg Tab TAKE 1 TABLET BY MOUTH TWICE A DAY    fenofibrate 160 MG Tab TAKE 1 TABLET EVERY DAY    fluticasone propionate (FLONASE) 50 mcg/actuation nasal spray INHALE 1 SPRAY IN EACH NOSTRIL ROUTE ONCE DAILY.    glipiZIDE (GLUCOTROL) 5 MG tablet Take 2 tablets (10 mg total) by mouth daily with breakfast AND 1 tablet (5 mg total) daily with dinner or evening meal. Take 7.5 mg with breakfast and 5 mg with dinner.    HYDROcodone-acetaminophen (NORCO) 7.5-325 mg per tablet Take 1 tablet by mouth every 24 hours as needed for Pain (take 30 minutes prior to parcipating in physical therapy).    insulin detemir U-100 (LEVEMIR FLEXTOUCH U-100 INSULN) 100 unit/mL (3 mL) InPn pen Inject 12 Units into the skin every evening.    lancets (ACCU-CHEK SOFTCLIX LANCETS) Misc To check BG 4 times daily, to use with insurance preferred meter.    metoprolol tartrate (LOPRESSOR) 25 MG tablet TAKE 1 TABLET TWICE DAILY    tamsulosin (FLOMAX) 0.4 mg Cap TAKE 1 CAPSULE BY MOUTH ONCE DAILY FOR URINE FLOW    [DISCONTINUED] clopidogreL (PLAVIX) 75 mg tablet TAKE 1 TABLET EVERY DAY    [DISCONTINUED] fenofibrate 160 MG Tab TAKE 1 TABLET EVERY DAY     Family History       Problem Relation (Age of Onset)    Arthritis Father    Coronary artery disease Father    Diabetes Mother    Heart attack Father    Heart disease Mother, Father    Hyperlipidemia Mother, Father, Brother    Hypertension Mother, Father, Brother, Son    No Known Problems Sister    Stroke " Mother, Brother    Thyroid disease Brother          Tobacco Use    Smoking status: Former Smoker     Packs/day: 3.00     Years: 15.00     Pack years: 45.00     Types: Cigarettes     Quit date:      Years since quittin.6    Smokeless tobacco: Never Used   Substance and Sexual Activity    Alcohol use: Yes     Alcohol/week: 1.0 standard drink     Types: 1 Shots of liquor per week     Comment: 2-3 drinks per year    Drug use: No    Sexual activity: Not Currently     Partners: Female     Review of Systems   Constitutional:  Negative for chills, diaphoresis, fever and unexpected weight change.   HENT:  Negative for congestion, sore throat, trouble swallowing and voice change.    Eyes:  Negative for photophobia and visual disturbance.   Respiratory:  Positive for cough (Dry), chest tightness and shortness of breath. Negative for wheezing.    Cardiovascular:  Positive for chest pain (Sharp Epigrastic. Resolved since admit). Negative for palpitations and leg swelling.   Gastrointestinal:  Negative for abdominal distention, abdominal pain, blood in stool, constipation, diarrhea, nausea and vomiting.   Endocrine: Negative for polydipsia, polyphagia and polyuria.   Genitourinary:  Negative for decreased urine volume, difficulty urinating, hematuria and urgency.   Musculoskeletal:  Negative for back pain, joint swelling, neck pain and neck stiffness.   Skin:  Negative for color change, rash and wound.   Neurological:  Negative for speech difficulty, weakness and headaches.   Psychiatric/Behavioral:  Negative for agitation, decreased concentration and sleep disturbance. The patient is not nervous/anxious.    Objective:     Vital Signs (Most Recent):  Temp: 97.7 °F (36.5 °C) (22)  Pulse: 78 (22)  Resp: (!) 22 (22)  BP: 130/68 (22)  SpO2: (!) 93 % (22)   Vital Signs (24h Range):  Temp:  [97.7 °F (36.5 °C)-98.3 °F (36.8 °C)] 97.7 °F (36.5 °C)  Pulse:  [65-83] 78  Resp:   [18-30] 22  SpO2:  [92 %-96 %] 93 %  BP: (107-134)/(62-82) 130/68     Weight: 90.7 kg (200 lb)  Body mass index is 29.53 kg/m².    Physical Exam  Vitals and nursing note reviewed.   Constitutional:       General: He is not in acute distress.     Appearance: He is well-developed. He is not diaphoretic.   HENT:      Head: Normocephalic and atraumatic.      Mouth/Throat:      Pharynx: No oropharyngeal exudate.   Eyes:      General: No scleral icterus.     Conjunctiva/sclera: Conjunctivae normal.      Pupils: Pupils are equal, round, and reactive to light.   Neck:      Trachea: No tracheal deviation.   Cardiovascular:      Rate and Rhythm: Normal rate and regular rhythm.      Heart sounds: Normal heart sounds. No murmur heard.  Pulmonary:      Effort: Pulmonary effort is normal. No respiratory distress.      Breath sounds: Normal breath sounds. No wheezing or rales.   Chest:      Chest wall: No tenderness.   Abdominal:      General: Bowel sounds are normal. There is no distension.      Palpations: Abdomen is soft. There is no mass.      Tenderness: There is no abdominal tenderness.   Musculoskeletal:         General: No deformity.      Cervical back: Neck supple.   Skin:     General: Skin is warm and dry.      Capillary Refill: Capillary refill takes less than 2 seconds.      Coloration: Skin is not pale.      Findings: No erythema or rash.   Neurological:      Mental Status: He is alert and oriented to person, place, and time.   Psychiatric:         Behavior: Behavior normal.         CRANIAL NERVES     CN III, IV, VI   Pupils are equal, round, and reactive to light.     Significant Labs: All pertinent labs within the past 24 hours have been reviewed.  A1C:   Recent Labs   Lab 05/18/22  1010 07/20/22  1145   HGBA1C 9.1* 8.8*     CBC:   Recent Labs   Lab 08/16/22  1340   WBC 9.25   HGB 13.3*   HCT 40.8        CMP:   Recent Labs   Lab 08/16/22  1340      K 5.1      CO2 20*   *   BUN 38*    CREATININE 1.70*   CALCIUM 9.5   PROT 6.9   ALBUMIN 4.1   BILITOT 0.9   ALKPHOS 36*   *   ALT 62*   ANIONGAP 10     Cardiac Markers: No results for input(s): CKMB, MYOGLOBIN, BNP, TROPISTAT in the last 48 hours.  Coagulation:   Recent Labs   Lab 08/16/22 2003   INR 1.3*   APTT 31.9     Lipid Panel: No results for input(s): CHOL, HDL, LDLCALC, TRIG, CHOLHDL in the last 48 hours.  Magnesium: No results for input(s): MG in the last 48 hours.  POCT Glucose:   Recent Labs   Lab 08/16/22  1526 08/16/22  1712 08/16/22  2043   POCTGLUCOSE 186* 172* 265*       Significant Imaging: I have reviewed all pertinent imaging results/findings within the past 24 hours.  Imaging Results              X-Ray Chest AP Portable (Final result)  Result time 08/16/22 13:35:34      Final result by Inocencio Sheppard MD (08/16/22 13:35:34)                   Impression:      1.  Cardiac silhouette size enlargement.  Small effusions.  Vascular congestion versus reticular interstitial changes throughout the lungs.  Interstitial pulmonary edema versus interstitial infectious process must be considered.    2.  Stable findings as noted above.      Electronically signed by: Inocencio Sheppard MD  Date:    08/16/2022  Time:    13:35               Narrative:    EXAMINATION:  XR CHEST AP PORTABLE    CLINICAL HISTORY:  sob;    COMPARISON:  May 18, 2022    FINDINGS:  EKG leads overlie the chest.  Small effusions.  The lungs are free of alveolar opacities.  The cardiac silhouette size is enlarged.  The trachea is midline and the mediastinal width is normal. Negative for pneumothorax.  Pulmonary vasculature is mildly congested.  Negative for osseous abnormalities. Median sternotomy wires and CABG changes.  Tortuous aorta with calcifications of the aortic knob.  There are degenerative changes of the spine and both shoulder girdles.                                    ECG: Sinus rhythm with 1st degree A-V block, Left posterior When compared with ECG of  16-AUG-2022 13:08, No significant change was found

## 2022-08-18 ENCOUNTER — PATIENT OUTREACH (OUTPATIENT)
Dept: ADMINISTRATIVE | Facility: OTHER | Age: 77
End: 2022-08-18
Payer: MEDICARE

## 2022-08-18 PROBLEM — I50.41 ACUTE COMBINED SYSTOLIC AND DIASTOLIC CONGESTIVE HEART FAILURE: Status: ACTIVE | Noted: 2022-08-18

## 2022-08-18 PROBLEM — J96.01 ACUTE HYPOXEMIC RESPIRATORY FAILURE: Status: ACTIVE | Noted: 2022-08-18

## 2022-08-18 PROBLEM — I50.21 ACUTE SYSTOLIC HEART FAILURE: Status: ACTIVE | Noted: 2022-08-18

## 2022-08-18 LAB
ALBUMIN SERPL BCP-MCNC: 3.6 G/DL (ref 3.5–5.2)
ALP SERPL-CCNC: 37 U/L (ref 55–135)
ALT SERPL W/O P-5'-P-CCNC: 44 U/L (ref 10–44)
ANION GAP SERPL CALC-SCNC: 14 MMOL/L (ref 8–16)
AST SERPL-CCNC: 75 U/L (ref 10–40)
BASOPHILS # BLD AUTO: 0.05 K/UL (ref 0–0.2)
BASOPHILS NFR BLD: 0.5 % (ref 0–1.9)
BILIRUB SERPL-MCNC: 1.5 MG/DL (ref 0.1–1)
BUN SERPL-MCNC: 46 MG/DL (ref 8–23)
CALCIUM SERPL-MCNC: 9.2 MG/DL (ref 8.7–10.5)
CHLORIDE SERPL-SCNC: 102 MMOL/L (ref 95–110)
CHOLEST SERPL-MCNC: 98 MG/DL (ref 120–199)
CHOLEST/HDLC SERPL: 4.3 {RATIO} (ref 2–5)
CO2 SERPL-SCNC: 24 MMOL/L (ref 23–29)
CREAT SERPL-MCNC: 1.9 MG/DL (ref 0.5–1.4)
DIFFERENTIAL METHOD: ABNORMAL
EOSINOPHIL # BLD AUTO: 0.1 K/UL (ref 0–0.5)
EOSINOPHIL NFR BLD: 1 % (ref 0–8)
ERYTHROCYTE [DISTWIDTH] IN BLOOD BY AUTOMATED COUNT: 14.9 % (ref 11.5–14.5)
EST. GFR  (NO RACE VARIABLE): 36 ML/MIN/1.73 M^2
GLUCOSE SERPL-MCNC: 137 MG/DL (ref 70–110)
HCT VFR BLD AUTO: 40.5 % (ref 40–54)
HDLC SERPL-MCNC: 23 MG/DL (ref 40–75)
HDLC SERPL: 23.5 % (ref 20–50)
HGB BLD-MCNC: 13.4 G/DL (ref 14–18)
IMM GRANULOCYTES # BLD AUTO: 0.05 K/UL (ref 0–0.04)
IMM GRANULOCYTES NFR BLD AUTO: 0.5 % (ref 0–0.5)
LDLC SERPL CALC-MCNC: 47.8 MG/DL (ref 63–159)
LYMPHOCYTES # BLD AUTO: 1.6 K/UL (ref 1–4.8)
LYMPHOCYTES NFR BLD: 15 % (ref 18–48)
MAGNESIUM SERPL-MCNC: 1.7 MG/DL (ref 1.6–2.6)
MCH RBC QN AUTO: 29.6 PG (ref 27–31)
MCHC RBC AUTO-ENTMCNC: 33.1 G/DL (ref 32–36)
MCV RBC AUTO: 89 FL (ref 82–98)
MONOCYTES # BLD AUTO: 0.8 K/UL (ref 0.3–1)
MONOCYTES NFR BLD: 8 % (ref 4–15)
NEUTROPHILS # BLD AUTO: 7.8 K/UL (ref 1.8–7.7)
NEUTROPHILS NFR BLD: 75 % (ref 38–73)
NONHDLC SERPL-MCNC: 75 MG/DL
NRBC BLD-RTO: 0 /100 WBC
PHOSPHATE SERPL-MCNC: 4 MG/DL (ref 2.7–4.5)
PLATELET # BLD AUTO: 217 K/UL (ref 150–450)
PMV BLD AUTO: 11.6 FL (ref 9.2–12.9)
POCT GLUCOSE: 122 MG/DL (ref 70–110)
POCT GLUCOSE: 163 MG/DL (ref 70–110)
POCT GLUCOSE: 210 MG/DL (ref 70–110)
POCT GLUCOSE: 213 MG/DL (ref 70–110)
POTASSIUM SERPL-SCNC: 4.3 MMOL/L (ref 3.5–5.1)
PROT SERPL-MCNC: 6.7 G/DL (ref 6–8.4)
RBC # BLD AUTO: 4.53 M/UL (ref 4.6–6.2)
SODIUM SERPL-SCNC: 140 MMOL/L (ref 136–145)
TRIGL SERPL-MCNC: 136 MG/DL (ref 30–150)
TROPONIN I SERPL DL<=0.01 NG/ML-MCNC: 33.88 NG/ML (ref 0–0.03)
WBC # BLD AUTO: 10.45 K/UL (ref 3.9–12.7)

## 2022-08-18 PROCEDURE — 99900035 HC TECH TIME PER 15 MIN (STAT)

## 2022-08-18 PROCEDURE — 94761 N-INVAS EAR/PLS OXIMETRY MLT: CPT

## 2022-08-18 PROCEDURE — 97535 SELF CARE MNGMENT TRAINING: CPT

## 2022-08-18 PROCEDURE — 25000003 PHARM REV CODE 250: Performed by: HOSPITALIST

## 2022-08-18 PROCEDURE — 93010 EKG 12-LEAD: ICD-10-PCS | Mod: ,,, | Performed by: INTERNAL MEDICINE

## 2022-08-18 PROCEDURE — 99233 PR SUBSEQUENT HOSPITAL CARE,LEVL III: ICD-10-PCS | Mod: ,,, | Performed by: NURSE PRACTITIONER

## 2022-08-18 PROCEDURE — 36415 COLL VENOUS BLD VENIPUNCTURE: CPT

## 2022-08-18 PROCEDURE — 99233 SBSQ HOSP IP/OBS HIGH 50: CPT | Mod: ,,, | Performed by: NURSE PRACTITIONER

## 2022-08-18 PROCEDURE — 80053 COMPREHEN METABOLIC PANEL: CPT | Performed by: HOSPITALIST

## 2022-08-18 PROCEDURE — 27000221 HC OXYGEN, UP TO 24 HOURS

## 2022-08-18 PROCEDURE — 83735 ASSAY OF MAGNESIUM: CPT | Performed by: HOSPITALIST

## 2022-08-18 PROCEDURE — 80061 LIPID PANEL: CPT

## 2022-08-18 PROCEDURE — 97116 GAIT TRAINING THERAPY: CPT

## 2022-08-18 PROCEDURE — 93005 ELECTROCARDIOGRAM TRACING: CPT

## 2022-08-18 PROCEDURE — 85025 COMPLETE CBC W/AUTO DIFF WBC: CPT | Performed by: HOSPITALIST

## 2022-08-18 PROCEDURE — 84484 ASSAY OF TROPONIN QUANT: CPT

## 2022-08-18 PROCEDURE — 25000003 PHARM REV CODE 250: Performed by: NURSE PRACTITIONER

## 2022-08-18 PROCEDURE — 97165 OT EVAL LOW COMPLEX 30 MIN: CPT

## 2022-08-18 PROCEDURE — A4216 STERILE WATER/SALINE, 10 ML: HCPCS | Performed by: HOSPITALIST

## 2022-08-18 PROCEDURE — 97161 PT EVAL LOW COMPLEX 20 MIN: CPT

## 2022-08-18 PROCEDURE — 84100 ASSAY OF PHOSPHORUS: CPT | Performed by: HOSPITALIST

## 2022-08-18 PROCEDURE — 93010 ELECTROCARDIOGRAM REPORT: CPT | Mod: ,,, | Performed by: INTERNAL MEDICINE

## 2022-08-18 PROCEDURE — 97530 THERAPEUTIC ACTIVITIES: CPT

## 2022-08-18 PROCEDURE — 11000001 HC ACUTE MED/SURG PRIVATE ROOM

## 2022-08-18 PROCEDURE — 63600175 PHARM REV CODE 636 W HCPCS: Performed by: HOSPITALIST

## 2022-08-18 RX ORDER — ASPIRIN 81 MG/1
81 TABLET ORAL DAILY
Status: DISCONTINUED | OUTPATIENT
Start: 2022-08-19 | End: 2022-08-20

## 2022-08-18 RX ADMIN — Medication 10 ML: at 09:08

## 2022-08-18 RX ADMIN — ATORVASTATIN CALCIUM 40 MG: 40 TABLET, FILM COATED ORAL at 09:08

## 2022-08-18 RX ADMIN — FUROSEMIDE 40 MG: 10 INJECTION, SOLUTION INTRAMUSCULAR; INTRAVENOUS at 03:08

## 2022-08-18 RX ADMIN — Medication 10 ML: at 05:08

## 2022-08-18 RX ADMIN — INSULIN ASPART 4 UNITS: 100 INJECTION, SOLUTION INTRAVENOUS; SUBCUTANEOUS at 05:08

## 2022-08-18 RX ADMIN — ASPIRIN 81 MG: 81 TABLET, CHEWABLE ORAL at 09:08

## 2022-08-18 RX ADMIN — ALLOPURINOL 300 MG: 100 TABLET ORAL at 09:08

## 2022-08-18 RX ADMIN — METOPROLOL TARTRATE 25 MG: 25 TABLET, FILM COATED ORAL at 08:08

## 2022-08-18 RX ADMIN — SACUBITRIL AND VALSARTAN 1 TABLET: 24; 26 TABLET, FILM COATED ORAL at 09:08

## 2022-08-18 RX ADMIN — APIXABAN 5 MG: 5 TABLET, FILM COATED ORAL at 08:08

## 2022-08-18 RX ADMIN — CLOPIDOGREL 75 MG: 75 TABLET, FILM COATED ORAL at 09:08

## 2022-08-18 RX ADMIN — INSULIN DETEMIR 12 UNITS: 100 INJECTION, SOLUTION SUBCUTANEOUS at 08:08

## 2022-08-18 RX ADMIN — METOPROLOL TARTRATE 25 MG: 25 TABLET, FILM COATED ORAL at 09:08

## 2022-08-18 RX ADMIN — POLYETHYLENE GLYCOL 3350 17 G: 17 POWDER, FOR SOLUTION ORAL at 09:08

## 2022-08-18 RX ADMIN — TAMSULOSIN HYDROCHLORIDE 0.4 MG: 0.4 CAPSULE ORAL at 09:08

## 2022-08-18 RX ADMIN — Medication 10 ML: at 02:08

## 2022-08-18 RX ADMIN — INSULIN ASPART 2 UNITS: 100 INJECTION, SOLUTION INTRAVENOUS; SUBCUTANEOUS at 08:08

## 2022-08-18 RX ADMIN — APIXABAN 5 MG: 5 TABLET, FILM COATED ORAL at 09:08

## 2022-08-18 RX ADMIN — INSULIN ASPART 2 UNITS: 100 INJECTION, SOLUTION INTRAVENOUS; SUBCUTANEOUS at 12:08

## 2022-08-18 NOTE — NURSING
Notified Dr. Ramesh troponin 33.883. Discontinue troponin lab draws.MORENO GILLESPIE Will continue to monitor.

## 2022-08-18 NOTE — SUBJECTIVE & OBJECTIVE
Interval History: [facility] with stent placement performed without compliction. VSS overnight, on 3 L NC. Voided without issue this morning, requiring assistance from RN and myself to stand from sitting, noted increased work of breathing with exertion. Continuing IV diuresis.    Review of Systems   Constitutional:  Negative for chills, diaphoresis and fever.   HENT:  Negative for congestion, sore throat, trouble swallowing and voice change.    Eyes:  Negative for visual disturbance.   Respiratory:  Positive for cough (non-productive) and shortness of breath. Negative for wheezing.    Cardiovascular:  Negative for chest pain (resolved), palpitations and leg swelling.   Gastrointestinal:  Negative for abdominal pain, blood in stool, constipation, diarrhea, nausea and vomiting.   Genitourinary:  Negative for decreased urine volume, dysuria, hematuria and urgency.   Musculoskeletal:  Positive for gait problem (Unsteady).   Skin:  Negative for pallor and wound.   Neurological:  Negative for dizziness, speech difficulty, weakness, light-headedness and headaches.   Psychiatric/Behavioral:  Negative for agitation and decreased concentration. The patient is not nervous/anxious.    Objective:     Vital Signs (Most Recent):  Temp: 98 °F (36.7 °C) (08/18/22 0809)  Pulse: 68 (08/18/22 0809)  Resp: 18 (08/18/22 0809)  BP: 131/84 (08/18/22 0809)  SpO2: 97 % (08/18/22 0809) Vital Signs (24h Range):  Temp:  [97.8 °F (36.6 °C)-99.1 °F (37.3 °C)] 98 °F (36.7 °C)  Pulse:  [68-90] 68  Resp:  [15-25] 18  SpO2:  [90 %-98 %] 97 %  BP: ()/(54-84) 131/84     Weight: 90.7 kg (200 lb)  Body mass index is 29.53 kg/m².    Intake/Output Summary (Last 24 hours) at 8/18/2022 1054  Last data filed at 8/18/2022 0746  Gross per 24 hour   Intake 10 ml   Output 1300 ml   Net -1290 ml      Physical Exam  Vitals and nursing note reviewed.   Constitutional:       General: He is not in acute distress.     Appearance: He is well-developed. He is  ill-appearing. He is not toxic-appearing or diaphoretic.   HENT:      Head: Normocephalic and atraumatic.      Mouth/Throat:      Pharynx: No oropharyngeal exudate.   Eyes:      General: No scleral icterus.     Pupils: Pupils are equal, round, and reactive to light.   Neck:      Trachea: No tracheal deviation.   Cardiovascular:      Rate and Rhythm: Normal rate and regular rhythm.      Heart sounds: Normal heart sounds. No murmur heard.  Pulmonary:      Effort: Pulmonary effort is normal. No respiratory distress.      Breath sounds: Rales present. No wheezing.   Chest:      Chest wall: No tenderness.   Abdominal:      General: Bowel sounds are normal. There is no distension.      Palpations: Abdomen is soft. There is no mass.      Tenderness: There is no abdominal tenderness.   Musculoskeletal:         General: No deformity.      Cervical back: Neck supple.   Skin:     General: Skin is warm and dry.      Coloration: Skin is not pale.      Findings: No erythema or rash.   Neurological:      Mental Status: He is alert and oriented to person, place, and time.   Psychiatric:         Behavior: Behavior normal.       Significant Labs: All pertinent labs within the past 24 hours have been reviewed.  BMP:   Recent Labs   Lab 08/18/22  0401   *      K 4.3      CO2 24   BUN 46*   CREATININE 1.9*   CALCIUM 9.2   MG 1.7     CBC:   Recent Labs   Lab 08/16/22  1340 08/17/22  0343 08/18/22  0401   WBC 9.25 12.69 10.45   HGB 13.3* 13.1* 13.4*   HCT 40.8 39.2* 40.5    206 217     CMP:   Recent Labs   Lab 08/16/22  1340 08/17/22  0342 08/18/22  0401    140 140   K 5.1 4.1 4.3    105 102   CO2 20* 22* 24   * 122* 137*   BUN 38* 39* 46*   CREATININE 1.70* 1.8* 1.9*   CALCIUM 9.5 9.7 9.2   PROT 6.9 7.0 6.7   ALBUMIN 4.1 3.5 3.6   BILITOT 0.9 0.7 1.5*   ALKPHOS 36* 33* 37*   * 88* 75*   ALT 62* 51* 44   ANIONGAP 10 13 14       Significant Imaging: I have reviewed all pertinent imaging  results/findings within the past 24 hours.

## 2022-08-18 NOTE — ASSESSMENT & PLAN NOTE
S/P CABG (2007)  Coronary artery disease  Complained of SOB and Stabbing Epigastric Pain. Since Resolved  ECG no change obvious changes from prior. No STEMI  Troponin 8.4 >>12.1>>10.75  proBNP 8080, Pulmonary Edema on CXR  Received  mg and lasix 40 mg x2  in ED    - Continue Lasix 40 mg IV daily  - Continue ASA, plavix, statin  - TTE showed EF 30% with left ventricular global hypokinesis and atrial fibrillation  - Cardiology consulted in ED, s/p C with SVG to RCA proximal graft s/p 1 3.5x18mm RAYNA  - Cardiology recommendations as follows   - Continue aspirin 81 mg daily indefinitely   - Continue plavix 75mg PO daily for 1 year   - Continue high intensity statin therapy (LDL goal < 70)   - Risk factor reduction (BP <130/80 mmHg, glycemic control, etc)   - Cardiac rehab referral   - Follow up with Dr. Sotelo

## 2022-08-18 NOTE — ASSESSMENT & PLAN NOTE
Presented with CP, SOB  Troponin elevated  S/p RAYNA to SVG-RCA  Continue DAPT, statin, BB, Entresto  Cardiac rehab as outpatient   Follow up with Dr Sotelo

## 2022-08-18 NOTE — PT/OT/SLP EVAL
Occupational Therapy   Evaluation, tx    Name: Jin gNo  MRN: [id number]  Admitting Diagnosis:  NSTEMI (non-ST elevated myocardial infarction)  Recent Surgery: Procedure(s) (LRB):  Left heart cath (Left)  Bypass graft study  ANGIOGRAM, CORONARY ARTERY (N/A)  IVUS, Coronary  Stent RAYNA bypass graft 1 Day Post-Op  The primary encounter diagnosis was NSTEMI (non-ST elevated myocardial infarction). Diagnoses of SOB (shortness of breath), CAD (coronary artery disease), Chest pain, S/P angioplasty, S/P angioplasty, S/P angioplasty, and Localized osteoporosis with current pathological fracture with delayed healing were also pertinent to this visit.    Recommendations:     Discharge Recommendations: home health OT, home health PT  Discharge Equipment Recommendations:  bedside commode  Barriers to discharge:  None    Assessment:     Jin Ngo is a 76 y.o. male with a medical diagnosis of NSTEMI (non-ST elevated myocardial infarction).  He presents with  Performance deficits affecting function: weakness, impaired endurance, impaired self care skills, impaired functional mobility, gait instability, impaired balance, decreased coordination, decreased lower extremity function, decreased safety awareness, decreased upper extremity function, pain, impaired cardiopulmonary response to activity.      Rehab Prognosis: Good; patient would benefit from acute skilled OT services to address these deficits and reach maximum level of function.       Plan:     Patient to be seen 3 x/week to address the above listed problems via self-care/home management, therapeutic activities, therapeutic exercises  · Plan of Care Expires: 09/18/22  · Plan of Care Reviewed with: patient, spouse    Subjective     Chief Complaint: none  Patient/Family Comments/goals: wife stated pt attends OP PT for past 2 months for L5 compression fx and has 2 more visits left.    Occupational Profile:  Living Environment: pt lives with spouse in [facility] with THE; t/s  combo with TTB.  Previous level of function: Mod I ADLS except max assist for socks 2/2 limited bending, back issues. Pt. wears a LSO brace for L5 compression PRN per spouse report. Pt does not drive, spouse does all IADLS and drives.  Roles and Routines: active in above, attends OPPT   Equipment Used at Home:  bath bench  Assistance upon Discharge: spouse    Pain/Comfort:  · Pain Rating 1: 0/10  · Pain Rating Post-Intervention 1: 0/10    Patients cultural, spiritual, Muslim conflicts given the current situation: no    Objective:     Communicated with: nurse prior to session.  Patient found HOB elevated with telemetry, oxygen, peripheral IV, bed alarm upon OT entry to room.    General Precautions: Standard, fall, diabetic   Orthopedic Precautions:N/A   Braces: N/A  Respiratory Status: Nasal cannula, flow 3 L/min    Occupational Performance:    Bed Mobility:    · Patient completed Rolling/Turning to Right with contact guard assistance and with side rail, increased time and tremors  · Patient completed Scooting/Bridging with minimum assistance and with side rail, same as above  · Patient completed Supine to Sit with minimum assistance and with side rail, same as above    Functional Mobility/Transfers:  · Patient completed Sit <> Stand Transfer with contact guard assistance  with  rolling walker   · Patient completed Bed <> Chair Transfer using Step Transfer technique with contact guard assistance with rolling walker  · Functional Mobility: ambulated CGA with RW to sink and BS chair on opposite side of room, cautious, slow, mild unsteadiness but no LOB, mild shakiness    Activities of Daily Living:  · Feeding:  modified independence increased time to open packages  · Grooming: contact guard assistance washed hands standing inside RW at sink  · Lower Body Dressing: maximal assistance socks, PLOF  Declined toileting, just came back from bathroom with staff assist.    Cognitive/Visual  Perceptual:  Cognitive/Psychosocial Skills:     -       Oriented to: Person, Place, Time and Situation   -       Follows Commands/attention:Follows one-step commands  -       Communication: clear/fluent  -       Memory: Poor immediate recall  -       Safety awareness/insight to disability: impaired   -       Mood/Affect/Coping skills/emotional control: Cooperative  Visual/Perceptual:      -Intact .    Physical Exam:  Balance:    -       sitting: good  dynamic: fair plus  standing; CGA-fair   dynamic; fair -  Postural examination/scapula alignment:    -       Rounded shoulders  Dominant hand:    -       right  Upper Extremity Range of Motion:     -       Right Upper Extremity: WFL  -       Left Upper Extremity: WFL  Upper Extremity Strength:    -       Right Upper Extremity: WFL  -       Left Upper Extremity: WFL   Strength:    -       Right Upper Extremity: WFL  -       Left Upper Extremity: WFL  Coordination: mild tremors    AMPAC 6 Click ADL:  AMPAC Total Score: 20    Treatment & Education:  Purpose of OT and POC  Educated pt and spouse in call don't fall for BTB assist  Initiated family training and DC planing with wife, likely HHOT but will confer with PT after eval 's pt.  DME recs for home: [facility]  Pt has hx L5 compression fx, reviewed back precautions; no bending , lifting, twisting and instructed in log roll  All questions/concerns addressed within scope  Education:    Patient left up in chair with all lines intact, call button in reach, nurse notified and spouse present    GOALS:   Multidisciplinary Problems     Occupational Therapy Goals        Problem: Occupational Therapy    Goal Priority Disciplines Outcome Interventions   Occupational Therapy Goal     OT, PT/OT Ongoing, Progressing    Description: Goals to be met by: 9/18/2022    Patient will increase functional independence with ADLs by performing:    UE Dressing with Supervision.  LE Dressing with Supervision with adaptive device PRN  Grooming while  standing at sink with Supervision.  Toileting from toilet with Supervision for hygiene and clothing management.   Supine to sit with Modified [place].  Toilet transfer to toilet with Modified [place].  Upper extremity exercise program x10 reps per handout, with supervision.                     History:     Past Medical History:   Diagnosis Date    Acute hypoxemic respiratory failure 8/18/2022    ANGELIC (acute kidney injury) 7/1/2020    Anticoagulant long-term use     CKD stage 3 due to type 2 diabetes mellitus 3/22/2021    Coronary artery disease     Encounter for blood transfusion     Hyperlipidemia     Hypertension     Localized osteoporosis with current pathological fracture with delayed healing 6/9/2022    Myocardial infarction     Obesity     Other chronic pulmonary heart diseases     Paroxysmal atrial fibrillation 11/9/2020    Primary osteoarthritis involving multiple joints 3/8/2017    Stroke     Type 2 diabetes mellitus without complication     Uncontrolled type 2 diabetes mellitus with neurologic complication, without long-term current use of insulin 3/8/2017       Past Surgical History:   Procedure Laterality Date    CARDIAC SURGERY      CATARACT EXTRACTION, BILATERAL      COLONOSCOPY      2011    COLONOSCOPY N/A 3/22/2016    Procedure: COLONOSCOPY;  Surgeon: Sridhar Reynoso MD;  Location: [facility] ENDO;  Service: Endoscopy;  Laterality: N/A;    COLONOSCOPY N/A 12/7/2021    Procedure: Colonoscopy;  Surgeon: Bebeto Perry MD;  Location: [facility] ENDO;  Service: Endoscopy;  Laterality: N/A;    CORONARY ANGIOGRAPHY N/A 8/17/2022    Procedure: ANGIOGRAM, CORONARY ARTERY;  Surgeon: Pasquale Ramesh MD;  Location: [facility] CATH LAB/EP;  Service: Cardiology;  Laterality: N/A;    CORONARY ARTERY BYPASS GRAFT      CORONARY BYPASS GRAFT ANGIOGRAPHY  8/17/2022    Procedure: Bypass graft study;  Surgeon: Pasquale Ramesh MD;  Location: [facility] CATH LAB/EP;  Service: Cardiology;;     EYE SURGERY  06/2014    Cataracts    LEFT HEART CATHETERIZATION Left 8/17/2022    Procedure: Left heart cath;  Surgeon: Pasquale Ramesh MD;  Location: [facility] CATH LAB/EP;  Service: Cardiology;  Laterality: Left;    PERCUTANEOUS TRANSLUMINAL ANGIOPLASTY (PTA) OF CAROTID ARTERY N/A 12/10/2020    Procedure: PTA, ARTERY, CAROTID;  Surgeon: Cheko Norris MD;  Location: [facility] CATH LAB;  Service: Cardiology;  Laterality: N/A;       Time Tracking:     OT Date of Treatment: 08/18/22  OT Start Time: 1150  OT Stop Time: 1219  OT Total Time (min): 29 min    Billable Minutes:Evaluation 10  Self Care/Home Management 19  Total Time 29    8/18/2022

## 2022-08-18 NOTE — PLAN OF CARE
[facility] - Telemetry  Discharge Final Note    Primary Care Provider: Sam Barroso MD    Expected Discharge Date: 8/18/2022    Pharmacist will go over home medications and reasons for medications. VN and bedside nurse to reiterate final discharge instructions.     Cleared from CM. No other CM needs at this time   ` . Bedside Nurse and VN notified.      Final Discharge Note (most recent)       Final Note - 08/18/22 0844          Final Note    Assessment Type Final Discharge Note (P)      Anticipated Discharge Disposition Home or Self Care (P)      Hospital Resources/Appts/Education Provided Appointments scheduled and added to AVS (P)         Post-Acute Status    Post-Acute Authorization Other (P)      Other Status No Post-Acute Service Needs (P)      Discharge Delays None known at this time (P)                    Future Appointments   Date Time Provider Department Center   8/19/2022  9:20 AM Oneyda Matthew MD [facility]   8/22/2022  2:00 PM Rahel Alvarado, PT [facility]   8/23/2022  9:40 AM Sam Barroso MD [facility] MED [place] [facility]   8/24/2022  1:00 PM Merlin Osei, PTA [facility]   8/29/2022  1:00 PM [place], PT [facility]   8/31/2022  1:00 PM Merlin Osei, PTA [facility]   9/12/2022 10:00 AM HAYES Watts [facility] [place] [facility]   10/10/2022 12:00 PM LAB, [place] [facility]   10/10/2022 12:10 PM LAB, [place] [facility]   10/17/2022 10:00 AM Ester M. Scobel, NP [facility] [address]   1/20/2023  9:00 AM Sam Barroso MD [facility] [place] [facility]         Contact Info     Sam Barroso MD   Specialty: Family Medicine   Relationship: PCP - General    [address]  [place] [address]   Phone: [phone]       Next Steps: Go on 8/25/2022    Instructions: FOLLOW UP WITH YOCASTA Rothman - Cardiology   Specialty: Cardiology    [address]  [place]  "[phone]   Phone: [phone]       Next Steps: Go in 2 week(s)    Instructions: FOLLOW UP WITH CARDIOLOGIST AFTER DISCHARGE, If date/time not listed,  will contact. Discussed with office MA of appt need.    [facility] - [place]    [facility] [address]  [place] [address]   Phone: [phone]       Next Steps: Schedule an appointment as soon as possible for a visit    Instructions: As needed, REHAB, FOLLOW UP WITH THERAPY; OFFICE TO CALL PATIENT           Medication List        ASK your doctor about these medications      ACCU-CHEK VERONICA PLUS TEST STRP Strp  Generic drug: blood sugar diagnostic  TEST BLOOD SUGAR FOUR TIMES DAILY     allopurinoL 300 MG tablet  Commonly known as: ZYLOPRIM  TAKE 1 TABLET EVERY DAY     amLODIPine 5 MG tablet  Commonly known as: NORVASC  TAKE 1 TABLET EVERY DAY     atorvastatin 40 MG tablet  Commonly known as: LIPITOR  Take 1 tablet (40 mg total) by mouth once daily.     BD ULTRA-FINE TEJINDER PEN NEEDLE 32 gauge x 5/32" Ndle  Generic drug: pen needle, diabetic  USE WITH LEVEMIR DAILY     blood-glucose meter kit  To check BG 4 times daily, to use with insurance preferred meter     clopidogreL 75 mg tablet  Commonly known as: PLAVIX  TAKE 1 TABLET EVERY DAY     doxycycline 100 MG tablet  Commonly known as: VIBRA-TABS  TAKE 1 TABLET BY MOUTH TWICE A DAY     DULoxetine 30 MG capsule  Commonly known as: CYMBALTA  TAKE 1 CAPSULE (30 MG TOTAL) ONCE DAILY. TO HELP REDUCE FEELING OF BACK PAIN     ELIQUIS 5 mg Tab  Generic drug: apixaban  TAKE 1 TABLET BY MOUTH TWICE A DAY     fenofibrate 160 MG Tab  TAKE 1 TABLET EVERY DAY  Ask about: Which instructions should I use?     fluticasone propionate 50 mcg/actuation nasal spray  Commonly known as: FLONASE  INHALE 1 SPRAY IN EACH NOSTRIL ROUTE ONCE DAILY.     glipiZIDE 5 MG tablet  Commonly known as: GLUCOTROL  Take 2 tablets (10 mg total) by mouth daily with breakfast AND 1 tablet (5 mg total) daily with dinner or " evening meal. Take 7.5 mg with breakfast and 5 mg with dinner.     HYDROcodone-acetaminophen 7.5-325 mg per tablet  Commonly known as: NORCO  Take 1 tablet by mouth every 24 hours as needed for Pain (take 30 minutes prior to parcipating in physical therapy).     lancets Misc  Commonly known as: ACCU-CHEK SOFTCLIX LANCETS  To check BG 4 times daily, to use with insurance preferred meter.     LEVEMIR FLEXTOUCH U-100 INSULN 100 unit/mL (3 mL) Inpn pen  Generic drug: insulin detemir U-100  Inject 12 Units into the skin every evening.     metoprolol tartrate 25 MG tablet  Commonly known as: LOPRESSOR  TAKE 1 TABLET TWICE DAILY     tamsulosin 0.4 mg Cap  Commonly known as: FLOMAX  TAKE 1 CAPSULE BY MOUTH ONCE DAILY FOR URINE FLOW               Where to Get Your Medications        These medications were sent to [facility] (Now [facility]) - [place], [place] - [address]  [address], [facility] [address]      Phone: [phone]   · fenofibrate 160 MG Tab

## 2022-08-18 NOTE — ASSESSMENT & PLAN NOTE
Home antihyperglycemic regimen: glipizide with long acting insulin  Last A1c reviewed-   Lab Results   Component Value Date    HGBA1C 7.4 (H) 08/17/2022     Most recent fingerstick glucose reviewed-   Recent Labs     08/16/22  2043 08/17/22  0409 08/17/22  1132 08/17/22  1639 08/17/22 2006 08/18/22  0812   POCTGLUCOSE 265* 122* 133* 191* 149* 122*     - Hold home oral antihyperglycemics while in hospital  - Patient's FSGs are controlled on current medication regimen.  - Continue 12 detemir QHS  - Current correctional scale  Medium  - POCT accuchecks ACHS  - Diabetic diet   - BG goals: Preprandial <140 mg/dL, Random  <180 mg/dL    Antihyperglycemics (From admission, onward)            Start     Stop Route Frequency Ordered    08/16/22 2100  insulin detemir U-100 pen 12 Units         -- SubQ Nightly 08/16/22 1451    08/16/22 1549  insulin aspart U-100 pen 1-10 Units         -- SubQ Before meals & nightly PRN 08/16/22 1451

## 2022-08-18 NOTE — PLAN OF CARE
Problem: Occupational Therapy  Goal: Occupational Therapy Goal  Description: Goals to be met by: 9/18/2022    Patient will increase functional independence with ADLs by performing:    UE Dressing with Supervision.  LE Dressing with Supervision with adaptive device PRN  Grooming while standing at sink with Supervision.  Toileting from toilet with Supervision for hygiene and clothing management.   Supine to sit with Modified [place].  Toilet transfer to toilet with Modified [place].  Upper extremity exercise program x10 reps per handout, with supervision.    Outcome: Ongoing, Progressing

## 2022-08-18 NOTE — ASSESSMENT & PLAN NOTE
TTE  · The left ventricle is mildly enlarged with moderately decreased systolic function.  · The estimated ejection fraction is 30%.  · There is moderate left ventricular global hypokinesis.  · A diastolic pattern consistent with atrial fibrillation observed.  · Mild-to-moderate mitral regurgitation.  · Mild aortic regurgitation.  · Mild tricuspid regurgitation.  · Mild pulmonic regurgitation.  · The estimated PA systolic pressure is 55 mmHg.  · Normal right ventricular size with normal right ventricular systolic function.  · There is moderate pulmonary hypertension.    Entresto initiated at low dose, will up titrate as outpatient if BP can tolerate  Continue BB  Diuresing with IV Lasix- continue  Inaccurate intake and output documented  Repeat TTE 90 d post optimal GDMT

## 2022-08-18 NOTE — ASSESSMENT & PLAN NOTE
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 Cause of exacerbation is NSTEMI  Bibasilar rales on exam  CXR revealed pulmonary edema  ECG showed atrial fibrillation with nonspecific ST wave changes    - TTE as below  - Cardiology following, recommendations as below   - Entresto initiated at low dose, will up titrate as outpatient if BP can tolerate   - Continue BB   - Continue diuresing with IV Lasix- continue   - Repeat TTE 90 d post optimal GDMT  - On 3 L NC, wean as tolerated  -  Daily weights  - Strict I/Os  - Monitor on telemetry  - Monitor and trend BMP, Mg, and renal function; keep K >4, Mg >2  - Monitor for signs of fluid overload: RR>30, O2 sat<92%, weight gain of >3 lbs in 24 hours, or urinary output <160ml/8hr  - CPAP at 6-8 mm H2O for mild respiratory distress  - SCDs, TEDs, Nursing communication to elevated LE   - Ambulate as tolerated    Last TTE Results for orders placed during the hospital encounter of 08/16/22    Echo    Interpretation Summary  · The left ventricle is mildly enlarged with moderately decreased systolic function.  · The estimated ejection fraction is 30%.  · There is moderate left ventricular global hypokinesis.  · A diastolic pattern consistent with atrial fibrillation observed.  · Mild-to-moderate mitral regurgitation.  · Mild aortic regurgitation.  · Mild tricuspid regurgitation.  · Mild pulmonic regurgitation.  · The estimated PA systolic pressure is 55 mmHg.  · Normal right ventricular size with normal right ventricular systolic function.  · There is moderate pulmonary hypertension.

## 2022-08-18 NOTE — ASSESSMENT & PLAN NOTE
Previously diagnosed  Takes eliquis and lopressor at home  ECG showed atrial fibrillation  Likely etiology fluid overload  Denies palpitations  [id number]-[id number] score is 6, annual risk of stroke is 9.7%  HAS-BLED score is 2, annual bleeding risk is 4.1%    - Eliquis initially held, heparin gtt started, discontinued post-LHC, patient to restart eliquis  - Continue lopressor  - Continue IV diuresis withp jase to transition to oral upon discharge

## 2022-08-18 NOTE — SUBJECTIVE & OBJECTIVE
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    Coronary artery disease     Encounter for blood transfusion     Hyperlipidemia     Hypertension     Localized osteoporosis with current pathological fracture with delayed healing 6/9/2022    Myocardial infarction     Obesity     Other chronic pulmonary heart diseases     Paroxysmal atrial fibrillation 11/9/2020    Primary osteoarthritis involving multiple joints 3/8/2017    Stroke     Type 2 diabetes mellitus without complication     Uncontrolled type 2 diabetes mellitus with neurologic complication, without long-term current use of insulin 3/8/2017       Past Surgical History:   Procedure Laterality Date    CARDIAC SURGERY      CATARACT EXTRACTION, BILATERAL      COLONOSCOPY      2011    COLONOSCOPY N/A 3/22/2016    Procedure: COLONOSCOPY;  Surgeon: Sridhar Reynoso MD;  Location: [facility] ENDO;  Service: Endoscopy;  Laterality: N/A;    COLONOSCOPY N/A 12/7/2021    Procedure: Colonoscopy;  Surgeon: Bebeto Perry MD;  Location: [facility] ENDO;  Service: Endoscopy;  Laterality: N/A;    CORONARY ARTERY BYPASS GRAFT      EYE SURGERY  06/2014    Cataracts    PERCUTANEOUS TRANSLUMINAL ANGIOPLASTY (PTA) OF CAROTID ARTERY N/A 12/10/2020    Procedure: PTA, ARTERY, CAROTID;  Surgeon: Cheko Norris MD;  Location: [facility] CATH LAB;  Service: Cardiology;  Laterality: N/A;       Review of patient's allergies indicates:   Allergen Reactions    Metformin      Caused stroke    Pcn [penicillins]        No current facility-administered medications on file prior to encounter.     Current Outpatient Medications on File Prior to Encounter   Medication Sig    ACCU-CHEK VERONICA PLUS TEST STRP Strp TEST BLOOD SUGAR FOUR TIMES DAILY    allopurinoL (ZYLOPRIM) 300 MG tablet TAKE 1 TABLET EVERY DAY    amLODIPine (NORVASC) 5 MG tablet TAKE 1 TABLET EVERY DAY    atorvastatin  "(LIPITOR) 40 MG tablet Take 1 tablet (40 mg total) by mouth once daily.    BD ULTRA-FINE TEJINDER PEN NEEDLE 32 gauge x 5/32" Ndle USE WITH LEVEMIR DAILY    blood-glucose meter kit To check BG 4 times daily, to use with insurance preferred meter    clopidogreL (PLAVIX) 75 mg tablet TAKE 1 TABLET EVERY DAY    doxycycline (VIBRA-TABS) 100 MG tablet TAKE 1 TABLET BY MOUTH TWICE A DAY    DULoxetine (CYMBALTA) 30 MG capsule TAKE 1 CAPSULE (30 MG TOTAL) ONCE DAILY. TO HELP REDUCE FEELING OF BACK PAIN    ELIQUIS 5 mg Tab TAKE 1 TABLET BY MOUTH TWICE A DAY    fenofibrate 160 MG Tab TAKE 1 TABLET EVERY DAY    fluticasone propionate (FLONASE) 50 mcg/actuation nasal spray INHALE 1 SPRAY IN EACH NOSTRIL ROUTE ONCE DAILY.    glipiZIDE (GLUCOTROL) 5 MG tablet Take 2 tablets (10 mg total) by mouth daily with breakfast AND 1 tablet (5 mg total) daily with dinner or evening meal. Take 7.5 mg with breakfast and 5 mg with dinner.    HYDROcodone-acetaminophen (NORCO) 7.5-325 mg per tablet Take 1 tablet by mouth every 24 hours as needed for Pain (take 30 minutes prior to parcipating in physical therapy).    insulin detemir U-100 (LEVEMIR FLEXTOUCH U-100 INSULN) 100 unit/mL (3 mL) InPn pen Inject 12 Units into the skin every evening.    lancets (ACCU-CHEK SOFTCLIX LANCETS) Misc To check BG 4 times daily, to use with insurance preferred meter.    metoprolol tartrate (LOPRESSOR) 25 MG tablet TAKE 1 TABLET TWICE DAILY    tamsulosin (FLOMAX) 0.4 mg Cap TAKE 1 CAPSULE BY MOUTH ONCE DAILY FOR URINE FLOW     Family History       Problem Relation (Age of Onset)    Arthritis Father    Coronary artery disease Father    Diabetes Mother    Heart attack Father    Heart disease Mother, Father    Hyperlipidemia Mother, Father, Brother    Hypertension Mother, Father, Brother, Son    No Known Problems Sister    Stroke Mother, Brother    Thyroid disease Brother          Tobacco Use    Smoking status: Former Smoker     Packs/day: 3.00     Years: 15.00     Pack " years: 45.00     Types: Cigarettes     Quit date:      Years since quittin.6    Smokeless tobacco: Never Used   Substance and Sexual Activity    Alcohol use: Yes     Alcohol/week: 1.0 standard drink     Types: 1 Shots of liquor per week     Comment: 2-3 drinks per year    Drug use: No    Sexual activity: Not Currently     Partners: Female     Review of Systems   Constitutional: Negative. Negative for diaphoresis.   HENT: Negative.     Eyes: Negative.    Cardiovascular:  Positive for dyspnea on exertion and orthopnea (resolved). Negative for chest pain, irregular heartbeat, leg swelling, near-syncope, palpitations, paroxysmal nocturnal dyspnea and syncope.   Respiratory:  Positive for cough and shortness of breath.    Endocrine: Negative.    Skin: Negative.    Musculoskeletal: Negative.    Gastrointestinal:  Positive for nausea. Negative for melena and vomiting.   Genitourinary: Negative.    Neurological:  Positive for weakness.   Psychiatric/Behavioral: Negative.     Allergic/Immunologic: Negative.    Objective:     Vital Signs (Most Recent):  Temp: 98 °F (36.7 °C) (22 0809)  Pulse: 68 (22 0809)  Resp: 18 (22 08)  BP: 131/84 (22 0809)  SpO2: 97 % (22 08)   Vital Signs (24h Range):  Temp:  [97.8 °F (36.6 °C)-99.1 °F (37.3 °C)] 98 °F (36.7 °C)  Pulse:  [68-91] 68  Resp:  [15-25] 18  SpO2:  [90 %-98 %] 97 %  BP: ()/(54-84) 131/84     Weight: 90.7 kg (200 lb)  Body mass index is 29.53 kg/m².    SpO2: 97 %  O2 Device (Oxygen Therapy): nasal cannula      Intake/Output Summary (Last 24 hours) at 2022 0836  Last data filed at 2022 0746  Gross per 24 hour   Intake 10 ml   Output 1300 ml   Net -1290 ml         Lines/Drains/Airways       Peripheral Intravenous Line  Duration                  Peripheral IV - Single Lumen 22 1339 20 G Right Forearm 1 day                    Physical Exam  Constitutional:       General: He is not in acute distress.     Appearance: He  is not diaphoretic.   HENT:      Head: Atraumatic.   Eyes:      General:         Right eye: No discharge.         Left eye: No discharge.   Cardiovascular:      Rate and Rhythm: Normal rate and regular rhythm.   Pulmonary:      Effort: Pulmonary effort is normal.      Breath sounds: No rales.   Abdominal:      General: Bowel sounds are normal.      Palpations: Abdomen is soft.   Skin:     General: Skin is warm and dry.      Capillary Refill: Capillary refill takes less than 2 seconds.   Neurological:      Mental Status: He is alert. Mental status is at baseline.       Significant Labs: BMP:   Recent Labs   Lab 08/16/22  1340 08/17/22  0342 08/18/22  0401   * 122* 137*    140 140   K 5.1 4.1 4.3    105 102   CO2 20* 22* 24   BUN 38* 39* 46*   CREATININE 1.70* 1.8* 1.9*   CALCIUM 9.5 9.7 9.2   MG  --  1.7 1.7     , CMP   Recent Labs   Lab 08/16/22  1340 08/17/22  0342 08/18/22  0401    140 140   K 5.1 4.1 4.3    105 102   CO2 20* 22* 24   * 122* 137*   BUN 38* 39* 46*   CREATININE 1.70* 1.8* 1.9*   CALCIUM 9.5 9.7 9.2   PROT 6.9 7.0 6.7   ALBUMIN 4.1 3.5 3.6   BILITOT 0.9 0.7 1.5*   ALKPHOS 36* 33* 37*   * 88* 75*   ALT 62* 51* 44   ANIONGAP 10 13 14     , CBC   Recent Labs   Lab 08/16/22  1340 08/17/22  0343 08/18/22  0401   WBC 9.25 12.69 10.45   HGB 13.3* 13.1* 13.4*   HCT 40.8 39.2* 40.5    206 217     , INR   Recent Labs   Lab 08/16/22 2003   INR 1.3*     , Lipid Panel   Recent Labs   Lab 08/18/22  0401   CHOL 98*   HDL 23*   LDLCALC 47.8*   TRIG 136   CHOLHDL 23.5     , Troponin   Recent Labs   Lab 08/16/22  2339 08/17/22  0820 08/18/22  0401   TROPONINI 11.543* 18.825* 33.883*     , and All pertinent lab results from the last 24 hours have been reviewed.    Significant Imaging: Echocardiogram: Transthoracic echo (TTE) complete (Cupid Only):   Results for orders placed or performed during the hospital encounter of 08/16/22   Echo   Result Value Ref Range     Ascending aorta 2.82 cm    STJ 2.55 cm    AV mean gradient 3 mmHg    Ao peak bebeto 1.08 m/s    Ao VTI 18.39 cm    IVS 0.96 0.6 - 1.1 cm    LA size 5.12 cm    Left Atrium Major Axis 7.35 cm    Left Atrium Minor Axis 7.68 cm    LVIDd 5.77 3.5 - 6.0 cm    LVIDs 4.65 (A) 2.1 - 4.0 cm    LVOT diameter 1.88 cm    LVOT peak VTI 9.25 cm    Posterior Wall 0.92 0.6 - 1.1 cm    RA Major Axis 6.33 cm    RA Width 3.62 cm    RVDD 2.73 cm    TR Max Bebeto 3.41 m/s    LA WIDTH 4.30 cm    Ao root annulus 2.90 cm    PV PEAK VELOCITY 0.60 cm/s    LV Diastolic Volume 164.53 mL    LV Systolic Volume 100.05 mL    LVOT peak bebeto 0.60 m/s    Mr max bebeto 0.05 m/s    LA volume (mod) 74.85 cm3    RV S' 8.05 cm/s    FS 19 %    LA volume 140.56 cm3    LV mass 213.26 g    Left Ventricle Relative Wall Thickness 0.32 cm    AV valve area 1.40 cm2    AV Velocity Ratio 0.56     AV index (prosthetic) 0.50     LVOT area 2.8 cm2    LVOT stroke volume 25.66 cm3    AV peak gradient 5 mmHg    Triscuspid Valve Regurgitation Peak Gradient 47 mmHg    BSA 2.1 m2    LV Systolic Volume Index 48.3 mL/m2    LV Diastolic Volume Index 79.48 mL/m2    LA Volume Index 67.9 mL/m2    LV Mass Index 103 g/m2    LA Volume Index (Mod) 36.2 mL/m2    Right Atrial Pressure (from IVC) 8 mmHg    EF 30 %    TV rest pulmonary artery pressure 55 mmHg    Narrative    · The left ventricle is mildly enlarged with moderately decreased systolic   function.  · The estimated ejection fraction is 30%.  · There is moderate left ventricular global hypokinesis.  · A diastolic pattern consistent with atrial fibrillation observed.  · Mild-to-moderate mitral regurgitation.  · Mild aortic regurgitation.  · Mild tricuspid regurgitation.  · Mild pulmonic regurgitation.  · The estimated PA systolic pressure is 55 mmHg.  · Normal right ventricular size with normal right ventricular systolic   function.  · There is moderate pulmonary hypertension.

## 2022-08-18 NOTE — PROGRESS NOTES
[facility] Medicine  Progress Note    Patient Name: Jin Ngo  MRN: [id number]  Patient Class: IP- Inpatient   Admission Date: 8/16/2022  Length of Stay: 2 days  Attending Physician: Clarita Cronin MD  Primary Care Provider: Sam Barroso MD        Subjective:     Principal Problem:NSTEMI (non-ST elevated myocardial infarction)        HPI:  Jin Ngo is a 76 year old male with PMHx of CKD, CAD (MI, S/p CABG on Plavix and Eliquis), CVA, Left Carotid Stent, hypertension, hyperlipidemia, pulmonary hypertension and diabetes who presented to [facility] ED today with reports of worsening shortness of breath since yesterday with associated cough productive of phlegm and nasal congestion along with some nausea.  Patient and wife states that yesterday he had episode of shortness of breath where he fell into his chair. ECG with no STEMI, unchanged from prior. Chest xray with evidence of Pulmonary Edema. Labs significant for Troponin of 8.4, Pro-BNP 8080, mildly elevated BUN/Cr.  with cardiology was consulted from the ED.  He was given Aspirin, Lasix 40mg x 2 and Duoneb in the ED. He is admitted to [facility] for NSTEMI      Overview/Hospital Course:  Patient presented with chief complaint of SOB. Troponin trended up to 12. ECG showed NSR with 1st degree A-V block and nonspecific ST and T wave abnormality. Admitted for NSTEMI. TTE showed EF 30% with moderate left ventricular global hypokinesis and atrial fibrillation. Cardiology consulted in ED, started on heparin gtt, underwent LHC with SVG to RCA proximal graft s/p 1 3.5x18mm RAYNA. Post-cath, still complaining of shortness of breath, on 3 L NC. Continuing IV diuresis with plan to transition to oral upon discharge.       Interval History: LHC with stent placement performed without compliction. VSS overnight, on 3 L NC. Voided without issue this morning, requiring assistance from RN and myself to stand from sitting, noted  increased work of breathing with exertion. Continuing IV diuresis.    Review of Systems   Constitutional:  Negative for chills, diaphoresis and fever.   HENT:  Negative for congestion, sore throat, trouble swallowing and voice change.    Eyes:  Negative for visual disturbance.   Respiratory:  Positive for cough (non-productive) and shortness of breath. Negative for wheezing.    Cardiovascular:  Negative for chest pain (resolved), palpitations and leg swelling.   Gastrointestinal:  Negative for abdominal pain, blood in stool, constipation, diarrhea, nausea and vomiting.   Genitourinary:  Negative for decreased urine volume, dysuria, hematuria and urgency.   Musculoskeletal:  Positive for gait problem (Unsteady).   Skin:  Negative for pallor and wound.   Neurological:  Negative for dizziness, speech difficulty, weakness, light-headedness and headaches.   Psychiatric/Behavioral:  Negative for agitation and decreased concentration. The patient is not nervous/anxious.    Objective:     Vital Signs (Most Recent):  Temp: 98 °F (36.7 °C) (08/18/22 0809)  Pulse: 68 (08/18/22 0809)  Resp: 18 (08/18/22 0809)  BP: 131/84 (08/18/22 0809)  SpO2: 97 % (08/18/22 0809) Vital Signs (24h Range):  Temp:  [97.8 °F (36.6 °C)-99.1 °F (37.3 °C)] 98 °F (36.7 °C)  Pulse:  [68-90] 68  Resp:  [15-25] 18  SpO2:  [90 %-98 %] 97 %  BP: ()/(54-84) 131/84     Weight: 90.7 kg (200 lb)  Body mass index is 29.53 kg/m².    Intake/Output Summary (Last 24 hours) at 8/18/2022 1054  Last data filed at 8/18/2022 0746  Gross per 24 hour   Intake 10 ml   Output 1300 ml   Net -1290 ml      Physical Exam  Vitals and nursing note reviewed.   Constitutional:       General: He is not in acute distress.     Appearance: He is well-developed. He is ill-appearing. He is not toxic-appearing or diaphoretic.   HENT:      Head: Normocephalic and atraumatic.      Mouth/Throat:      Pharynx: No oropharyngeal exudate.   Eyes:      General: No scleral icterus.      Pupils: Pupils are equal, round, and reactive to light.   Neck:      Trachea: No tracheal deviation.   Cardiovascular:      Rate and Rhythm: Normal rate and regular rhythm.      Heart sounds: Normal heart sounds. No murmur heard.  Pulmonary:      Effort: Pulmonary effort is normal. No respiratory distress.      Breath sounds: Rales present. No wheezing.   Chest:      Chest wall: No tenderness.   Abdominal:      General: Bowel sounds are normal. There is no distension.      Palpations: Abdomen is soft. There is no mass.      Tenderness: There is no abdominal tenderness.   Musculoskeletal:         General: No deformity.      Cervical back: Neck supple.   Skin:     General: Skin is warm and dry.      Coloration: Skin is not pale.      Findings: No erythema or rash.   Neurological:      Mental Status: He is alert and oriented to person, place, and time.   Psychiatric:         Behavior: Behavior normal.       Significant Labs: All pertinent labs within the past 24 hours have been reviewed.  BMP:   Recent Labs   Lab 08/18/22  0401   *      K 4.3      CO2 24   BUN 46*   CREATININE 1.9*   CALCIUM 9.2   MG 1.7     CBC:   Recent Labs   Lab 08/16/22  1340 08/17/22  0343 08/18/22  0401   WBC 9.25 12.69 10.45   HGB 13.3* 13.1* 13.4*   HCT 40.8 39.2* 40.5    206 217     CMP:   Recent Labs   Lab 08/16/22  1340 08/17/22  0342 08/18/22  0401    140 140   K 5.1 4.1 4.3    105 102   CO2 20* 22* 24   * 122* 137*   BUN 38* 39* 46*   CREATININE 1.70* 1.8* 1.9*   CALCIUM 9.5 9.7 9.2   PROT 6.9 7.0 6.7   ALBUMIN 4.1 3.5 3.6   BILITOT 0.9 0.7 1.5*   ALKPHOS 36* 33* 37*   * 88* 75*   ALT 62* 51* 44   ANIONGAP 10 13 14       Significant Imaging: I have reviewed all pertinent imaging results/findings within the past 24 hours.      Assessment/Plan:      * NSTEMI (non-ST elevated myocardial infarction)  S/P CABG (2007)  Coronary artery disease  Complained of SOB and Stabbing Epigastric Pain.  Since Resolved  ECG no change obvious changes from prior. No STEMI  Troponin 8.4 >>12.1>>10.75  proBNP 8080, Pulmonary Edema on CXR  Received  mg and lasix 40 mg x2  in ED    - Continue Lasix 40 mg IV daily  - Continue ASA, plavix, statin  - TTE showed EF 30% with left ventricular global hypokinesis and atrial fibrillation  - Cardiology consulted in ED, s/p LHC with SVG to RCA proximal graft s/p 1 3.5x18mm RAYNA  - Cardiology recommendations as follows   - Continue aspirin 81 mg daily indefinitely   - Continue plavix 75mg PO daily for 1 year   - Continue high intensity statin therapy (LDL goal < 70)   - Risk factor reduction (BP <130/80 mmHg, glycemic control, etc)   - Cardiac rehab referral   - Follow up with Dr. Sotelo    Acute hypoxemic respiratory failure  See acute combined CHF      Acute combined systolic and diastolic congestive heart failure  Acute hypoxemic respiratory failure  Acute pulmonary edema  Patient admitted with shortness of breath and pulmonary edema consistent with acute on chronic combined CHF exacerbation. Cause of exacerbation is NSTEMI  Bibasilar rales on exam  CXR revealed pulmonary edema  ECG showed atrial fibrillation with nonspecific ST wave changes    - TTE as below  - Cardiology following, recommendations as below   - Entresto initiated at low dose, will up titrate as outpatient if BP can tolerate   - Continue BB   - Continue diuresing with IV Lasix- continue   - Repeat TTE 90 d post optimal GDMT  - On 3 L NC, wean as tolerated  -  Daily weights  - Strict I/Os  - Monitor on telemetry  - Monitor and trend BMP, Mg, and renal function; keep K >4, Mg >2  - Monitor for signs of fluid overload: RR>30, O2 sat<92%, weight gain of >3 lbs in 24 hours, or urinary output <160ml/8hr  - CPAP at 6-8 mm H2O for mild respiratory distress  - SCDs, TEDs, Nursing communication to elevated LE   - Ambulate as tolerated    Last TTE Results for orders placed during the hospital encounter of  08/16/22    Echo    Interpretation Summary  · The left ventricle is mildly enlarged with moderately decreased systolic function.  · The estimated ejection fraction is 30%.  · There is moderate left ventricular global hypokinesis.  · A diastolic pattern consistent with atrial fibrillation observed.  · Mild-to-moderate mitral regurgitation.  · Mild aortic regurgitation.  · Mild tricuspid regurgitation.  · Mild pulmonic regurgitation.  · The estimated PA systolic pressure is 55 mmHg.  · Normal right ventricular size with normal right ventricular systolic function.  · There is moderate pulmonary hypertension.        Acute pulmonary edema  See acute combined CHF    Physical deconditioning  -PT/OT consulted    CKD stage 3 due to type 2 diabetes mellitus  Chronic  No acute kidney injury or evidence of acute dysfunction  Cr 1.9 this AM, at baseline    Carotid stenosis, asymptomatic, bilateral  S/P left ICA stent in 2020    - Continue ASA, plavix and statin    Paroxysmal atrial fibrillation  Previously diagnosed  Takes eliquis and lopressor at home  ECG showed atrial fibrillation  Likely etiology fluid overload  Denies palpitations  [id number] score is 6, annual risk of stroke is 9.7%  HAS-BLED score is 2, annual bleeding risk is 4.1%    - Eliquis initially held, heparin gtt started, discontinued post-LHC, patient to restart eliquis  - Continue lopressor  - Continue IV diuresis withp jase to transition to oral upon discharge    Uncontrolled type 2 diabetes mellitus with diabetic neuropathy, without long-term current use of insulin  Home antihyperglycemic regimen: glipizide with long acting insulin  Last A1c reviewed-   Lab Results   Component Value Date    HGBA1C 7.4 (H) 08/17/2022     Most recent fingerstick glucose reviewed-   Recent Labs     08/16/22  2043 08/17/22  0409 08/17/22  1132 08/17/22  1639 08/17/22  2006 08/18/22  0812   POCTGLUCOSE 265* 122* 133* 191* 149* 122*     - Hold home oral antihyperglycemics while in  hospital  - Patient's FSGs are controlled on current medication regimen.  - Continue 12 detemir QHS  - Current correctional scale  Medium  - POCT accuchecks ACHS  - Diabetic diet   - BG goals: Preprandial <140 mg/dL, Random  <180 mg/dL    Antihyperglycemics (From admission, onward)            Start     Stop Route Frequency Ordered    08/16/22 2100  insulin detemir U-100 pen 12 Units         -- SubQ Nightly 08/16/22 1451    08/16/22 1549  insulin aspart U-100 pen 1-10 Units         -- SubQ Before meals & nightly PRN 08/16/22 1451          BMI 31.0-31.9,adult  Body mass index is 29.53 kg/m². Morbid obesity complicates all aspects of disease management from diagnostic modalities to treatment. Weight loss encouraged and health benefits explained to patient.    Mixed hyperlipidemia  Lipid profile on   Lab Results   Component Value Date    LDLCALC 47.8 (L) 08/18/2022    HDL 23 (L) 08/18/2022    TRIG 136 08/18/2022    CHOL 98 (L) 08/18/2022     Takes Atorvastatin at home    - Continue home dose statin  - Lipid panel as above  - Reports compliance with hyperlipidemia treatment as prescribed  - Denies adverse effects of medications      Pulmonary HTN  Noted on TTE    S/P CABG (coronary artery bypass graft)  See NSTEMI    Coronary artery disease  See NSTEMI    Essential hypertension  Chronic, controlled.  Latest blood pressure and vitals reviewed-   Temp:  [97.8 °F (36.6 °C)-99.1 °F (37.3 °C)]   Pulse:  [68-90]   Resp:  [15-25]   BP: ()/(54-84)   SpO2:  [90 %-98 %] .   Home meds for hypertension were reviewed and noted below.   Hypertension Medications             amLODIPine (NORVASC) 5 MG tablet TAKE 1 TABLET EVERY DAY    metoprolol tartrate (LOPRESSOR) 25 MG tablet TAKE 1 TABLET TWICE DAILY          - Adjust home antihypertensive regimen as follows- Continue lopressor, norvasc discontinued, started on low dose entresto  - Will utilize PRN blood pressure medication only if patient's blood pressure greater than 180/110  and he develops symptoms such as worsening chest pain or shortness of breath.          VTE Risk Mitigation (From admission, onward)         Ordered     apixaban tablet 5 mg  2 times daily         08/18/22 0806     heparin infusion 1,000 units/500 ml in 0.9% NaCl (pressure line flush)  Intra-op continuous PRN         08/17/22 1231     Reason for No Pharmacological VTE Prophylaxis  Once        Question:  Reasons:  Answer:  Already adequately anticoagulated on oral Anticoagulants    08/16/22 1451     IP VTE HIGH RISK PATIENT  Once         08/16/22 1451     Place sequential compression device  Until discontinued         08/16/22 1451                Discharge Planning   HARISH: 8/19/2022     Code Status: Full Code   Is the patient medically ready for discharge?:     Reason for patient still in hospital (select all that apply): Patient trending condition  Discharge Plan A: Home, Home Health, Home with family   Discharge Delays: None known at this time              Cassi Ibrahim PA-C  Department of Hospital Medicine   [facility]

## 2022-08-18 NOTE — ASSESSMENT & PLAN NOTE
Chronic, controlled.  Latest blood pressure and vitals reviewed-   Temp:  [97.8 °F (36.6 °C)-99.1 °F (37.3 °C)]   Pulse:  [68-90]   Resp:  [15-25]   BP: ()/(54-84)   SpO2:  [90 %-98 %] .   Home meds for hypertension were reviewed and noted below.   Hypertension Medications             amLODIPine (NORVASC) 5 MG tablet TAKE 1 TABLET EVERY DAY    metoprolol tartrate (LOPRESSOR) 25 MG tablet TAKE 1 TABLET TWICE DAILY          - Adjust home antihypertensive regimen as follows- Continue lopressor, norvasc discontinued, started on low dose entresto  - Will utilize PRN blood pressure medication only if patient's blood pressure greater than 180/110 and he develops symptoms such as worsening chest pain or shortness of breath.

## 2022-08-18 NOTE — ASSESSMENT & PLAN NOTE
Lipid profile on   Lab Results   Component Value Date    LDLCALC 47.8 (L) 08/18/2022    HDL 23 (L) 08/18/2022    TRIG 136 08/18/2022    CHOL 98 (L) 08/18/2022     Takes Atorvastatin at home    - Continue home dose statin  - Lipid panel as above  - Reports compliance with hyperlipidemia treatment as prescribed  - Denies adverse effects of medications

## 2022-08-18 NOTE — PROGRESS NOTES
IP Liaison - Initial Visit Note    Patient: Jin Ngo  MRN:  [id number]  Date of Service:  8/18/2022  Completed by:  EDWARDO Woody    Reason for Visit   Patient presents with    IP Liaison Initial Visit       RSW met with patient at bedside in order to complete SDOH questionnaire and liaison assessment.  Pt has identified no immediate social barriers to care. Pt family is pt support system. Pt interested in obtaining resources for future use, RSW will follow up at a later time.    The following were addressed during this visit:  - Review SDOH Questions   - Complete patient assessment   - Complete initial visit with patient        Patient Summary     IP Liaison Patient Assessment    General  Level of Caregiver support: Member independent and does not need caregiver assistance  Have you had to make a decision between paying for any of the following in the last 2 months?: None  Transportation means: Family  Employment status: Retired and not working  Assessments  Was the PHQ Depression Screening completed this visit?: No  Was the JORGE-7 Screening completed this visit?: No       EDWARDO Woody

## 2022-08-18 NOTE — PROGRESS NOTES
[facility]  Cardiology  Progress Note    Patient Name: Jin Ngo  MRN: [id number]  Admission Date: 8/16/2022  Hospital Length of Stay: 2 days  Code Status: Full Code   Attending Physician: Clarita Cronin MD   Primary Care Physician: Sam Barroso MD  Expected Discharge Date: 8/18/2022  Principal Problem:NSTEMI (non-ST elevated myocardial infarction)    Subjective:     Hospital Course:   08/17/2022 Per HPI   07/18/2022 s/p SVG-RCA RAYNA. Diuresing with IV lasix. Inaccurate intake and output documented. Norvasc discontinued, low dose Entresto initiated.       Past Medical History:   Diagnosis Date    ANGELIC (acute kidney injury) 7/1/2020    Anticoagulant long-term use     CKD stage 3 due to type 2 diabetes mellitus 3/22/2021    Coronary artery disease     Encounter for blood transfusion     Hyperlipidemia     Hypertension     Localized osteoporosis with current pathological fracture with delayed healing 6/9/2022    Myocardial infarction     Obesity     Other chronic pulmonary heart diseases     Paroxysmal atrial fibrillation 11/9/2020    Primary osteoarthritis involving multiple joints 3/8/2017    Stroke     Type 2 diabetes mellitus without complication     Uncontrolled type 2 diabetes mellitus with neurologic complication, without long-term current use of insulin 3/8/2017       Past Surgical History:   Procedure Laterality Date    CARDIAC SURGERY      CATARACT EXTRACTION, BILATERAL      COLONOSCOPY      2011    COLONOSCOPY N/A 3/22/2016    Procedure: COLONOSCOPY;  Surgeon: Sridhar Reynoso MD;  Location: [facility] ENDO;  Service: Endoscopy;  Laterality: N/A;    COLONOSCOPY N/A 12/7/2021    Procedure: Colonoscopy;  Surgeon: Bebeto Perry MD;  Location: [facility] ENDO;  Service: Endoscopy;  Laterality: N/A;    CORONARY ARTERY BYPASS GRAFT      EYE SURGERY  06/2014    Cataracts    PERCUTANEOUS TRANSLUMINAL ANGIOPLASTY (PTA) OF CAROTID ARTERY N/A 12/10/2020    Procedure: PTA,  "ARTERY, CAROTID;  Surgeon: Cheko Norris MD;  Location: [facility] CATH LAB;  Service: Cardiology;  Laterality: N/A;       Review of patient's allergies indicates:   Allergen Reactions    Metformin      Caused stroke    Pcn [penicillins]        No current facility-administered medications on file prior to encounter.     Current Outpatient Medications on File Prior to Encounter   Medication Sig    ACCU-CHEK VERONICA PLUS TEST STRP Strp TEST BLOOD SUGAR FOUR TIMES DAILY    allopurinoL (ZYLOPRIM) 300 MG tablet TAKE 1 TABLET EVERY DAY    amLODIPine (NORVASC) 5 MG tablet TAKE 1 TABLET EVERY DAY    atorvastatin (LIPITOR) 40 MG tablet Take 1 tablet (40 mg total) by mouth once daily.    BD ULTRA-FINE TEJINDER PEN NEEDLE 32 gauge x 5/32" Ndle USE WITH LEVEMIR DAILY    blood-glucose meter kit To check BG 4 times daily, to use with insurance preferred meter    clopidogreL (PLAVIX) 75 mg tablet TAKE 1 TABLET EVERY DAY    doxycycline (VIBRA-TABS) 100 MG tablet TAKE 1 TABLET BY MOUTH TWICE A DAY    DULoxetine (CYMBALTA) 30 MG capsule TAKE 1 CAPSULE (30 MG TOTAL) ONCE DAILY. TO HELP REDUCE FEELING OF BACK PAIN    ELIQUIS 5 mg Tab TAKE 1 TABLET BY MOUTH TWICE A DAY    fenofibrate 160 MG Tab TAKE 1 TABLET EVERY DAY    fluticasone propionate (FLONASE) 50 mcg/actuation nasal spray INHALE 1 SPRAY IN EACH NOSTRIL ROUTE ONCE DAILY.    glipiZIDE (GLUCOTROL) 5 MG tablet Take 2 tablets (10 mg total) by mouth daily with breakfast AND 1 tablet (5 mg total) daily with dinner or evening meal. Take 7.5 mg with breakfast and 5 mg with dinner.    HYDROcodone-acetaminophen (NORCO) 7.5-325 mg per tablet Take 1 tablet by mouth every 24 hours as needed for Pain (take 30 minutes prior to parcipating in physical therapy).    insulin detemir U-100 (LEVEMIR FLEXTOUCH U-100 INSULN) 100 unit/mL (3 mL) InPn pen Inject 12 Units into the skin every evening.    lancets (ACCU-CHEK SOFTCLIX LANCETS) Misc To check BG 4 times daily, to use with insurance " preferred meter.    metoprolol tartrate (LOPRESSOR) 25 MG tablet TAKE 1 TABLET TWICE DAILY    tamsulosin (FLOMAX) 0.4 mg Cap TAKE 1 CAPSULE BY MOUTH ONCE DAILY FOR URINE FLOW     Family History       Problem Relation (Age of Onset)    Arthritis Father    Coronary artery disease Father    Diabetes Mother    Heart attack Father    Heart disease Mother, Father    Hyperlipidemia Mother, Father, Brother    Hypertension Mother, Father, Brother, Son    No Known Problems Sister    Stroke Mother, Brother    Thyroid disease Brother          Tobacco Use    Smoking status: Former Smoker     Packs/day: 3.00     Years: 15.00     Pack years: 45.00     Types: Cigarettes     Quit date:      Years since quittin.6    Smokeless tobacco: Never Used   Substance and Sexual Activity    Alcohol use: Yes     Alcohol/week: 1.0 standard drink     Types: 1 Shots of liquor per week     Comment: 2-3 drinks per year    Drug use: No    Sexual activity: Not Currently     Partners: Female     Review of Systems   Constitutional: Negative. Negative for diaphoresis.   HENT: Negative.     Eyes: Negative.    Cardiovascular:  Positive for dyspnea on exertion and orthopnea (resolved). Negative for chest pain, irregular heartbeat, leg swelling, near-syncope, palpitations, paroxysmal nocturnal dyspnea and syncope.   Respiratory:  Positive for cough and shortness of breath.    Endocrine: Negative.    Skin: Negative.    Musculoskeletal: Negative.    Gastrointestinal:  Positive for nausea. Negative for melena and vomiting.   Genitourinary: Negative.    Neurological:  Positive for weakness.   Psychiatric/Behavioral: Negative.     Allergic/Immunologic: Negative.    Objective:     Vital Signs (Most Recent):  Temp: 98 °F (36.7 °C) (22 08)  Pulse: 68 (22 0809)  Resp: 18 (22 08)  BP: 131/84 (22 0809)  SpO2: 97 % (22 08)   Vital Signs (24h Range):  Temp:  [97.8 °F (36.6 °C)-99.1 °F (37.3 °C)] 98 °F (36.7 °C)  Pulse:   [68-91] 68  Resp:  [15-25] 18  SpO2:  [90 %-98 %] 97 %  BP: ()/(54-84) 131/84     Weight: 90.7 kg (200 lb)  Body mass index is 29.53 kg/m².    SpO2: 97 %  O2 Device (Oxygen Therapy): nasal cannula      Intake/Output Summary (Last 24 hours) at 8/18/2022 0836  Last data filed at 8/18/2022 0746  Gross per 24 hour   Intake 10 ml   Output 1300 ml   Net -1290 ml         Lines/Drains/Airways       Peripheral Intravenous Line  Duration                  Peripheral IV - Single Lumen 08/16/22 1339 20 G Right Forearm 1 day                    Physical Exam  Constitutional:       General: He is not in acute distress.     Appearance: He is not diaphoretic.   HENT:      Head: Atraumatic.   Eyes:      General:         Right eye: No discharge.         Left eye: No discharge.   Cardiovascular:      Rate and Rhythm: Normal rate and regular rhythm.   Pulmonary:      Effort: Pulmonary effort is normal.      Breath sounds: No rales.   Abdominal:      General: Bowel sounds are normal.      Palpations: Abdomen is soft.   Skin:     General: Skin is warm and dry.      Capillary Refill: Capillary refill takes less than 2 seconds.   Neurological:      Mental Status: He is alert. Mental status is at baseline.       Significant Labs: BMP:   Recent Labs   Lab 08/16/22  1340 08/17/22  0342 08/18/22  0401   * 122* 137*    140 140   K 5.1 4.1 4.3    105 102   CO2 20* 22* 24   BUN 38* 39* 46*   CREATININE 1.70* 1.8* 1.9*   CALCIUM 9.5 9.7 9.2   MG  --  1.7 1.7     , CMP   Recent Labs   Lab 08/16/22  1340 08/17/22  0342 08/18/22  0401    140 140   K 5.1 4.1 4.3    105 102   CO2 20* 22* 24   * 122* 137*   BUN 38* 39* 46*   CREATININE 1.70* 1.8* 1.9*   CALCIUM 9.5 9.7 9.2   PROT 6.9 7.0 6.7   ALBUMIN 4.1 3.5 3.6   BILITOT 0.9 0.7 1.5*   ALKPHOS 36* 33* 37*   * 88* 75*   ALT 62* 51* 44   ANIONGAP 10 13 14     , CBC   Recent Labs   Lab 08/16/22  1340 08/17/22  0343 08/18/22  0401   WBC 9.25 12.69 10.45    HGB 13.3* 13.1* 13.4*   HCT 40.8 39.2* 40.5    206 217     , INR   Recent Labs   Lab 08/16/22 2003   INR 1.3*     , Lipid Panel   Recent Labs   Lab 08/18/22  0401   CHOL 98*   HDL 23*   LDLCALC 47.8*   TRIG 136   CHOLHDL 23.5     , Troponin   Recent Labs   Lab 08/16/22  2339 08/17/22  0820 08/18/22  0401   TROPONINI 11.543* 18.825* 33.883*     , and All pertinent lab results from the last 24 hours have been reviewed.    Significant Imaging: Echocardiogram: Transthoracic echo (TTE) complete (Cupid Only):   Results for orders placed or performed during the hospital encounter of 08/16/22   Echo   Result Value Ref Range    Ascending aorta 2.82 cm    STJ 2.55 cm    AV mean gradient 3 mmHg    Ao peak bebeto 1.08 m/s    Ao VTI 18.39 cm    IVS 0.96 0.6 - 1.1 cm    LA size 5.12 cm    Left Atrium Major Axis 7.35 cm    Left Atrium Minor Axis 7.68 cm    LVIDd 5.77 3.5 - 6.0 cm    LVIDs 4.65 (A) 2.1 - 4.0 cm    LVOT diameter 1.88 cm    LVOT peak VTI 9.25 cm    Posterior Wall 0.92 0.6 - 1.1 cm    RA Major Axis 6.33 cm    RA Width 3.62 cm    RVDD 2.73 cm    TR Max Bebeto 3.41 m/s    LA WIDTH 4.30 cm    Ao root annulus 2.90 cm    PV PEAK VELOCITY 0.60 cm/s    LV Diastolic Volume 164.53 mL    LV Systolic Volume 100.05 mL    LVOT peak bebeto 0.60 m/s    Mr max bebeto 0.05 m/s    LA volume (mod) 74.85 cm3    RV S' 8.05 cm/s    FS 19 %    LA volume 140.56 cm3    LV mass 213.26 g    Left Ventricle Relative Wall Thickness 0.32 cm    AV valve area 1.40 cm2    AV Velocity Ratio 0.56     AV index (prosthetic) 0.50     LVOT area 2.8 cm2    LVOT stroke volume 25.66 cm3    AV peak gradient 5 mmHg    Triscuspid Valve Regurgitation Peak Gradient 47 mmHg    BSA 2.1 m2    LV Systolic Volume Index 48.3 mL/m2    LV Diastolic Volume Index 79.48 mL/m2    LA Volume Index 67.9 mL/m2    LV Mass Index 103 g/m2    LA Volume Index (Mod) 36.2 mL/m2    Right Atrial Pressure (from IVC) 8 mmHg    EF 30 %    TV rest pulmonary artery pressure 55 mmHg    Narrative     · The left ventricle is mildly enlarged with moderately decreased systolic   function.  · The estimated ejection fraction is 30%.  · There is moderate left ventricular global hypokinesis.  · A diastolic pattern consistent with atrial fibrillation observed.  · Mild-to-moderate mitral regurgitation.  · Mild aortic regurgitation.  · Mild tricuspid regurgitation.  · Mild pulmonic regurgitation.  · The estimated PA systolic pressure is 55 mmHg.  · Normal right ventricular size with normal right ventricular systolic   function.  · There is moderate pulmonary hypertension.        Assessment and Plan:     Brief HPI: Patient seen this morning on rounds, SOB reported as improving. Medications adjusted.     * NSTEMI (non-ST elevated myocardial infarction)  Presented with CP, SOB  Troponin elevated  S/p RAYNA to SVG-RCA  Continue DAPT, statin, BB, Entresto  Cardiac rehab as outpatient   Follow up with Dr Sotelo    Acute systolic heart failure  TTE  · The left ventricle is mildly enlarged with moderately decreased systolic function.  · The estimated ejection fraction is 30%.  · There is moderate left ventricular global hypokinesis.  · A diastolic pattern consistent with atrial fibrillation observed.  · Mild-to-moderate mitral regurgitation.  · Mild aortic regurgitation.  · Mild tricuspid regurgitation.  · Mild pulmonic regurgitation.  · The estimated PA systolic pressure is 55 mmHg.  · Normal right ventricular size with normal right ventricular systolic function.  · There is moderate pulmonary hypertension.    Entresto initiated at low dose, will up titrate as outpatient if BP can tolerate  Continue BB  Diuresing with IV Lasix- continue  Inaccurate intake and output documented  Repeat TTE 90 d post optimal GDMT    Acute pulmonary edema  Pulmonary edema on CXR  Orthopnea resolved  Diuresing with IV Lasix   Net -1.6 L   TTE pending     CKD stage 3 due to type 2 diabetes mellitus  Cr 1.9 ~ baseline     Carotid stenosis,  asymptomatic, bilateral  S/p LICA stent in 2020  Continue DAPT and statin     Paroxysmal atrial fibrillation  Continue BB Eliquis resumed     Uncontrolled type 2 diabetes mellitus with diabetic neuropathy, without long-term current use of insulin  HA1c 07/22 8.8   AccuChecks AC/HS with SSI     BMI 31.0-31.9,adult  Weight loss encouraged     Mixed hyperlipidemia  Continue statin     Coronary artery disease  S/p CABG in 2007  DAPT, statin, BB     Essential hypertension  SBP 100s-130s  Continue Davon HANSENsto initiated         VTE Risk Mitigation (From admission, onward)         Ordered     apixaban tablet 5 mg  2 times daily         08/18/22 0806     heparin infusion 1,000 units/500 ml in 0.9% NaCl (pressure line flush)  Intra-op continuous PRN         08/17/22 1231     Reason for No Pharmacological VTE Prophylaxis  Once        Question:  Reasons:  Answer:  Already adequately anticoagulated on oral Anticoagulants    08/16/22 1451     IP VTE HIGH RISK PATIENT  Once         08/16/22 1451     Place sequential compression device  Until discontinued         08/16/22 1451                Parminder Riggs NP  Cardiology  [place] [facility]

## 2022-08-19 ENCOUNTER — PATIENT OUTREACH (OUTPATIENT)
Dept: ADMINISTRATIVE | Facility: OTHER | Age: 77
End: 2022-08-19
Payer: MEDICARE

## 2022-08-19 LAB
ALBUMIN SERPL BCP-MCNC: 3 G/DL (ref 3.5–5.2)
ALBUMIN SERPL BCP-MCNC: 3.3 G/DL (ref 3.5–5.2)
ALP SERPL-CCNC: 42 U/L (ref 55–135)
ALT SERPL W/O P-5'-P-CCNC: 47 U/L (ref 10–44)
ANION GAP SERPL CALC-SCNC: 13 MMOL/L (ref 8–16)
ANION GAP SERPL CALC-SCNC: 14 MMOL/L (ref 8–16)
AST SERPL-CCNC: 55 U/L (ref 10–40)
BASOPHILS # BLD AUTO: 0.04 K/UL (ref 0–0.2)
BASOPHILS NFR BLD: 0.6 % (ref 0–1.9)
BILIRUB SERPL-MCNC: 1.3 MG/DL (ref 0.1–1)
BUN SERPL-MCNC: 61 MG/DL (ref 8–23)
BUN SERPL-MCNC: 68 MG/DL (ref 8–23)
CALCIUM SERPL-MCNC: 9.2 MG/DL (ref 8.7–10.5)
CALCIUM SERPL-MCNC: 9.4 MG/DL (ref 8.7–10.5)
CHLORIDE SERPL-SCNC: 100 MMOL/L (ref 95–110)
CHLORIDE SERPL-SCNC: 98 MMOL/L (ref 95–110)
CO2 SERPL-SCNC: 23 MMOL/L (ref 23–29)
CO2 SERPL-SCNC: 24 MMOL/L (ref 23–29)
CREAT SERPL-MCNC: 2.1 MG/DL (ref 0.5–1.4)
CREAT SERPL-MCNC: 2.4 MG/DL (ref 0.5–1.4)
DIFFERENTIAL METHOD: ABNORMAL
EOSINOPHIL # BLD AUTO: 0.3 K/UL (ref 0–0.5)
EOSINOPHIL NFR BLD: 4.1 % (ref 0–8)
ERYTHROCYTE [DISTWIDTH] IN BLOOD BY AUTOMATED COUNT: 14.6 % (ref 11.5–14.5)
EST. GFR  (NO RACE VARIABLE): 27 ML/MIN/1.73 M^2
EST. GFR  (NO RACE VARIABLE): 32 ML/MIN/1.73 M^2
GLUCOSE SERPL-MCNC: 150 MG/DL (ref 70–110)
GLUCOSE SERPL-MCNC: 209 MG/DL (ref 70–110)
HCT VFR BLD AUTO: 41.2 % (ref 40–54)
HGB BLD-MCNC: 14 G/DL (ref 14–18)
IMM GRANULOCYTES # BLD AUTO: 0.03 K/UL (ref 0–0.04)
IMM GRANULOCYTES NFR BLD AUTO: 0.5 % (ref 0–0.5)
LYMPHOCYTES # BLD AUTO: 1.3 K/UL (ref 1–4.8)
LYMPHOCYTES NFR BLD: 20.4 % (ref 18–48)
MAGNESIUM SERPL-MCNC: 1.7 MG/DL (ref 1.6–2.6)
MCH RBC QN AUTO: 30 PG (ref 27–31)
MCHC RBC AUTO-ENTMCNC: 34 G/DL (ref 32–36)
MCV RBC AUTO: 88 FL (ref 82–98)
MONOCYTES # BLD AUTO: 0.6 K/UL (ref 0.3–1)
MONOCYTES NFR BLD: 9.1 % (ref 4–15)
NEUTROPHILS # BLD AUTO: 4.2 K/UL (ref 1.8–7.7)
NEUTROPHILS NFR BLD: 65.3 % (ref 38–73)
NRBC BLD-RTO: 0 /100 WBC
PHOSPHATE SERPL-MCNC: 4.7 MG/DL (ref 2.7–4.5)
PHOSPHATE SERPL-MCNC: 5.2 MG/DL (ref 2.7–4.5)
PLATELET # BLD AUTO: 209 K/UL (ref 150–450)
PMV BLD AUTO: 11.6 FL (ref 9.2–12.9)
POCT GLUCOSE: 148 MG/DL (ref 70–110)
POCT GLUCOSE: 189 MG/DL (ref 70–110)
POCT GLUCOSE: 235 MG/DL (ref 70–110)
POCT GLUCOSE: 246 MG/DL (ref 70–110)
POTASSIUM SERPL-SCNC: 3.7 MMOL/L (ref 3.5–5.1)
POTASSIUM SERPL-SCNC: 4.5 MMOL/L (ref 3.5–5.1)
PROT SERPL-MCNC: 7.2 G/DL (ref 6–8.4)
RBC # BLD AUTO: 4.67 M/UL (ref 4.6–6.2)
SODIUM SERPL-SCNC: 135 MMOL/L (ref 136–145)
SODIUM SERPL-SCNC: 137 MMOL/L (ref 136–145)
WBC # BLD AUTO: 6.37 K/UL (ref 3.9–12.7)

## 2022-08-19 PROCEDURE — 97110 THERAPEUTIC EXERCISES: CPT | Mod: CQ

## 2022-08-19 PROCEDURE — 83735 ASSAY OF MAGNESIUM: CPT | Performed by: HOSPITALIST

## 2022-08-19 PROCEDURE — 27000221 HC OXYGEN, UP TO 24 HOURS

## 2022-08-19 PROCEDURE — 25000003 PHARM REV CODE 250: Performed by: NURSE PRACTITIONER

## 2022-08-19 PROCEDURE — 97110 THERAPEUTIC EXERCISES: CPT

## 2022-08-19 PROCEDURE — 97116 GAIT TRAINING THERAPY: CPT | Mod: CQ

## 2022-08-19 PROCEDURE — 36415 COLL VENOUS BLD VENIPUNCTURE: CPT

## 2022-08-19 PROCEDURE — 25000003 PHARM REV CODE 250

## 2022-08-19 PROCEDURE — 99233 SBSQ HOSP IP/OBS HIGH 50: CPT | Mod: ,,, | Performed by: NURSE PRACTITIONER

## 2022-08-19 PROCEDURE — 36415 COLL VENOUS BLD VENIPUNCTURE: CPT | Performed by: HOSPITALIST

## 2022-08-19 PROCEDURE — 99900035 HC TECH TIME PER 15 MIN (STAT)

## 2022-08-19 PROCEDURE — A4216 STERILE WATER/SALINE, 10 ML: HCPCS | Performed by: HOSPITALIST

## 2022-08-19 PROCEDURE — 85025 COMPLETE CBC W/AUTO DIFF WBC: CPT | Performed by: HOSPITALIST

## 2022-08-19 PROCEDURE — 97535 SELF CARE MNGMENT TRAINING: CPT

## 2022-08-19 PROCEDURE — 25000003 PHARM REV CODE 250: Performed by: HOSPITALIST

## 2022-08-19 PROCEDURE — 84100 ASSAY OF PHOSPHORUS: CPT | Performed by: HOSPITALIST

## 2022-08-19 PROCEDURE — 11000001 HC ACUTE MED/SURG PRIVATE ROOM

## 2022-08-19 PROCEDURE — 99233 PR SUBSEQUENT HOSPITAL CARE,LEVL III: ICD-10-PCS | Mod: ,,, | Performed by: NURSE PRACTITIONER

## 2022-08-19 PROCEDURE — 80069 RENAL FUNCTION PANEL: CPT

## 2022-08-19 PROCEDURE — 97530 THERAPEUTIC ACTIVITIES: CPT

## 2022-08-19 PROCEDURE — 63600175 PHARM REV CODE 636 W HCPCS: Performed by: HOSPITALIST

## 2022-08-19 PROCEDURE — 94761 N-INVAS EAR/PLS OXIMETRY MLT: CPT

## 2022-08-19 PROCEDURE — 80053 COMPREHEN METABOLIC PANEL: CPT | Performed by: HOSPITALIST

## 2022-08-19 RX ORDER — FUROSEMIDE 40 MG/1
40 TABLET ORAL DAILY
Status: DISCONTINUED | OUTPATIENT
Start: 2022-08-20 | End: 2022-08-20 | Stop reason: HOSPADM

## 2022-08-19 RX ORDER — POTASSIUM CHLORIDE 20 MEQ/1
20 TABLET, EXTENDED RELEASE ORAL ONCE
Status: COMPLETED | OUTPATIENT
Start: 2022-08-19 | End: 2022-08-19

## 2022-08-19 RX ORDER — METOPROLOL SUCCINATE 25 MG/1
25 TABLET, EXTENDED RELEASE ORAL DAILY
Status: DISCONTINUED | OUTPATIENT
Start: 2022-08-19 | End: 2022-08-20 | Stop reason: HOSPADM

## 2022-08-19 RX ADMIN — APIXABAN 5 MG: 5 TABLET, FILM COATED ORAL at 08:08

## 2022-08-19 RX ADMIN — ATORVASTATIN CALCIUM 40 MG: 40 TABLET, FILM COATED ORAL at 08:08

## 2022-08-19 RX ADMIN — Medication 10 ML: at 09:08

## 2022-08-19 RX ADMIN — INSULIN ASPART 4 UNITS: 100 INJECTION, SOLUTION INTRAVENOUS; SUBCUTANEOUS at 04:08

## 2022-08-19 RX ADMIN — CLOPIDOGREL 75 MG: 75 TABLET, FILM COATED ORAL at 08:08

## 2022-08-19 RX ADMIN — Medication 10 ML: at 04:08

## 2022-08-19 RX ADMIN — ASPIRIN 81 MG: 81 TABLET, COATED ORAL at 08:08

## 2022-08-19 RX ADMIN — METOPROLOL TARTRATE 25 MG: 25 TABLET, FILM COATED ORAL at 08:08

## 2022-08-19 RX ADMIN — FUROSEMIDE 40 MG: 10 INJECTION, SOLUTION INTRAMUSCULAR; INTRAVENOUS at 04:08

## 2022-08-19 RX ADMIN — Medication 10 ML: at 05:08

## 2022-08-19 RX ADMIN — INSULIN ASPART 4 UNITS: 100 INJECTION, SOLUTION INTRAVENOUS; SUBCUTANEOUS at 11:08

## 2022-08-19 RX ADMIN — SACUBITRIL AND VALSARTAN 1 TABLET: 24; 26 TABLET, FILM COATED ORAL at 08:08

## 2022-08-19 RX ADMIN — POTASSIUM CHLORIDE 20 MEQ: 1500 TABLET, EXTENDED RELEASE ORAL at 04:08

## 2022-08-19 RX ADMIN — TAMSULOSIN HYDROCHLORIDE 0.4 MG: 0.4 CAPSULE ORAL at 08:08

## 2022-08-19 RX ADMIN — INSULIN DETEMIR 12 UNITS: 100 INJECTION, SOLUTION SUBCUTANEOUS at 08:08

## 2022-08-19 RX ADMIN — ALLOPURINOL 300 MG: 100 TABLET ORAL at 08:08

## 2022-08-19 RX ADMIN — INSULIN ASPART 1 UNITS: 100 INJECTION, SOLUTION INTRAVENOUS; SUBCUTANEOUS at 08:08

## 2022-08-19 RX ADMIN — SODIUM CHLORIDE 250 ML: 0.9 INJECTION, SOLUTION INTRAVENOUS at 11:08

## 2022-08-19 NOTE — SUBJECTIVE & OBJECTIVE
Interval History: VSS this AM, hypotensive last night, dose of lasix held until this morning. Currently on 3 L. Without acute complaint this AM. Developed ANGELIC with Cr 2.1, received 250 cc bolus.     Review of Systems   Constitutional:  Negative for chills, diaphoresis and fever.   HENT:  Negative for congestion, sore throat, trouble swallowing and voice change.    Eyes:  Negative for visual disturbance.   Respiratory:  Positive for cough (non-productive) and shortness of breath. Negative for wheezing.    Cardiovascular:  Negative for chest pain (resolved), palpitations and leg swelling.   Gastrointestinal:  Negative for abdominal pain, blood in stool, constipation, diarrhea, nausea and vomiting.   Genitourinary:  Negative for decreased urine volume, dysuria, hematuria and urgency.   Musculoskeletal:  Positive for gait problem (Unsteady).   Skin:  Negative for pallor and wound.   Neurological:  Negative for dizziness, speech difficulty, weakness, light-headedness and headaches.   Psychiatric/Behavioral:  Negative for agitation and decreased concentration. The patient is not nervous/anxious.    Objective:     Vital Signs (Most Recent):  Temp: 98 °F (36.7 °C) (08/19/22 0745)  Pulse: 85 (08/19/22 0745)  Resp: 18 (08/19/22 0745)  BP: 127/66 (08/19/22 0745)  SpO2: 95 % (08/19/22 0800) Vital Signs (24h Range):  Temp:  [96.4 °F (35.8 °C)-98 °F (36.7 °C)] 98 °F (36.7 °C)  Pulse:  [54-85] 85  Resp:  [16-18] 18  SpO2:  [93 %-98 %] 95 %  BP: ()/(52-66) 127/66     Weight: 90.7 kg (200 lb)  Body mass index is 29.53 kg/m².    Intake/Output Summary (Last 24 hours) at 8/19/2022 1025  Last data filed at 8/19/2022 0712  Gross per 24 hour   Intake 240 ml   Output 1050 ml   Net -810 ml      Physical Exam  Vitals and nursing note reviewed.   Constitutional:       General: He is not in acute distress.     Appearance: He is well-developed. He is ill-appearing. He is not toxic-appearing or diaphoretic.   HENT:      Head: Normocephalic  and atraumatic.      Mouth/Throat:      Pharynx: No oropharyngeal exudate.   Eyes:      General: No scleral icterus.     Pupils: Pupils are equal, round, and reactive to light.   Neck:      Trachea: No tracheal deviation.   Cardiovascular:      Rate and Rhythm: Normal rate and regular rhythm.      Heart sounds: Normal heart sounds. No murmur heard.  Pulmonary:      Effort: Pulmonary effort is normal. No respiratory distress.      Breath sounds: No wheezing or rales.      Comments: On 3 L NC  Chest:      Chest wall: No tenderness.   Abdominal:      General: Bowel sounds are normal. There is no distension.      Palpations: Abdomen is soft. There is no mass.      Tenderness: There is no abdominal tenderness.   Musculoskeletal:         General: No deformity.      Cervical back: Neck supple.   Skin:     General: Skin is warm and dry.      Coloration: Skin is not pale.      Findings: No erythema or rash.   Neurological:      Mental Status: He is alert and oriented to person, place, and time.   Psychiatric:         Behavior: Behavior normal.       Significant Labs: All pertinent labs within the past 24 hours have been reviewed.  BMP:   Recent Labs   Lab 08/19/22  0700   *      K 3.7      CO2 24   BUN 61*   CREATININE 2.1*   CALCIUM 9.4   MG 1.7     CBC:   Recent Labs   Lab 08/18/22  0401 08/19/22  0701   WBC 10.45 6.37   HGB 13.4* 14.0   HCT 40.5 41.2    209     CMP:   Recent Labs   Lab 08/18/22  0401 08/19/22  0700    137   K 4.3 3.7    100   CO2 24 24   * 150*   BUN 46* 61*   CREATININE 1.9* 2.1*   CALCIUM 9.2 9.4   PROT 6.7 7.2   ALBUMIN 3.6 3.3*   BILITOT 1.5* 1.3*   ALKPHOS 37* 42*   AST 75* 55*   ALT 44 47*   ANIONGAP 14 13       Significant Imaging: I have reviewed all pertinent imaging results/findings within the past 24 hours.

## 2022-08-19 NOTE — PLAN OF CARE
Problem: Adult Inpatient Plan of Care  Goal: Plan of Care Review  Outcome: Ongoing, Progressing  Goal: Patient-Specific Goal (Individualized)  Outcome: Ongoing, Progressing  Goal: Absence of Hospital-Acquired Illness or Injury  Outcome: Ongoing, Progressing  Goal: Optimal Comfort and Wellbeing  Outcome: Ongoing, Progressing  Goal: Readiness for Transition of Care  Outcome: Ongoing, Progressing     Problem: Diabetes Comorbidity  Goal: Blood Glucose Level Within Targeted Range  Outcome: Ongoing, Progressing     Problem: Fluid and Electrolyte Imbalance (Acute Kidney Injury/Impairment)  Goal: Fluid and Electrolyte Balance  Outcome: Ongoing, Progressing     Problem: Oral Intake Inadequate (Acute Kidney Injury/Impairment)  Goal: Optimal Nutrition Intake  Outcome: Ongoing, Progressing     Problem: Renal Function Impairment (Acute Kidney Injury/Impairment)  Goal: Effective Renal Function  Outcome: Ongoing, Progressing     Problem: Adjustment to Illness (Acute Coronary Syndrome)  Goal: Optimal Adaptation to Illness  Outcome: Ongoing, Progressing

## 2022-08-19 NOTE — ASSESSMENT & PLAN NOTE
Chronic, controlled.  Latest blood pressure and vitals reviewed-   Temp:  [96.4 °F (35.8 °C)-98 °F (36.7 °C)]   Pulse:  [54-85]   Resp:  [16-18]   BP: ()/(52-66)   SpO2:  [93 %-98 %] .   Home meds for hypertension were reviewed and noted below.   Hypertension Medications             amLODIPine (NORVASC) 5 MG tablet TAKE 1 TABLET EVERY DAY    metoprolol tartrate (LOPRESSOR) 25 MG tablet TAKE 1 TABLET TWICE DAILY          - Adjust home antihypertensive regimen as follows- Continue lopressor, norvasc discontinued, started on low dose entresto  - Will utilize PRN blood pressure medication only if patient's blood pressure greater than 180/110 and he develops symptoms such as worsening chest pain or shortness of breath.

## 2022-08-19 NOTE — PLAN OF CARE
Problem: Physical Therapy  Goal: Physical Therapy Goal  Description: Goals to be met by: 22     Patient will increase functional independence with mobility by performin. Supine to sit with Modified [place]  2. Sit to supine with Modified [place]  3. Sit to stand transfer with Supervision  4. Bed to chair transfer with Supervision using Rolling Walker  5. Gait  x 200 feet with Supervision using Rolling Walker.     Outcome: Ongoing, Progressing   Sup to sit with S, sit to stand with SBA with rw, gt training with rw x 230 ft with rw. Pt performed BLE TE seated x 10 with demo/vcs.  Pt demo'd understanding.

## 2022-08-19 NOTE — PROGRESS NOTES
[facility]  Cardiology  Progress Note    Patient Name: Jin Ngo  MRN: [id number]  Admission Date: 8/16/2022  Hospital Length of Stay: 3 days  Code Status: Full Code   Attending Physician: Clarita Cronin MD   Primary Care Physician: Sam Barroso MD  Expected Discharge Date: 8/19/2022  Principal Problem:NSTEMI (non-ST elevated myocardial infarction)    Subjective:     Hospital Course:   08/17/2022 Per HPI   07/18/2022 s/p SVG-RCA RAYNA. Diuresing with IV lasix. Inaccurate intake and output documented. Norvasc discontinued, low dose Entresto initiated.   07/19/2022 Breathing back to baseline. Lasix converted to p.o.. No chest pain reported. Entresto on hold. BP marginal overnight.       Past Medical History:   Diagnosis Date    Acute hypoxemic respiratory failure 8/18/2022    ANGELIC (acute kidney injury) 7/1/2020    Anticoagulant long-term use     CKD stage 3 due to type 2 diabetes mellitus 3/22/2021    Coronary artery disease     Encounter for blood transfusion     Hyperlipidemia     Hypertension     Localized osteoporosis with current pathological fracture with delayed healing 6/9/2022    Myocardial infarction     Obesity     Other chronic pulmonary heart diseases     Paroxysmal atrial fibrillation 11/9/2020    Primary osteoarthritis involving multiple joints 3/8/2017    Stroke     Type 2 diabetes mellitus without complication     Uncontrolled type 2 diabetes mellitus with neurologic complication, without long-term current use of insulin 3/8/2017       Past Surgical History:   Procedure Laterality Date    CARDIAC SURGERY      CATARACT EXTRACTION, BILATERAL      COLONOSCOPY      2011    COLONOSCOPY N/A 3/22/2016    Procedure: COLONOSCOPY;  Surgeon: Sridhar Reynoso MD;  Location: [facility] ENDO;  Service: Endoscopy;  Laterality: N/A;    COLONOSCOPY N/A 12/7/2021    Procedure: Colonoscopy;  Surgeon: Bebeto Perry MD;  Location: [facility] ENDO;  Service: Endoscopy;  Laterality:  "N/A;    CORONARY ANGIOGRAPHY N/A 8/17/2022    Procedure: ANGIOGRAM, CORONARY ARTERY;  Surgeon: Pasquale Ramesh MD;  Location: [facility] CATH LAB/EP;  Service: Cardiology;  Laterality: N/A;    CORONARY ARTERY BYPASS GRAFT      CORONARY BYPASS GRAFT ANGIOGRAPHY  8/17/2022    Procedure: Bypass graft study;  Surgeon: Pasquale Ramesh MD;  Location: [facility] CATH LAB/EP;  Service: Cardiology;;    EYE SURGERY  06/2014    Cataracts    LEFT HEART CATHETERIZATION Left 8/17/2022    Procedure: Left heart cath;  Surgeon: Pasquale Ramesh MD;  Location: [facility] CATH LAB/EP;  Service: Cardiology;  Laterality: Left;    PERCUTANEOUS TRANSLUMINAL ANGIOPLASTY (PTA) OF CAROTID ARTERY N/A 12/10/2020    Procedure: PTA, ARTERY, CAROTID;  Surgeon: Chkeo Norris MD;  Location: [facility] CATH LAB;  Service: Cardiology;  Laterality: N/A;       Review of patient's allergies indicates:   Allergen Reactions    Metformin      Caused stroke    Pcn [penicillins]        No current facility-administered medications on file prior to encounter.     Current Outpatient Medications on File Prior to Encounter   Medication Sig    ACCU-CHEK VERONICA PLUS TEST STRP Strp TEST BLOOD SUGAR FOUR TIMES DAILY    allopurinoL (ZYLOPRIM) 300 MG tablet TAKE 1 TABLET EVERY DAY    amLODIPine (NORVASC) 5 MG tablet TAKE 1 TABLET EVERY DAY    atorvastatin (LIPITOR) 40 MG tablet Take 1 tablet (40 mg total) by mouth once daily.    BD ULTRA-FINE TEJINDER PEN NEEDLE 32 gauge x 5/32" Ndle USE WITH LEVEMIR DAILY    blood-glucose meter kit To check BG 4 times daily, to use with insurance preferred meter    clopidogreL (PLAVIX) 75 mg tablet TAKE 1 TABLET EVERY DAY    doxycycline (VIBRA-TABS) 100 MG tablet TAKE 1 TABLET BY MOUTH TWICE A DAY    DULoxetine (CYMBALTA) 30 MG capsule TAKE 1 CAPSULE (30 MG TOTAL) ONCE DAILY. TO HELP REDUCE FEELING OF BACK PAIN    ELIQUIS 5 mg Tab TAKE 1 TABLET BY MOUTH TWICE A DAY    fenofibrate 160 MG Tab TAKE 1 TABLET EVERY DAY    fluticasone " propionate (FLONASE) 50 mcg/actuation nasal spray INHALE 1 SPRAY IN EACH NOSTRIL ROUTE ONCE DAILY.    glipiZIDE (GLUCOTROL) 5 MG tablet Take 2 tablets (10 mg total) by mouth daily with breakfast AND 1 tablet (5 mg total) daily with dinner or evening meal. Take 7.5 mg with breakfast and 5 mg with dinner.    HYDROcodone-acetaminophen (NORCO) 7.5-325 mg per tablet Take 1 tablet by mouth every 24 hours as needed for Pain (take 30 minutes prior to parcipating in physical therapy).    insulin detemir U-100 (LEVEMIR FLEXTOUCH U-100 INSULN) 100 unit/mL (3 mL) InPn pen Inject 12 Units into the skin every evening.    lancets (ACCU-CHEK SOFTCLIX LANCETS) Misc To check BG 4 times daily, to use with insurance preferred meter.    metoprolol tartrate (LOPRESSOR) 25 MG tablet TAKE 1 TABLET TWICE DAILY    tamsulosin (FLOMAX) 0.4 mg Cap TAKE 1 CAPSULE BY MOUTH ONCE DAILY FOR URINE FLOW     Family History       Problem Relation (Age of Onset)    Arthritis Father    Coronary artery disease Father    Diabetes Mother    Heart attack Father    Heart disease Mother, Father    Hyperlipidemia Mother, Father, Brother    Hypertension Mother, Father, Brother, Son    No Known Problems Sister    Stroke Mother, Brother    Thyroid disease Brother          Tobacco Use    Smoking status: Former Smoker     Packs/day: 3.00     Years: 15.00     Pack years: 45.00     Types: Cigarettes     Quit date:      Years since quittin.6    Smokeless tobacco: Never Used   Substance and Sexual Activity    Alcohol use: Yes     Alcohol/week: 1.0 standard drink     Types: 1 Shots of liquor per week     Comment: 2-3 drinks per year    Drug use: No    Sexual activity: Not Currently     Partners: Female     Review of Systems   Constitutional: Negative. Negative for diaphoresis.   HENT: Negative.     Eyes: Negative.    Cardiovascular:  Positive for orthopnea (resolved). Negative for chest pain, irregular heartbeat, leg swelling, near-syncope,  palpitations, paroxysmal nocturnal dyspnea and syncope.   Respiratory:  Positive for cough. Negative for shortness of breath.    Endocrine: Negative.    Skin: Negative.    Musculoskeletal: Negative.    Gastrointestinal:  Negative for melena, nausea and vomiting.   Genitourinary: Negative.    Neurological:  Positive for weakness.   Psychiatric/Behavioral: Negative.     Allergic/Immunologic: Negative.    Objective:     Vital Signs (Most Recent):  Temp: 98 °F (36.7 °C) (08/19/22 0745)  Pulse: 85 (08/19/22 0745)  Resp: 18 (08/19/22 0745)  BP: 127/66 (08/19/22 0745)  SpO2: 95 % (08/19/22 0800)   Vital Signs (24h Range):  Temp:  [96.4 °F (35.8 °C)-98 °F (36.7 °C)] 98 °F (36.7 °C)  Pulse:  [54-85] 85  Resp:  [16-18] 18  SpO2:  [93 %-98 %] 95 %  BP: ()/(52-66) 127/66     Weight: 90.7 kg (200 lb)  Body mass index is 29.53 kg/m².    SpO2: 95 %  O2 Device (Oxygen Therapy): nasal cannula      Intake/Output Summary (Last 24 hours) at 8/19/2022 1037  Last data filed at 8/19/2022 0712  Gross per 24 hour   Intake 240 ml   Output 1050 ml   Net -810 ml         Lines/Drains/Airways       Peripheral Intravenous Line  Duration                  Peripheral IV - Single Lumen 08/16/22 1339 20 G Right Forearm 2 days                    Physical Exam  Constitutional:       General: He is not in acute distress.     Appearance: He is not diaphoretic.   HENT:      Head: Atraumatic.   Eyes:      General:         Right eye: No discharge.         Left eye: No discharge.   Cardiovascular:      Rate and Rhythm: Normal rate and regular rhythm.   Pulmonary:      Effort: Pulmonary effort is normal.      Breath sounds: No rales.   Abdominal:      General: Bowel sounds are normal.      Palpations: Abdomen is soft.   Skin:     General: Skin is warm and dry.      Capillary Refill: Capillary refill takes less than 2 seconds.   Neurological:      Mental Status: He is alert. Mental status is at baseline.       Significant Labs: BMP:   Recent Labs   Lab  08/18/22  0401 08/19/22  0700   * 150*    137   K 4.3 3.7    100   CO2 24 24   BUN 46* 61*   CREATININE 1.9* 2.1*   CALCIUM 9.2 9.4   MG 1.7 1.7     , CMP   Recent Labs   Lab 08/18/22  0401 08/19/22  0700    137   K 4.3 3.7    100   CO2 24 24   * 150*   BUN 46* 61*   CREATININE 1.9* 2.1*   CALCIUM 9.2 9.4   PROT 6.7 7.2   ALBUMIN 3.6 3.3*   BILITOT 1.5* 1.3*   ALKPHOS 37* 42*   AST 75* 55*   ALT 44 47*   ANIONGAP 14 13     , CBC   Recent Labs   Lab 08/18/22  0401 08/19/22  0701   WBC 10.45 6.37   HGB 13.4* 14.0   HCT 40.5 41.2    209     , INR   No results for input(s): INR, PROTIME in the last 48 hours.  , Lipid Panel   Recent Labs   Lab 08/18/22  0401   CHOL 98*   HDL 23*   LDLCALC 47.8*   TRIG 136   CHOLHDL 23.5     , Troponin   Recent Labs   Lab 08/18/22  0401   TROPONINI 33.883*     , and All pertinent lab results from the last 24 hours have been reviewed.    Significant Imaging: Echocardiogram: Transthoracic echo (TTE) complete (Cupid Only):   Results for orders placed or performed during the hospital encounter of 08/16/22   Echo   Result Value Ref Range    Ascending aorta 2.82 cm    STJ 2.55 cm    AV mean gradient 3 mmHg    Ao peak bebeto 1.08 m/s    Ao VTI 18.39 cm    IVS 0.96 0.6 - 1.1 cm    LA size 5.12 cm    Left Atrium Major Axis 7.35 cm    Left Atrium Minor Axis 7.68 cm    LVIDd 5.77 3.5 - 6.0 cm    LVIDs 4.65 (A) 2.1 - 4.0 cm    LVOT diameter 1.88 cm    LVOT peak VTI 9.25 cm    Posterior Wall 0.92 0.6 - 1.1 cm    RA Major Axis 6.33 cm    RA Width 3.62 cm    RVDD 2.73 cm    TR Max Bebeto 3.41 m/s    LA WIDTH 4.30 cm    Ao root annulus 2.90 cm    PV PEAK VELOCITY 0.60 cm/s    LV Diastolic Volume 164.53 mL    LV Systolic Volume 100.05 mL    LVOT peak bebeto 0.60 m/s    Mr max bebeto 0.05 m/s    LA volume (mod) 74.85 cm3    RV S' 8.05 cm/s    FS 19 %    LA volume 140.56 cm3    LV mass 213.26 g    Left Ventricle Relative Wall Thickness 0.32 cm    AV valve area 1.40 cm2    AV  Velocity Ratio 0.56     AV index (prosthetic) 0.50     LVOT area 2.8 cm2    LVOT stroke volume 25.66 cm3    AV peak gradient 5 mmHg    Triscuspid Valve Regurgitation Peak Gradient 47 mmHg    BSA 2.1 m2    LV Systolic Volume Index 48.3 mL/m2    LV Diastolic Volume Index 79.48 mL/m2    LA Volume Index 67.9 mL/m2    LV Mass Index 103 g/m2    LA Volume Index (Mod) 36.2 mL/m2    Right Atrial Pressure (from IVC) 8 mmHg    EF 30 %    TV rest pulmonary artery pressure 55 mmHg    Narrative    · The left ventricle is mildly enlarged with moderately decreased systolic   function.  · The estimated ejection fraction is 30%.  · There is moderate left ventricular global hypokinesis.  · A diastolic pattern consistent with atrial fibrillation observed.  · Mild-to-moderate mitral regurgitation.  · Mild aortic regurgitation.  · Mild tricuspid regurgitation.  · Mild pulmonic regurgitation.  · The estimated PA systolic pressure is 55 mmHg.  · Normal right ventricular size with normal right ventricular systolic   function.  · There is moderate pulmonary hypertension.        Assessment and Plan:     Brief HPI: Patient seen this morning on rounds without CV complaint. BBS clear. Medications adjusted.     * NSTEMI (non-ST elevated myocardial infarction)  Presented with CP, SOB  Troponin elevated  S/p RAYNA to SVG-RCA  Continue Plavix, statin, BB. No asa given the risk of bleeding with triple therapy   Cardiac rehab as outpatient   Follow up with Dr Deepak Melendez combined systolic and diastolic congestive heart failure  TTE  · The left ventricle is mildly enlarged with moderately decreased systolic function.  · The estimated ejection fraction is 30%.  · There is moderate left ventricular global hypokinesis.  · A diastolic pattern consistent with atrial fibrillation observed.  · Mild-to-moderate mitral regurgitation.  · Mild aortic regurgitation.  · Mild tricuspid regurgitation.  · Mild pulmonic regurgitation.  · The estimated PA systolic  pressure is 55 mmHg.  · Normal right ventricular size with normal right ventricular systolic function.  · There is moderate pulmonary hypertension.    Entresto to be initiated as an outpatient, BP marginal  Continue BB  Inaccurate intake and output documented  Repeat TTE 90 d post optimal GDMT  Lasix converted to 40 mg p.o. daily  Follow up with Dr Sotelo in 1-2 weeks     CKD stage 3 due to type 2 diabetes mellitus  Cr up to 2.1- hold ARB     Carotid stenosis, asymptomatic, bilateral  S/p LICA stent in 2020  Continue DAPT and statin     Paroxysmal atrial fibrillation  Continue BB, Eliquis      Uncontrolled type 2 diabetes mellitus with diabetic neuropathy, without long-term current use of insulin  HA1c 07/22 8.8; repeat pending   AccuChecks AC/HS with SSI     BMI 31.0-31.9,adult  Weight loss encouraged     Mixed hyperlipidemia  Continue statin     Coronary artery disease  S/p CABG in 2007  DAPT, statin, BB  LHC today   TTE pending     Essential hypertension  SBP 90s-110s  Continue BB        VTE Risk Mitigation (From admission, onward)         Ordered     apixaban tablet 5 mg  2 times daily         08/18/22 0806     heparin infusion 1,000 units/500 ml in 0.9% NaCl (pressure line flush)  Intra-op continuous PRN         08/17/22 1231     Reason for No Pharmacological VTE Prophylaxis  Once        Question:  Reasons:  Answer:  Already adequately anticoagulated on oral Anticoagulants    08/16/22 1451     IP VTE HIGH RISK PATIENT  Once         08/16/22 1451     Place sequential compression device  Until discontinued         08/16/22 1451                Parminder Riggs NP  Cardiology  [facility]

## 2022-08-19 NOTE — ASSESSMENT & PLAN NOTE
Presented with CP, SOB  Troponin elevated  S/p RAYNA to SVG-RCA  Continue Plavix, statin, BB. No asa given the risk of bleeding with triple therapy   Cardiac rehab as outpatient   Follow up with Dr Sotelo

## 2022-08-19 NOTE — PLAN OF CARE
"   08/19/22 1056   Post-Acute Status   Post-Acute Authorization Home Health;HME   HME Status Pending post-acute provider review/more information requested  (Recs for DME noted. To be reviewed with [employer]. Pending approval)   Home Health Status Pending post-acute provider review/more information requested  (Recs for HH per therapy. Orders requested from ANJALI Ye. Pt also has referral for Cardiac Rehab. To be initiated after HH completed.)   Other Status No Post-Acute Service Needs   Discharge Delays None known at this time   Discharge Plan   Discharge Plan A Home;Home with family;Home Health     Future Appointments   Date Time Provider Department Center   8/22/2022  2:00 PM Rahel Alvarado, PT [facility] REHABOP [facility]   8/23/2022  9:40 AM Sam Barroso MD [facility] FAM MED [facility]   8/24/2022  1:00 PM Merlin Osei, PTA [facility] REHABOP [facility]   8/29/2022  1:00 PM [place] PT [facility] RE[facility]AB[facility]   8/31/2022  1:00 PM Merlin Osei, PTA [facility] RE[facility]   9/1/2022  9:40 AM Pasquale Ramesh MD [facility] CARDIO [place]   9/12/2022 10:00 AM HAYES Watts [facility]   10/10/2022 12:00 PM LAB, [facility] LAB [facility]   10/10/2022 12:10 PM LAB, [facility] LAB [facility]   10/17/2022 10:00 AM Ester Mitchell NP [facility] ENDOSABRINA [address]   11/14/2022  1:40 PM Oneyda Matthew MD [facility]   1/20/2023  9:00 AM Sam Barroso MD [facility] FAM MED [facility]     /66 (BP Location: Left arm, Patient Position: Lying)   Pulse 85   Temp 98 °F (36.7 °C) (Axillary)   Resp 18   Ht 5' 9" (1.753 m)   Wt 90.7 kg (200 lb)   SpO2 95%   BMI 29.53 kg/m²      allopurinoL  300 mg Oral Daily    apixaban  5 mg Oral BID    aspirin  81 mg Oral Daily    atorvastatin  40 mg Oral Daily    clopidogreL  75 mg Oral Daily    [START ON 8/20/2022] furosemide  40 mg Oral Daily    insulin detemir U-100  12 Units Subcutaneous QHS    metoprolol succinate  25 mg Oral " Daily    polyethylene glycol  17 g Oral Daily    sodium chloride 0.9%  250 mL Intravenous Once    sodium chloride 0.9%  10 mL Intravenous Q8H    tamsulosin  0.4 mg Oral Daily

## 2022-08-19 NOTE — ASSESSMENT & PLAN NOTE
Home antihyperglycemic regimen: glipizide with long acting insulin  Last A1c reviewed-   Lab Results   Component Value Date    HGBA1C 7.4 (H) 08/17/2022     Most recent fingerstick glucose reviewed-   Recent Labs     08/17/22 2006 08/18/22  0812 08/18/22  1125 08/18/22  1635 08/18/22  1921 08/19/22  0410   POCTGLUCOSE 149* 122* 163* 210* 213* 148*     - Hold home oral antihyperglycemics while in hospital  - Patient's FSGs are controlled on current medication regimen.  - Continue 12 detemir QHS  - Current correctional scale  Medium  - POCT accuchecks ACHS  - Diabetic diet   - BG goals: Preprandial <140 mg/dL, Random  <180 mg/dL    Antihyperglycemics (From admission, onward)            Start     Stop Route Frequency Ordered    08/16/22 2100  insulin detemir U-100 pen 12 Units         -- SubQ Nightly 08/16/22 1451    08/16/22 1549  insulin aspart U-100 pen 1-10 Units         -- SubQ Before meals & nightly PRN 08/16/22 1451

## 2022-08-19 NOTE — ASSESSMENT & PLAN NOTE
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 Cause of exacerbation is NSTEMI  Bibasilar rales on exam  CXR revealed pulmonary edema  ECG showed atrial fibrillation with nonspecific ST wave changes    - TTE as below  - Cardiology following, recommendations as below   - Entresto discontinued, plan to restart as outpatient   - Continue BB   - Start PO lasix QD   - Repeat TTE 90 d post optimal GDMT  - On 3 L NC, wean as tolerated  -  Daily weights  - Strict I/Os  - Monitor on telemetry  - Monitor and trend BMP, Mg, and renal function; keep K >4, Mg >2  - Monitor for signs of fluid overload: RR>30, O2 sat<92%, weight gain of >3 lbs in 24 hours, or urinary output <160ml/8hr  - CPAP at 6-8 mm H2O for mild respiratory distress  - SCDs, TEDs, Nursing communication to elevated LE   - Ambulate as tolerated    Last TTE Results for orders placed during the hospital encounter of 08/16/22    Echo    Interpretation Summary  · The left ventricle is mildly enlarged with moderately decreased systolic function.  · The estimated ejection fraction is 30%.  · There is moderate left ventricular global hypokinesis.  · A diastolic pattern consistent with atrial fibrillation observed.  · Mild-to-moderate mitral regurgitation.  · Mild aortic regurgitation.  · Mild tricuspid regurgitation.  · Mild pulmonic regurgitation.  · The estimated PA systolic pressure is 55 mmHg.  · Normal right ventricular size with normal right ventricular systolic function.  · There is moderate pulmonary hypertension.

## 2022-08-19 NOTE — PT/OT/SLP PROGRESS
Physical Therapy Treatment    Patient Name:  Jin Ngo   MRN:  [id number]    Recommendations:     Discharge Recommendations:  home health OT, home health PT (transition to cardiac rehab)   Discharge Equipment Recommendations: bedside commode, walker, rolling   Barriers to discharge: None    Assessment:     Jin Ngo is a 76 y.o. male admitted with a medical diagnosis of NSTEMI (non-ST elevated myocardial infarction).  He presents with the following impairments/functional limitations:  weakness, impaired endurance, impaired self care skills, impaired functional mobility, gait instability, impaired balance, decreased lower extremity function, pain pt amb with decreased cadenc, decreased step length and height, NBOS..    Rehab Prognosis: Good; patient would benefit from acute skilled PT services to address these deficits and reach maximum level of function.    Recent Surgery: Procedure(s) (LRB):  Left heart cath (Left)  Bypass graft study  ANGIOGRAM, CORONARY ARTERY (N/A)  IVUS, Coronary  Stent RAYNA bypass graft 2 Days Post-Op    Plan:     During this hospitalization, patient to be seen 3 x/week to address the identified rehab impairments via gait training, therapeutic activities, therapeutic exercises, neuromuscular re-education and progress toward the following goals:    · Plan of Care Expires:  09/18/22    Subjective     Chief Complaint: just a little nagging pain in the groin  Patient/Family Comments/goals: pt agreed to therapy.  Pain/Comfort:  · Pain Rating 1: 2/10  · Location 1: groin  · Pain Addressed 1: Pre-medicate for activity, Reposition, Distraction, Cessation of Activity, Nurse notified  · Pain Rating Post-Intervention 1: 2/10      Objective:     Communicated with nurseCelina prior to session.  Patient found HOB elevated with telemetry, peripheral IV upon PT entry to room.     General Precautions: Standard, fall   Orthopedic Precautions:N/A   Braces:    Respiratory Status: Room air     Functional  Mobility:  · Bed Mobility:     · Rolling Right: modified independence  · Scooting: modified independence  · Supine to Sit: supervision  · Transfers:     · Sit to Stand:  stand by assistance with rolling walker  · Gait: 230 ft with rw with SBA  · Balance: F+ dynamic standing, G sitting      AM-PAC 6 CLICK MOBILITY  Turning over in bed (including adjusting bedclothes, sheets and blankets)?: 4  Sitting down on and standing up from a chair with arms (e.g., wheelchair, bedside commode, etc.): 4  Moving from lying on back to sitting on the side of the bed?: 4  Moving to and from a bed to a chair (including a wheelchair)?: 3  Need to walk in hospital room?: 3  Climbing 3-5 steps with a railing?: 3  Basic Mobility Total Score: 21       Therapeutic Activities and Exercises:   Lower Extremity Exercises.   Patient educated on the purpose of therapeutic exercise.     Patient verbalized acceptance/understanding of instructions, expectations, and limitations(for safety).   Patient performed: 10 reps (each) of B LE There Ex: AP, LAQ, Hip abd/add, Hip flexion while sitting up on EOB.        Patient required still requires verbal cues/tactile cues to ensure correct sequence, to maintain proper form, and to allow for self-correction.   Pt reported no complaints or problems with exercise activity.       Patient left up in chair with all lines intact, call button in reach and spouse present..    GOALS:   Multidisciplinary Problems     Physical Therapy Goals        Problem: Physical Therapy    Goal Priority Disciplines Outcome Goal Variances Interventions   Physical Therapy Goal     PT, PT/OT Ongoing, Progressing     Description: Goals to be met by: 22     Patient will increase functional independence with mobility by performin. Supine to sit with Modified [place]  2. Sit to supine with Modified [place]  3. Sit to stand transfer with Supervision  4. Bed to chair transfer with Supervision using Rolling  Walker  5. Gait  x 200 feet with Supervision using Rolling Walker.                      Time Tracking:     PT Received On: 08/19/22  PT Start Time: 1150     PT Stop Time: 1213  PT Total Time (min): 23 min     Billable Minutes: Gait Training 8 and Therapeutic Exercise 15    Treatment Type: Treatment  PT/PTA: PTA     PTA Visit Number: 1     08/19/2022

## 2022-08-19 NOTE — ASSESSMENT & PLAN NOTE
Chronic Kidney Disease Stage III  Creatinine 1.7 on admission, baseline around 1.5-1.7  Etiology ikely pre-renal from dehydration    Lab Results   Component Value Date    CREATININE 2.1 (H) 08/19/2022     Estimated Creatinine Clearance: 33.3 mL/min (A) (based on SCr of 2.1 mg/dL (H)). according to latest data    - Developed ANGELIC this AM  -   cc bolus, plan to recheck Cr  - Avoid hypotension  - Strict I&Os and daily weights   - Avoid nephrotoxins, including NSAIDs, aminoglycosides, IV contrast (unless absolutely necessary), gadolinium, fleets and other phosphorous-based laxatives. Caution with antibiotics.  - Renally dose medications according to current GFR.  - No indications for HD at this time

## 2022-08-19 NOTE — ASSESSMENT & PLAN NOTE
Previously diagnosed  Takes eliquis and lopressor at home  ECG showed atrial fibrillation  Likely etiology fluid overload  Denies palpitations  [id number] score is 6, annual risk of stroke is 9.7%  HAS-BLED score is 2, annual bleeding risk is 4.1%    - Eliquis initially held, heparin gtt started, discontinued post-LHC, patient to restart eliquis  - Continue lopressor  - Start PO lasix 40 mg QD

## 2022-08-19 NOTE — PROGRESS NOTES
08/19/22 1000   Room Air SpO2 At Rest   Room Air SpO2 At Rest 95 %   Ambulation SpO2 Evaluation on Room Air   Room Air SpO2 During Ambulation 91 %   Pulse 99 bpm   SpO2 On Post Ambulation   SpO2 Post Ambulation 95 %   Post Ambulation Heart Rate 85 bpm   Post Ambulation O2 LPM 0 LPM   Home O2 Eval Comments pt sat between 91-94% while ambulation and required 02 for recovery

## 2022-08-19 NOTE — NURSING
Pt's BP now 118/58 HR 62 from previous 94/57 and 97/52. Lasix 40mg IV due now. RASHID Barrientos NP called and notified of all above written. New order ok to give lasix as scheduled, repeated and verified.

## 2022-08-19 NOTE — PROGRESS NOTES
EDWARDO met with patient to discuss discharge and additional patient barriers to care. EDWARDO provided pt with [facility] and [place] [facility] information. Pt expressed understanding at this time. Pt identified no additional social barriers to care. Per pt, pt is not in need additional of resources at this time.    The following were addressed during this visit:  -Complete follow-up with patient    EDWARDO Woody

## 2022-08-19 NOTE — SUBJECTIVE & OBJECTIVE
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    Coronary artery disease     Encounter for blood transfusion     Hyperlipidemia     Hypertension     Localized osteoporosis with current pathological fracture with delayed healing 6/9/2022    Myocardial infarction     Obesity     Other chronic pulmonary heart diseases     Paroxysmal atrial fibrillation 11/9/2020    Primary osteoarthritis involving multiple joints 3/8/2017    Stroke     Type 2 diabetes mellitus without complication     Uncontrolled type 2 diabetes mellitus with neurologic complication, without long-term current use of insulin 3/8/2017       Past Surgical History:   Procedure Laterality Date    CARDIAC SURGERY      CATARACT EXTRACTION, BILATERAL      COLONOSCOPY      2011    COLONOSCOPY N/A 3/22/2016    Procedure: COLONOSCOPY;  Surgeon: Sridhar Reynoso MD;  Location: [facility] ENDO;  Service: Endoscopy;  Laterality: N/A;    COLONOSCOPY N/A 12/7/2021    Procedure: Colonoscopy;  Surgeon: Bebeto Perry MD;  Location: [facility] ENDO;  Service: Endoscopy;  Laterality: N/A;    CORONARY ANGIOGRAPHY N/A 8/17/2022    Procedure: ANGIOGRAM, CORONARY ARTERY;  Surgeon: Pasquale Ramesh MD;  Location: [facility] CATH LAB/EP;  Service: Cardiology;  Laterality: N/A;    CORONARY ARTERY BYPASS GRAFT      CORONARY BYPASS GRAFT ANGIOGRAPHY  8/17/2022    Procedure: Bypass graft study;  Surgeon: Pasquale Ramesh MD;  Location: [facility] CATH LAB/EP;  Service: Cardiology;;    EYE SURGERY  06/2014    Cataracts    LEFT HEART CATHETERIZATION Left 8/17/2022    Procedure: Left heart cath;  Surgeon: Pasquale Ramesh MD;  Location: [facility] CATH LAB/EP;  Service: Cardiology;  Laterality: Left;    PERCUTANEOUS TRANSLUMINAL ANGIOPLASTY (PTA) OF CAROTID ARTERY N/A 12/10/2020    Procedure: PTA, ARTERY, CAROTID;  Surgeon: Cheko  "PRISCILLA Norris MD;  Location: [facility] CATH LAB;  Service: Cardiology;  Laterality: N/A;       Review of patient's allergies indicates:   Allergen Reactions    Metformin      Caused stroke    Pcn [penicillins]        No current facility-administered medications on file prior to encounter.     Current Outpatient Medications on File Prior to Encounter   Medication Sig    ACCU-CHEK VERONICA PLUS TEST STRP Strp TEST BLOOD SUGAR FOUR TIMES DAILY    allopurinoL (ZYLOPRIM) 300 MG tablet TAKE 1 TABLET EVERY DAY    amLODIPine (NORVASC) 5 MG tablet TAKE 1 TABLET EVERY DAY    atorvastatin (LIPITOR) 40 MG tablet Take 1 tablet (40 mg total) by mouth once daily.    BD ULTRA-FINE TEJINDER PEN NEEDLE 32 gauge x 5/32" Ndle USE WITH LEVEMIR DAILY    blood-glucose meter kit To check BG 4 times daily, to use with insurance preferred meter    clopidogreL (PLAVIX) 75 mg tablet TAKE 1 TABLET EVERY DAY    doxycycline (VIBRA-TABS) 100 MG tablet TAKE 1 TABLET BY MOUTH TWICE A DAY    DULoxetine (CYMBALTA) 30 MG capsule TAKE 1 CAPSULE (30 MG TOTAL) ONCE DAILY. TO HELP REDUCE FEELING OF BACK PAIN    ELIQUIS 5 mg Tab TAKE 1 TABLET BY MOUTH TWICE A DAY    fenofibrate 160 MG Tab TAKE 1 TABLET EVERY DAY    fluticasone propionate (FLONASE) 50 mcg/actuation nasal spray INHALE 1 SPRAY IN EACH NOSTRIL ROUTE ONCE DAILY.    glipiZIDE (GLUCOTROL) 5 MG tablet Take 2 tablets (10 mg total) by mouth daily with breakfast AND 1 tablet (5 mg total) daily with dinner or evening meal. Take 7.5 mg with breakfast and 5 mg with dinner.    HYDROcodone-acetaminophen (NORCO) 7.5-325 mg per tablet Take 1 tablet by mouth every 24 hours as needed for Pain (take 30 minutes prior to parcipating in physical therapy).    insulin detemir U-100 (LEVEMIR FLEXTOUCH U-100 INSULN) 100 unit/mL (3 mL) InPn pen Inject 12 Units into the skin every evening.    lancets (ACCU-CHEK SOFTCLIX LANCETS) Misc To check BG 4 times daily, to use with insurance preferred meter.    metoprolol tartrate (LOPRESSOR) " 25 MG tablet TAKE 1 TABLET TWICE DAILY    tamsulosin (FLOMAX) 0.4 mg Cap TAKE 1 CAPSULE BY MOUTH ONCE DAILY FOR URINE FLOW     Family History       Problem Relation (Age of Onset)    Arthritis Father    Coronary artery disease Father    Diabetes Mother    Heart attack Father    Heart disease Mother, Father    Hyperlipidemia Mother, Father, Brother    Hypertension Mother, Father, Brother, Son    No Known Problems Sister    Stroke Mother, Brother    Thyroid disease Brother          Tobacco Use    Smoking status: Former Smoker     Packs/day: 3.00     Years: 15.00     Pack years: 45.00     Types: Cigarettes     Quit date:      Years since quittin.6    Smokeless tobacco: Never Used   Substance and Sexual Activity    Alcohol use: Yes     Alcohol/week: 1.0 standard drink     Types: 1 Shots of liquor per week     Comment: 2-3 drinks per year    Drug use: No    Sexual activity: Not Currently     Partners: Female     Review of Systems   Constitutional: Negative. Negative for diaphoresis.   HENT: Negative.     Eyes: Negative.    Cardiovascular:  Positive for orthopnea (resolved). Negative for chest pain, irregular heartbeat, leg swelling, near-syncope, palpitations, paroxysmal nocturnal dyspnea and syncope.   Respiratory:  Positive for cough. Negative for shortness of breath.    Endocrine: Negative.    Skin: Negative.    Musculoskeletal: Negative.    Gastrointestinal:  Negative for melena, nausea and vomiting.   Genitourinary: Negative.    Neurological:  Positive for weakness.   Psychiatric/Behavioral: Negative.     Allergic/Immunologic: Negative.    Objective:     Vital Signs (Most Recent):  Temp: 98 °F (36.7 °C) (22 0745)  Pulse: 85 (22 0745)  Resp: 18 (22 0745)  BP: 127/66 (22 0745)  SpO2: 95 % (22 0800)   Vital Signs (24h Range):  Temp:  [96.4 °F (35.8 °C)-98 °F (36.7 °C)] 98 °F (36.7 °C)  Pulse:  [54-85] 85  Resp:  [16-18] 18  SpO2:  [93 %-98 %] 95 %  BP: ()/(52-66) 127/66      Weight: 90.7 kg (200 lb)  Body mass index is 29.53 kg/m².    SpO2: 95 %  O2 Device (Oxygen Therapy): nasal cannula      Intake/Output Summary (Last 24 hours) at 8/19/2022 1037  Last data filed at 8/19/2022 0712  Gross per 24 hour   Intake 240 ml   Output 1050 ml   Net -810 ml         Lines/Drains/Airways       Peripheral Intravenous Line  Duration                  Peripheral IV - Single Lumen 08/16/22 1339 20 G Right Forearm 2 days                    Physical Exam  Constitutional:       General: He is not in acute distress.     Appearance: He is not diaphoretic.   HENT:      Head: Atraumatic.   Eyes:      General:         Right eye: No discharge.         Left eye: No discharge.   Cardiovascular:      Rate and Rhythm: Normal rate and regular rhythm.   Pulmonary:      Effort: Pulmonary effort is normal.      Breath sounds: No rales.   Abdominal:      General: Bowel sounds are normal.      Palpations: Abdomen is soft.   Skin:     General: Skin is warm and dry.      Capillary Refill: Capillary refill takes less than 2 seconds.   Neurological:      Mental Status: He is alert. Mental status is at baseline.       Significant Labs: BMP:   Recent Labs   Lab 08/18/22  0401 08/19/22  0700   * 150*    137   K 4.3 3.7    100   CO2 24 24   BUN 46* 61*   CREATININE 1.9* 2.1*   CALCIUM 9.2 9.4   MG 1.7 1.7     , CMP   Recent Labs   Lab 08/18/22  0401 08/19/22  0700    137   K 4.3 3.7    100   CO2 24 24   * 150*   BUN 46* 61*   CREATININE 1.9* 2.1*   CALCIUM 9.2 9.4   PROT 6.7 7.2   ALBUMIN 3.6 3.3*   BILITOT 1.5* 1.3*   ALKPHOS 37* 42*   AST 75* 55*   ALT 44 47*   ANIONGAP 14 13     , CBC   Recent Labs   Lab 08/18/22  0401 08/19/22  0701   WBC 10.45 6.37   HGB 13.4* 14.0   HCT 40.5 41.2    209     , INR   No results for input(s): INR, PROTIME in the last 48 hours.  , Lipid Panel   Recent Labs   Lab 08/18/22  0401   CHOL 98*   HDL 23*   LDLCALC 47.8*   TRIG 136   CHOLHDL 23.5     ,  Troponin   Recent Labs   Lab 08/18/22  0401   TROPONINI 33.883*     , and All pertinent lab results from the last 24 hours have been reviewed.    Significant Imaging: Echocardiogram: Transthoracic echo (TTE) complete (Cupid Only):   Results for orders placed or performed during the hospital encounter of 08/16/22   Echo   Result Value Ref Range    Ascending aorta 2.82 cm    STJ 2.55 cm    AV mean gradient 3 mmHg    Ao peak bebeto 1.08 m/s    Ao VTI 18.39 cm    IVS 0.96 0.6 - 1.1 cm    LA size 5.12 cm    Left Atrium Major Axis 7.35 cm    Left Atrium Minor Axis 7.68 cm    LVIDd 5.77 3.5 - 6.0 cm    LVIDs 4.65 (A) 2.1 - 4.0 cm    LVOT diameter 1.88 cm    LVOT peak VTI 9.25 cm    Posterior Wall 0.92 0.6 - 1.1 cm    RA Major Axis 6.33 cm    RA Width 3.62 cm    RVDD 2.73 cm    TR Max Bebeto 3.41 m/s    LA WIDTH 4.30 cm    Ao root annulus 2.90 cm    PV PEAK VELOCITY 0.60 cm/s    LV Diastolic Volume 164.53 mL    LV Systolic Volume 100.05 mL    LVOT peak bebeto 0.60 m/s    Mr max bebeto 0.05 m/s    LA volume (mod) 74.85 cm3    RV S' 8.05 cm/s    FS 19 %    LA volume 140.56 cm3    LV mass 213.26 g    Left Ventricle Relative Wall Thickness 0.32 cm    AV valve area 1.40 cm2    AV Velocity Ratio 0.56     AV index (prosthetic) 0.50     LVOT area 2.8 cm2    LVOT stroke volume 25.66 cm3    AV peak gradient 5 mmHg    Triscuspid Valve Regurgitation Peak Gradient 47 mmHg    BSA 2.1 m2    LV Systolic Volume Index 48.3 mL/m2    LV Diastolic Volume Index 79.48 mL/m2    LA Volume Index 67.9 mL/m2    LV Mass Index 103 g/m2    LA Volume Index (Mod) 36.2 mL/m2    Right Atrial Pressure (from IVC) 8 mmHg    EF 30 %    TV rest pulmonary artery pressure 55 mmHg    Narrative    · The left ventricle is mildly enlarged with moderately decreased systolic   function.  · The estimated ejection fraction is 30%.  · There is moderate left ventricular global hypokinesis.  · A diastolic pattern consistent with atrial fibrillation observed.  · Mild-to-moderate mitral  regurgitation.  · Mild aortic regurgitation.  · Mild tricuspid regurgitation.  · Mild pulmonic regurgitation.  · The estimated PA systolic pressure is 55 mmHg.  · Normal right ventricular size with normal right ventricular systolic   function.  · There is moderate pulmonary hypertension.

## 2022-08-19 NOTE — PROGRESS NOTES
[facility] Medicine  Progress Note    Patient Name: Jin Ngo  MRN: [id number]  Patient Class: IP- Inpatient   Admission Date: 8/16/2022  Length of Stay: 3 days  Attending Physician: Clarita Cronin MD  Primary Care Provider: Sam Barroso MD      Subjective:     Principal Problem:NSTEMI (non-ST elevated myocardial infarction)      HPI:  Jin Ngo is a 76 year old male with PMHx of CKD, CAD (MI, S/p CABG on Plavix and Eliquis), CVA, Left Carotid Stent, hypertension, hyperlipidemia, pulmonary hypertension and diabetes who presented to [facility] ED today with reports of worsening shortness of breath since yesterday with associated cough productive of phlegm and nasal congestion along with some nausea.  Patient and wife states that yesterday he had episode of shortness of breath where he fell into his chair. ECG with no STEMI, unchanged from prior. Chest xray with evidence of Pulmonary Edema. Labs significant for Troponin of 8.4, Pro-BNP 8080, mildly elevated BUN/Cr.  with cardiology was consulted from the ED.  He was given Aspirin, Lasix 40mg x 2 and Duoneb in the ED. He is admitted to [facility] for NSTEMI      Overview/Hospital Course:  Patient presented with chief complaint of SOB. Troponin trended up to 12. ECG showed NSR with 1st degree A-V block and nonspecific ST and T wave abnormality. Admitted for NSTEMI. TTE showed EF 30% with moderate left ventricular global hypokinesis and atrial fibrillation. Cardiology consulted in ED, started on heparin gtt, underwent LHC with SVG to RCA proximal graft s/p 1 3.5x18mm RAYNA. Post-cath on 3 L NC. Continuing IV diuresis with plan to transition to oral upon discharge. Developed ANGELIC, likely due to dehydration.       Interval History: VSS this AM, hypotensive last night, dose of lasix held until this morning. Currently on 3 L. Without acute complaint this AM. Developed ANGELIC with Cr 2.1, received 250 cc bolus.     Review of Systems    Constitutional:  Negative for chills, diaphoresis and fever.   HENT:  Negative for congestion, sore throat, trouble swallowing and voice change.    Eyes:  Negative for visual disturbance.   Respiratory:  Positive for cough (non-productive) and shortness of breath. Negative for wheezing.    Cardiovascular:  Negative for chest pain (resolved), palpitations and leg swelling.   Gastrointestinal:  Negative for abdominal pain, blood in stool, constipation, diarrhea, nausea and vomiting.   Genitourinary:  Negative for decreased urine volume, dysuria, hematuria and urgency.   Musculoskeletal:  Positive for gait problem (Unsteady).   Skin:  Negative for pallor and wound.   Neurological:  Negative for dizziness, speech difficulty, weakness, light-headedness and headaches.   Psychiatric/Behavioral:  Negative for agitation and decreased concentration. The patient is not nervous/anxious.    Objective:     Vital Signs (Most Recent):  Temp: 98 °F (36.7 °C) (08/19/22 0745)  Pulse: 85 (08/19/22 0745)  Resp: 18 (08/19/22 0745)  BP: 127/66 (08/19/22 0745)  SpO2: 95 % (08/19/22 0800) Vital Signs (24h Range):  Temp:  [96.4 °F (35.8 °C)-98 °F (36.7 °C)] 98 °F (36.7 °C)  Pulse:  [54-85] 85  Resp:  [16-18] 18  SpO2:  [93 %-98 %] 95 %  BP: ()/(52-66) 127/66     Weight: 90.7 kg (200 lb)  Body mass index is 29.53 kg/m².    Intake/Output Summary (Last 24 hours) at 8/19/2022 1025  Last data filed at 8/19/2022 0712  Gross per 24 hour   Intake 240 ml   Output 1050 ml   Net -810 ml      Physical Exam  Vitals and nursing note reviewed.   Constitutional:       General: He is not in acute distress.     Appearance: He is well-developed. He is ill-appearing. He is not toxic-appearing or diaphoretic.   HENT:      Head: Normocephalic and atraumatic.      Mouth/Throat:      Pharynx: No oropharyngeal exudate.   Eyes:      General: No scleral icterus.     Pupils: Pupils are equal, round, and reactive to light.   Neck:      Trachea: No tracheal  deviation.   Cardiovascular:      Rate and Rhythm: Normal rate and regular rhythm.      Heart sounds: Normal heart sounds. No murmur heard.  Pulmonary:      Effort: Pulmonary effort is normal. No respiratory distress.      Breath sounds: No wheezing or rales.      Comments: On 3 L NC  Chest:      Chest wall: No tenderness.   Abdominal:      General: Bowel sounds are normal. There is no distension.      Palpations: Abdomen is soft. There is no mass.      Tenderness: There is no abdominal tenderness.   Musculoskeletal:         General: No deformity.      Cervical back: Neck supple.   Skin:     General: Skin is warm and dry.      Coloration: Skin is not pale.      Findings: No erythema or rash.   Neurological:      Mental Status: He is alert and oriented to person, place, and time.   Psychiatric:         Behavior: Behavior normal.       Significant Labs: All pertinent labs within the past 24 hours have been reviewed.  BMP:   Recent Labs   Lab 08/19/22  0700   *      K 3.7      CO2 24   BUN 61*   CREATININE 2.1*   CALCIUM 9.4   MG 1.7     CBC:   Recent Labs   Lab 08/18/22  0401 08/19/22  0701   WBC 10.45 6.37   HGB 13.4* 14.0   HCT 40.5 41.2    209     CMP:   Recent Labs   Lab 08/18/22  0401 08/19/22  0700    137   K 4.3 3.7    100   CO2 24 24   * 150*   BUN 46* 61*   CREATININE 1.9* 2.1*   CALCIUM 9.2 9.4   PROT 6.7 7.2   ALBUMIN 3.6 3.3*   BILITOT 1.5* 1.3*   ALKPHOS 37* 42*   AST 75* 55*   ALT 44 47*   ANIONGAP 14 13       Significant Imaging: I have reviewed all pertinent imaging results/findings within the past 24 hours.      Assessment/Plan:      * NSTEMI (non-ST elevated myocardial infarction)  S/P CABG (2007)  Coronary artery disease  Complained of SOB and Stabbing Epigastric Pain. Since Resolved  ECG no change obvious changes from prior. No STEMI  Troponin 8.4 >>12.1>>10.75  proBNP 8080, Pulmonary Edema on CXR  Received  mg and lasix 40 mg x2  in ED    -  Continue Lasix 40 mg IV daily  - Continue ASA, plavix, statin  - TTE showed EF 30% with left ventricular global hypokinesis and atrial fibrillation  - Cardiology consulted in ED, s/p LHC with SVG to RCA proximal graft s/p 1 3.5x18mm RAYNA  - Cardiology recommendations as follows   - Continue aspirin 81 mg daily indefinitely   - Continue plavix 75mg PO daily for 1 year   - Continue high intensity statin therapy (LDL goal < 70)   - Risk factor reduction (BP <130/80 mmHg, glycemic control, etc)   - Cardiac rehab referral   - Follow up with Dr. Sotelo    Acute hypoxemic respiratory failure  See acute combined CHF      Acute combined systolic and diastolic congestive heart failure  Acute hypoxemic respiratory failure  Acute pulmonary edema  Patient admitted with shortness of breath and pulmonary edema consistent with acute on chronic combined CHF exacerbation. Cause of exacerbation is NSTEMI  Bibasilar rales on exam  CXR revealed pulmonary edema  ECG showed atrial fibrillation with nonspecific ST wave changes    - TTE as below  - Cardiology following, recommendations as below   - Entresto discontinued, plan to restart as outpatient   - Continue BB   - Start PO lasix QD   - Repeat TTE 90 d post optimal GDMT  - On 3 L NC, wean as tolerated  -  Daily weights  - Strict I/Os  - Monitor on telemetry  - Monitor and trend BMP, Mg, and renal function; keep K >4, Mg >2  - Monitor for signs of fluid overload: RR>30, O2 sat<92%, weight gain of >3 lbs in 24 hours, or urinary output <160ml/8hr  - CPAP at 6-8 mm H2O for mild respiratory distress  - SCDs, TEDs, Nursing communication to elevated    - Ambulate as tolerated    Last TTE Results for orders placed during the hospital encounter of 08/16/22    Echo    Interpretation Summary  · The left ventricle is mildly enlarged with moderately decreased systolic function.  · The estimated ejection fraction is 30%.  · There is moderate left ventricular global hypokinesis.  · A diastolic  pattern consistent with atrial fibrillation observed.  · Mild-to-moderate mitral regurgitation.  · Mild aortic regurgitation.  · Mild tricuspid regurgitation.  · Mild pulmonic regurgitation.  · The estimated PA systolic pressure is 55 mmHg.  · Normal right ventricular size with normal right ventricular systolic function.  · There is moderate pulmonary hypertension.        Acute pulmonary edema  See acute combined CHF    Physical deconditioning  -PT/OT consulted, recommend home health OT/PT    CKD stage 3 due to type 2 diabetes mellitus  See ANGELIC    Carotid stenosis, asymptomatic, bilateral  S/P left ICA stent in 2020    - Continue ASA, plavix and statin    Paroxysmal atrial fibrillation  Previously diagnosed  Takes eliquis and lopressor at home  ECG showed atrial fibrillation  Likely etiology fluid overload  Denies palpitations  [id number] score is 6, annual risk of stroke is 9.7%  HAS-BLED score is 2, annual bleeding risk is 4.1%    - Eliquis initially held, heparin gtt started, discontinued post-LHC, patient to restart eliquis  - Continue lopressor  - Start PO lasix 40 mg QD    ANGELIC (acute kidney injury)  Chronic Kidney Disease Stage III  Creatinine 1.7 on admission, baseline around 1.5-1.7  Etiology ikely pre-renal from dehydration    Lab Results   Component Value Date    CREATININE 2.1 (H) 08/19/2022     Estimated Creatinine Clearance: 33.3 mL/min (A) (based on SCr of 2.1 mg/dL (H)). according to latest data    - Developed ANGELIC this AM  -   cc bolus, plan to recheck Cr  - Avoid hypotension  - Strict I&Os and daily weights   - Avoid nephrotoxins, including NSAIDs, aminoglycosides, IV contrast (unless absolutely necessary), gadolinium, fleets and other phosphorous-based laxatives. Caution with antibiotics.  - Renally dose medications according to current GFR.  - No indications for HD at this time    Uncontrolled type 2 diabetes mellitus with diabetic neuropathy, without long-term current use of insulin  Home  antihyperglycemic regimen: glipizide with long acting insulin  Last A1c reviewed-   Lab Results   Component Value Date    HGBA1C 7.4 (H) 08/17/2022     Most recent fingerstick glucose reviewed-   Recent Labs     08/17/22 2006 08/18/22  0812 08/18/22  1125 08/18/22  1635 08/18/22  1921 08/19/22  0410   POCTGLUCOSE 149* 122* 163* 210* 213* 148*     - Hold home oral antihyperglycemics while in hospital  - Patient's FSGs are controlled on current medication regimen.  - Continue 12 detemir QHS  - Current correctional scale  Medium  - POCT accuchecks ACHS  - Diabetic diet   - BG goals: Preprandial <140 mg/dL, Random  <180 mg/dL    Antihyperglycemics (From admission, onward)            Start     Stop Route Frequency Ordered    08/16/22 2100  insulin detemir U-100 pen 12 Units         -- SubQ Nightly 08/16/22 1451    08/16/22 1549  insulin aspart U-100 pen 1-10 Units         -- SubQ Before meals & nightly PRN 08/16/22 1451          BMI 31.0-31.9,adult  Body mass index is 29.53 kg/m². Morbid obesity complicates all aspects of disease management from diagnostic modalities to treatment. Weight loss encouraged and health benefits explained to patient.    Mixed hyperlipidemia  Lipid profile on   Lab Results   Component Value Date    LDLCALC 47.8 (L) 08/18/2022    HDL 23 (L) 08/18/2022    TRIG 136 08/18/2022    CHOL 98 (L) 08/18/2022     Takes Atorvastatin at home    - Continue home dose statin  - Lipid panel as above  - Reports compliance with hyperlipidemia treatment as prescribed  - Denies adverse effects of medications      Pulmonary HTN  Noted on TTE    S/P CABG (coronary artery bypass graft)  See NSTEMI    Coronary artery disease  See NSTEMI    Essential hypertension  Chronic, controlled.  Latest blood pressure and vitals reviewed-   Temp:  [96.4 °F (35.8 °C)-98 °F (36.7 °C)]   Pulse:  [54-85]   Resp:  [16-18]   BP: ()/(52-66)   SpO2:  [93 %-98 %] .   Home meds for hypertension were reviewed and noted below.    Hypertension Medications             amLODIPine (NORVASC) 5 MG tablet TAKE 1 TABLET EVERY DAY    metoprolol tartrate (LOPRESSOR) 25 MG tablet TAKE 1 TABLET TWICE DAILY          - Adjust home antihypertensive regimen as follows- Continue lopressor, norvasc discontinued, started on low dose entresto  - Will utilize PRN blood pressure medication only if patient's blood pressure greater than 180/110 and he develops symptoms such as worsening chest pain or shortness of breath.      VTE Risk Mitigation (From admission, onward)         Ordered     apixaban tablet 5 mg  2 times daily         08/18/22 0806     heparin infusion 1,000 units/500 ml in 0.9% NaCl (pressure line flush)  Intra-op continuous PRN         08/17/22 1231     Reason for No Pharmacological VTE Prophylaxis  Once        Question:  Reasons:  Answer:  Already adequately anticoagulated on oral Anticoagulants    08/16/22 1451     IP VTE HIGH RISK PATIENT  Once         08/16/22 1451     Place sequential compression device  Until discontinued         08/16/22 1451                Discharge Planning   HARISH: 8/19/2022     Code Status: Full Code   Is the patient medically ready for discharge?:     Reason for patient still in hospital (select all that apply): Patient trending condition  Discharge Plan A: Home, Home Health, Home with family   Discharge Delays: None known at this time              Cassi Ibrahim PA-C  Department of Hospital Medicine   [facility]

## 2022-08-19 NOTE — ASSESSMENT & PLAN NOTE
TTE  · The left ventricle is mildly enlarged with moderately decreased systolic function.  · The estimated ejection fraction is 30%.  · There is moderate left ventricular global hypokinesis.  · A diastolic pattern consistent with atrial fibrillation observed.  · Mild-to-moderate mitral regurgitation.  · Mild aortic regurgitation.  · Mild tricuspid regurgitation.  · Mild pulmonic regurgitation.  · The estimated PA systolic pressure is 55 mmHg.  · Normal right ventricular size with normal right ventricular systolic function.  · There is moderate pulmonary hypertension.    Entresto to be initiated as an outpatient, BP marginal  Continue BB  Inaccurate intake and output documented  Repeat TTE 90 d post optimal GDMT  Lasix converted to 40 mg p.o. daily  Follow up with Dr Sotelo in 1-2 weeks

## 2022-08-19 NOTE — PT/OT/SLP PROGRESS
Occupational Therapy   Treatment    Name: Jin Ngo  MRN: [id number]  Admitting Diagnosis:  NSTEMI (non-ST elevated myocardial infarction)  2 Days Post-Op  The primary encounter diagnosis was NSTEMI (non-ST elevated myocardial infarction). 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  Pre-op Diagnosis: CAD (coronary artery disease) [I25.10]NSTEMI (non-ST elevated myocardial infarction) [I21.4]Chest pain [R07.9] s/p Procedure(s):  Left heart cath  Bypass graft study  ANGIOGRAM, CORONARY ARTERY  IVUS, Coronary  Stent RAYNA bypass graft  Protection, Coronary, Filter     Recommendations:     Discharge Recommendations: home health OT, home health PT  Discharge Equipment Recommendations:  bedside commode  Barriers to discharge:  None    Assessment:     Jin Ngo is a 76 y.o. male with a medical diagnosis of NSTEMI (non-ST elevated myocardial infarction).  He presents with Performance deficits affecting function are weakness, impaired endurance, impaired self care skills, impaired functional mobility, gait instability, impaired balance, decreased coordination, decreased upper extremity function, decreased lower extremity function, decreased safety awareness, pain, decreased ROM, impaired fine motor, impaired coordination.     Rehab Prognosis:  Good; patient would benefit from acute skilled OT services to address these deficits and reach maximum level of function.       Plan:     Patient to be seen 3 x/week to address the above listed problems via self-care/home management, therapeutic activities, therapeutic exercises  · Plan of Care Expires: 09/18/22  · Plan of Care Reviewed with: patient, spouse    Subjective     Pain/Comfort:  · Pain Rating  1: 0/10  · Pain Rating Post-Intervention 1: 0/10    Objective:     Communicated with: nurse prior to session.  Patient found HOB elevated with telemetry, peripheral IV, bed alarm upon OT entry to room.    General Precautions: Standard, diabetic, fall   Orthopedic Precautions:N/A   Braces: N/A  Respiratory Status: Room air     Occupational Performance:     Bed Mobility:    · Patient completed Rolling/Turning to Right with supervision with side rail and increased time  · Patient completed Scooting/Bridging with supervision with side rail and increased time  · Patient completed Supine to Sit with supervision with side rail and increased time    Functional Mobility/Transfers:  · Patient completed Sit <> Stand Transfer with stand by assistance  with  rolling walker   · Patient completed Bed <> Chair Transfer using Step Transfer technique with contact guard assistance with rolling walker  · Functional Mobility: ambulated SBA with RW short distance in room to sink and BS chair on opposite of room, short steps, slow, mild shuffling, side stepped in narrow space, no LOB.    Activities of Daily Living:  · Grooming: stand by assistance washed hands, brushed teeth standing inside Rw for balance safety, no LOB noted  · LE Dressing: donned/doff socks with reacher to doff socks and sock aid to don seated EOB with max verbal instruction, demonstration.   · Toileting: declined use, just finished using with wife prior to OT visit .      [facility] 6 Click ADL: 20    Treatment & Education:  Issued and instructed pt in BUE HEP with light resistive tband ex and handout with demonstration and verbal instruction. Wife and pt verbalized understanding.  Pt. Instructed in sock aide and reacher use to don/doff socks with SBA, max sequencing cues and problem solving with task;  increased time for task performance/completion and some frustration noted from pt but encouraged pt to continue with task.  Mild hand tremors noted.  Purchase place options  discussed with pt and spouse.   All questions/ concerns addressed within scope.   Pt. left sitting UIC, call don't fall explained and call bell issued. Wife and pt verbalized understanding.      Patient left up in chair with all lines intact, call button in reach, chair alarm on, nurse notified and spouse presentEducation:      GOALS:   Multidisciplinary Problems     Occupational Therapy Goals        Problem: Occupational Therapy    Goal Priority Disciplines Outcome Interventions   Occupational Therapy Goal     OT, PT/OT Ongoing, Progressing    Description: Goals to be met by: 9/18/2022    Patient will increase functional independence with ADLs by performing:    UE Dressing with Supervision.  LE Dressing with Supervision with adaptive device PRN  Grooming while standing at sink with Supervision.  Toileting from toilet with Supervision for hygiene and clothing management.   Supine to sit with Modified [place].  Toilet transfer to toilet with Modified [place].  Upper extremity exercise program x10 reps per handout, with supervision.                     Time Tracking:     OT Date of Treatment: 08/19/22  OT Start Time: 1353  OT Stop Time: 1440  OT Total Time (min): 47 min    Billable Minutes:Self Care/Home Management 20  Therapeutic Activity 15  Therapeutic Exercise 12  Total Time 47    OT/ANDREW: OT          8/19/2022

## 2022-08-19 NOTE — PLAN OF CARE
Problem: Occupational Therapy  Goal: Occupational Therapy Goal  Description: Goals to be met by: 9/18/2022    Patient will increase functional independence with ADLs by performing:    UE Dressing with Supervision.  LE Dressing with Supervision with adaptive device PRN  Grooming while standing at sink with Supervision.  Toileting from toilet with Supervision for hygiene and clothing management.   Supine to sit with Modified [place].  Toilet transfer to toilet with Modified [place].  Upper extremity exercise program x10 reps per handout, with supervision.    Outcome: Ongoing, Progressing

## 2022-08-20 VITALS
HEART RATE: 74 BPM | TEMPERATURE: 98 F | WEIGHT: 200 LBS | RESPIRATION RATE: 18 BRPM | OXYGEN SATURATION: 96 % | BODY MASS INDEX: 29.62 KG/M2 | DIASTOLIC BLOOD PRESSURE: 58 MMHG | HEIGHT: 69 IN | SYSTOLIC BLOOD PRESSURE: 121 MMHG

## 2022-08-20 LAB
ANION GAP SERPL CALC-SCNC: 10 MMOL/L (ref 8–16)
BUN SERPL-MCNC: 61 MG/DL (ref 8–23)
CALCIUM SERPL-MCNC: 9.1 MG/DL (ref 8.7–10.5)
CHLORIDE SERPL-SCNC: 102 MMOL/L (ref 95–110)
CO2 SERPL-SCNC: 24 MMOL/L (ref 23–29)
CREAT SERPL-MCNC: 1.8 MG/DL (ref 0.5–1.4)
EST. GFR  (NO RACE VARIABLE): 39 ML/MIN/1.73 M^2
GLUCOSE SERPL-MCNC: 155 MG/DL (ref 70–110)
MAGNESIUM SERPL-MCNC: 1.8 MG/DL (ref 1.6–2.6)
PHOSPHATE SERPL-MCNC: 4.1 MG/DL (ref 2.7–4.5)
POCT GLUCOSE: 158 MG/DL (ref 70–110)
POCT GLUCOSE: 198 MG/DL (ref 70–110)
POTASSIUM SERPL-SCNC: 3.9 MMOL/L (ref 3.5–5.1)
SODIUM SERPL-SCNC: 136 MMOL/L (ref 136–145)

## 2022-08-20 PROCEDURE — 25000003 PHARM REV CODE 250: Performed by: HOSPITALIST

## 2022-08-20 PROCEDURE — 84100 ASSAY OF PHOSPHORUS: CPT | Performed by: HOSPITALIST

## 2022-08-20 PROCEDURE — 99233 PR SUBSEQUENT HOSPITAL CARE,LEVL III: ICD-10-PCS | Mod: ,,, | Performed by: INTERNAL MEDICINE

## 2022-08-20 PROCEDURE — 99233 SBSQ HOSP IP/OBS HIGH 50: CPT | Mod: ,,, | Performed by: INTERNAL MEDICINE

## 2022-08-20 PROCEDURE — 25000003 PHARM REV CODE 250: Performed by: NURSE PRACTITIONER

## 2022-08-20 PROCEDURE — A4216 STERILE WATER/SALINE, 10 ML: HCPCS | Performed by: HOSPITALIST

## 2022-08-20 PROCEDURE — 99900035 HC TECH TIME PER 15 MIN (STAT)

## 2022-08-20 PROCEDURE — 25000003 PHARM REV CODE 250

## 2022-08-20 PROCEDURE — 83735 ASSAY OF MAGNESIUM: CPT | Performed by: HOSPITALIST

## 2022-08-20 PROCEDURE — 36415 COLL VENOUS BLD VENIPUNCTURE: CPT | Performed by: HOSPITALIST

## 2022-08-20 PROCEDURE — 94761 N-INVAS EAR/PLS OXIMETRY MLT: CPT

## 2022-08-20 PROCEDURE — 27000221 HC OXYGEN, UP TO 24 HOURS

## 2022-08-20 PROCEDURE — 80048 BASIC METABOLIC PNL TOTAL CA: CPT | Performed by: HOSPITALIST

## 2022-08-20 RX ORDER — METOPROLOL SUCCINATE 25 MG/1
25 TABLET, EXTENDED RELEASE ORAL DAILY
Qty: 30 TABLET | Refills: 11 | Status: SHIPPED | OUTPATIENT
Start: 2022-08-21 | End: 2022-08-20 | Stop reason: SDUPTHER

## 2022-08-20 RX ORDER — FUROSEMIDE 40 MG/1
40 TABLET ORAL DAILY
Qty: 30 TABLET | Refills: 11 | Status: SHIPPED | OUTPATIENT
Start: 2022-08-21 | End: 2022-08-20 | Stop reason: SDUPTHER

## 2022-08-20 RX ORDER — CLOPIDOGREL BISULFATE 75 MG/1
75 TABLET ORAL DAILY
Qty: 30 TABLET | Refills: 11 | Status: SHIPPED | OUTPATIENT
Start: 2022-08-21 | End: 2023-11-16 | Stop reason: SDUPTHER

## 2022-08-20 RX ORDER — METOPROLOL SUCCINATE 25 MG/1
25 TABLET, EXTENDED RELEASE ORAL DAILY
Qty: 30 TABLET | Refills: 11 | Status: SHIPPED | OUTPATIENT
Start: 2022-08-21 | End: 2023-08-22 | Stop reason: SDUPTHER

## 2022-08-20 RX ORDER — FUROSEMIDE 40 MG/1
40 TABLET ORAL DAILY
Qty: 30 TABLET | Refills: 11 | Status: SHIPPED | OUTPATIENT
Start: 2022-08-21 | End: 2023-11-16 | Stop reason: SDUPTHER

## 2022-08-20 RX ADMIN — ATORVASTATIN CALCIUM 40 MG: 40 TABLET, FILM COATED ORAL at 09:08

## 2022-08-20 RX ADMIN — Medication 10 ML: at 05:08

## 2022-08-20 RX ADMIN — TAMSULOSIN HYDROCHLORIDE 0.4 MG: 0.4 CAPSULE ORAL at 09:08

## 2022-08-20 RX ADMIN — CLOPIDOGREL 75 MG: 75 TABLET, FILM COATED ORAL at 09:08

## 2022-08-20 RX ADMIN — FUROSEMIDE 40 MG: 40 TABLET ORAL at 09:08

## 2022-08-20 RX ADMIN — APIXABAN 5 MG: 5 TABLET, FILM COATED ORAL at 09:08

## 2022-08-20 RX ADMIN — POLYETHYLENE GLYCOL 3350 17 G: 17 POWDER, FOR SOLUTION ORAL at 09:08

## 2022-08-20 RX ADMIN — ALLOPURINOL 300 MG: 100 TABLET ORAL at 09:08

## 2022-08-20 RX ADMIN — METOPROLOL SUCCINATE 25 MG: 25 TABLET, EXTENDED RELEASE ORAL at 09:08

## 2022-08-20 NOTE — NURSING
Home Oxygen Evaluation    Date Performed: 2022    1) Patient's Home O2 Sat on room air, while at rest: 96 %        If O2 sats on room air at rest are 88% or below, patient qualifies. No additional testing needed. Document N/A in steps 2 and 3. If 89% or above, complete steps 2.      2) Patient's O2 Sat on room air while exercisin%        If O2 sats on room air while exercising remain 89% or above patient does not qualify, no further testing needed Document N/A in step 3. If O2 sats on room air while exercising are 88% or below, continue to step 3.      3) Patient's O2 Sat while exercising on O2: N/A         (Must show improvement from #2 for patients to qualify)    If O2 sats improve on oxygen, patient qualifies for portable oxygen. If not, the patient does not qualify.

## 2022-08-20 NOTE — PROGRESS NOTES
Discharge orders noted. Additional clinical references attached. Patient's discharge instructions given by bedside RN and reviewed via this VN.  Education provided on new medication, diagnosis, and follow-up appointments.  Teach back method used. Patient verbalized understanding. All questions answered. Transport to [facility] requested. Floor nurse notified.      08/20/22 1420   AVS Confirmation   Discharge instructions and AVS given to and reviewed with patient and/or significant other. Yes

## 2022-08-20 NOTE — DISCHARGE SUMMARY
[facility] Medicine  Discharge Summary      Patient Name: Jin Ngo  MRN: [id number]  Patient Class: IP- Inpatient  Admission Date: 8/16/2022  Hospital Length of Stay: 4 days  Discharge Date and Time: 8/20/2022  2:35 PM  Attending Physician: Dr. Clarita Cronin   Discharging Provider: Renetta Armstrong NP  Primary Care Provider: Sam Barroso MD      HPI:   Jin Ngo is a 76 year old male with PMHx of CKD, CAD (MI, S/p CABG on Plavix and Eliquis), CVA, Left Carotid Stent, hypertension, hyperlipidemia, pulmonary hypertension and diabetes who presented to [facility] ED today with reports of worsening shortness of breath since yesterday with associated cough productive of phlegm and nasal congestion along with some nausea.  Patient and wife states that yesterday he had episode of shortness of breath where he fell into his chair. ECG with no STEMI, unchanged from prior. Chest xray with evidence of Pulmonary Edema. Labs significant for Troponin of 8.4, Pro-BNP 8080, mildly elevated BUN/Cr.  with cardiology was consulted from the ED.  He was given Aspirin, Lasix 40mg x 2 and Duoneb in the ED. He is admitted to [facility] for NSTEMI      Procedure(s) (LRB):  Left heart cath (Left)  Bypass graft study  ANGIOGRAM, CORONARY ARTERY (N/A)  IVUS, Coronary  Stent RAYNA bypass graft  Protection, Coronary, Filter      Hospital Course:   Patient presented with chief complaint of SOB. Work-up in ED with Troponin trended up and peaked 33 and ECG showed NSR with 1st degree A-V block and nonspecific ST and T wave abnormality. Also patient was noted to be with hypoxia associated with elevated NT-proBNP 8080 and chest x-ray showed signs of pulmonary edema. He was admitted for NSTEMI and HFrEF decompensation. TTE showed EF 30% with moderate left ventricular global hypokinesis and atrial fibrillation. Cardiology consulted in ED, started on heparin gtt, underwent LHC with SVG to RCA proximal graft s/p 1  3.5x18mm RAYNA. Post-cath on 3 L NC. Continuing IV diuresis with plan to transition to oral upon discharge. Developed ANGELIC, likely due to dehydration. Kidney function was back to near baseline after IV fluid. Transitioned to oral lasix. Patient's symptom was improved and was able to wean off oxygen. Patient was stable and cleared for discharge by Cardiology with plan of continuing Plavix/DOAC, statin, lasix 40 mg daily, and Toprol. He would need to follow up outpatient with Cardiology to re-initiate Entresto once renal function stabilized. Patient was evaluated by PT/OT who recommended HH PT/OT. Patient also needed to start on Cardiac Rehab outpatient. Social Work and Case Management were consulted for discharge planning. Discharge plan was discussed with patient at the bedside and he agreed with plan. Patient was stable for discharge.         Goals of Care Treatment Preferences:  Code Status: Full Code      Consults:   Consults (From admission, onward)        Status Ordering Provider     Inpatient consult to Cardiology  Once        Provider:  (Not yet assigned)    Completed REGINO PAYTON          No new Assessment & Plan notes have been filed under this hospital service since the last note was generated.  Service: Hospital Medicine    Final Active Diagnoses:    Diagnosis Date Noted POA    PRINCIPAL PROBLEM:  NSTEMI (non-ST elevated myocardial infarction) [I21.4] 08/16/2022 Unknown    Acute combined systolic and diastolic congestive heart failure [I50.41] 08/18/2022 Yes    Acute hypoxemic respiratory failure [J96.01] 08/18/2022 Yes    Acute pulmonary edema [J81.0] 08/17/2022 Yes    Physical deconditioning [R53.81] 06/07/2022 Yes    CKD stage 3 due to type 2 diabetes mellitus [E11.22, N18.30] 03/22/2021 Yes    Carotid stenosis, asymptomatic, bilateral [I65.23] 12/01/2020 Yes    Paroxysmal atrial fibrillation [I48.0] 11/09/2020 Yes    ANGELIC (acute kidney injury) [N17.9] 07/01/2020 Yes    Uncontrolled type 2  "diabetes mellitus with diabetic neuropathy, without long-term current use of insulin [E11.40, E11.65] 03/08/2017 Yes     Chronic    BMI 31.0-31.9,adult [Z68.31] 03/08/2017 Not Applicable    Mixed hyperlipidemia [E78.2] 12/19/2016 Yes    Pulmonary HTN [I27.20] 08/22/2016 Yes    S/P CABG (coronary artery bypass graft) [Z95.1] 07/25/2016 Not Applicable    Essential hypertension [I10] 02/11/2016 Yes    Coronary artery disease [I25.10] 02/11/2016 Yes     Chronic      Problems Resolved During this Admission:       Discharged Condition: stable    Disposition: Home-Health Care [facility]    Follow Up:   Follow-up Information     Sam Barroso MD. Go on 8/23/2022.    Specialty: Family Medicine  Why: FOLLOW UP WITH PCP  Contact information:  [address] [facility] [address]  [phone]             [place] [facility]. Go in 2 week(s).    Specialty: Cardiology  Why: FOLLOW UP WITH CARDIOLOGIST AFTER DISCHARGE, If date/time not listed,  will contact. Discussed with office MA of appt need.  Contact information:  [address], [address]  [facility] [address] [phone]  Additional information:  Please park in surface lot and check in at [address].           [facility]. Schedule an appointment as soon as possible for a visit.    Why: As needed, REHAB, FOLLOW UP WITH THERAPY; OFFICE TO CALL PATIENT Pt needs cardiac monitoring during therapy sessions  Contact information:  [address] [place] [address] [id number] [phone]                       Patient Instructions:      COMMODE FOR HOME USE     Order Specific Question Answer Comments   Type: Standard    Height: 5' 9" (1.753 m)    Weight: 90.7 kg (200 lb)    Does patient have medical equipment at home? bath bench    Length of need (1-99 months): 99      WALKER FOR HOME USE     Order Specific Question Answer Comments   Type of Walker: Adult (5'4"-6'6")    With wheels? Yes    Height: 5' 9" (1.753 m)    Weight: 90.7 kg (200 lb)  "   Length of need (1-99 months): 99    Does patient have medical equipment at home? bath bench    Please check all that apply: Patient's condition impairs ambulation.    Please check all that apply: Walker will be used for gait training.    Please check all that apply: Patient is unable to safely ambulate without equipment.      Ambulatory referral/consult to Cardiac Rehab   Standing Status: Future   Referral Priority: Routine Referral Type: Consultation   Referral Reason: Specialty Services Required   Requested Specialty: Cardiac Rehabilitation   Number of Visits Requested: 1     Ambulatory referral/consult to Cardiology   Standing Status: Future   Referral Priority: Routine Referral Type: Consultation   Referral Reason: Specialty Services Required   Requested Specialty: Cardiology     Diet diabetic     Diet Cardiac     Notify your health care provider if you experience any of the following:  temperature >100.4     Notify your health care provider if you experience any of the following:  persistent nausea and vomiting or diarrhea     Notify your health care provider if you experience any of the following:  severe uncontrolled pain     Notify your health care provider if you experience any of the following:  redness, tenderness, or signs of infection (pain, swelling, redness, odor or green/yellow discharge around incision site)     Notify your health care provider if you experience any of the following:  difficulty breathing or increased cough     Notify your health care provider if you experience any of the following:  severe persistent headache     Notify your health care provider if you experience any of the following:  worsening rash     Notify your health care provider if you experience any of the following:  persistent dizziness, light-headedness, or visual disturbances     Notify your health care provider if you experience any of the following:  increased confusion or weakness     Activity as tolerated   Order  Comments: Do not participate in strenuous activities for 5 days after the procedure. Avoid heavy lifting (more than 10 pounds) and pushing or pulling heavy objects for the first 5 to 7 days after the procedure. Do not strain during bowel movements for the first 3 to 4 days after the procedure to prevent bleeding from the catheter insertion site. Do not take a bath, tub soak, go in a Jacuzzi, or swim in a pool or lake for one week after the procedure. Gradually increase your activities until you reach your normal activity level within one week after the procedure       Significant Diagnostic Studies: Labs:   BMP:   Recent Labs   Lab 08/19/22  0700 08/19/22  1349 08/20/22  0719   * 209* 155*    135* 136   K 3.7 4.5 3.9    98 102   CO2 24 23 24   BUN 61* 68* 61*   CREATININE 2.1* 2.4* 1.8*   CALCIUM 9.4 9.2 9.1   MG 1.7  --  1.8   , CMP   Recent Labs   Lab 08/19/22  0700 08/19/22  1349 08/20/22  0719    135* 136   K 3.7 4.5 3.9    98 102   CO2 24 23 24   * 209* 155*   BUN 61* 68* 61*   CREATININE 2.1* 2.4* 1.8*   CALCIUM 9.4 9.2 9.1   PROT 7.2  --   --    ALBUMIN 3.3* 3.0*  --    BILITOT 1.3*  --   --    ALKPHOS 42*  --   --    AST 55*  --   --    ALT 47*  --   --    ANIONGAP 13 14 10   , CBC   Recent Labs   Lab 08/19/22  0701   WBC 6.37   HGB 14.0   HCT 41.2      , Troponin   Recent Labs   Lab 08/16/22  2339 08/17/22  0820 08/18/22  0401   TROPONINI 11.543* 18.825* 33.883*    and A1C:   Recent Labs   Lab 05/18/22  1010 07/20/22  1145 08/17/22  0343   HGBA1C 9.1* 8.8* 7.4*       Pending Diagnostic Studies:     None         Medications:  Reconciled Home Medications:      Medication List      START taking these medications    furosemide 40 MG tablet  Commonly known as: LASIX  Take 1 tablet (40 mg total) by mouth once daily.  Start taking on: August 21, 2022     metoprolol succinate 25 MG 24 hr tablet  Commonly known as: TOPROL-XL  Take 1 tablet (25 mg total) by mouth once  "daily.  Start taking on: August 21, 2022        CONTINUE taking these medications    ACCU-CHEK VERONICA PLUS TEST STRP Strp  Generic drug: blood sugar diagnostic  TEST BLOOD SUGAR FOUR TIMES DAILY     allopurinoL 300 MG tablet  Commonly known as: ZYLOPRIM  TAKE 1 TABLET EVERY DAY     atorvastatin 40 MG tablet  Commonly known as: LIPITOR  Take 1 tablet (40 mg total) by mouth once daily.     BD ULTRA-FINE TEJINDER PEN NEEDLE 32 gauge x 5/32" Ndle  Generic drug: pen needle, diabetic  USE WITH LEVEMIR DAILY     blood-glucose meter kit  To check BG 4 times daily, to use with insurance preferred meter     clopidogreL 75 mg tablet  Commonly known as: PLAVIX  Take 1 tablet (75 mg total) by mouth once daily.  Start taking on: August 21, 2022     DULoxetine 30 MG capsule  Commonly known as: CYMBALTA  TAKE 1 CAPSULE (30 MG TOTAL) ONCE DAILY. TO HELP REDUCE FEELING OF BACK PAIN     ELIQUIS 5 mg Tab  Generic drug: apixaban  TAKE 1 TABLET BY MOUTH TWICE A DAY     fenofibrate 160 MG Tab  TAKE 1 TABLET EVERY DAY     fluticasone propionate 50 mcg/actuation nasal spray  Commonly known as: FLONASE  INHALE 1 SPRAY IN EACH NOSTRIL ROUTE ONCE DAILY.     glipiZIDE 5 MG tablet  Commonly known as: GLUCOTROL  Take 2 tablets (10 mg total) by mouth daily with breakfast AND 1 tablet (5 mg total) daily with dinner or evening meal. Take 7.5 mg with breakfast and 5 mg with dinner.     HYDROcodone-acetaminophen 7.5-325 mg per tablet  Commonly known as: NORCO  Take 1 tablet by mouth every 24 hours as needed for Pain (take 30 minutes prior to parcipating in physical therapy).     lancets Misc  Commonly known as: ACCU-CHEK SOFTCLIX LANCETS  To check BG 4 times daily, to use with insurance preferred meter.     LEVEMIR FLEXTOUCH U-100 INSULN 100 unit/mL (3 mL) Inpn pen  Generic drug: insulin detemir U-100  Inject 12 Units into the skin every evening.     tamsulosin 0.4 mg Cap  Commonly known as: FLOMAX  TAKE 1 CAPSULE BY MOUTH ONCE DAILY FOR URINE FLOW      "   STOP taking these medications    amLODIPine 5 MG tablet  Commonly known as: NORVASC     doxycycline 100 MG tablet  Commonly known as: VIBRA-TABS     metoprolol tartrate 25 MG tablet  Commonly known as: LOPRESSOR            Indwelling Lines/Drains at time of discharge:   Lines/Drains/Airways     None                 Time spent on the discharge of patient: 35 minutes         Renetta Armstrong NP  Department of Hospital Medicine  [place] - Telemetry

## 2022-08-20 NOTE — PROGRESS NOTES
The sw spoke to the pt's wife Candelaria Ngo and she is agreeable to use [facility] b/c someone in the family used them before. The sw faxed the pt's info to the  agency listed above via [employer].     2:28pm The sw spoke to Jamila at [facility] [place] [facility] and she states they will admit the pt Tuesday. The sw informed the pt's wife and she's in agreement with the d/c plan.

## 2022-08-20 NOTE — PLAN OF CARE
[facility] - Telemetry      HOME HEALTH ORDERS  FACE TO FACE ENCOUNTER    Patient Name: Jin Ngo  YOB: 1945    PCP: Sam Barroso MD   PCP Address: [address] [facility] / [place] [address]  PCP Phone Number: [phone]  PCP Fax: [phone]    Encounter Date: 8/16/22    Admit to Home Health    Diagnoses:  Active Hospital Problems    Diagnosis  POA    *NSTEMI (non-ST elevated myocardial infarction) [I21.4]  Unknown    Acute combined systolic and diastolic congestive heart failure [I50.41]  Yes    Acute hypoxemic respiratory failure [J96.01]  Yes    Acute pulmonary edema [J81.0]  Yes    Physical deconditioning [R53.81]  Yes    CKD stage 3 due to type 2 diabetes mellitus [E11.22, N18.30]  Yes    Carotid stenosis, asymptomatic, bilateral [I65.23]  Yes    Paroxysmal atrial fibrillation [I48.0]  Yes    ANGELIC (acute kidney injury) [N17.9]  Yes    Uncontrolled type 2 diabetes mellitus with diabetic neuropathy, without long-term current use of insulin [E11.40, E11.65]  Yes     Chronic    BMI 31.0-31.9,adult [Z68.31]  Not Applicable    Mixed hyperlipidemia [E78.2]  Yes    Pulmonary HTN [I27.20]  Yes    S/P CABG (coronary artery bypass graft) [Z95.1]  Not Applicable    Essential hypertension [I10]  Yes    Coronary artery disease [I25.10]  Yes     Chronic      Resolved Hospital Problems   No resolved problems to display.       Follow Up Appointments:  Future Appointments   Date Time Provider Department Center   8/22/2022  2:00 PM Rahel Alvarado, PT [facility]   8/23/2022  9:40 AM Sam Barroso MD [facility] MED [facility]   8/24/2022  1:00 PM Merlin Osei, PTA [facility]   8/29/2022  1:00 PM [place], PT [facility]   8/31/2022  1:00 PM [facility]   9/1/2022  9:40 AM Pasquale Ramesh MD [facility] CARDIO [place]   9/12/2022 10:00 AM HAYES [facility] PA[facility]   10/10/2022 12:00 PM LAB, [place]  [facility] LAB [facility]   10/10/2022 12:10 PM LAB, [facility] LAB [facility]   10/17/2022 10:00 AM Ester Mitchell NP [facility] WILDA [place]   11/14/2022  1:40 PM Oneyda Matthew MD [facility]   1/20/2023  9:00 AM Sam Barroso MD [facility] MED [place] [facility]       Allergies:  Review of patient's allergies indicates:   Allergen Reactions    Metformin      Caused stroke    Pcn [penicillins]        Medications: Review discharge medications with patient and family and provide education.    Current Facility-Administered Medications   Medication Dose Route Frequency Provider Last Rate Last Admin    acetaminophen tablet 650 mg  650 mg Oral Q4H PRN Clarita Cronin MD        allopurinoL tablet 300 mg  300 mg Oral Daily Clarita Cronin MD   300 mg at 08/20/22 0929    apixaban tablet 5 mg  5 mg Oral BID Parminder Riggs NP   5 mg at 08/20/22 0929    atorvastatin tablet 40 mg  40 mg Oral Daily Denia Zepeda NP   40 mg at 08/20/22 0929    clopidogreL tablet 75 mg  75 mg Oral Daily Clarita Cronin MD   75 mg at 08/20/22 0929    dextrose 10% bolus 125 mL  12.5 g Intravenous PRN Clarita Cronin MD        dextrose 10% bolus 250 mL  25 g Intravenous PRN Clarita Cronin MD        furosemide tablet 40 mg  40 mg Oral Daily Cassi Ibrahim PA-C   40 mg at 08/20/22 0929    glucagon (human recombinant) injection 1 mg  1 mg Intramuscular PRN Clarita Cronin MD        glucose chewable tablet 16 g  16 g Oral PRN Clarita Cronin MD        glucose chewable tablet 24 g  24 g Oral PRN Clarita Cronin MD        heparin infusion 1,000 units/500 ml in 0.9% NaCl (pressure line flush)    Continuous PRN Pasquale Ramesh  mL/hr at 08/17/22 1231 1,500 Units/hr at 08/17/22 1231    insulin aspart U-100 pen 1-10 Units  1-10 Units Subcutaneous QID (AC + HS) PRN Clarita Cronin MD   1 Units at 08/19/22 2000    insulin detemir U-100 pen 12 Units  12 Units Subcutaneous Q Clarita Cronin MD   12 Units at  "08/19/22 2000    melatonin tablet 6 mg  6 mg Oral Nightly PRN Clarita Cronin MD        metoprolol succinate (TOPROL-XL) 24 hr tablet 25 mg  25 mg Oral Daily Parminderkorey Riggs, CASSI   25 mg at 08/20/22 0929    naloxone 0.4 mg/mL injection 0.02 mg  0.02 mg Intravenous PRN Clarita Cronin MD        ondansetron injection 4 mg  4 mg Intravenous Q8H PRN Clarita Cronin MD        polyethylene glycol packet 17 g  17 g Oral Daily Clarita Cronin MD   17 g at 08/20/22 0929    sodium chloride 0.9% flush 10 mL  10 mL Intravenous Q8H Clarita Cronin MD   10 mL at 08/20/22 0530    tamsulosin 24 hr capsule 0.4 mg  0.4 mg Oral Daily Clarita Cronin MD   0.4 mg at 08/20/22 0929     Current Discharge Medication List      START taking these medications    Details   furosemide (LASIX) 40 MG tablet Take 1 tablet (40 mg total) by mouth once daily.  Qty: 30 tablet, Refills: 11      metoprolol succinate (TOPROL-XL) 25 MG 24 hr tablet Take 1 tablet (25 mg total) by mouth once daily.  Qty: 30 tablet, Refills: 11    Comments: .         CONTINUE these medications which have CHANGED    Details   clopidogreL (PLAVIX) 75 mg tablet Take 1 tablet (75 mg total) by mouth once daily.  Qty: 30 tablet, Refills: 11         CONTINUE these medications which have NOT CHANGED    Details   ACCU-CHEK VERONICA PLUS TEST STRP Strp TEST BLOOD SUGAR FOUR TIMES DAILY  Qty: 400 strip, Refills: 3    Associated Diagnoses: Uncontrolled type 2 diabetes mellitus with diabetic neuropathy, without long-term current use of insulin      allopurinoL (ZYLOPRIM) 300 MG tablet TAKE 1 TABLET EVERY DAY  Qty: 90 tablet, Refills: 0      atorvastatin (LIPITOR) 40 MG tablet Take 1 tablet (40 mg total) by mouth once daily.  Qty: 90 tablet, Refills: 3    Associated Diagnoses: Hyperlipidemia, unspecified hyperlipidemia type      BD ULTRA-FINE TEJINDER PEN NEEDLE 32 gauge x 5/32" Ndle USE WITH LEVEMIR DAILY  Qty: 100 each, Refills: 3    Associated Diagnoses: Uncontrolled type 2 diabetes mellitus with " diabetic neuropathy, without long-term current use of insulin      blood-glucose meter kit To check BG 4 times daily, to use with insurance preferred meter  Qty: 1 each, Refills: 0    Associated Diagnoses: Uncontrolled type 2 diabetes mellitus with diabetic neuropathy, without long-term current use of insulin      DULoxetine (CYMBALTA) 30 MG capsule TAKE 1 CAPSULE (30 MG TOTAL) ONCE DAILY. TO HELP REDUCE FEELING OF BACK PAIN  Qty: 90 capsule, Refills: 3      ELIQUIS 5 mg Tab TAKE 1 TABLET BY MOUTH TWICE A DAY  Qty: 60 tablet, Refills: 2      fenofibrate 160 MG Tab TAKE 1 TABLET EVERY DAY  Qty: 90 tablet, Refills: 1      fluticasone propionate (FLONASE) 50 mcg/actuation nasal spray INHALE 1 SPRAY IN EACH NOSTRIL ROUTE ONCE DAILY.  Qty: 48 mL, Refills: 2      glipiZIDE (GLUCOTROL) 5 MG tablet Take 2 tablets (10 mg total) by mouth daily with breakfast AND 1 tablet (5 mg total) daily with dinner or evening meal. Take 7.5 mg with breakfast and 5 mg with dinner.  Qty: 270 tablet, Refills: 2    Associated Diagnoses: Uncontrolled type 2 diabetes mellitus with diabetic neuropathy, without long-term current use of insulin      HYDROcodone-acetaminophen (NORCO) 7.5-325 mg per tablet Take 1 tablet by mouth every 24 hours as needed for Pain (take 30 minutes prior to parcipating in physical therapy).  Qty: 20 tablet, Refills: 0    Comments: Quantity prescribed more than 7 day supply? Yes, quantity medically necessary  Associated Diagnoses: Compression fracture of L2 lumbar vertebra, open, initial encounter      insulin detemir U-100 (LEVEMIR FLEXTOUCH U-100 INSULN) 100 unit/mL (3 mL) InPn pen Inject 12 Units into the skin every evening.  Qty: 15 mL, Refills: 3    Associated Diagnoses: Uncontrolled type 2 diabetes mellitus with diabetic neuropathy, without long-term current use of insulin      lancets (ACCU-CHEK SOFTCLIX LANCETS) Misc To check BG 4 times daily, to use with insurance preferred meter.  Qty: 200 each, Refills: 0       tamsulosin (FLOMAX) 0.4 mg Cap TAKE 1 CAPSULE BY MOUTH ONCE DAILY FOR URINE FLOW  Qty: 90 capsule, Refills: 3         STOP taking these medications       amLODIPine (NORVASC) 5 MG tablet Comments:   Reason for Stopping:         doxycycline (VIBRA-TABS) 100 MG tablet Comments:   Reason for Stopping:         metoprolol tartrate (LOPRESSOR) 25 MG tablet Comments:   Reason for Stopping:                 I have seen and examined this patient within the last 30 days. My clinical findings that support the need for the home health skilled services and home bound status are the following:no   Weakness/numbness causing balance and gait disturbance due to Heart Failure, Coronary Heart Disease, Weakness/Debility and Anemia making it taxing to leave home.     Diet:   cardiac diet and diabetic diet 2000 calorie      Referrals/ Consults  Physical Therapy to evaluate and treat. Evaluate for home safety and equipment needs; Establish/upgrade home exercise program. Perform / instruct on therapeutic exercises, gait training, transfer training, and Range of Motion.  Occupational Therapy to evaluate and treat. Evaluate home environment for safety and equipment needs. Perform/Instruct on transfers, ADL training, ROM, and therapeutic exercises.  Aide to provide assistance with personal care, ADLs, and vital signs.   Cardiac rehab    Activities:   activity as tolerated    Nursing:   Agency to admit patient within 24 hours of hospital discharge unless specified on physician order or at patient request    SN to complete comprehensive assessment including routine vital signs. Instruct on disease process and s/s of complications to report to MD. Review/verify medication list sent home with the patient at time of discharge  and instruct patient/caregiver as needed. Frequency may be adjusted depending on start of care date.     Skilled nurse to perform up to 3 visits PRN for symptoms related to diagnosis    Notify MD if SBP > 160 or < 90; DBP >  90 or < 50; HR > 120 or < 50; Temp > 101; O2 < 88%; Other:       Ok to schedule additional visits based on staff availability and patient request on consecutive days within the home health episode.    When multiple disciplines ordered:    Start of Care occurs on Sunday - Wednesday schedule remaining discipline evaluations as ordered on separate consecutive days following the start of care.    Thursday SOC -schedule subsequent evaluations Friday and Monday the following week.     Friday - Saturday SOC - schedule subsequent discipline evaluations on consecutive days starting Monday of the following week.    For all post-discharge communication and subsequent orders please contact patient's primary care physician. If unable to reach primary care physician or do not receive response within 30 minutes, please contact Eladia Nunes for clinical staff order clarification        Home Health Aide:  Nursing Three times weekly, Physical Therapy Three times weekly, Occupational Therapy Three times weekly and Home Health Aide Three times weekly    Wound Care Orders  no    I certify that this patient is confined to his home and needs intermittent skilled nursing care, physical therapy and occupational therapy.

## 2022-08-22 ENCOUNTER — PATIENT OUTREACH (OUTPATIENT)
Dept: ADMINISTRATIVE | Facility: OTHER | Age: 77
End: 2022-08-22
Payer: MEDICARE

## 2022-08-22 ENCOUNTER — PATIENT OUTREACH (OUTPATIENT)
Dept: ADMINISTRATIVE | Facility: CLINIC | Age: 77
End: 2022-08-22
Payer: MEDICARE

## 2022-08-22 NOTE — PROGRESS NOTES
C3 nurse spoke with Jin Ngo and wife for a TCC post hospital discharge follow up call. The patient has a scheduled HOSFU appointment with Sam Barroso MD on 8/23/22 @ 2493.

## 2022-08-22 NOTE — PROGRESS NOTES
IP Liaison - Final Visit Note    Patient: Jin Ngo  MRN:  [id number]  Date of Service:  8/22/2022  Completed by:  EDWARDO Woody    Reason for Visit   Patient presents with    IP Liaison Chart Review        Patient Summary     Discharge Date: 8/20/2022  Discharge telephone number/address: [phone] / [address] [place] [id number]  Follow up provider: Sam Barroso MD / Pasquale Ramesh MD  Follow up appointments: 8/23/2022 @ 9:40am / 9/1/2022 @ 9:40am  Home Health agency & telephone number: [facility]  DME ordered &  name: n/a  Assigned OPCM RN/SW: n/a  Report sent to follow up team (PCP/OPCM) via in basket message: n/a  Community Resources provided including agency name & contact info: [facility] [place] [facility] and [facility] and [facility] information      EDWARDO Woody

## 2022-08-23 ENCOUNTER — OFFICE VISIT (OUTPATIENT)
Dept: FAMILY MEDICINE | Facility: CLINIC | Age: 77
End: 2022-08-23
Payer: MEDICARE

## 2022-08-23 VITALS
HEART RATE: 56 BPM | DIASTOLIC BLOOD PRESSURE: 50 MMHG | HEIGHT: 69 IN | BODY MASS INDEX: 28.63 KG/M2 | OXYGEN SATURATION: 98 % | TEMPERATURE: 98 F | WEIGHT: 193.31 LBS | SYSTOLIC BLOOD PRESSURE: 96 MMHG

## 2022-08-23 DIAGNOSIS — I25.10 CORONARY ARTERY DISEASE INVOLVING NATIVE HEART WITHOUT ANGINA PECTORIS, UNSPECIFIED VESSEL OR LESION TYPE: Primary | ICD-10-CM

## 2022-08-23 DIAGNOSIS — R79.89 ELEVATED SERUM CREATININE: ICD-10-CM

## 2022-08-23 DIAGNOSIS — I10 ESSENTIAL HYPERTENSION: ICD-10-CM

## 2022-08-23 PROCEDURE — 99499 RISK ADDL DX/OHS AUDIT: ICD-10-PCS | Mod: HCNC,S$GLB,, | Performed by: FAMILY MEDICINE

## 2022-08-23 PROCEDURE — 99214 PR OFFICE/OUTPT VISIT, EST, LEVL IV, 30-39 MIN: ICD-10-PCS | Mod: S$GLB,,, | Performed by: FAMILY MEDICINE

## 2022-08-23 PROCEDURE — 3288F FALL RISK ASSESSMENT DOCD: CPT | Mod: CPTII,S$GLB,, | Performed by: FAMILY MEDICINE

## 2022-08-23 PROCEDURE — 99499 UNLISTED E&M SERVICE: CPT | Mod: HCNC,S$GLB,, | Performed by: FAMILY MEDICINE

## 2022-08-23 PROCEDURE — 1126F AMNT PAIN NOTED NONE PRSNT: CPT | Mod: CPTII,S$GLB,, | Performed by: FAMILY MEDICINE

## 2022-08-23 PROCEDURE — 3078F PR MOST RECENT DIASTOLIC BLOOD PRESSURE < 80 MM HG: ICD-10-PCS | Mod: CPTII,S$GLB,, | Performed by: FAMILY MEDICINE

## 2022-08-23 PROCEDURE — 1126F PR PAIN SEVERITY QUANTIFIED, NO PAIN PRESENT: ICD-10-PCS | Mod: CPTII,S$GLB,, | Performed by: FAMILY MEDICINE

## 2022-08-23 PROCEDURE — 3288F PR FALLS RISK ASSESSMENT DOCUMENTED: ICD-10-PCS | Mod: CPTII,S$GLB,, | Performed by: FAMILY MEDICINE

## 2022-08-23 PROCEDURE — 3078F DIAST BP <80 MM HG: CPT | Mod: CPTII,S$GLB,, | Performed by: FAMILY MEDICINE

## 2022-08-23 PROCEDURE — 3074F SYST BP LT 130 MM HG: CPT | Mod: CPTII,S$GLB,, | Performed by: FAMILY MEDICINE

## 2022-08-23 PROCEDURE — 1101F PT FALLS ASSESS-DOCD LE1/YR: CPT | Mod: CPTII,S$GLB,, | Performed by: FAMILY MEDICINE

## 2022-08-23 PROCEDURE — 1101F PR PT FALLS ASSESS DOC 0-1 FALLS W/OUT INJ PAST YR: ICD-10-PCS | Mod: CPTII,S$GLB,, | Performed by: FAMILY MEDICINE

## 2022-08-23 PROCEDURE — 3074F PR MOST RECENT SYSTOLIC BLOOD PRESSURE < 130 MM HG: ICD-10-PCS | Mod: CPTII,S$GLB,, | Performed by: FAMILY MEDICINE

## 2022-08-23 PROCEDURE — 99214 OFFICE O/P EST MOD 30 MIN: CPT | Mod: S$GLB,,, | Performed by: FAMILY MEDICINE

## 2022-09-05 NOTE — PROGRESS NOTES
" Patient ID: Jin Ngo is a 76 y.o. male.    Chief Complaint: Hospital Follow Up    HPI      Jin Ngo is a 76 y.o. male following up myocardial infarction.  Doing well overall after hospitalization.  No chest pain or palpitations.  No shortness of breath.  No nausea vomiting--diarrhea    Vitals:    08/23/22 0943   BP: (!) 96/50   BP Location: Left arm   Patient Position: Sitting   Pulse: (!) 56   Temp: 97.9 °F (36.6 °C)   TempSrc: Oral   SpO2: 98%   Weight: 87.7 kg (193 lb 5.5 oz)   Height: 5' 9" (1.753 m)            Review of Symptoms      Physical Exam    Constitutional:  Oriented to person, place, and time.appears well-developed and well-nourished.  No distress.      HENT  Head: Normocephalic and atraumatic  Right Ear: External ear normal.   Left Ear: External ear normal.   Nose: External nose normal.   Mouth:  Moist mucus membranes.    Eyes:  Conjunctivae are normal. Right eye exhibits no discharge.  Left eye exhibits no discharge. No scleral icterus.  No periorbital edema    Cardiovascular:  Regular rate and rhythm with normal S1 and S2     Pulmonary/Chest:   Clear to auscultation bilaterally without wheezes, rhonchi or rales      Musculoskeletal:  No edema. No obvious deformity No wasting       Neurological:  Alert and oriented to person, place, and time.   Coordination normal.     Skin:   Skin is warm and dry.  No diaphoresis.   No rash noted.     Psychiatric: Normal mood and affect. Behavior is normal.  Judgment and thought content normal.     Complete Blood Count  Lab Results   Component Value Date    RBC 4.67 08/19/2022    HGB 14.0 08/19/2022    HCT 41.2 08/19/2022    MCV 88 08/19/2022    MCH 30.0 08/19/2022    MCHC 34.0 08/19/2022    RDW 14.6 (H) 08/19/2022     08/19/2022    MPV 11.6 08/19/2022    GRAN 4.2 08/19/2022    GRAN 65.3 08/19/2022    LYMPH 1.3 08/19/2022    LYMPH 20.4 08/19/2022    MONO 0.6 08/19/2022    MONO 9.1 08/19/2022    EOS 0.3 08/19/2022    BASO 0.04 08/19/2022    " EOSINOPHIL 4.1 08/19/2022    BASOPHIL 0.6 08/19/2022    DIFFMETHOD Automated 08/19/2022       Comprehensive Metabolic Panel  Lab Results   Component Value Date     (H) 08/20/2022    BUN 61 (H) 08/20/2022    CREATININE 1.8 (H) 08/20/2022     08/20/2022    K 3.9 08/20/2022     08/20/2022    PROT 7.2 08/19/2022    ALBUMIN 3.0 (L) 08/19/2022    BILITOT 1.3 (H) 08/19/2022    AST 55 (H) 08/19/2022    ALKPHOS 42 (L) 08/19/2022    CO2 24 08/20/2022    ALT 47 (H) 08/19/2022    ANIONGAP 10 08/20/2022    EGFRNONAA 46.1 (A) 06/13/2022    ESTGFRAFRICA 53.2 (A) 06/13/2022       TSH  Lab Results   Component Value Date    TSH 1.720 06/13/2022       Assessment / Plan:      ICD-10-CM ICD-9-CM   1. Coronary artery disease involving native heart without angina pectoris, unspecified vessel or lesion type  I25.10 414.01   2. Elevated serum creatinine  R79.89 790.99   3. Essential hypertension  I10 401.9   4. Uncontrolled type 2 diabetes mellitus with diabetic neuropathy, without long-term current use of insulin  E11.40 250.62    E11.65 357.2     Coronary artery disease involving native heart without angina pectoris, unspecified vessel or lesion type  -     Comprehensive Metabolic Panel; Future; Expected date: 08/23/2022  -     CBC Auto Differential; Future; Expected date: 08/23/2022  -     Comprehensive Metabolic Panel; Future; Expected date: 08/23/2022  -     CBC Auto Differential; Future; Expected date: 08/23/2022    Elevated serum creatinine  -     Comprehensive Metabolic Panel; Future; Expected date: 08/23/2022  -     CBC Auto Differential; Future; Expected date: 08/23/2022  -     Comprehensive Metabolic Panel; Future; Expected date: 08/23/2022  -     CBC Auto Differential; Future; Expected date: 08/23/2022    Essential hypertension  -     Comprehensive Metabolic Panel; Future; Expected date: 08/23/2022  -     CBC Auto Differential; Future; Expected date: 08/23/2022    Uncontrolled type 2 diabetes mellitus with  diabetic neuropathy, without long-term current use of insulin  -     Hemoglobin A1C; Standing

## 2022-09-06 ENCOUNTER — TELEPHONE (OUTPATIENT)
Dept: FAMILY MEDICINE | Facility: CLINIC | Age: 77
End: 2022-09-06
Payer: MEDICARE

## 2022-09-06 ENCOUNTER — OFFICE VISIT (OUTPATIENT)
Dept: CARDIOLOGY | Facility: CLINIC | Age: 77
End: 2022-09-06
Payer: MEDICARE

## 2022-09-06 ENCOUNTER — LAB VISIT (OUTPATIENT)
Dept: LAB | Facility: HOSPITAL | Age: 77
End: 2022-09-06
Attending: FAMILY MEDICINE
Payer: MEDICARE

## 2022-09-06 VITALS
DIASTOLIC BLOOD PRESSURE: 73 MMHG | BODY MASS INDEX: 27.73 KG/M2 | OXYGEN SATURATION: 95 % | SYSTOLIC BLOOD PRESSURE: 112 MMHG | HEIGHT: 69 IN | WEIGHT: 187.19 LBS | HEART RATE: 65 BPM

## 2022-09-06 DIAGNOSIS — I15.2 HYPERTENSION ASSOCIATED WITH TYPE 2 DIABETES MELLITUS: ICD-10-CM

## 2022-09-06 DIAGNOSIS — E11.59 HYPERTENSION ASSOCIATED WITH TYPE 2 DIABETES MELLITUS: ICD-10-CM

## 2022-09-06 DIAGNOSIS — I50.41 ACUTE COMBINED SYSTOLIC AND DIASTOLIC CONGESTIVE HEART FAILURE: ICD-10-CM

## 2022-09-06 DIAGNOSIS — I10 ESSENTIAL HYPERTENSION: ICD-10-CM

## 2022-09-06 DIAGNOSIS — I21.4 NSTEMI (NON-ST ELEVATED MYOCARDIAL INFARCTION): ICD-10-CM

## 2022-09-06 DIAGNOSIS — I27.20 PULMONARY HTN: ICD-10-CM

## 2022-09-06 DIAGNOSIS — R79.89 ELEVATED SERUM CREATININE: ICD-10-CM

## 2022-09-06 DIAGNOSIS — N18.30 CKD STAGE 3 DUE TO TYPE 2 DIABETES MELLITUS: ICD-10-CM

## 2022-09-06 DIAGNOSIS — Z95.5 STATUS POST INSERTION OF DRUG ELUTING CORONARY ARTERY STENT: ICD-10-CM

## 2022-09-06 DIAGNOSIS — J81.0 ACUTE PULMONARY EDEMA: ICD-10-CM

## 2022-09-06 DIAGNOSIS — I65.23 CAROTID STENOSIS, ASYMPTOMATIC, BILATERAL: ICD-10-CM

## 2022-09-06 DIAGNOSIS — E78.5 TYPE 2 DIABETES MELLITUS WITH HYPERLIPIDEMIA: ICD-10-CM

## 2022-09-06 DIAGNOSIS — I48.0 PAROXYSMAL ATRIAL FIBRILLATION: ICD-10-CM

## 2022-09-06 DIAGNOSIS — E11.69 TYPE 2 DIABETES MELLITUS WITH HYPERLIPIDEMIA: ICD-10-CM

## 2022-09-06 DIAGNOSIS — E11.22 CKD STAGE 3 DUE TO TYPE 2 DIABETES MELLITUS: ICD-10-CM

## 2022-09-06 DIAGNOSIS — I25.10 CORONARY ARTERY DISEASE INVOLVING NATIVE HEART WITHOUT ANGINA PECTORIS, UNSPECIFIED VESSEL OR LESION TYPE: ICD-10-CM

## 2022-09-06 DIAGNOSIS — Z95.1 S/P CABG (CORONARY ARTERY BYPASS GRAFT): ICD-10-CM

## 2022-09-06 LAB
ALBUMIN SERPL BCP-MCNC: 4.5 G/DL (ref 3.5–5.2)
ALP SERPL-CCNC: 41 U/L (ref 38–126)
ALT SERPL W/O P-5'-P-CCNC: 31 U/L (ref 10–44)
ANION GAP SERPL CALC-SCNC: 10 MMOL/L (ref 8–16)
AST SERPL-CCNC: 42 U/L (ref 15–46)
BASOPHILS # BLD AUTO: 0.06 K/UL (ref 0–0.2)
BASOPHILS NFR BLD: 1.4 % (ref 0–1.9)
BILIRUB SERPL-MCNC: 0.6 MG/DL (ref 0.1–1)
CALCIUM SERPL-MCNC: 9.6 MG/DL (ref 8.7–10.5)
CHLORIDE SERPL-SCNC: 101 MMOL/L (ref 95–110)
CO2 SERPL-SCNC: 30 MMOL/L (ref 23–29)
CREAT SERPL-MCNC: 1.93 MG/DL (ref 0.5–1.4)
DIFFERENTIAL METHOD: ABNORMAL
EOSINOPHIL # BLD AUTO: 0.2 K/UL (ref 0–0.5)
EOSINOPHIL NFR BLD: 4.5 % (ref 0–8)
ERYTHROCYTE [DISTWIDTH] IN BLOOD BY AUTOMATED COUNT: 14.3 % (ref 11.5–14.5)
EST. GFR  (NO RACE VARIABLE): 35.4 ML/MIN/1.73 M^2
GLUCOSE SERPL-MCNC: 149 MG/DL (ref 70–110)
HCT VFR BLD AUTO: 43.9 % (ref 40–54)
HGB BLD-MCNC: 14 G/DL (ref 14–18)
IMM GRANULOCYTES # BLD AUTO: 0.01 K/UL (ref 0–0.04)
IMM GRANULOCYTES NFR BLD AUTO: 0.2 % (ref 0–0.5)
LYMPHOCYTES # BLD AUTO: 1.1 K/UL (ref 1–4.8)
LYMPHOCYTES NFR BLD: 27.2 % (ref 18–48)
MCH RBC QN AUTO: 29.4 PG (ref 27–31)
MCHC RBC AUTO-ENTMCNC: 31.9 G/DL (ref 32–36)
MCV RBC AUTO: 92 FL (ref 82–98)
MONOCYTES # BLD AUTO: 0.5 K/UL (ref 0.3–1)
MONOCYTES NFR BLD: 11.2 % (ref 4–15)
NEUTROPHILS # BLD AUTO: 2.3 K/UL (ref 1.8–7.7)
NEUTROPHILS NFR BLD: 55.5 % (ref 38–73)
NRBC BLD-RTO: 0 /100 WBC
PLATELET # BLD AUTO: 242 K/UL (ref 150–450)
PMV BLD AUTO: 10.4 FL (ref 9.2–12.9)
POTASSIUM SERPL-SCNC: 4.1 MMOL/L (ref 3.5–5.1)
PROT SERPL-MCNC: 7.6 G/DL (ref 6–8.4)
RBC # BLD AUTO: 4.77 M/UL (ref 4.6–6.2)
SODIUM SERPL-SCNC: 141 MMOL/L (ref 136–145)
UUN UR-MCNC: 38 MG/DL (ref 2–20)
WBC # BLD AUTO: 4.19 K/UL (ref 3.9–12.7)

## 2022-09-06 PROCEDURE — 85025 COMPLETE CBC W/AUTO DIFF WBC: CPT | Mod: PO | Performed by: FAMILY MEDICINE

## 2022-09-06 PROCEDURE — 1159F MED LIST DOCD IN RCRD: CPT | Mod: CPTII,S$GLB,, | Performed by: INTERNAL MEDICINE

## 2022-09-06 PROCEDURE — 1111F PR DISCHARGE MEDS RECONCILED W/ CURRENT OUTPATIENT MED LIST: ICD-10-PCS | Mod: CPTII,S$GLB,, | Performed by: INTERNAL MEDICINE

## 2022-09-06 PROCEDURE — 99215 PR OFFICE/OUTPT VISIT, EST, LEVL V, 40-54 MIN: ICD-10-PCS | Mod: S$GLB,,, | Performed by: INTERNAL MEDICINE

## 2022-09-06 PROCEDURE — 3074F PR MOST RECENT SYSTOLIC BLOOD PRESSURE < 130 MM HG: ICD-10-PCS | Mod: CPTII,S$GLB,, | Performed by: INTERNAL MEDICINE

## 2022-09-06 PROCEDURE — 1126F AMNT PAIN NOTED NONE PRSNT: CPT | Mod: CPTII,S$GLB,, | Performed by: INTERNAL MEDICINE

## 2022-09-06 PROCEDURE — 1160F RVW MEDS BY RX/DR IN RCRD: CPT | Mod: CPTII,S$GLB,, | Performed by: INTERNAL MEDICINE

## 2022-09-06 PROCEDURE — 99999 PR PBB SHADOW E&M-EST. PATIENT-LVL III: ICD-10-PCS | Mod: PBBFAC,,, | Performed by: INTERNAL MEDICINE

## 2022-09-06 PROCEDURE — 1159F PR MEDICATION LIST DOCUMENTED IN MEDICAL RECORD: ICD-10-PCS | Mod: CPTII,S$GLB,, | Performed by: INTERNAL MEDICINE

## 2022-09-06 PROCEDURE — 1111F DSCHRG MED/CURRENT MED MERGE: CPT | Mod: CPTII,S$GLB,, | Performed by: INTERNAL MEDICINE

## 2022-09-06 PROCEDURE — 36415 COLL VENOUS BLD VENIPUNCTURE: CPT | Mod: PO | Performed by: FAMILY MEDICINE

## 2022-09-06 PROCEDURE — 99215 OFFICE O/P EST HI 40 MIN: CPT | Mod: S$GLB,,, | Performed by: INTERNAL MEDICINE

## 2022-09-06 PROCEDURE — 99499 UNLISTED E&M SERVICE: CPT | Mod: S$GLB,,, | Performed by: INTERNAL MEDICINE

## 2022-09-06 PROCEDURE — 1160F PR REVIEW ALL MEDS BY PRESCRIBER/CLIN PHARMACIST DOCUMENTED: ICD-10-PCS | Mod: CPTII,S$GLB,, | Performed by: INTERNAL MEDICINE

## 2022-09-06 PROCEDURE — 99999 PR PBB SHADOW E&M-EST. PATIENT-LVL III: CPT | Mod: PBBFAC,,, | Performed by: INTERNAL MEDICINE

## 2022-09-06 PROCEDURE — 3078F DIAST BP <80 MM HG: CPT | Mod: CPTII,S$GLB,, | Performed by: INTERNAL MEDICINE

## 2022-09-06 PROCEDURE — 3074F SYST BP LT 130 MM HG: CPT | Mod: CPTII,S$GLB,, | Performed by: INTERNAL MEDICINE

## 2022-09-06 PROCEDURE — 3078F PR MOST RECENT DIASTOLIC BLOOD PRESSURE < 80 MM HG: ICD-10-PCS | Mod: CPTII,S$GLB,, | Performed by: INTERNAL MEDICINE

## 2022-09-06 PROCEDURE — 1126F PR PAIN SEVERITY QUANTIFIED, NO PAIN PRESENT: ICD-10-PCS | Mod: CPTII,S$GLB,, | Performed by: INTERNAL MEDICINE

## 2022-09-06 PROCEDURE — 80053 COMPREHEN METABOLIC PANEL: CPT | Mod: PO | Performed by: FAMILY MEDICINE

## 2022-09-06 NOTE — PROGRESS NOTES
[facility] Cardiology     Subjective:    Patient ID:  Jin Ngo is a 76 y.o. male who presents for follow-up of Hypertension, Hyperlipidemia, Coronary Artery Disease, Carotid Artery Disease, and Congestive Heart Failure    Review of patient's allergies indicates:   Allergen Reactions    Metformin      Caused stroke    Pcn [penicillins]       He developed heart failure symptoms with chest pain and was admitted.  He has history of CABG 4 vessel 2007.  His heart catheterization confirmed high-grade stenosis to SVG right coronary artery.  A 3.5 mm stent was deployed 18 mm length drug-eluting type.  His echo showed ejection fraction 30% with mild-to-moderate mitral regurgitation and PA systolic pressure 55 mm Hg.  He was treated with diuretics.  His peak troponin was 33.  Femoral access was required and he states his groin is healing well with residual mild tenderness.  He is with his wife today.  She confirms that he is still having some breathing problems.  He is on furosemide daily.    His history includes previous carotid stenting and AFib.  He is on Eliquis and clopidogrel.  He remains on atorvastatin for hyperlipidemia.  He is diabetic.  There was occluded left anterior descending artery without grafting.  There was patent SVG to circumflex.  The patient's last stress test was 2016 with EF normal and normal perfusion study.  His blood pressure is normal today.  Because of renal insufficiency and borderline low blood pressures he was not ordered for lisinopril.  His wife states he was taken off ACE-inhibitor therapy a couple of years ago.  His GFR is 39, serum creatinine 1.8 baseline.        Review of Systems   Constitutional: Negative for chills, decreased appetite, diaphoresis, fever, malaise/fatigue, night sweats, weight gain and weight loss.   HENT:  Negative for congestion, ear discharge, ear pain, hearing loss, hoarse voice,  nosebleeds, odynophagia, sore throat, stridor and tinnitus.    Eyes:  Negative for blurred vision, discharge, double vision, pain, photophobia, redness, vision loss in left eye, vision loss in right eye, visual disturbance and visual halos.   Cardiovascular:  Positive for dyspnea on exertion. Negative for chest pain, claudication, cyanosis, irregular heartbeat, leg swelling, near-syncope, orthopnea, palpitations, paroxysmal nocturnal dyspnea and syncope.   Respiratory:  Positive for shortness of breath. Negative for cough, hemoptysis, sleep disturbances due to breathing, snoring, sputum production and wheezing.    Endocrine: Negative for cold intolerance, heat intolerance, polydipsia, polyphagia and polyuria.   Hematologic/Lymphatic: Negative for adenopathy and bleeding problem. Does not bruise/bleed easily.   Skin:  Negative for color change, dry skin, flushing, itching, nail changes, poor wound healing, rash, skin cancer, suspicious lesions and unusual hair distribution.   Musculoskeletal:  Negative for arthritis, back pain, falls, gout, joint pain, joint swelling, muscle cramps, muscle weakness, myalgias, neck pain and stiffness.   Gastrointestinal:  Negative for bloating, abdominal pain, anorexia, change in bowel habit, bowel incontinence, constipation, diarrhea, dysphagia, excessive appetite, flatus, heartburn, hematemesis, hematochezia, hemorrhoids, jaundice, melena, nausea and vomiting.   Genitourinary:  Negative for bladder incontinence, decreased libido, dysuria, flank pain, frequency, genital sores, hematuria, hesitancy, incomplete emptying, nocturia and urgency.   Neurological:  Positive for weakness. Negative for aphonia, brief paralysis, difficulty with concentration, disturbances in coordination, excessive daytime sleepiness, dizziness, focal weakness, headaches, light-headedness, loss of balance, numbness, paresthesias, seizures, sensory change, tremors and vertigo.   Psychiatric/Behavioral:  Negative  "for altered mental status, depression, hallucinations, memory loss, substance abuse, suicidal ideas and thoughts of violence. The patient does not have insomnia and is not nervous/anxious.    Allergic/Immunologic: Negative for hives and persistent infections.      Objective:       Vitals:    09/06/22 1151   BP: 112/73   BP Location: Right leg   Patient Position: Sitting   BP Method: Large (Automatic)   Pulse: 65   SpO2: 95%   Weight: 84.9 kg (187 lb 3.2 oz)   Height: 5' 9" (1.753 m)    Physical Exam  Constitutional:       General: He is not in acute distress.     Appearance: He is well-developed. He is not diaphoretic.   HENT:      Head: Normocephalic and atraumatic.      Nose: Nose normal.   Eyes:      General: No scleral icterus.        Right eye: No discharge.      Conjunctiva/sclera: Conjunctivae normal.      Pupils: Pupils are equal, round, and reactive to light.   Neck:      Thyroid: No thyromegaly.      Vascular: No JVD.      Trachea: No tracheal deviation.   Cardiovascular:      Rate and Rhythm: Normal rate and regular rhythm.      Pulses:           Carotid pulses are 2+ on the right side and 2+ on the left side.       Radial pulses are 2+ on the right side and 2+ on the left side.        Dorsalis pedis pulses are 2+ on the right side and 2+ on the left side.        Posterior tibial pulses are 2+ on the right side and 2+ on the left side.      Heart sounds: Normal heart sounds. No murmur heard.    No friction rub. No gallop.   Pulmonary:      Effort: Pulmonary effort is normal. No respiratory distress.      Breath sounds: Normal breath sounds. No stridor. No wheezing or rales.   Chest:      Chest wall: No tenderness.   Abdominal:      General: Bowel sounds are normal. There is no distension.      Palpations: Abdomen is soft. There is no mass.      Tenderness: There is no abdominal tenderness. There is no guarding or rebound.   Musculoskeletal:         General: No tenderness. Normal range of motion.      " Cervical back: Normal range of motion and neck supple.   Lymphadenopathy:      Cervical: No cervical adenopathy.   Skin:     General: Skin is warm and dry.      Coloration: Skin is not pale.      Findings: No erythema or rash.   Neurological:      Mental Status: He is alert and oriented to person, place, and time.      Cranial Nerves: No cranial nerve deficit.      Coordination: Coordination normal.   Psychiatric:         Behavior: Behavior normal.         Thought Content: Thought content normal.         Judgment: Judgment normal.         Assessment:       1. NSTEMI (non-ST elevated myocardial infarction)    2. Acute combined systolic and diastolic congestive heart failure    3. CKD stage 3 due to type 2 diabetes mellitus    4. Hypertension associated with type 2 diabetes mellitus    5. Type 2 diabetes mellitus with hyperlipidemia    6. Uncontrolled type 2 diabetes mellitus with diabetic neuropathy, without long-term current use of insulin    7. Paroxysmal atrial fibrillation    8. S/P CABG (coronary artery bypass graft)    9. Pulmonary HTN    10. Acute pulmonary edema    11. Status post insertion of drug eluting coronary artery stent    12. Carotid stenosis, asymptomatic, bilateral      Results for orders placed or performed during the hospital encounter of 08/16/22   Influenza A & B by Molecular    Specimen: Nasopharyngeal Swab   Result Value Ref Range    Influenza A, Molecular Negative Negative    Influenza B, Molecular Negative Negative    Flu A & B Source Nasal swab    CBC auto differential   Result Value Ref Range    WBC 9.25 3.90 - 12.70 K/uL    RBC 4.48 (L) 4.60 - 6.20 M/uL    Hemoglobin 13.3 (L) 14.0 - 18.0 g/dL    Hematocrit 40.8 40.0 - 54.0 %    MCV 91 82 - 98 fL    MCH 29.7 27.0 - 31.0 pg    MCHC 32.6 32.0 - 36.0 g/dL    RDW 14.6 (H) 11.5 - 14.5 %    Platelets 207 150 - 450 K/uL    MPV 11.2 9.2 - 12.9 fL    Immature Granulocytes 0.3 0.0 - 0.5 %    Gran # (ANC) 7.6 1.8 - 7.7 K/uL    Immature Grans (Abs)  0.03 0.00 - 0.04 K/uL    Lymph # 0.9 (L) 1.0 - 4.8 K/uL    Mono # 0.7 0.3 - 1.0 K/uL    Eos # 0.0 0.0 - 0.5 K/uL    Baso # 0.03 0.00 - 0.20 K/uL    nRBC 0 0 /100 WBC    Gran % 82.3 (H) 38.0 - 73.0 %    Lymph % 9.4 (L) 18.0 - 48.0 %    Mono % 7.7 4.0 - 15.0 %    Eosinophil % 0.0 0.0 - 8.0 %    Basophil % 0.3 0.0 - 1.9 %    Differential Method Automated    Comprehensive metabolic panel   Result Value Ref Range    Sodium 136 136 - 145 mmol/L    Potassium 5.1 3.5 - 5.1 mmol/L    Chloride 106 95 - 110 mmol/L    CO2 20 (L) 23 - 29 mmol/L    Glucose 245 (H) 70 - 110 mg/dL    BUN 38 (H) 2 - 20 mg/dL    Creatinine 1.70 (H) 0.50 - 1.40 mg/dL    Calcium 9.5 8.7 - 10.5 mg/dL    Total Protein 6.9 6.0 - 8.4 g/dL    Albumin 4.1 3.5 - 5.2 g/dL    Total Bilirubin 0.9 0.1 - 1.0 mg/dL    Alkaline Phosphatase 36 (L) 38 - 126 U/L     (H) 15 - 46 U/L    ALT 62 (H) 10 - 44 U/L    Anion Gap 10 8 - 16 mmol/L    eGFR 41.3 (A) >60 mL/min/1.73 m^2   NT-Pro Natriuretic Peptide   Result Value Ref Range    NT-proBNP 8080 (H) 5 - 1800 pg/mL   Troponin I   Result Value Ref Range    Troponin I 8.400 (H) 0.012 - 0.034 ng/mL   COVID-19 Rapid Screening   Result Value Ref Range    SARS-CoV-2 RNA, Amplification, Qual Negative Negative   Lipase   Result Value Ref Range    Lipase Result 49 23 - 300 U/L   Troponin I   Result Value Ref Range    Troponin I 12.123 (H) 0.000 - 0.026 ng/mL   Troponin I   Result Value Ref Range    Troponin I 10.755 (H) 0.000 - 0.026 ng/mL   APTT   Result Value Ref Range    aPTT 31.9 21.0 - 32.0 sec   Protime-INR   Result Value Ref Range    Prothrombin Time 12.8 (H) 9.0 - 12.5 sec    INR 1.3 (H) 0.8 - 1.2   Troponin I   Result Value Ref Range    Troponin I 11.543 (H) 0.000 - 0.026 ng/mL   Comprehensive Metabolic Panel (CMP)   Result Value Ref Range    Sodium 140 136 - 145 mmol/L    Potassium 4.1 3.5 - 5.1 mmol/L    Chloride 105 95 - 110 mmol/L    CO2 22 (L) 23 - 29 mmol/L    Glucose 122 (H) 70 - 110 mg/dL    BUN 39 (H) 8 -  23 mg/dL    Creatinine 1.8 (H) 0.5 - 1.4 mg/dL    Calcium 9.7 8.7 - 10.5 mg/dL    Total Protein 7.0 6.0 - 8.4 g/dL    Albumin 3.5 3.5 - 5.2 g/dL    Total Bilirubin 0.7 0.1 - 1.0 mg/dL    Alkaline Phosphatase 33 (L) 55 - 135 U/L    AST 88 (H) 10 - 40 U/L    ALT 51 (H) 10 - 44 U/L    Anion Gap 13 8 - 16 mmol/L    eGFR 39 (A) >60 mL/min/1.73 m^2   Magnesium   Result Value Ref Range    Magnesium 1.7 1.6 - 2.6 mg/dL   Phosphorus   Result Value Ref Range    Phosphorus 3.8 2.7 - 4.5 mg/dL   CBC with Automated Differential   Result Value Ref Range    WBC 12.69 3.90 - 12.70 K/uL    RBC 4.35 (L) 4.60 - 6.20 M/uL    Hemoglobin 13.1 (L) 14.0 - 18.0 g/dL    Hematocrit 39.2 (L) 40.0 - 54.0 %    MCV 90 82 - 98 fL    MCH 30.1 27.0 - 31.0 pg    MCHC 33.4 32.0 - 36.0 g/dL    RDW 14.8 (H) 11.5 - 14.5 %    Platelets 206 150 - 450 K/uL    MPV 11.2 9.2 - 12.9 fL    Immature Granulocytes 0.6 (H) 0.0 - 0.5 %    Gran # (ANC) 10.4 (H) 1.8 - 7.7 K/uL    Immature Grans (Abs) 0.07 (H) 0.00 - 0.04 K/uL    Lymph # 1.1 1.0 - 4.8 K/uL    Mono # 1.1 (H) 0.3 - 1.0 K/uL    Eos # 0.0 0.0 - 0.5 K/uL    Baso # 0.01 0.00 - 0.20 K/uL    nRBC 0 0 /100 WBC    Gran % 81.9 (H) 38.0 - 73.0 %    Lymph % 8.6 (L) 18.0 - 48.0 %    Mono % 8.8 4.0 - 15.0 %    Eosinophil % 0.0 0.0 - 8.0 %    Basophil % 0.1 0.0 - 1.9 %    Differential Method Automated    Hemoglobin A1c   Result Value Ref Range    Hemoglobin A1C 7.4 (H) 4.0 - 5.6 %    Estimated Avg Glucose 166 (H) 68 - 131 mg/dL   APTT   Result Value Ref Range    aPTT 35.9 (H) 21.0 - 32.0 sec   APTT   Result Value Ref Range    aPTT 44.8 (H) 21.0 - 32.0 sec   Troponin I   Result Value Ref Range    Troponin I 18.825 (H) 0.000 - 0.026 ng/mL   APTT   Result Value Ref Range    aPTT 43.1 (H) 21.0 - 32.0 sec   Comprehensive Metabolic Panel (CMP)   Result Value Ref Range    Sodium 140 136 - 145 mmol/L    Potassium 4.3 3.5 - 5.1 mmol/L    Chloride 102 95 - 110 mmol/L    CO2 24 23 - 29 mmol/L    Glucose 137 (H) 70 - 110 mg/dL     BUN 46 (H) 8 - 23 mg/dL    Creatinine 1.9 (H) 0.5 - 1.4 mg/dL    Calcium 9.2 8.7 - 10.5 mg/dL    Total Protein 6.7 6.0 - 8.4 g/dL    Albumin 3.6 3.5 - 5.2 g/dL    Total Bilirubin 1.5 (H) 0.1 - 1.0 mg/dL    Alkaline Phosphatase 37 (L) 55 - 135 U/L    AST 75 (H) 10 - 40 U/L    ALT 44 10 - 44 U/L    Anion Gap 14 8 - 16 mmol/L    eGFR 36 (A) >60 mL/min/1.73 m^2   Magnesium   Result Value Ref Range    Magnesium 1.7 1.6 - 2.6 mg/dL   Phosphorus   Result Value Ref Range    Phosphorus 4.0 2.7 - 4.5 mg/dL   CBC with Automated Differential   Result Value Ref Range    WBC 10.45 3.90 - 12.70 K/uL    RBC 4.53 (L) 4.60 - 6.20 M/uL    Hemoglobin 13.4 (L) 14.0 - 18.0 g/dL    Hematocrit 40.5 40.0 - 54.0 %    MCV 89 82 - 98 fL    MCH 29.6 27.0 - 31.0 pg    MCHC 33.1 32.0 - 36.0 g/dL    RDW 14.9 (H) 11.5 - 14.5 %    Platelets 217 150 - 450 K/uL    MPV 11.6 9.2 - 12.9 fL    Immature Granulocytes 0.5 0.0 - 0.5 %    Gran # (ANC) 7.8 (H) 1.8 - 7.7 K/uL    Immature Grans (Abs) 0.05 (H) 0.00 - 0.04 K/uL    Lymph # 1.6 1.0 - 4.8 K/uL    Mono # 0.8 0.3 - 1.0 K/uL    Eos # 0.1 0.0 - 0.5 K/uL    Baso # 0.05 0.00 - 0.20 K/uL    nRBC 0 0 /100 WBC    Gran % 75.0 (H) 38.0 - 73.0 %    Lymph % 15.0 (L) 18.0 - 48.0 %    Mono % 8.0 4.0 - 15.0 %    Eosinophil % 1.0 0.0 - 8.0 %    Basophil % 0.5 0.0 - 1.9 %    Differential Method Automated    Lipid panel   Result Value Ref Range    Cholesterol 98 (L) 120 - 199 mg/dL    Triglycerides 136 30 - 150 mg/dL    HDL 23 (L) 40 - 75 mg/dL    LDL Cholesterol 47.8 (L) 63.0 - 159.0 mg/dL    HDL/Cholesterol Ratio 23.5 20.0 - 50.0 %    Total Cholesterol/HDL Ratio 4.3 2.0 - 5.0    Non-HDL Cholesterol 75 mg/dL   Troponin I   Result Value Ref Range    Troponin I 33.883 (H) 0.000 - 0.026 ng/mL   Comprehensive Metabolic Panel (CMP)   Result Value Ref Range    Sodium 137 136 - 145 mmol/L    Potassium 3.7 3.5 - 5.1 mmol/L    Chloride 100 95 - 110 mmol/L    CO2 24 23 - 29 mmol/L    Glucose 150 (H) 70 - 110 mg/dL    BUN 61 (H)  8 - 23 mg/dL    Creatinine 2.1 (H) 0.5 - 1.4 mg/dL    Calcium 9.4 8.7 - 10.5 mg/dL    Total Protein 7.2 6.0 - 8.4 g/dL    Albumin 3.3 (L) 3.5 - 5.2 g/dL    Total Bilirubin 1.3 (H) 0.1 - 1.0 mg/dL    Alkaline Phosphatase 42 (L) 55 - 135 U/L    AST 55 (H) 10 - 40 U/L    ALT 47 (H) 10 - 44 U/L    Anion Gap 13 8 - 16 mmol/L    eGFR 32 (A) >60 mL/min/1.73 m^2   Magnesium   Result Value Ref Range    Magnesium 1.7 1.6 - 2.6 mg/dL   Phosphorus   Result Value Ref Range    Phosphorus 4.7 (H) 2.7 - 4.5 mg/dL   CBC with Automated Differential   Result Value Ref Range    WBC 6.37 3.90 - 12.70 K/uL    RBC 4.67 4.60 - 6.20 M/uL    Hemoglobin 14.0 14.0 - 18.0 g/dL    Hematocrit 41.2 40.0 - 54.0 %    MCV 88 82 - 98 fL    MCH 30.0 27.0 - 31.0 pg    MCHC 34.0 32.0 - 36.0 g/dL    RDW 14.6 (H) 11.5 - 14.5 %    Platelets 209 150 - 450 K/uL    MPV 11.6 9.2 - 12.9 fL    Immature Granulocytes 0.5 0.0 - 0.5 %    Gran # (ANC) 4.2 1.8 - 7.7 K/uL    Immature Grans (Abs) 0.03 0.00 - 0.04 K/uL    Lymph # 1.3 1.0 - 4.8 K/uL    Mono # 0.6 0.3 - 1.0 K/uL    Eos # 0.3 0.0 - 0.5 K/uL    Baso # 0.04 0.00 - 0.20 K/uL    nRBC 0 0 /100 WBC    Gran % 65.3 38.0 - 73.0 %    Lymph % 20.4 18.0 - 48.0 %    Mono % 9.1 4.0 - 15.0 %    Eosinophil % 4.1 0.0 - 8.0 %    Basophil % 0.6 0.0 - 1.9 %    Differential Method Automated    Renal function panel   Result Value Ref Range    Glucose 209 (H) 70 - 110 mg/dL    Sodium 135 (L) 136 - 145 mmol/L    Potassium 4.5 3.5 - 5.1 mmol/L    Chloride 98 95 - 110 mmol/L    CO2 23 23 - 29 mmol/L    BUN 68 (H) 8 - 23 mg/dL    Calcium 9.2 8.7 - 10.5 mg/dL    Creatinine 2.4 (H) 0.5 - 1.4 mg/dL    Albumin 3.0 (L) 3.5 - 5.2 g/dL    Phosphorus 5.2 (H) 2.7 - 4.5 mg/dL    eGFR 27 (A) >60 mL/min/1.73 m^2    Anion Gap 14 8 - 16 mmol/L   Magnesium   Result Value Ref Range    Magnesium 1.8 1.6 - 2.6 mg/dL   Phosphorus   Result Value Ref Range    Phosphorus 4.1 2.7 - 4.5 mg/dL   Basic Metabolic Panel   Result Value Ref Range    Sodium 136 136  - 145 mmol/L    Potassium 3.9 3.5 - 5.1 mmol/L    Chloride 102 95 - 110 mmol/L    CO2 24 23 - 29 mmol/L    Glucose 155 (H) 70 - 110 mg/dL    BUN 61 (H) 8 - 23 mg/dL    Creatinine 1.8 (H) 0.5 - 1.4 mg/dL    Calcium 9.1 8.7 - 10.5 mg/dL    Anion Gap 10 8 - 16 mmol/L    eGFR 39 (A) >60 mL/min/1.73 m^2   Echo   Result Value Ref Range    Ascending aorta 2.82 cm    STJ 2.55 cm    AV mean gradient 3 mmHg    Ao peak bebeto 1.08 m/s    Ao VTI 18.39 cm    IVS 0.96 0.6 - 1.1 cm    LA size 5.12 cm    Left Atrium Major Axis 7.35 cm    Left Atrium Minor Axis 7.68 cm    LVIDd 5.77 3.5 - 6.0 cm    LVIDs 4.65 (A) 2.1 - 4.0 cm    LVOT diameter 1.88 cm    LVOT peak VTI 9.25 cm    Posterior Wall 0.92 0.6 - 1.1 cm    RA Major Axis 6.33 cm    RA Width 3.62 cm    RVDD 2.73 cm    TR Max Bebeto 3.41 m/s    LA WIDTH 4.30 cm    Ao root annulus 2.90 cm    PV PEAK VELOCITY 0.60 cm/s    LV Diastolic Volume 164.53 mL    LV Systolic Volume 100.05 mL    LVOT peak bebeto 0.60 m/s    Mr max bebeto 0.05 m/s    LA volume (mod) 74.85 cm3    RV S' 8.05 cm/s    FS 19 %    LA volume 140.56 cm3    LV mass 213.26 g    Left Ventricle Relative Wall Thickness 0.32 cm    AV valve area 1.40 cm2    AV Velocity Ratio 0.56     AV index (prosthetic) 0.50     LVOT area 2.8 cm2    LVOT stroke volume 25.66 cm3    AV peak gradient 5 mmHg    Triscuspid Valve Regurgitation Peak Gradient 47 mmHg    BSA 2.1 m2    LV Systolic Volume Index 48.3 mL/m2    LV Diastolic Volume Index 79.48 mL/m2    LA Volume Index 67.9 mL/m2    LV Mass Index 103 g/m2    LA Volume Index (Mod) 36.2 mL/m2    Right Atrial Pressure (from IVC) 8 mmHg    EF 30 %    TV rest pulmonary artery pressure 55 mmHg   POCT glucose   Result Value Ref Range    POCT Glucose 186 (H) 70 - 110 mg/dL   POCT glucose   Result Value Ref Range    POCT Glucose 172 (H) 70 - 110 mg/dL   POCT glucose   Result Value Ref Range    POCT Glucose 265 (H) 70 - 110 mg/dL   POCT glucose   Result Value Ref Range    POCT Glucose 122 (H) 70 - 110 mg/dL  "  POCT glucose   Result Value Ref Range    POCT Glucose 133 (H) 70 - 110 mg/dL   ISTAT ACT-K   Result Value Ref Range    POC ACTIVATED CLOTTING TIME K 329 (H) 74 - 137 sec    Sample ARTERIAL    ISTAT ACT-K   Result Value Ref Range    POC ACTIVATED CLOTTING TIME K 231 (H) 74 - 137 sec    Sample ARTERIAL    POCT glucose   Result Value Ref Range    POCT Glucose 191 (H) 70 - 110 mg/dL   POCT glucose   Result Value Ref Range    POCT Glucose 149 (H) 70 - 110 mg/dL   POCT glucose   Result Value Ref Range    POCT Glucose 122 (H) 70 - 110 mg/dL   POCT glucose   Result Value Ref Range    POCT Glucose 163 (H) 70 - 110 mg/dL   POCT glucose   Result Value Ref Range    POCT Glucose 210 (H) 70 - 110 mg/dL   POCT glucose   Result Value Ref Range    POCT Glucose 213 (H) 70 - 110 mg/dL   POCT glucose   Result Value Ref Range    POCT Glucose 148 (H) 70 - 110 mg/dL   POCT glucose   Result Value Ref Range    POCT Glucose 235 (H) 70 - 110 mg/dL   POCT glucose   Result Value Ref Range    POCT Glucose 246 (H) 70 - 110 mg/dL   POCT glucose   Result Value Ref Range    POCT Glucose 189 (H) 70 - 110 mg/dL   POCT glucose   Result Value Ref Range    POCT Glucose 158 (H) 70 - 110 mg/dL   POCT glucose   Result Value Ref Range    POCT Glucose 198 (H) 70 - 110 mg/dL         Current Outpatient Medications:     ACCU-CHEK VERONICA PLUS TEST STRP Strp, TEST BLOOD SUGAR FOUR TIMES DAILY, Disp: 400 strip, Rfl: 3    allopurinoL (ZYLOPRIM) 300 MG tablet, TAKE 1 TABLET EVERY DAY, Disp: 90 tablet, Rfl: 0    atorvastatin (LIPITOR) 40 MG tablet, TAKE 1 TABLET EVERY DAY, Disp: 90 tablet, Rfl: 3    BD ULTRA-FINE TEJINDER PEN NEEDLE 32 gauge x 5/32" Ndle, USE WITH LEVEMIR DAILY, Disp: 100 each, Rfl: 3    blood-glucose meter kit, To check BG 4 times daily, to use with insurance preferred meter, Disp: 1 each, Rfl: 0    clopidogreL (PLAVIX) 75 mg tablet, Take 1 tablet (75 mg total) by mouth once daily., Disp: 30 tablet, Rfl: 11    DULoxetine (CYMBALTA) 30 MG capsule, TAKE 1 " CAPSULE (30 MG TOTAL) ONCE DAILY. TO HELP REDUCE FEELING OF BACK PAIN, Disp: 90 capsule, Rfl: 3    ELIQUIS 5 mg Tab, TAKE 1 TABLET BY MOUTH TWICE A DAY, Disp: 60 tablet, Rfl: 2    fenofibrate 160 MG Tab, TAKE 1 TABLET EVERY DAY, Disp: 90 tablet, Rfl: 1    fluticasone propionate (FLONASE) 50 mcg/actuation nasal spray, INHALE 1 SPRAY IN EACH NOSTRIL ROUTE ONCE DAILY., Disp: 48 mL, Rfl: 2    furosemide (LASIX) 40 MG tablet, Take 1 tablet (40 mg total) by mouth once daily., Disp: 30 tablet, Rfl: 11    glipiZIDE (GLUCOTROL) 5 MG tablet, Take 2 tablets (10 mg total) by mouth daily with breakfast AND 1 tablet (5 mg total) daily with dinner or evening meal. Take 7.5 mg with breakfast and 5 mg with dinner., Disp: 270 tablet, Rfl: 2    HYDROcodone-acetaminophen (NORCO) 7.5-325 mg per tablet, Take 1 tablet by mouth every 24 hours as needed for Pain (take 30 minutes prior to parcipating in physical therapy)., Disp: 20 tablet, Rfl: 0    insulin detemir U-100 (LEVEMIR FLEXTOUCH U-100 INSULN) 100 unit/mL (3 mL) InPn pen, Inject 12 Units into the skin every evening., Disp: 15 mL, Rfl: 3    lancets (ACCU-CHEK SOFTCLIX LANCETS) Misc, To check BG 4 times daily, to use with insurance preferred meter., Disp: 200 each, Rfl: 0    metoprolol succinate (TOPROL-XL) 25 MG 24 hr tablet, Take 1 tablet (25 mg total) by mouth once daily., Disp: 30 tablet, Rfl: 11    tamsulosin (FLOMAX) 0.4 mg Cap, TAKE 1 CAPSULE BY MOUTH ONCE DAILY FOR URINE FLOW, Disp: 90 capsule, Rfl: 3     Lab Results   Component Value Date    WBC 4.19 09/06/2022    RBC 4.77 09/06/2022    HGB 14.0 09/06/2022    HCT 43.9 09/06/2022    MCV 92 09/06/2022    MCH 29.4 09/06/2022    MCHC 31.9 (L) 09/06/2022    RDW 14.3 09/06/2022     09/06/2022    MPV 10.4 09/06/2022    GRAN 2.3 09/06/2022    GRAN 55.5 09/06/2022    LYMPH 1.1 09/06/2022    LYMPH 27.2 09/06/2022    MONO 0.5 09/06/2022    MONO 11.2 09/06/2022    EOS 0.2 09/06/2022    BASO 0.06 09/06/2022    EOSINOPHIL 4.5  09/06/2022    BASOPHIL 1.4 09/06/2022    MG 1.8 08/20/2022        CMP  Lab Results   Component Value Date     09/06/2022    K 4.1 09/06/2022     09/06/2022    CO2 30 (H) 09/06/2022     (H) 09/06/2022    BUN 38 (H) 09/06/2022    CREATININE 1.93 (H) 09/06/2022    CALCIUM 9.6 09/06/2022    PROT 7.6 09/06/2022    ALBUMIN 4.5 09/06/2022    BILITOT 0.6 09/06/2022    ALKPHOS 41 09/06/2022    AST 42 09/06/2022    ALT 31 09/06/2022    ANIONGAP 10 09/06/2022    ESTGFRAFRICA 53.2 (A) 06/13/2022    EGFRNONAA 46.1 (A) 06/13/2022        No results found for: LABBLOO, LABURIN, RESPIRATORYC, GSRESP         Results for orders placed or performed during the hospital encounter of 08/16/22   EKG 12-LEAD starting tomorrow    Collection Time: 08/18/22 12:39 AM    Narrative    Test Reason : Z98.62,    Vent. Rate : 078 BPM     Atrial Rate : 092 BPM     P-R Int : 000 ms          QRS Dur : 106 ms      QT Int : 350 ms       P-R-T Axes : 000 120 148 degrees     QTc Int : 399 ms    Atrial fibrillation  ST elevation inferior leads with reciprocal ST changes high lateral leads,  consider acute injury  Abnormal ECG  When compared with ECG of 17-AUG-2022 14:39,  ST changes slightly more prominent   Confirmed by Trey Diego MD ([id number]) on 8/22/2022 8:42:43 AM    Referred By: [email or web address]   SELF           Confirmed By:Trey Diego MD        Echo 08/17/2022 [id number] Final   X-Ray Chest AP Portable 08/16/2022 [id number] Final   CARDIAC MONITORING STRIPS 08/16/2022  Final            Plan:       Problem List Items Addressed This Visit          Pulmonary    Pulmonary HTN     His echo confirmed PA systolic pressure 55 mm Hg.  He has been diuresed since then.  I expect condition to have improved partially.         Acute pulmonary edema     In setting of acute myocardial infarction peak troponin 33 range.  Condition improved by exam findings today.  Furosemide to continue.  There is underlying mitral regurgitation,  mild-to-moderate.            Cardiac/Vascular    S/P CABG (coronary artery bypass graft)     Four vessel bypass 2007.         Paroxysmal atrial fibrillation     He will continue Eliquis.  Atrial fibrillation documented on previous Holter monitor.  He has a lot of atrial ectopy chronically.         Carotid stenosis, asymptomatic, bilateral     He had left carotid stent 2020.  It was successful.  Last follow-up carotid ultrasound 2022.  40-49% right stenosis no residual disease left sided following carotid stenting.         NSTEMI (non-ST elevated myocardial infarction)    Acute combined systolic and diastolic congestive heart failure    Status post insertion of drug eluting coronary artery stent     August 17, 2022 placed to SVG RCA high-grade stenosis triggering NSTEMI.  He will continue Eliquis and clopidogrel.            Renal/    CKD stage 3 due to type 2 diabetes mellitus     Condition unchanged, GFR 39, diabetic kidney disease.  I would like to initiate Ace/Arb treatment but it is not possible at this time.  He was advised to stop ACE-inhibitor therapy in the past related to hyperkalemia.            Endocrine    Uncontrolled type 2 diabetes mellitus with diabetic neuropathy, without long-term current use of insulin (Chronic)     Condition unchanged.  He is insulin-dependent.         Hypertension associated with type 2 diabetes mellitus     His blood pressure is normal today.  No changes made in treatment.         Type 2 diabetes mellitus with hyperlipidemia     Atorvastatin 40 mg to continue.  Most recent LDL 48 mg% mixed hyperlipidemia picture with HDL 23, triglycerides 136 mg%.                 I will see the patient back in a month.  I explained to the patient and his wife that his LV will likely recover but it was a significant size myocardial infarction.  As well the loss of 2 previous grafts without detection less than the chance of full recovery of ejection fraction.    No medication changes made today.   He is in cardiac rehab.           Marvin Sotelo MD  09/06/2022   4:02 PM

## 2022-09-06 NOTE — ASSESSMENT & PLAN NOTE
In setting of acute myocardial infarction peak troponin 33 range.  Condition improved by exam findings today.  Furosemide to continue.  There is underlying mitral regurgitation, mild-to-moderate.

## 2022-09-06 NOTE — ASSESSMENT & PLAN NOTE
Atorvastatin 40 mg to continue.  Most recent LDL 48 mg% mixed hyperlipidemia picture with HDL 23, triglycerides 136 mg%.

## 2022-09-06 NOTE — ASSESSMENT & PLAN NOTE
August 17, 2022 placed to SVG RCA high-grade stenosis triggering NSTEMI.  He will continue Eliquis and clopidogrel.

## 2022-09-06 NOTE — ASSESSMENT & PLAN NOTE
Condition unchanged, GFR 39, diabetic kidney disease.  I would like to initiate Ace/Arb treatment but it is not possible at this time.  He was advised to stop ACE-inhibitor therapy in the past related to hyperkalemia.

## 2022-09-06 NOTE — ASSESSMENT & PLAN NOTE
His echo confirmed PA systolic pressure 55 mm Hg.  He has been diuresed since then.  I expect condition to have improved partially.

## 2022-09-06 NOTE — ASSESSMENT & PLAN NOTE
He will continue Eliquis.  Atrial fibrillation documented on previous Holter monitor.  He has a lot of atrial ectopy chronically.

## 2022-09-06 NOTE — ASSESSMENT & PLAN NOTE
He had left carotid stent 2020.  It was successful.  Last follow-up carotid ultrasound 2022.  40-49% right stenosis no residual disease left sided following carotid stenting.

## 2022-09-07 NOTE — TELEPHONE ENCOUNTER
Current lab work is normal.  Your renal function remains steady.  No changes need to be made this time.    Letter will be printed

## 2022-09-10 NOTE — PROGRESS NOTES
[facility] - Telemetry  Cardiology  Progress Note    Patient Name: Jin Ngo  MRN: [id number]  Admission Date: 8/16/2022  Hospital Length of Stay: 4 days  Code Status: Full Code   Attending Physician: Clarita Cronin MD   Primary Care Physician: Sam Barroso MD  Expected Discharge Date: 8/20/2022  Principal Problem:NSTEMI (non-ST elevated myocardial infarction)    Subjective:     Hospital Course:   8/20/2022: Seen and examined. Feeling better. Off oxygen     Review of Systems   Constitutional: Positive for malaise/fatigue. Negative for chills and fever.   Cardiovascular: Positive for dyspnea on exertion. Negative for chest pain.   Respiratory: Negative for cough and shortness of breath.    Neurological: Negative.    Psychiatric/Behavioral: Negative.           Objective:     Vital Signs (Most Recent):  Temp: 96.9 °F (36.1 °C) (08/20/22 0726)  Pulse: 75 (08/20/22 0726)  Resp: 18 (08/20/22 0726)  BP: 133/68 (08/20/22 0726)  SpO2: 96 % (08/20/22 0810) Vital Signs (24h Range):  Temp:  [96.3 °F (35.7 °C)-98.8 °F (37.1 °C)] 96.9 °F (36.1 °C)  Pulse:  [64-86] 75  Resp:  [18-20] 18  SpO2:  [89 %-97 %] 96 %  BP: ()/(54-72) 133/68     Weight: 90.7 kg (200 lb)  Body mass index is 29.53 kg/m².    SpO2: 96 %  O2 Device (Oxygen Therapy): nasal cannula      Intake/Output Summary (Last 24 hours) at 8/20/2022 1101  Last data filed at 8/20/2022 0543  Gross per 24 hour   Intake 240 ml   Output 840 ml   Net -600 ml       Lines/Drains/Airways     Peripheral Intravenous Line  Duration                Peripheral IV - Single Lumen 08/16/22 1339 20 G Right Forearm 3 days                Physical Exam  Cardiovascular:      Rate and Rhythm: Normal rate and regular rhythm.      Heart sounds: No murmur heard.  Pulmonary:      Effort: Pulmonary effort is normal. No respiratory distress.      Breath sounds: Normal breath sounds.   Musculoskeletal:      Right lower leg: No edema.      Left lower leg: No edema.   Neurological:      Mental  Status: He is alert.         Significant Labs:   BMP:   Recent Labs   Lab 08/19/22  0700 08/19/22  1349 08/20/22  0719   * 209* 155*    135* 136   K 3.7 4.5 3.9    98 102   CO2 24 23 24   BUN 61* 68* 61*   CREATININE 2.1* 2.4* 1.8*   CALCIUM 9.4 9.2 9.1   MG 1.7  --  1.8   , CMP   Recent Labs   Lab 08/19/22  0700 08/19/22  1349 08/20/22  0719    135* 136   K 3.7 4.5 3.9    98 102   CO2 24 23 24   * 209* 155*   BUN 61* 68* 61*   CREATININE 2.1* 2.4* 1.8*   CALCIUM 9.4 9.2 9.1   PROT 7.2  --   --    ALBUMIN 3.3* 3.0*  --    BILITOT 1.3*  --   --    ALKPHOS 42*  --   --    AST 55*  --   --    ALT 47*  --   --    ANIONGAP 13 14 10   , CBC   Recent Labs   Lab 08/19/22  0701   WBC 6.37   HGB 14.0   HCT 41.2      , Lipid Panel No results for input(s): CHOL, HDL, LDLCALC, TRIG, CHOLHDL in the last 48 hours., Troponin No results for input(s): TROPONINI in the last 48 hours. and All pertinent lab results from the last 24 hours have been reviewed.    Significant Imaging: Echocardiogram:   2D echo with color flow doppler:   Results for orders placed or performed during the hospital encounter of 08/01/16   2D Echo w/ Color Flow Doppler   Result Value Ref Range    EF + QEF 60 55 - 65    Diastolic Dysfunction No     Est. PA Systolic Pressure 58.95 (A)     Mitral Valve Mobility NORMAL     Tricuspid Valve Regurgitation TRIVIAL TO MILD     Narrative    Date of Procedure: 08/01/2016        TEST DESCRIPTION   Technical Quality: This is a technically adequate study.     Aorta: The aortic root is normal in size, measuring 2.5 cm at sinotubular junction.     Left Atrium: The left atrial volume index is mildly enlarged, measuring 39.73 cc/m2.     Left Ventricle: The left ventricle is normal in size, with an end-diastolic diameter of 5.6 cm, and an end-systolic diameter of 3.2 cm. LV wall thickness is normal, with the septum measuring 0.7 cm and the posterior wall measuring 1.3 cm across.  Relative   wall thickness was increased at 0.46, and the LV mass index was increased at 121.1 g/m2 consistent with concentric left ventricular hypertrophy. Global left ventricular systolic function appears normal. Visually estimated ejection fraction is 60-65%.   The LV Doppler derived stroke volume equals 74.0 ccs.   The E/e'(lat) is 9, consistent with normal diastolic function.     Right Atrium: The right atrium is enlarged, measuring 5.9 cm in length in the apical view.     Right Ventricle: The right ventricle is normal in size measuring 3.5 cm at the base in the apical right ventricle-focused view. Global right ventricular systolic function appears normal. The estimated PA systolic pressure is 59 mmHg.     Aortic Valve:  The aortic valve is mildly sclerotic with normal leaflet mobility. The aortic valve is tri-leaflet in structure.     Mitral Valve:  The mitral valve is normal in structure with normal leaflet mobility.     Tricuspid Valve:  The tricuspid valve is normal in structure with normal leaflet mobility. There is trivial to mild tricuspid regurgitation.     Pulmonary Valve:  The pulmonic valve is not well seen.     IVC: IVC is normal in size and collapses > 50% with a sniff, suggesting normal right atrial pressure of 3 mmHg.     Intracavitary: There is no evidence of pericardial effusion, intracavity mass, thrombi, or vegetation.         CONCLUSIONS     1 - Normal left ventricular systolic function (EF 60-65%).     2 - Normal left ventricular diastolic function.     3 - Normal right ventricular systolic function .     4 - Pulmonary hypertension. The estimated PA systolic pressure is 59 mmHg.     5 - Biatrial enlargement.             This document has been electronically    SIGNED BY: Daniela Vargas MD On: 08/01/2016 13:11    and Transthoracic echo (TTE) complete (Cupid Only):   Results for orders placed or performed during the hospital encounter of 08/16/22   Echo   Result Value Ref Range    Ascending  aorta 2.82 cm    STJ 2.55 cm    AV mean gradient 3 mmHg    Ao peak bebeto 1.08 m/s    Ao VTI 18.39 cm    IVS 0.96 0.6 - 1.1 cm    LA size 5.12 cm    Left Atrium Major Axis 7.35 cm    Left Atrium Minor Axis 7.68 cm    LVIDd 5.77 3.5 - 6.0 cm    LVIDs 4.65 (A) 2.1 - 4.0 cm    LVOT diameter 1.88 cm    LVOT peak VTI 9.25 cm    Posterior Wall 0.92 0.6 - 1.1 cm    RA Major Axis 6.33 cm    RA Width 3.62 cm    RVDD 2.73 cm    TR Max Bebeto 3.41 m/s    LA WIDTH 4.30 cm    Ao root annulus 2.90 cm    PV PEAK VELOCITY 0.60 cm/s    LV Diastolic Volume 164.53 mL    LV Systolic Volume 100.05 mL    LVOT peak bebeto 0.60 m/s    Mr max bebeto 0.05 m/s    LA volume (mod) 74.85 cm3    RV S' 8.05 cm/s    FS 19 %    LA volume 140.56 cm3    LV mass 213.26 g    Left Ventricle Relative Wall Thickness 0.32 cm    AV valve area 1.40 cm2    AV Velocity Ratio 0.56     AV index (prosthetic) 0.50     LVOT area 2.8 cm2    LVOT stroke volume 25.66 cm3    AV peak gradient 5 mmHg    Triscuspid Valve Regurgitation Peak Gradient 47 mmHg    BSA 2.1 m2    LV Systolic Volume Index 48.3 mL/m2    LV Diastolic Volume Index 79.48 mL/m2    LA Volume Index 67.9 mL/m2    LV Mass Index 103 g/m2    LA Volume Index (Mod) 36.2 mL/m2    Right Atrial Pressure (from IVC) 8 mmHg    EF 30 %    TV rest pulmonary artery pressure 55 mmHg    Narrative    · The left ventricle is mildly enlarged with moderately decreased systolic   function.  · The estimated ejection fraction is 30%.  · There is moderate left ventricular global hypokinesis.  · A diastolic pattern consistent with atrial fibrillation observed.  · Mild-to-moderate mitral regurgitation.  · Mild aortic regurgitation.  · Mild tricuspid regurgitation.  · Mild pulmonic regurgitation.  · The estimated PA systolic pressure is 55 mmHg.  · Normal right ventricular size with normal right ventricular systolic   function.  · There is moderate pulmonary hypertension.        Assessment and Plan:     Brief HPI:     76 year old male with      1. NSTEMi  PCI to SVG-RCA  2. HFrEF decompensation /New drop in EF post NSTEMI  3. PAF   4. ANGELIC     Plan:  - Continue Plavix/DOAC  - Statin   - Lasix 40 mg daily  - Toprol XL   - Entresto on hold due to ANGELIC and hypotension. Re-initiate as outpatient once renal function stabilizes   - F/U with Dr. Sotelo as outpatient       Active Diagnoses:    Diagnosis Date Noted POA    PRINCIPAL PROBLEM:  NSTEMI (non-ST elevated myocardial infarction) [I21.4] 08/16/2022 Unknown    Acute combined systolic and diastolic congestive heart failure [I50.41] 08/18/2022 Yes    Acute hypoxemic respiratory failure [J96.01] 08/18/2022 Yes    Acute pulmonary edema [J81.0] 08/17/2022 Yes    Physical deconditioning [R53.81] 06/07/2022 Yes    CKD stage 3 due to type 2 diabetes mellitus [E11.22, N18.30] 03/22/2021 Yes    Carotid stenosis, asymptomatic, bilateral [I65.23] 12/01/2020 Yes    Paroxysmal atrial fibrillation [I48.0] 11/09/2020 Yes    ANGELIC (acute kidney injury) [N17.9] 07/01/2020 Yes    Uncontrolled type 2 diabetes mellitus with diabetic neuropathy, without long-term current use of insulin [E11.40, E11.65] 03/08/2017 Yes     Chronic    BMI 31.0-31.9,adult [Z68.31] 03/08/2017 Not Applicable    Mixed hyperlipidemia [E78.2] 12/19/2016 Yes    Pulmonary HTN [I27.20] 08/22/2016 Yes    S/P CABG (coronary artery bypass graft) [Z95.1] 07/25/2016 Not Applicable    Essential hypertension [I10] 02/11/2016 Yes    Coronary artery disease [I25.10] 02/11/2016 Yes     Chronic      Problems Resolved During this Admission:       VTE Risk Mitigation (From admission, onward)         Ordered     apixaban tablet 5 mg  2 times daily         08/18/22 0806     heparin infusion 1,000 units/500 ml in 0.9% NaCl (pressure line flush)  Intra-op continuous PRN         08/17/22 1231     Reason for No Pharmacological VTE Prophylaxis  Once        Question:  Reasons:  Answer:  Already adequately anticoagulated on oral Anticoagulants    08/16/22 6731      IP VTE HIGH RISK PATIENT  Once         08/16/22 1451     Place sequential compression device  Until discontinued         08/16/22 1451                Niko Rosen MD  Cardiology  [facility]   [facility]

## 2022-09-13 ENCOUNTER — PATIENT OUTREACH (OUTPATIENT)
Dept: ADMINISTRATIVE | Facility: HOSPITAL | Age: 77
End: 2022-09-13
Payer: MEDICARE

## 2022-09-20 ENCOUNTER — PATIENT OUTREACH (OUTPATIENT)
Dept: ADMINISTRATIVE | Facility: HOSPITAL | Age: 77
End: 2022-09-20
Payer: MEDICARE

## 2022-09-30 LAB
LEFT EYE DM RETINOPATHY: NEGATIVE
RIGHT EYE DM RETINOPATHY: NEGATIVE

## 2022-10-18 ENCOUNTER — OFFICE VISIT (OUTPATIENT)
Dept: CARDIOLOGY | Facility: CLINIC | Age: 77
End: 2022-10-18
Payer: MEDICARE

## 2022-10-18 VITALS
DIASTOLIC BLOOD PRESSURE: 67 MMHG | OXYGEN SATURATION: 98 % | HEIGHT: 69 IN | SYSTOLIC BLOOD PRESSURE: 120 MMHG | HEART RATE: 54 BPM | WEIGHT: 188.69 LBS | BODY MASS INDEX: 27.95 KG/M2

## 2022-10-18 DIAGNOSIS — Z95.5 STATUS POST INSERTION OF DRUG ELUTING CORONARY ARTERY STENT: ICD-10-CM

## 2022-10-18 DIAGNOSIS — J96.01 ACUTE HYPOXEMIC RESPIRATORY FAILURE: ICD-10-CM

## 2022-10-18 DIAGNOSIS — N18.30 CKD STAGE 3 DUE TO TYPE 2 DIABETES MELLITUS: ICD-10-CM

## 2022-10-18 DIAGNOSIS — I50.41 ACUTE COMBINED SYSTOLIC AND DIASTOLIC CONGESTIVE HEART FAILURE: Primary | ICD-10-CM

## 2022-10-18 DIAGNOSIS — I10 ESSENTIAL HYPERTENSION: ICD-10-CM

## 2022-10-18 DIAGNOSIS — E11.22 CKD STAGE 3 DUE TO TYPE 2 DIABETES MELLITUS: ICD-10-CM

## 2022-10-18 DIAGNOSIS — I48.0 PAROXYSMAL ATRIAL FIBRILLATION: ICD-10-CM

## 2022-10-18 DIAGNOSIS — I27.20 PULMONARY HTN: ICD-10-CM

## 2022-10-18 DIAGNOSIS — I70.0 ABDOMINAL AORTIC ATHEROSCLEROSIS: ICD-10-CM

## 2022-10-18 PROCEDURE — 1159F PR MEDICATION LIST DOCUMENTED IN MEDICAL RECORD: ICD-10-PCS | Mod: CPTII,S$GLB,, | Performed by: INTERNAL MEDICINE

## 2022-10-18 PROCEDURE — 3288F FALL RISK ASSESSMENT DOCD: CPT | Mod: CPTII,S$GLB,, | Performed by: INTERNAL MEDICINE

## 2022-10-18 PROCEDURE — 99499 UNLISTED E&M SERVICE: CPT | Mod: HCNC,S$GLB,, | Performed by: INTERNAL MEDICINE

## 2022-10-18 PROCEDURE — 3288F PR FALLS RISK ASSESSMENT DOCUMENTED: ICD-10-PCS | Mod: CPTII,S$GLB,, | Performed by: INTERNAL MEDICINE

## 2022-10-18 PROCEDURE — 99213 PR OFFICE/OUTPT VISIT, EST, LEVL III, 20-29 MIN: ICD-10-PCS | Mod: S$GLB,,, | Performed by: INTERNAL MEDICINE

## 2022-10-18 PROCEDURE — 1160F PR REVIEW ALL MEDS BY PRESCRIBER/CLIN PHARMACIST DOCUMENTED: ICD-10-PCS | Mod: CPTII,S$GLB,, | Performed by: INTERNAL MEDICINE

## 2022-10-18 PROCEDURE — 3074F PR MOST RECENT SYSTOLIC BLOOD PRESSURE < 130 MM HG: ICD-10-PCS | Mod: CPTII,S$GLB,, | Performed by: INTERNAL MEDICINE

## 2022-10-18 PROCEDURE — 1101F PR PT FALLS ASSESS DOC 0-1 FALLS W/OUT INJ PAST YR: ICD-10-PCS | Mod: CPTII,S$GLB,, | Performed by: INTERNAL MEDICINE

## 2022-10-18 PROCEDURE — 1101F PT FALLS ASSESS-DOCD LE1/YR: CPT | Mod: CPTII,S$GLB,, | Performed by: INTERNAL MEDICINE

## 2022-10-18 PROCEDURE — 1160F RVW MEDS BY RX/DR IN RCRD: CPT | Mod: CPTII,S$GLB,, | Performed by: INTERNAL MEDICINE

## 2022-10-18 PROCEDURE — 3078F PR MOST RECENT DIASTOLIC BLOOD PRESSURE < 80 MM HG: ICD-10-PCS | Mod: CPTII,S$GLB,, | Performed by: INTERNAL MEDICINE

## 2022-10-18 PROCEDURE — 99213 OFFICE O/P EST LOW 20 MIN: CPT | Mod: S$GLB,,, | Performed by: INTERNAL MEDICINE

## 2022-10-18 PROCEDURE — 99999 PR PBB SHADOW E&M-EST. PATIENT-LVL V: ICD-10-PCS | Mod: PBBFAC,,, | Performed by: INTERNAL MEDICINE

## 2022-10-18 PROCEDURE — 99999 PR PBB SHADOW E&M-EST. PATIENT-LVL V: CPT | Mod: PBBFAC,,, | Performed by: INTERNAL MEDICINE

## 2022-10-18 PROCEDURE — 1126F AMNT PAIN NOTED NONE PRSNT: CPT | Mod: CPTII,S$GLB,, | Performed by: INTERNAL MEDICINE

## 2022-10-18 PROCEDURE — 3078F DIAST BP <80 MM HG: CPT | Mod: CPTII,S$GLB,, | Performed by: INTERNAL MEDICINE

## 2022-10-18 PROCEDURE — 1126F PR PAIN SEVERITY QUANTIFIED, NO PAIN PRESENT: ICD-10-PCS | Mod: CPTII,S$GLB,, | Performed by: INTERNAL MEDICINE

## 2022-10-18 PROCEDURE — 1159F MED LIST DOCD IN RCRD: CPT | Mod: CPTII,S$GLB,, | Performed by: INTERNAL MEDICINE

## 2022-10-18 PROCEDURE — 3074F SYST BP LT 130 MM HG: CPT | Mod: CPTII,S$GLB,, | Performed by: INTERNAL MEDICINE

## 2022-10-18 NOTE — ASSESSMENT & PLAN NOTE
I note various Electrocardiograms in sinus and also Electrocardiograms confirming AFib with controlled heart rates.  Condition stable.  He will continue Eliquis 5 mg b.i.d..

## 2022-10-18 NOTE — ASSESSMENT & PLAN NOTE
Likely improving status.  Repeat echo ordered.  Previous echo confirming PA systolic pressure 55 mm Hg.

## 2022-10-18 NOTE — PROGRESS NOTES
[facility] Cardiology     Subjective:    Patient ID:  Jin Ngo is a 76 y.o. male who presents for follow-up of Atrial Fibrillation, Congestive Heart Failure, Coronary Artery Disease, and Diabetes Mellitus    Review of patient's allergies indicates:   Allergen Reactions    Metformin      Caused stroke    Pcn [penicillins]       He has improved his exercise capacity.  He is in cardiac rehab.  He had NSTEMI related to vein graft disease requiring stenting to RCA August 17, 2022.  He is with EF 30% at time of MI.  It previously was normal.  He is on diuretics.  He sleeps well at night.  He still has some shortness of breath.  He was walking 2 miles a day before he had his heart attack.    Rhythm strips while in cardiac rehab confirm sinus rhythm mostly.  While hospitalized he had some Electrocardiograms confirming AFib.  He is on Eliquis and Plavix.  He has not had any chest pain, he did not have chest pain with his heart attack only shortness of breath.  His heart rates are in the 50s with low-dose metoprolol.  He is on treatment for mixed hyperlipidemia with fenofibrate and atorvastatin long-term management.  He has had previous carotid stent.  He is with normal neurologic status.  He has lost almost 20 lb since his heart attack.      Review of Systems   Constitutional: Positive for weight loss. Negative for chills, decreased appetite, diaphoresis, fever, malaise/fatigue, night sweats and weight gain.   HENT:  Negative for congestion, ear discharge, ear pain, hearing loss, hoarse voice, nosebleeds, odynophagia, sore throat, stridor and tinnitus.    Eyes:  Negative for blurred vision, discharge, double vision, pain, photophobia, redness, vision loss in left eye, vision loss in right eye, visual disturbance and visual halos.   Cardiovascular:  Positive for dyspnea on exertion. Negative for chest pain, claudication, cyanosis, irregular  heartbeat, leg swelling, near-syncope, orthopnea, palpitations, paroxysmal nocturnal dyspnea and syncope.   Respiratory:  Positive for cough. Negative for hemoptysis, shortness of breath, sleep disturbances due to breathing, snoring, sputum production and wheezing.    Endocrine: Negative for cold intolerance, heat intolerance, polydipsia, polyphagia and polyuria.   Hematologic/Lymphatic: Negative for adenopathy and bleeding problem. Does not bruise/bleed easily.   Skin:  Negative for color change, dry skin, flushing, itching, nail changes, poor wound healing, rash, skin cancer, suspicious lesions and unusual hair distribution.   Musculoskeletal:  Negative for arthritis, back pain, falls, gout, joint pain, joint swelling, muscle cramps, muscle weakness, myalgias, neck pain and stiffness.   Gastrointestinal:  Negative for bloating, abdominal pain, anorexia, change in bowel habit, bowel incontinence, constipation, diarrhea, dysphagia, excessive appetite, flatus, heartburn, hematemesis, hematochezia, hemorrhoids, jaundice, melena, nausea and vomiting.   Genitourinary:  Negative for bladder incontinence, decreased libido, dysuria, flank pain, frequency, genital sores, hematuria, hesitancy, incomplete emptying, nocturia and urgency.   Neurological:  Positive for weakness. Negative for aphonia, brief paralysis, difficulty with concentration, disturbances in coordination, excessive daytime sleepiness, dizziness, focal weakness, headaches, light-headedness, loss of balance, numbness, paresthesias, seizures, sensory change, tremors and vertigo.   Psychiatric/Behavioral:  Negative for altered mental status, depression, hallucinations, memory loss, substance abuse, suicidal ideas and thoughts of violence. The patient does not have insomnia and is not nervous/anxious.    Allergic/Immunologic: Negative for hives and persistent infections.      Objective:       Vitals:    10/18/22 1107   BP: 120/67   Pulse: (!) 54   SpO2: 98%  "  Weight: 85.6 kg (188 lb 11.2 oz)   Height: 5' 9" (1.753 m)    Physical Exam  Constitutional:       General: He is not in acute distress.     Appearance: He is well-developed. He is not diaphoretic.   HENT:      Head: Normocephalic and atraumatic.      Nose: Nose normal.   Eyes:      General: No scleral icterus.        Right eye: No discharge.      Conjunctiva/sclera: Conjunctivae normal.      Pupils: Pupils are equal, round, and reactive to light.   Neck:      Thyroid: No thyromegaly.      Vascular: No JVD.      Trachea: No tracheal deviation.   Cardiovascular:      Rate and Rhythm: Normal rate and regular rhythm.      Pulses:           Carotid pulses are 2+ on the right side and 2+ on the left side.       Radial pulses are 2+ on the right side and 2+ on the left side.        Dorsalis pedis pulses are 2+ on the right side and 2+ on the left side.        Posterior tibial pulses are 2+ on the right side and 2+ on the left side.      Heart sounds: Normal heart sounds. No murmur heard.    No friction rub. No gallop.   Pulmonary:      Effort: Pulmonary effort is normal. No respiratory distress.      Breath sounds: Normal breath sounds. No stridor. No wheezing or rales.   Chest:      Chest wall: No tenderness.   Abdominal:      General: Bowel sounds are normal. There is no distension.      Palpations: Abdomen is soft. There is no mass.      Tenderness: There is no abdominal tenderness. There is no guarding or rebound.   Musculoskeletal:         General: No tenderness. Normal range of motion.      Cervical back: Normal range of motion and neck supple.   Lymphadenopathy:      Cervical: No cervical adenopathy.   Skin:     General: Skin is warm and dry.      Coloration: Skin is not pale.      Findings: No erythema or rash.   Neurological:      Mental Status: He is alert and oriented to person, place, and time.      Cranial Nerves: No cranial nerve deficit.      Coordination: Coordination normal.   Psychiatric:         " Behavior: Behavior normal.         Thought Content: Thought content normal.         Judgment: Judgment normal.         Assessment:       1. Acute combined systolic and diastolic congestive heart failure    2. Paroxysmal atrial fibrillation    3. Status post insertion of drug eluting coronary artery stent    4. Acute hypoxemic respiratory failure    5. Essential hypertension    6. CKD stage 3 due to type 2 diabetes mellitus    7. Abdominal aortic atherosclerosis    8. Pulmonary HTN      Results for orders placed or performed in visit on 09/06/22   Comprehensive Metabolic Panel   Result Value Ref Range    Sodium 141 136 - 145 mmol/L    Potassium 4.1 3.5 - 5.1 mmol/L    Chloride 101 95 - 110 mmol/L    CO2 30 (H) 23 - 29 mmol/L    Glucose 149 (H) 70 - 110 mg/dL    BUN 38 (H) 2 - 20 mg/dL    Creatinine 1.93 (H) 0.50 - 1.40 mg/dL    Calcium 9.6 8.7 - 10.5 mg/dL    Total Protein 7.6 6.0 - 8.4 g/dL    Albumin 4.5 3.5 - 5.2 g/dL    Total Bilirubin 0.6 0.1 - 1.0 mg/dL    Alkaline Phosphatase 41 38 - 126 U/L    AST 42 15 - 46 U/L    ALT 31 10 - 44 U/L    Anion Gap 10 8 - 16 mmol/L    eGFR 35.4 (A) >60 mL/min/1.73 m^2   CBC Auto Differential   Result Value Ref Range    WBC 4.19 3.90 - 12.70 K/uL    RBC 4.77 4.60 - 6.20 M/uL    Hemoglobin 14.0 14.0 - 18.0 g/dL    Hematocrit 43.9 40.0 - 54.0 %    MCV 92 82 - 98 fL    MCH 29.4 27.0 - 31.0 pg    MCHC 31.9 (L) 32.0 - 36.0 g/dL    RDW 14.3 11.5 - 14.5 %    Platelets 242 150 - 450 K/uL    MPV 10.4 9.2 - 12.9 fL    Immature Granulocytes 0.2 0.0 - 0.5 %    Gran # (ANC) 2.3 1.8 - 7.7 K/uL    Immature Grans (Abs) 0.01 0.00 - 0.04 K/uL    Lymph # 1.1 1.0 - 4.8 K/uL    Mono # 0.5 0.3 - 1.0 K/uL    Eos # 0.2 0.0 - 0.5 K/uL    Baso # 0.06 0.00 - 0.20 K/uL    nRBC 0 0 /100 WBC    Gran % 55.5 38.0 - 73.0 %    Lymph % 27.2 18.0 - 48.0 %    Mono % 11.2 4.0 - 15.0 %    Eosinophil % 4.5 0.0 - 8.0 %    Basophil % 1.4 0.0 - 1.9 %    Differential Method Automated          Current Outpatient  "Medications:     ACCU-CHEK VERONICA PLUS TEST STRP Strp, TEST BLOOD SUGAR FOUR TIMES DAILY, Disp: 400 strip, Rfl: 3    allopurinoL (ZYLOPRIM) 300 MG tablet, TAKE 1 TABLET EVERY DAY, Disp: 90 tablet, Rfl: 0    atorvastatin (LIPITOR) 40 MG tablet, TAKE 1 TABLET EVERY DAY, Disp: 90 tablet, Rfl: 3    BD ULTRA-FINE TEJINDER PEN NEEDLE 32 gauge x 5/32" Ndle, USE WITH LEVEMIR DAILY, Disp: 100 each, Rfl: 3    blood-glucose meter kit, To check BG 4 times daily, to use with insurance preferred meter, Disp: 1 each, Rfl: 0    clopidogreL (PLAVIX) 75 mg tablet, Take 1 tablet (75 mg total) by mouth once daily., Disp: 30 tablet, Rfl: 11    DULoxetine (CYMBALTA) 30 MG capsule, TAKE 1 CAPSULE ONE TIME DAILY TO HELP REDUCE FEELING OF BACK PAIN., Disp: 90 capsule, Rfl: 3    ELIQUIS 5 mg Tab, TAKE 1 TABLET BY MOUTH TWICE A DAY, Disp: 60 tablet, Rfl: 2    fenofibrate 160 MG Tab, TAKE 1 TABLET EVERY DAY, Disp: 90 tablet, Rfl: 1    fluticasone propionate (FLONASE) 50 mcg/actuation nasal spray, INHALE 1 SPRAY IN EACH NOSTRIL ROUTE ONCE DAILY., Disp: 48 mL, Rfl: 2    furosemide (LASIX) 40 MG tablet, Take 1 tablet (40 mg total) by mouth once daily., Disp: 30 tablet, Rfl: 11    HYDROcodone-acetaminophen (NORCO) 7.5-325 mg per tablet, Take 1 tablet by mouth every 24 hours as needed for Pain (take 30 minutes prior to parcipating in physical therapy)., Disp: 20 tablet, Rfl: 0    insulin detemir U-100 (LEVEMIR FLEXTOUCH U-100 INSULN) 100 unit/mL (3 mL) InPn pen, Inject 12 Units into the skin every evening., Disp: 15 mL, Rfl: 3    lancets (ACCU-CHEK SOFTCLIX LANCETS) Misc, To check BG 4 times daily, to use with insurance preferred meter., Disp: 200 each, Rfl: 0    metoprolol succinate (TOPROL-XL) 25 MG 24 hr tablet, Take 1 tablet (25 mg total) by mouth once daily., Disp: 30 tablet, Rfl: 11    tamsulosin (FLOMAX) 0.4 mg Cap, TAKE 1 CAPSULE BY MOUTH ONCE DAILY FOR URINE FLOW, Disp: 90 capsule, Rfl: 3    glipiZIDE (GLUCOTROL) 5 MG tablet, Take 2 tablets (10 " mg total) by mouth daily with breakfast AND 1 tablet (5 mg total) daily with dinner or evening meal. Take 7.5 mg with breakfast and 5 mg with dinner., Disp: 270 tablet, Rfl: 2     Lab Results   Component Value Date    WBC 4.19 09/06/2022    RBC 4.77 09/06/2022    HGB 14.0 09/06/2022    HCT 43.9 09/06/2022    MCV 92 09/06/2022    MCH 29.4 09/06/2022    MCHC 31.9 (L) 09/06/2022    RDW 14.3 09/06/2022     09/06/2022    MPV 10.4 09/06/2022    GRAN 2.3 09/06/2022    GRAN 55.5 09/06/2022    LYMPH 1.1 09/06/2022    LYMPH 27.2 09/06/2022    MONO 0.5 09/06/2022    MONO 11.2 09/06/2022    EOS 0.2 09/06/2022    BASO 0.06 09/06/2022    EOSINOPHIL 4.5 09/06/2022    BASOPHIL 1.4 09/06/2022    MG 1.8 08/20/2022        CMP  Lab Results   Component Value Date     09/06/2022    K 4.1 09/06/2022     09/06/2022    CO2 30 (H) 09/06/2022     (H) 09/06/2022    BUN 38 (H) 09/06/2022    CREATININE 1.93 (H) 09/06/2022    CALCIUM 9.6 09/06/2022    PROT 7.6 09/06/2022    ALBUMIN 4.5 09/06/2022    BILITOT 0.6 09/06/2022    ALKPHOS 41 09/06/2022    AST 42 09/06/2022    ALT 31 09/06/2022    ANIONGAP 10 09/06/2022    ESTGFRAFRICA 53.2 (A) 06/13/2022    EGFRNONAA 46.1 (A) 06/13/2022        No results found for: LABBLOO, LABURIN, RESPIRATORYC, GSRESP         Results for orders placed or performed during the hospital encounter of 08/16/22   EKG 12-LEAD starting tomorrow    Collection Time: 08/18/22 12:39 AM    Narrative    Test Reason : Z98.62,    Vent. Rate : 078 BPM     Atrial Rate : 092 BPM     P-R Int : 000 ms          QRS Dur : 106 ms      QT Int : 350 ms       P-R-T Axes : 000 120 148 degrees     QTc Int : 399 ms    Atrial fibrillation  ST elevation inferior leads with reciprocal ST changes high lateral leads,  consider acute injury  Abnormal ECG  When compared with ECG of 17-AUG-2022 14:39,  ST changes slightly more prominent   Confirmed by Eneida Diego MDmiarjun ([id number]) on 8/22/2022 8:42:43 AM    Referred By:  [email or web address]   SELF           Confirmed By:Trey Diego MD                  Plan:       Problem List Items Addressed This Visit          Pulmonary    Pulmonary HTN     Likely improving status.  Repeat echo ordered.  Previous echo confirming PA systolic pressure 55 mm Hg.         Acute hypoxemic respiratory failure     Condition improving.              Cardiac/Vascular    Essential hypertension     Condition controlled.         Abdominal aortic atherosclerosis     Condition stable.         Paroxysmal atrial fibrillation     I note various Electrocardiograms in sinus and also Electrocardiograms confirming AFib with controlled heart rates.  Condition stable.  He will continue Eliquis 5 mg b.i.d..         Acute combined systolic and diastolic congestive heart failure - Primary     EF 30% related to myocardial infarction.  Repeat echo ordered.  Education provided to the patient regarding his current ejection fraction and expected improvement.             Relevant Orders    Echo    Status post insertion of drug eluting coronary artery stent     August 17th, 2022 RAYNA to RCA vein graft.  Successful.  Clopidogrel to continue.            Renal/    CKD stage 3 due to type 2 diabetes mellitus     No Ace/Arb due to diabetic kidney disease and renal insufficiency.             A repeat echocardiogram is ordered.  It has been 2 months since his myocardial infarction.  No medication changes made today.  I will see him back in 3 months.  It is hopeful that his EF will improve.               Marvin Sotelo MD  10/18/2022   11:24 AM

## 2022-10-18 NOTE — ASSESSMENT & PLAN NOTE
EF 30% related to myocardial infarction.  Repeat echo ordered.  Education provided to the patient regarding his current ejection fraction and expected improvement.

## 2022-10-24 ENCOUNTER — TELEPHONE (OUTPATIENT)
Dept: ENDOCRINOLOGY | Facility: CLINIC | Age: 77
End: 2022-10-24
Payer: MEDICARE

## 2022-10-24 ENCOUNTER — OFFICE VISIT (OUTPATIENT)
Dept: ENDOCRINOLOGY | Facility: CLINIC | Age: 77
End: 2022-10-24
Payer: MEDICARE

## 2022-10-24 VITALS
HEIGHT: 69 IN | DIASTOLIC BLOOD PRESSURE: 60 MMHG | SYSTOLIC BLOOD PRESSURE: 120 MMHG | HEART RATE: 63 BPM | OXYGEN SATURATION: 97 % | BODY MASS INDEX: 27.84 KG/M2 | WEIGHT: 187.94 LBS

## 2022-10-24 DIAGNOSIS — E78.5 HYPERLIPIDEMIA, UNSPECIFIED HYPERLIPIDEMIA TYPE: ICD-10-CM

## 2022-10-24 DIAGNOSIS — M80.80XG LOCALIZED OSTEOPOROSIS WITH CURRENT PATHOLOGICAL FRACTURE WITH DELAYED HEALING: ICD-10-CM

## 2022-10-24 DIAGNOSIS — E11.22 TYPE 2 DIABETES MELLITUS WITH CHRONIC KIDNEY DISEASE, WITH LONG-TERM CURRENT USE OF INSULIN, UNSPECIFIED CKD STAGE: ICD-10-CM

## 2022-10-24 DIAGNOSIS — Z79.4 TYPE 2 DIABETES MELLITUS WITH CHRONIC KIDNEY DISEASE, WITH LONG-TERM CURRENT USE OF INSULIN, UNSPECIFIED CKD STAGE: ICD-10-CM

## 2022-10-24 DIAGNOSIS — E78.2 MIXED HYPERLIPIDEMIA: ICD-10-CM

## 2022-10-24 PROCEDURE — 3078F DIAST BP <80 MM HG: CPT | Mod: CPTII,S$GLB,, | Performed by: INTERNAL MEDICINE

## 2022-10-24 PROCEDURE — 1159F MED LIST DOCD IN RCRD: CPT | Mod: CPTII,S$GLB,, | Performed by: INTERNAL MEDICINE

## 2022-10-24 PROCEDURE — 1126F AMNT PAIN NOTED NONE PRSNT: CPT | Mod: CPTII,S$GLB,, | Performed by: INTERNAL MEDICINE

## 2022-10-24 PROCEDURE — 3074F SYST BP LT 130 MM HG: CPT | Mod: CPTII,S$GLB,, | Performed by: INTERNAL MEDICINE

## 2022-10-24 PROCEDURE — 3288F FALL RISK ASSESSMENT DOCD: CPT | Mod: CPTII,S$GLB,, | Performed by: INTERNAL MEDICINE

## 2022-10-24 PROCEDURE — 3288F PR FALLS RISK ASSESSMENT DOCUMENTED: ICD-10-PCS | Mod: CPTII,S$GLB,, | Performed by: INTERNAL MEDICINE

## 2022-10-24 PROCEDURE — 99999 PR PBB SHADOW E&M-EST. PATIENT-LVL V: ICD-10-PCS | Mod: PBBFAC,,, | Performed by: INTERNAL MEDICINE

## 2022-10-24 PROCEDURE — 99214 PR OFFICE/OUTPT VISIT, EST, LEVL IV, 30-39 MIN: ICD-10-PCS | Mod: GC,S$GLB,, | Performed by: INTERNAL MEDICINE

## 2022-10-24 PROCEDURE — 99999 PR PBB SHADOW E&M-EST. PATIENT-LVL V: CPT | Mod: PBBFAC,,, | Performed by: INTERNAL MEDICINE

## 2022-10-24 PROCEDURE — 99499 UNLISTED E&M SERVICE: CPT | Mod: HCNC,S$GLB,, | Performed by: STUDENT IN AN ORGANIZED HEALTH CARE EDUCATION/TRAINING PROGRAM

## 2022-10-24 PROCEDURE — 1101F PT FALLS ASSESS-DOCD LE1/YR: CPT | Mod: CPTII,S$GLB,, | Performed by: INTERNAL MEDICINE

## 2022-10-24 PROCEDURE — 1126F PR PAIN SEVERITY QUANTIFIED, NO PAIN PRESENT: ICD-10-PCS | Mod: CPTII,S$GLB,, | Performed by: INTERNAL MEDICINE

## 2022-10-24 PROCEDURE — 3074F PR MOST RECENT SYSTOLIC BLOOD PRESSURE < 130 MM HG: ICD-10-PCS | Mod: CPTII,S$GLB,, | Performed by: INTERNAL MEDICINE

## 2022-10-24 PROCEDURE — 1101F PR PT FALLS ASSESS DOC 0-1 FALLS W/OUT INJ PAST YR: ICD-10-PCS | Mod: CPTII,S$GLB,, | Performed by: INTERNAL MEDICINE

## 2022-10-24 PROCEDURE — 99214 OFFICE O/P EST MOD 30 MIN: CPT | Mod: GC,S$GLB,, | Performed by: INTERNAL MEDICINE

## 2022-10-24 PROCEDURE — 3078F PR MOST RECENT DIASTOLIC BLOOD PRESSURE < 80 MM HG: ICD-10-PCS | Mod: CPTII,S$GLB,, | Performed by: INTERNAL MEDICINE

## 2022-10-24 PROCEDURE — 1159F PR MEDICATION LIST DOCUMENTED IN MEDICAL RECORD: ICD-10-PCS | Mod: CPTII,S$GLB,, | Performed by: INTERNAL MEDICINE

## 2022-10-24 RX ORDER — DULAGLUTIDE 0.75 MG/.5ML
0.75 INJECTION, SOLUTION SUBCUTANEOUS
Qty: 4 PEN | Refills: 6 | Status: SHIPPED | OUTPATIENT
Start: 2022-10-24 | End: 2022-11-23

## 2022-10-24 RX ORDER — INSULIN DETEMIR 100 [IU]/ML
17 INJECTION, SOLUTION SUBCUTANEOUS NIGHTLY
Qty: 15 ML | Refills: 6 | Status: SHIPPED | OUTPATIENT
Start: 2022-10-24 | End: 2023-11-16 | Stop reason: SDUPTHER

## 2022-10-24 NOTE — ASSESSMENT & PLAN NOTE
Continues on Vitamin D3 supplementation, 5000 IU once daily  Does not want to pursue other medical management for osteoporosis  Has not had any concerning falls since compression fracture and modifications to decrease falls have been discussed prior like handrails, and removing rugs that are tripping hazards.

## 2022-10-24 NOTE — PATIENT INSTRUCTIONS
"We would like you for now to drop your Glipizide dose to 5 mg in the morning with breakfast and 5 mg in the evening with dinner.  So this will be only 2 pills a day, not 3 anymore.  We will send referrals to set you up with diabetes education and podiatry.  For new medications we would like to start you on Trulicity but we will need to send a referral to patient assistance with the help of pharmacy.  Trulicity will be a once a week injection if you get approved and covered for this.  You should continue to check your blood sugar and log these results and bring them to your diabetes education visit when that gets set up.  Also please check your blood sugar when you have symptoms of feeling "jittery" as this may be a sign of low numbers.  Follow the 15-15 rule as below if blood sugar is low.  We will also plan for labs next month.  Follow up in clinic in 3-4 months.        Please let us know if you continue to have low blood sugars below 70 so we can plan further adjustments.     Continue on Vitamin D3 5000 units once daily for osteoporosis.  Continue to use fall precautions and consider handrails installed in the house and use of a cane if needed.  Move rugs that are tripping hazards.        "15-15 Rule" for hypoglycemia treatment  - Many people tend to want to eat as much as they can for low blood glucose until they feel better. This can cause blood glucose levels to go very high, which is bad. Using the step-wise approach of the "15-15 Rule" can help you correct a low blood glucose while also preventing subsequent high blood glucose levels  - If you experience an episode of hypoglycemia (glucose less than 70), please follow the "15-15 rule": Take 15 grams of carbohydrate (see examples below) to raise your blood sugar and recheck it after 15 minutes. If still below 70 mg/dL, take another 15 gram serving of carbohydrate  -Repeat these steps until your blood sugar is at least 70 mg/dL. Once your blood sugar is back to " normal, eat a small serving of protein to make sure it doesnt lower again    -Examples of 15g of carbohydrate:  4 ounces (1/2 cup) of juice or regular soda (not diet)  1 tablespoon of sugar, honey, or corn syrup  Hard candies, jellybeans, or gumdrops--see food label for how many to consume  Make a note about any episodes of low blood sugar and talk with your health care team if they are recurrent     - Note: When treating a low blood glucose, the choice of carbohydrate source is important. Complex carbohydrates, or foods that contain fats along with carbs (like chocolate) can slow the absorption of glucose and should not be used to treat an emergency low    - For more information, please visit:  [email or web address]

## 2022-10-24 NOTE — ASSESSMENT & PLAN NOTE
Review of reported glucose readings at home show concern for some post prandial hypoglycemia in the early afternoon based on glucose recall and symptoms.  He is open to trying GLP-1 type medication again.  He is up to date with yearly eye exam but does need to see a podiatrist for diabetic foot exam and history of calluses.       Plan:  Continue Levemir 17 units once daily  Drop Glipizide to 5 mg with breakfast and 5 mg with dinner    We will start Trulicity 0.75 mg once weekly with pharmacy/patient assistance.  When this is started blood glucose needs to be monitored closely as we will likely be able to decrease Glipizide dosing or even remove this all together.  GLP-1 will be helpful not just for diabetes and weight reduction but also in the setting of known CAD with recent NSTEMI.      He was encouraged to keep glucose logs and bring these to diabetes education visit which we will try to get set up for him.  New referral to podiatry at patients request.

## 2022-10-24 NOTE — PROGRESS NOTES
Subjective:      Patient ID: Jin Ngo is a 76 y.o. male.    Chief Complaint:  diabetes    History of Present Illness  Jin Ngo with Type 2 diabetes complicated by TIA/CAD with CABG in 2007, hypertension, and dyslipidemia presents today for follow up of Type 2 diabetes. Previous patient of Dr. Diaz and RADHA Langston. Has been following with Ester Mitchell with last visit 06/14/2022.      He is accompanied by his wife, Krupa.      Interval History:  He had an NSTEMI in August and was admitted to [facility] for this.  He required a stent placed with PCI and he is now in Cardiac Rehab 3 times a week.      He has had his Levemir dose adjust by his PCP from 12 units to 17 units in the prior weeks.  He does not have his logs with him today to review and reports numbers based off memory.    He has attempted to use Trulicity in the past and tolerated this but cost was a problem but it sounds like patient assistance never was involved completely.        Has undergone professional CGMs in Oct 2021 and noted controlled BGs at 81% in target. We performed another CGMS in March 2022   Average glucose: 195   Above 180 mg/dL: 50.6%  (Above 250 mg/dL: 16.1%)   Within  mg/dL: 49.4%   Below 70 mg/dL: 0%  (Below 54 mg/dL: 0%)   We increased his glipizide to 7.5 mg with breakfast    With regards to diabetes:    Diagnosed: ~2018  Family History of Type 2 DM: mother   Known complications:  DKA -  RN -; saw in Dec 2020 outside [facility] -needs appt  PN -  Nephropathy +; needs appt  CAD +  CABG in 2007  LCA STENT placed in Dec 2020    Current regimen:  Levemir 17 units in the AM   Glipizide 10 mg (2 - 5 mg pills) with breakfast and 5 mg with dinner       Other medications tried:  Metformin - stopped in the past due to CKD  Trulicity - stopped due to expense (patient assistance not able to help)    Glucose Monitor:    1-2 times a day testing  Log reviewed: oral recall, forgot logs today  States BGs in the AM have been  "70's  Before Dinner BGs 120    Hypoglycemia:   Has had some readings in the mid 60s in the AM.  Has had some shaking/jittery feeling in the early afternoon which improves with oral intake.  He has not checked his BG with this before.  On average once every 1-2 weeks  Knows how to correct with 15 grams of carbs- juice, coke, or a peppermint.     Diet/Exercise: He is in cardiac rehab 2 times a week.  He feels that he has not been following a strict diabetic diet. He states "a good baked potato goes a mile and a half".    B: Grits or oatmeal, 2 eggs  L: Baked potato, chicken  D: biggest meal, pasta, salad    Snacks: popcorn, pears, grapes, oranges, apples  Drinks: water mostly, diet sodas     Will snack overnight at times    Education - last visit: in the past; 2017  Podiatry: None    Diabetes Management Status    Hemoglobin A1C   Date Value Ref Range Status   08/17/2022 7.4 (H) 4.0 - 5.6 % Final     Comment:     ADA Screening Guidelines:  5.7-6.4%  Consistent with prediabetes  >or=6.5%  Consistent with diabetes    High levels of fetal hemoglobin interfere with the HbA1C  assay. Heterozygous hemoglobin variants (HbS, HgC, etc)do  not significantly interfere with this assay.   However, presence of multiple variants may affect accuracy.     07/20/2022 8.8 (H) 4.0 - 5.6 % Final     Comment:     ADA Screening Guidelines:  5.7-6.4%  Consistent with prediabetes  >or=6.5%  Consistent with diabetes    High levels of fetal hemoglobin interfere with the HbA1C  assay. Heterozygous hemoglobin variants (HbS, HgC, etc)do  not significantly interfere with this assay.   However, presence of multiple variants may affect accuracy.     05/18/2022 9.1 (H) 4.0 - 5.6 % Final     Comment:     ADA Screening Guidelines:  5.7-6.4%  Consistent with prediabetes  >or=6.5%  Consistent with diabetes    High levels of fetal hemoglobin interfere with the HbA1C  assay. Heterozygous hemoglobin variants (HbS, HgC, etc)do  not significantly interfere with " this assay.   However, presence of multiple variants may affect accuracy.       Statin: Taking  ACE/ARB: Taking    Screening or Prevention Patient's value Goal Complete/Controlled?   HgA1C Testing and Control   Lab Results   Component Value Date    HGBA1C 7.4 (H) 08/17/2022      Annually/Less than 8% Yes   Lipid profile : 08/18/2022 Annually Yes   LDL control Lab Results   Component Value Date    LDLCALC 47.8 (L) 08/18/2022    Annually/Less than 100 mg/dl  Yes   Nephropathy screening Lab Results   Component Value Date    LABMICR 22.0 10/11/2021     Lab Results   Component Value Date    PROTEINUA Trace (A) 10/11/2021    Annually Yes   Blood pressure BP Readings from Last 1 Encounters:   10/24/22 120/60    Less than 140/90 No   Dilated retinal exam : 12/21/2020 Seen In September by outside optho doc, no problems Annually Yes   Foot exam   Most Recent Foot Exam Date: Not Found, will need podiatry visit Annually Yes       With regards to dyslipidemia,     He is on atorvastatin 40 mg daily     Latest Reference Range & Units 08/18/22 04:01   Cholesterol 120 - 199 mg/dL 98 (L)   HDL 40 - 75 mg/dL 23 (L)   HDL/Cholesterol Ratio 20.0 - 50.0 % 23.5   LDL Cholesterol External 63.0 - 159.0 mg/dL 47.8 (L)   Non-HDL Cholesterol mg/dL 75   Total Cholesterol/HDL Ratio 2.0 - 5.0  4.3   Triglycerides 30 - 150 mg/dL 136   (L): Data is abnormally low      With regards to osteoporosis:     Family history: mother with broken hip in 90's    current medication: denies     Calcium intake - none but has in diet   Vit D intake- 5000 IU daily of Vit D3    Weight bearing exercise- none with back pain; he will be seeing rehab  Recent falls ~ around March 2022 fell on back in the rain running after dog    May 2022  FINDINGS:  Moderate scoliosis, convex to the right and centered at L2-L3. Severe L2 compression fracture, age indeterminate.  Remaining lumbar vertebral bodies are normal in height.  6.2 mm retrolisthesis L2-L3.  8.1 mm retrolisthesis  L3-L4.Mild disc space narrowing T12-L1 and L1-L2.  Moderate disc space narrowing L2-3 and L3-4.  Severe disc space narrowing L4-5.  Scattered osteophytes..  Severe facet arthropathy L3-4 through L5-S1.  Severe atherosclerotic calcification of the aorta.     Impression:   1. Severe L2 compression fracture, age indeterminate.  2. Advanced multilevel degenerative changes as detailed above.    Recent fracture as above  States he broke back as a child 15-16 years old after falling out of a tree ~50 feet. He states was hospitalized for 3 months and wore brace    No significant height loss (>2 inches)    tob use -  None     etoh use- Social     No current diarrhea or h/o malabsorption    Denies chronic exposure to steroid therapy, proton pump inhibitors or antiseizure medications.   + anticoagulants    Denies GERD, indigestion, or gastric bypass surgery.     Denies history of thyroid disease, anemia, kidney stones   + kidney disease.     Denies active malignancy, history of malignancy the involved the bone, prior radiation treatment, or unexplained elevations of alk phos on labs     Last BMD dated  6/3/2022   FINDINGS:  The T score associated with the lumbar spine is 4.6.  The T score associated with the left femoral neck is -0.6.  The T score associated with the right femoral neck is -0.2.     Impression:   Normal study    Review of Systems   Constitutional:  Positive for fatigue. Negative for unexpected weight change.   Eyes:  Negative for visual disturbance.   Endocrine: Positive for polyphagia. Negative for polydipsia and polyuria.   Musculoskeletal:  Positive for back pain and gait problem.   Objective:   Physical Exam  Vitals reviewed.   HENT:      Head: Normocephalic and atraumatic.   Neck:      Thyroid: No thyromegaly.   Cardiovascular:      Rate and Rhythm: Normal rate.      Pulses: Normal pulses.      Comments: No edema present  Pulmonary:      Effort: Pulmonary effort is normal.   Musculoskeletal:      Comments:  "No point tenderness to spine   Neurological:      General: No focal deficit present.      Mental Status: He is oriented to person, place, and time.       Visit Vitals  /60 (BP Location: Right arm, Patient Position: Sitting, BP Method: Medium (Manual))   Pulse 63   Ht 5' 9" (1.753 m)   Wt 85.2 kg (187 lb 15.1 oz)   SpO2 97%   BMI 27.75 kg/m²       Body mass index is 27.75 kg/m².    Lab Review:   Lab Results   Component Value Date    HGBA1C 7.4 (H) 08/17/2022    HGBA1C 8.8 (H) 07/20/2022    HGBA1C 9.1 (H) 05/18/2022       Lab Results   Component Value Date    CHOL 98 (L) 08/18/2022    HDL 23 (L) 08/18/2022    LDLCALC 47.8 (L) 08/18/2022    TRIG 136 08/18/2022    CHOLHDL 23.5 08/18/2022     Lab Results   Component Value Date     09/06/2022    K 4.1 09/06/2022     09/06/2022    CO2 30 (H) 09/06/2022     (H) 09/06/2022    BUN 38 (H) 09/06/2022    CREATININE 1.93 (H) 09/06/2022    CALCIUM 9.6 09/06/2022    PROT 7.6 09/06/2022    ALBUMIN 4.5 09/06/2022    BILITOT 0.6 09/06/2022    ALKPHOS 41 09/06/2022    AST 42 09/06/2022    ALT 31 09/06/2022    ANIONGAP 10 09/06/2022    ESTGFRAFRICA 53.2 (A) 06/13/2022    EGFRNONAA 46.1 (A) 06/13/2022    TSH 1.720 06/13/2022     Vit D, 25-Hydroxy   Date Value Ref Range Status   06/13/2022 20 (L) 30 - 96 ng/mL Final     Comment:     Vitamin D deficiency.........<10 ng/mL                              Vitamin D insufficiency......10-29 ng/mL       Vitamin D sufficiency........> or equal to 30 ng/mL  Vitamin D toxicity............>100 ng/mL       Assessment and Plan     Problem List Items Addressed This Visit          Cardiac/Vascular    Mixed hyperlipidemia    Current Assessment & Plan     Continue on Statin therapy and Fibrate therapy         RESOLVED: Hyperlipidemia    Current Assessment & Plan     Continue on Statin therapy and Fibrate therapy            Endocrine    Type 2 diabetes mellitus with chronic kidney disease, with long-term current use of insulin    " Current Assessment & Plan     Review of reported glucose readings at home show concern for some post prandial hypoglycemia in the early afternoon based on glucose recall and symptoms.  He is open to trying GLP-1 type medication again.  He is up to date with yearly eye exam but does need to see a podiatrist for diabetic foot exam and history of calluses.       Plan:  Continue Levemir 17 units once daily  Drop Glipizide to 5 mg with breakfast and 5 mg with dinner    We will start Trulicity 0.75 mg once weekly with pharmacy/patient assistance.  When this is started blood glucose needs to be monitored closely as we will likely be able to decrease Glipizide dosing or even remove this all together.  GLP-1 will be helpful not just for diabetes and weight reduction but also in the setting of known CAD with recent NSTEMI.      He was encouraged to keep glucose logs and bring these to diabetes education visit which we will try to get set up for him.  New referral to podiatry at patients request.                Orthopedic    Localized osteoporosis with current pathological fracture with delayed healing    Current Assessment & Plan     Continues on Vitamin D3 supplementation, 5000 IU once daily  Does not want to pursue other medical management for osteoporosis  Has not had any concerning falls since compression fracture and modifications to decrease falls have been discussed prior like handrails, and removing rugs that are tripping hazards.             RTC with Ester Mitchell in 3-4 months with labs in 1 month.      [facility], [address]th [address]  [address] [place], [address]    Office: [phone]  Fax: [phone]    Disclaimer: This note has been generated using voice-recognition software. There may be typographical errors that have been missed during proof-reading.    The above history labs imaging impression and plan were discussed with attending physician who is in  agreement and also took part in this patient's care.  I personally reviewed all of the patients available medications, labs, imaging, vitals, allergies, medical history.

## 2022-10-26 ENCOUNTER — TELEPHONE (OUTPATIENT)
Dept: PHARMACY | Facility: CLINIC | Age: 77
End: 2022-10-26
Payer: MEDICARE

## 2022-10-26 NOTE — PROGRESS NOTES
I have reached out to Jin Ngo to inform him of the [facility] application process for Trulicity and what's required to apply.  Jin Ngo did not answer. I left a voicemail and mailed a letter introducing him to the pharmacy patient assistance program. I will follow up in 5 business days.

## 2022-10-26 NOTE — TELEPHONE ENCOUNTER
I have reached out to Jin Ngo to inform him of the [facility] application process for Trulicity and what's required to apply.  Jin Ngo did not answer. I left a voicemail and mailed a letter introducing him to the pharmacy patient assistance program. I will follow up in 5 business days.

## 2022-11-02 ENCOUNTER — TELEPHONE (OUTPATIENT)
Dept: CARDIOLOGY | Facility: CLINIC | Age: 77
End: 2022-11-02
Payer: MEDICARE

## 2022-11-02 NOTE — TELEPHONE ENCOUNTER
I spoke to his wife today via the telephone.  He had a recent echo confirming ejection fraction 50%.  It had been 30% at time of heart attack.  No changes in treatment advised.

## 2022-11-11 ENCOUNTER — TELEPHONE (OUTPATIENT)
Dept: OTOLARYNGOLOGY | Facility: CLINIC | Age: 77
End: 2022-11-11
Payer: MEDICARE

## 2022-11-11 NOTE — TELEPHONE ENCOUNTER
Called pt to inform that Dr. Watkins is out on medical leave for 6 weeks and that appointment would need to be rescheduled. Apt rescheduled to the same day, 11/14, at 9AM with Dr. Retana at the [facility] location. Pts wife thanked me and verbalized understanding.

## 2022-11-14 ENCOUNTER — OFFICE VISIT (OUTPATIENT)
Dept: OTOLARYNGOLOGY | Facility: CLINIC | Age: 77
End: 2022-11-14
Payer: MEDICARE

## 2022-11-14 VITALS
BODY MASS INDEX: 27.75 KG/M2 | SYSTOLIC BLOOD PRESSURE: 128 MMHG | HEIGHT: 69 IN | HEART RATE: 43 BPM | DIASTOLIC BLOOD PRESSURE: 60 MMHG | WEIGHT: 187.38 LBS | OXYGEN SATURATION: 94 %

## 2022-11-14 DIAGNOSIS — K21.9 GASTROESOPHAGEAL REFLUX DISEASE, UNSPECIFIED WHETHER ESOPHAGITIS PRESENT: ICD-10-CM

## 2022-11-14 DIAGNOSIS — J31.0 CHRONIC RHINITIS: Chronic | ICD-10-CM

## 2022-11-14 DIAGNOSIS — R05.3 CHRONIC COUGH: Chronic | ICD-10-CM

## 2022-11-14 DIAGNOSIS — J32.0 CHRONIC MAXILLARY SINUSITIS: Primary | Chronic | ICD-10-CM

## 2022-11-14 PROCEDURE — 99999 PR PBB SHADOW E&M-EST. PATIENT-LVL V: CPT | Mod: PBBFAC,,, | Performed by: OTOLARYNGOLOGY

## 2022-11-14 PROCEDURE — 1159F MED LIST DOCD IN RCRD: CPT | Mod: CPTII,S$GLB,, | Performed by: OTOLARYNGOLOGY

## 2022-11-14 PROCEDURE — 3078F DIAST BP <80 MM HG: CPT | Mod: CPTII,S$GLB,, | Performed by: OTOLARYNGOLOGY

## 2022-11-14 PROCEDURE — 1160F PR REVIEW ALL MEDS BY PRESCRIBER/CLIN PHARMACIST DOCUMENTED: ICD-10-PCS | Mod: CPTII,S$GLB,, | Performed by: OTOLARYNGOLOGY

## 2022-11-14 PROCEDURE — 31575 LARYNGOSCOPY: ICD-10-PCS | Mod: S$GLB,,, | Performed by: OTOLARYNGOLOGY

## 2022-11-14 PROCEDURE — 3074F SYST BP LT 130 MM HG: CPT | Mod: CPTII,S$GLB,, | Performed by: OTOLARYNGOLOGY

## 2022-11-14 PROCEDURE — 99204 PR OFFICE/OUTPT VISIT, NEW, LEVL IV, 45-59 MIN: ICD-10-PCS | Mod: 25,S$GLB,, | Performed by: OTOLARYNGOLOGY

## 2022-11-14 PROCEDURE — 99204 OFFICE O/P NEW MOD 45 MIN: CPT | Mod: 25,S$GLB,, | Performed by: OTOLARYNGOLOGY

## 2022-11-14 PROCEDURE — 1160F RVW MEDS BY RX/DR IN RCRD: CPT | Mod: CPTII,S$GLB,, | Performed by: OTOLARYNGOLOGY

## 2022-11-14 PROCEDURE — 1126F AMNT PAIN NOTED NONE PRSNT: CPT | Mod: CPTII,S$GLB,, | Performed by: OTOLARYNGOLOGY

## 2022-11-14 PROCEDURE — 1126F PR PAIN SEVERITY QUANTIFIED, NO PAIN PRESENT: ICD-10-PCS | Mod: CPTII,S$GLB,, | Performed by: OTOLARYNGOLOGY

## 2022-11-14 PROCEDURE — 31575 DIAGNOSTIC LARYNGOSCOPY: CPT | Mod: S$GLB,,, | Performed by: OTOLARYNGOLOGY

## 2022-11-14 PROCEDURE — 99999 PR PBB SHADOW E&M-EST. PATIENT-LVL V: ICD-10-PCS | Mod: PBBFAC,,, | Performed by: OTOLARYNGOLOGY

## 2022-11-14 PROCEDURE — 3078F PR MOST RECENT DIASTOLIC BLOOD PRESSURE < 80 MM HG: ICD-10-PCS | Mod: CPTII,S$GLB,, | Performed by: OTOLARYNGOLOGY

## 2022-11-14 PROCEDURE — 1159F PR MEDICATION LIST DOCUMENTED IN MEDICAL RECORD: ICD-10-PCS | Mod: CPTII,S$GLB,, | Performed by: OTOLARYNGOLOGY

## 2022-11-14 PROCEDURE — 3074F PR MOST RECENT SYSTOLIC BLOOD PRESSURE < 130 MM HG: ICD-10-PCS | Mod: CPTII,S$GLB,, | Performed by: OTOLARYNGOLOGY

## 2022-11-14 RX ORDER — LEVOCETIRIZINE DIHYDROCHLORIDE 5 MG/1
5 TABLET, FILM COATED ORAL NIGHTLY
Qty: 30 TABLET | Refills: 11 | Status: SHIPPED | OUTPATIENT
Start: 2022-11-14 | End: 2023-05-08 | Stop reason: SDUPTHER

## 2022-11-14 RX ORDER — DOXYCYCLINE HYCLATE 100 MG
100 TABLET ORAL 2 TIMES DAILY
Qty: 56 TABLET | Refills: 0 | Status: SHIPPED | OUTPATIENT
Start: 2022-11-14 | End: 2022-12-12

## 2022-11-14 RX ORDER — FLUTICASONE PROPIONATE 50 MCG
1 SPRAY, SUSPENSION (ML) NASAL 2 TIMES DAILY
Qty: 11.1 ML | Refills: 11 | Status: SHIPPED | OUTPATIENT
Start: 2022-11-14 | End: 2022-12-14

## 2022-11-14 RX ORDER — PREDNISONE 10 MG/1
10 TABLET ORAL DAILY
Qty: 10 TABLET | Refills: 0 | Status: SHIPPED | OUTPATIENT
Start: 2022-11-14 | End: 2023-05-01

## 2022-11-14 RX ORDER — AZELASTINE 1 MG/ML
1 SPRAY, METERED NASAL 2 TIMES DAILY
Qty: 30 ML | Refills: 11 | Status: SHIPPED | OUTPATIENT
Start: 2022-11-14 | End: 2023-05-08 | Stop reason: SDUPTHER

## 2022-11-14 NOTE — PROCEDURES
Laryngoscopy    Date/Time: 11/14/2022 9:00 AM  Performed by: Pepper Retana MD  Authorized by: Pepper Retana MD     Consent Done?:  Yes (Verbal)  Anesthesia:     Local anesthetic:  Topical anesthetic    Patient tolerance:  Patient tolerated the procedure well with no immediate complications    Decongestion performed?: Yes    Laryngoscopy:     Areas examined:  Nasal cavities, vocal cords, nasopharynx, oropharynx, hypopharynx and larynx  Nose Intranasal:      Mucosa no polyps     Mucosa ulcers not present     No mucosa lesions present     No septum gross deformity     Enlarged turbinates  Nasopharynx:      No mucosa lesions     Adenoids not present     Posterior choanae patent     Eustachian tube patent  Larynx/hypopharynx:      No epiglottis lesions     No epiglottis edema     No AE folds lesions     No vocal cord polyps     Equal and normal bilateral     No hypopharynx lesions     No piriform sinus pooling     No piriform sinus lesions     No post cricoid edema     No post cricoid erythema     Purulent secretions noted bilateral nasal cavities, worse on left.  Emanating from middle meatus bilaterally.    Pepper Retana MD  [facility]

## 2022-11-14 NOTE — PROGRESS NOTES
"  Chief Complaint   Patient presents with    Cough     Allergies, Cough from "sinus drip" for about a year, takes zyrtec        HPI:      Jin Ngo is a 76 y.o. male who presents for evaluation of a several months history of nasal congestion and postnasal drip. He describes difficulty breathing at night, as well as significant thick postnasal drip that happens episodically and causes significant coughing and choking. There is bilateral nasal obstruction. He previously used sinus rinses or nasal sprays. He has midface pain and pressure, worse on left.  He has rhinorrhea and postnasal drip. There is not maxillary tooth pain. He  denies headaches.  He has not had sinus or nasal surgery. There is no history of sinonasal trauma.  Patient uses saline nasal spray daily and previously used Flonase but stopped because he felt it was not helping.  He is also still taking Zyrtec daily.  He has not had any true aspiration episodes but does feel that occasionally the postnasal drip will choke him.  He reports a history of GERD, but no symptoms currently and not currently taking any medications.              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    Coronary artery disease     Encounter for blood transfusion     Hyperlipidemia     Hypertension     Localized osteoporosis with current pathological fracture with delayed healing 6/9/2022    Myocardial infarction     Obesity     Other chronic pulmonary heart diseases     Paroxysmal atrial fibrillation 11/9/2020    Primary osteoarthritis involving multiple joints 3/8/2017    Stroke     Type 2 diabetes mellitus without complication     Uncontrolled type 2 diabetes mellitus with neurologic complication, without long-term current use of insulin 3/8/2017     Social History     Socioeconomic History    Marital " status:    Tobacco Use    Smoking status: Former     Packs/day: 3.00     Years: 15.00     Pack years: 45.00     Types: Cigarettes     Quit date: 1972     Years since quittin.9    Smokeless tobacco: Never   Substance and Sexual Activity    Alcohol use: Yes     Alcohol/week: 1.0 standard drink     Types: 1 Shots of liquor per week     Comment: 2-3 drinks per year    Drug use: No    Sexual activity: Not Currently     Partners: Female     Social Determinants of Health     Financial Resource Strain: Low Risk     Difficulty of Paying Living Expenses: Not very hard   Food Insecurity: No Food Insecurity    Worried About Running Out of Food in the Last Year: Never true    Ran Out of Food in the Last Year: Never true   Transportation Needs: No Transportation Needs    Lack of Transportation (Medical): No    Lack of Transportation (Non-Medical): No   Physical Activity: Sufficiently Active    Days of Exercise per Week: 3 days    Minutes of Exercise per Session: 60 min   Stress: No Stress Concern Present    Feeling of Stress : Not at all   Social Connections: Moderately Isolated    Frequency of Communication with Friends and Family: More than three times a week    Frequency of Social Gatherings with Friends and Family: More than three times a week    Attends Voodoo Services: Never    Active Member of Clubs or Organizations: No    Attends Club or Organization Meetings: Never    Marital Status:    Housing Stability: Low Risk     Unable to Pay for Housing in the Last Year: No    Number of Places Lived in the Last Year: 1    Unstable Housing in the Last Year: No     Past Surgical History:   Procedure Laterality Date    CARDIAC SURGERY      CATARACT EXTRACTION, BILATERAL      COLONOSCOPY          COLONOSCOPY N/A 3/22/2016    Procedure: COLONOSCOPY;  Surgeon: Sridhar Reynoso MD;  Location: [facility];  Service: Endoscopy;  Laterality: N/A;    COLONOSCOPY N/A 2021    Procedure: Colonoscopy;   Surgeon: Bebeto Perry MD;  Location: [facility] ENDO;  Service: Endoscopy;  Laterality: N/A;    CORONARY ANGIOGRAPHY N/A 8/17/2022    Procedure: ANGIOGRAM, CORONARY ARTERY;  Surgeon: Pasquale Ramesh MD;  Location: [facility] CATH LAB/EP;  Service: Cardiology;  Laterality: N/A;    CORONARY ARTERY BYPASS GRAFT      CORONARY BYPASS GRAFT ANGIOGRAPHY  8/17/2022    Procedure: Bypass graft study;  Surgeon: Pasquale Ramesh MD;  Location: [facility] CATH LAB/EP;  Service: Cardiology;;    EYE SURGERY  06/2014    Cataracts    LEFT HEART CATHETERIZATION Left 8/17/2022    Procedure: Left heart cath;  Surgeon: Pasquale Ramesh MD;  Location: [facility] CATH LAB/EP;  Service: Cardiology;  Laterality: Left;    PERCUTANEOUS TRANSLUMINAL ANGIOPLASTY (PTA) OF CAROTID ARTERY N/A 12/10/2020    Procedure: PTA, ARTERY, CAROTID;  Surgeon: Cheko Norris MD;  Location: [facility] CATH LAB;  Service: Cardiology;  Laterality: N/A;     Family History   Problem Relation Age of Onset    Stroke Mother     Heart disease Mother     Hypertension Mother     Diabetes Mother     Hyperlipidemia Mother     Arthritis Father     Hyperlipidemia Father     Heart disease Father     Hypertension Father     Heart attack Father     Coronary artery disease Father     No Known Problems Sister     Hyperlipidemia Brother     Hypertension Brother     Stroke Brother     Thyroid disease Brother     Hypertension Son            Review of Systems  General: negative for chills, fever or weight loss  Psychological: negative for mood changes or depression  Ophthalmic: negative for blurry vision, photophobia or eye pain  ENT: see HPI  Respiratory: no cough, shortness of breath, or wheezing  Cardiovascular: no chest pain or dyspnea on exertion  Gastrointestinal: no abdominal pain, change in bowel habits, or black/ bloody stools  Musculoskeletal: negative for gait disturbance or muscular weakness  Neurological: no syncope or seizures; no ataxia  Dermatological: negative for pruritis,  rash and  jaundice  Hematologic/lymphatic: no easy bruising, no new adenopathy      Physical Exam:    Vitals:    11/14/22 0912   BP: 128/60   Pulse: (!) 43         Constitutional:   He is oriented to person, place, and time. Vital signs are normal. He appears well-developed and well-nourished. He appears alert. He is cooperative. Normal speech.      Head:  Normocephalic and atraumatic. Salivary glands normal.  Facial strength is normal.      Ears:  Hearing normal to normal and whispered voice; external ear normal without scars, lesions, or masses; ear canal, tympanic membrane, and middle ear normal., right ear hearing normal to normal and whispered voice; external ear normal without scars, lesions, or masses; ear canal, tympanic membrane, and middle ear normal. and left ear hearing normal to normal and whispered voice; external ear normal without scars, lesions, or masses; ear canal, tympanic membrane, and middle ear normal..   Right Ear: Tympanic membrane is not perforated and not erythematous. No middle ear effusion.   Left Ear: Tympanic membrane is not perforated and not erythematous.  No middle ear effusion.   Dry skin, partial wax impaction    Nose:  Mucosal edema and rhinorrhea (purulent secretions bilaterally) present. No septal deviation or polyps. Turbinates abnormal and turbinate hypertrophy (3+, boggy, congested mucosa).  Right sinus exhibits no maxillary sinus tenderness and no frontal sinus tenderness. Left sinus exhibits maxillary sinus tenderness. Left sinus exhibits no frontal sinus tenderness.     Mouth/Throat  Oropharynx clear and moist without lesions or asymmetry, lips, teeth, and gums normal and oropharynx normal. Abnormal dentition. No mucous membrane lesions. No oropharyngeal exudate, posterior oropharyngeal edema or posterior oropharyngeal erythema. +1.  Tonsillar erythema, tonsillar exudate.  Mirror exam not performed due to patient tolerance.  Mirror exam not performed due to patient tolerance.       Neck:  Neck normal without thyromegaly masses, asymmetry, normal tracheal structure, crepitus, and tenderness, thyroid normal, trachea normal, phonation normal, full range of motion with neck supple and no adenopathy. No JVD present. Carotid bruit is not present. No thyroid mass and no thyromegaly present.     He has no cervical adenopathy.     Cardiovascular:    Normal rate, regular rhythm and rate and rhythm, heart sounds, and pulses normal.              Pulmonary/Chest:   Effort and breath sounds normal.     Psychiatric:   He has a normal mood and affect. His speech is normal and behavior is normal.     Neurological:   He is alert and oriented to person, place, and time. He has neurological normal, alert and oriented. No cranial nerve deficit.     Skin:   No abrasions, lacerations, lesions, or rashes.         See separate procedure note for laryngoscopy.     Assessment:    ICD-10-CM ICD-9-CM    1. Chronic maxillary sinusitis  J32.0 473.0 Ambulatory referral/consult to ENT      predniSONE (DELTASONE) 10 MG tablet      levocetirizine (XYZAL) 5 MG tablet      azelastine (ASTELIN) 137 mcg (0.1 %) nasal spray      doxycycline (VIBRA-TABS) 100 MG tablet      fluticasone propionate (FLONASE) 50 mcg/actuation nasal spray      2. Chronic cough  R05.3 786.2 Ambulatory referral/consult to ENT      3. Chronic rhinitis  J31.0 472.0       4. Gastroesophageal reflux disease, unspecified whether esophagitis present  K21.9 530.81         The primary encounter diagnosis was Chronic maxillary sinusitis. Diagnoses of Chronic cough, Chronic rhinitis, and Gastroesophageal reflux disease, unspecified whether esophagitis present were also pertinent to this visit.      Plan:    Patient will complete a course of doxycycline and prednisone taper for the acute on chronic sinus infection.  He will also use saline nasal rinses daily.    He will change his Zyrtec to Xyzal daily, and will also restart Flonase and add azelastine nasal  spray.    We will see him again in a few weeks to assess response to therapy and decide on further workup if needed.    No current evidence of active          Pepper Retana MD

## 2022-11-14 NOTE — PATIENT INSTRUCTIONS
I would recommend the use of a wax softening drop, either over the counter Debrox, baby oil, or mineral oil, on a weekly basis.  I also instructed the patient to avoid Qtips.     The patient will complete a course of antibiotics, steroids, and medical management for the sinusitis.  CT scan will be obtained after treatment in 3-4 weeks.  I will communicate these results when they are available.  The patient will follow up for further discussion of the symptoms and management.     Get Alex Med Sinus Rinse from the pharmacy.  Use it daily according to the instructions.  It will help to cut down the inflammation and mucus/crusting in your nose and sinuses.     The patient was given a prescription for a nasal steroid and/or nasal antihistamine nasal spray and we discussed in detail the proper mechanism of use directing the spray away from the nasal septum.  The patient was also instructed to take a daily oral antihistamine (Claritin, Zyrtec, Allegra, or Xyzal).    In addition, we also discussed that it will take 3-4 weeks of daily use to achieve maximal effectiveness.  The patient will please call in 3-4 weeks with their progress.  If allergy symptoms persist at that time, we could consider additional medications and possibly allergy testing.     How do you use a Nasal Corticosteroid Spray?    Make sure you understand your dosing instructions. Spray only the number of prescribed sprays in each nostril. Read the package instructions before using your spray the first time.    Most corticosteroid sprays suggest the following steps:    Wash your hands well.    Gently blow your nose to clear the passageway.    Shake the container several times.    Tilt your head slightly downward.  Use the opposite hand from the nostril you will be spraying to hold the spray bottle.    Block one nostril with your finger.  Insert the nasal applicator into the other nostril.    Aim the spray toward the outer wall of the nostril.  Inhale slowly  through the nose and press the .    Breathe out and repeat to apply the prescribed number of sprays.  Repeat these steps for the other nostril.     Avoid sneezing or blowing your nose right after spraying.

## 2022-11-16 ENCOUNTER — LAB VISIT (OUTPATIENT)
Dept: LAB | Facility: HOSPITAL | Age: 77
End: 2022-11-16
Attending: INTERNAL MEDICINE
Payer: MEDICARE

## 2022-11-16 DIAGNOSIS — E78.2 MIXED HYPERLIPIDEMIA: ICD-10-CM

## 2022-11-16 DIAGNOSIS — I21.4 NSTEMI (NON-ST ELEVATED MYOCARDIAL INFARCTION): ICD-10-CM

## 2022-11-16 LAB
CHOLEST SERPL-MCNC: 125 MG/DL (ref 120–199)
CHOLEST/HDLC SERPL: 4.5 {RATIO} (ref 2–5)
HDLC SERPL-MCNC: 28 MG/DL (ref 40–75)
HDLC SERPL: 22.4 % (ref 20–50)
LDLC SERPL CALC-MCNC: 79 MG/DL (ref 63–159)
NONHDLC SERPL-MCNC: 97 MG/DL
TRIGL SERPL-MCNC: 90 MG/DL (ref 30–150)

## 2022-11-16 PROCEDURE — 36415 COLL VENOUS BLD VENIPUNCTURE: CPT | Mod: PO | Performed by: INTERNAL MEDICINE

## 2022-11-16 PROCEDURE — 80061 LIPID PANEL: CPT | Performed by: INTERNAL MEDICINE

## 2022-11-21 PROBLEM — J81.0 ACUTE PULMONARY EDEMA: Status: RESOLVED | Noted: 2022-08-17 | Resolved: 2022-11-21

## 2022-11-21 PROBLEM — J96.01 ACUTE HYPOXEMIC RESPIRATORY FAILURE: Status: RESOLVED | Noted: 2022-08-18 | Resolved: 2022-11-21

## 2022-11-21 PROBLEM — N17.9 AKI (ACUTE KIDNEY INJURY): Status: RESOLVED | Noted: 2020-07-01 | Resolved: 2022-11-21

## 2022-11-21 PROBLEM — I21.4 NSTEMI (NON-ST ELEVATED MYOCARDIAL INFARCTION): Status: RESOLVED | Noted: 2022-08-16 | Resolved: 2022-11-21

## 2022-11-29 ENCOUNTER — LAB VISIT (OUTPATIENT)
Dept: LAB | Facility: HOSPITAL | Age: 77
End: 2022-11-29
Attending: STUDENT IN AN ORGANIZED HEALTH CARE EDUCATION/TRAINING PROGRAM
Payer: MEDICARE

## 2022-11-29 ENCOUNTER — CLINICAL SUPPORT (OUTPATIENT)
Dept: DIABETES | Facility: CLINIC | Age: 77
End: 2022-11-29
Payer: MEDICARE

## 2022-11-29 DIAGNOSIS — E11.22 TYPE 2 DIABETES MELLITUS WITH CHRONIC KIDNEY DISEASE, WITH LONG-TERM CURRENT USE OF INSULIN, UNSPECIFIED CKD STAGE: ICD-10-CM

## 2022-11-29 DIAGNOSIS — Z79.4 TYPE 2 DIABETES MELLITUS WITH CHRONIC KIDNEY DISEASE, WITH LONG-TERM CURRENT USE OF INSULIN, UNSPECIFIED CKD STAGE: ICD-10-CM

## 2022-11-29 LAB
ALBUMIN/CREAT UR: NORMAL UG/MG (ref 0–30)
CREAT UR-MCNC: 78 MG/DL (ref 23–375)
ESTIMATED AVG GLUCOSE: 128 MG/DL (ref 68–131)
HBA1C MFR BLD: 6.1 % (ref 4–5.6)
MICROALBUMIN UR DL<=1MG/L-MCNC: <5 UG/ML

## 2022-11-29 PROCEDURE — 83036 HEMOGLOBIN GLYCOSYLATED A1C: CPT | Performed by: STUDENT IN AN ORGANIZED HEALTH CARE EDUCATION/TRAINING PROGRAM

## 2022-11-29 PROCEDURE — 36415 COLL VENOUS BLD VENIPUNCTURE: CPT | Mod: PO | Performed by: STUDENT IN AN ORGANIZED HEALTH CARE EDUCATION/TRAINING PROGRAM

## 2022-11-29 PROCEDURE — 82043 UR ALBUMIN QUANTITATIVE: CPT | Mod: PO | Performed by: STUDENT IN AN ORGANIZED HEALTH CARE EDUCATION/TRAINING PROGRAM

## 2022-11-29 PROCEDURE — 82570 ASSAY OF URINE CREATININE: CPT | Mod: PO | Performed by: STUDENT IN AN ORGANIZED HEALTH CARE EDUCATION/TRAINING PROGRAM

## 2022-11-29 NOTE — PROGRESS NOTES
Diabetes Care Specialist Progress Note  Author: Jasmyne Espinoza RD  Date: 11/29/2022    Program Intake  Reason for Diabetes Program Visit:: Initial Diabetes Assessment  Current diabetes risk level:: moderate  In the last 12 months, have you:: been admitted to a hospital  Was the ER or hospital admission related to diabetes?: No  Permission to speak with others about care:: no    Lab Results   Component Value Date    HGBA1C 7.4 (H) 08/17/2022       Clinical  Problem Review  Reviewed Problem List with Patient: yes  Active comorbidities affecting diabetes self-care.: yes  Comorbidities: Chronic Kidney Disease, Heart Attack, Hypertension  Reviewed health maintenance: yes    Clinical Assessment  Current Diabetes Treatment: Oral Medication, Insulin  Have you ever experienced hypoglycemia (low blood sugar)?: yes  In the last month, how often have you experienced low blood sugar?: once a week  Are you able to tell when your blood sugar is low?: Yes  What symptoms do you experience?: Shaky, Sweaty, Hungry, Tiredness  Have you ever been hospitalized because your blood sugar was too low?: no  How do you treat hypoglycemia (low blood sugar)?: 1/2 can soda/fruit juice, 4 glucose tablets  Have you ever experienced hyperglycemia (high blood sugar)?: yes  In the last month, how often have you experienced high blood sugar?: more than once a week  Are you able to tell when your blood sugar is high?: No (comment)  Have you ever been hospitalized because your blood sugar was high?: no    Medication Information  How do you obtain your medications?: Family picks up  How many days a week do you miss your medications?: Never  Do you sometimes have difficulty refilling your medications?: No  Medication adherence impacting ability to self-manage diabetes?: No    Labs  Do you have regular lab work to monitor your medications?: Yes  Type of Regular Lab Work: A1c  Lab Compliance Barriers: No    Nutritional Status  Diet: Regular  Meal Plan 24  Hour Recall: Breakfast, Lunch, Dinner, Snack  Meal Plan 24 Hour Recall - Breakfast: grits, 2 eggs, water and coffee; oatmeal packet with coffee  Meal Plan 24 Hour Recall - Lunch: healthy choice frozen dinners; leftovers  Meal Plan 24 Hour Recall - Dinner: protein, pasta or baked/mashed potato, salad  Meal Plan 24 Hour Recall - Snack: cereal, fruit  Change in appetite?: Yes (pt c/o always being hungry expecially at night when he is up late watching tv)  Dentation:: Intact  Recent Changes in Weight: Weight Loss  Was weight loss intentional or unintentional?: Intentional  Current nutritional status an area of need that is impacting patient's ability to self-manage diabetes?: Yes    Additional Social History    Support  Does anyone support you with your diabetes care?: yes  Who supports you?: spouse, self  Who takes you to your medical appointments?: spouse  Does the current support meet the patient's needs?: Yes  Is Support an area impacting ability to self-manage diabetes?: No    Access to Mass Media & Technology  Does the patient have access to any of the following devices or technologies?: Smart phone  Media or technology needs impacting ability to self-manage diabetes?: No    Cognitive/Behavioral Health  Alert and Oriented: Yes  Difficulty Thinking: No  Requires Prompting: No  Requires assistance for routine expression?: No  Cognitive or behavioral barriers impacting ability to self-manage diabetes?: No         Communication  Language preference: English  Hearing Problems: No  Vision Problems: No  Communication needs impacting ability to self-manage diabetes?: No    Health Literacy  Preferred Learning Method: Face to Face, Reading Materials  How often do you need to have someone help you read instructions, pamphlets, or written material from your doctor or pharmacy?: Rarely  Health literacy needs impacting ability to self-manage diabetes?: No      Diabetes Self-Management Skills Assessment    Diabetes Disease  Process/Treatment Options  Patient/caregiver able to state what happens when someone has diabetes.: somewhat  Patient/caregiver knows what type of diabetes they have.: yes  Diabetes Type : Type II  Patient/caregiver able to identify at least three signs and symptoms of diabetes.: yes  Identified signs and symptoms:: increased thirst, blurred vision, fatigue  Patient able to identify at least three risk factors for diabetes.: yes  Identified risk factors:: being overweight, family history  Diabetes Disease Process/Treatment Options: Skills Assessment Completed: Yes  Assessment indicates:: Adequate understanding  Area of need?: No    Nutrition/Healthy Eating  Challenges to healthy eating:: portion control, snacking between meals and at night  Method of carbohydrate measurement:: no method  Patient can identify foods that impact blood sugar.: yes  Patient-identified foods:: fruit/fruit juice, soda, milk, starches (bread, pasta, rice, cereal), sweets  Nutrition/Healthy Eating Skills Assessment Completed:: Yes  Assessment indicates:: Instruction Needed  Area of need?: Yes    Physical Activity/Exercise  Patient's daily activity level:: moderately active  Patient formally exercises outside of work.: yes  Exercise Type: walking, using exercise equipment  Intensity: Moderate  Frequency: three times a week  Duration: > 1 hour  Patient can identify forms of physical activity.: yes  Stated forms of physical activity:: any movement performed by muscles that uses energy  Patient can identify reasons why exercise/physical activity is important in diabetes management.: no  Physical Activity/Exercise Skills Assessment Completed: : Yes  Assessment indicates:: Instruction Needed  Area of need?: Yes    Medications  Patient is able to describe current diabetes management routine.: yes  Diabetes management routine:: insulin, oral medications  Patient is able to identify current diabetes medications, dosages, and appropriate timing of  medications.: yes  Patient understands the purpose of the medications taken for diabetes.: no  Patient reports problems or concerns with current medication regimen.: no  Medication Skills Assessment Completed:: Yes  Assessment indicates:: Instruction Needed  Area of need?: Yes    Home Blood Glucose Monitoring  Patient states that blood sugar is checked at home daily.: yes  Monitoring Method:: home glucometer  How often do you check your blood sugar?: Twice a day  When do you check your blood sugar?: Before breakfast, 2 hours after meal  When you check what is your typical blood sugar range? : fastimg between  with a few under 70 mg/dL  Blood glucose logs:: encouraged to bring logs to provider visits  Blood glucose logs reviewed today?: no  Home Blood Glucose Monitoring Skills Assessment Completed: : Yes  Assessment indicates:: Adequate understanding  Area of need?: No    Acute Complications  Patient is able to identify types of acute complications: No  Patient Identified:: Hypoglycemia  Patient is able to state the basic meaning of hypoglycemia?: Yes  Able to state the blood sugar range for hypoglycemia?: yes  Patient stated range:: < 70 mg/dL  Patient can identify general symptoms of hypoglycemia: yes  Patient identified:: shakiness, hunger, fatigue  Able to state proper treatment of hypoglycemia?: yes  Patient identified:: 1/2 can soda/fruit juice, 4 glucose tablets  Acute Complications Skills Assessment Completed: : Yes  Assessment indicates:: Adequate understanding  Area of need?: No    Chronic Complications  Patient can identify major chronic complications of diabetes.: yes  Stated chronic complications:: heart disease/heart attack, kidney disease  Patient can identify ways to prevent or delay diabetes complications.: yes  Stated ways to prevent complications:: controlling blood sugar  Patient is aware that having diabetes increases risk of heart disease?: No  Patient is aware that heart disease is the  leading cause of death and disability in people with diabetes?: No  Patient able to state risk factors for heart disease?: No  Patient is taking statin?: Yes  Chronic Complications Skills Assessment Completed: : Yes  Assessment indicates:: Instruction Needed  Area of need?: Yes    Psychosocial/Coping  Psychosocial/Coping Skills Assessment Completed: : No  Deffered due to:: Time    Assessment Summary and Plan    Based on today's diabetes care assessment, the following areas of need were identified:      Social 11/29/2022   Support No   Access to Mass Media/Tech No   Cognitive/Behavioral Health No   Communication No   Health Literacy No        Clinical 11/29/2022   Medication Adherence No   Lab Compliance No   Nutritional Status Yes, see care plan        Diabetes Self-Management Skills 11/29/2022   Diabetes Disease Process/Treatment Options No   Nutrition/Healthy Eating Yes, see care plan   Physical Activity/Exercise Yes, taught effects of physical activity on BG   Medication Yes, taught MOA   Home Blood Glucose Monitoring No   Acute Complications No   Chronic Complications Yes, taught about heart disease and T2DM.          Today's interventions were provided through individual discussion, instruction, and written materials were provided.      Patient verbalized understanding of instruction and written materials.  Pt was able to return back demonstration of instructions today. Patient understood key points, needs reinforcement and further instruction.     Diabetes Self-Management Care Plan:    Today's Diabetes Self-Management Care Plan was developed with Jin's input. Jin has agreed to work toward the following goal(s) to improve his/her overall diabetes control.      Care Plan: Diabetes Management   Updates made since 10/30/2022 12:00 AM        Problem: Healthy Eating         Goal: Eat 3 meals daily with 45g/3 servings of Carbohydrate per meal.    Start Date: 11/29/2022   Expected End Date: 5/31/2023    Priority: High   Barriers: No Barriers Identified   Note:    Pt eats 3 meals/day plus snack if needed. He eats breakfast at 10 a.m. He goes to cardiac rehab for 2 pm and says he sometimes goes low (below 70 mg/dL) during session. He does not eat before he goes. I instructed him to eat snack before he goes to prevent hypoglycemia. He does not drink sugary beverages unless he is treating hypoglycemia.    Incorporated high/low potassium foods into diet discussion due to GFR of 35.4.       Task: Reviewed the sources and role of Carbohydrate, Protein, and Fat and how each nutrient impacts blood sugar. Completed 11/29/2022     Task: Explained how to count carbohydrates using the food label and the use of dry measuring cups for accurate carb counting. Completed 11/29/2022        Task: Discussed strategies for choosing healthier menu options when dining out. Completed 11/29/2022     Task: Review the importance of balancing carbohydrates with each meal using portion control techniques to count servings of carbohydrate and label reading to identify serving size and amount of total carbs per serving. Completed 11/29/2022        Task: Provided Sample plate method and reviewed the use of the plate to estimate amounts of carbohydrate per meal. Completed 11/29/2022          Follow Up Plan     Follow up in about 3 months (around 2/28/2023). Review A1c, BG numbers, assess carb counting/meal planning skills.    Today's care plan and follow up schedule was discussed with patient.  Jin verbalized understanding of the care plan, goals, and agrees to follow up plan.        The patient was encouraged to communicate with his/her health care provider/physician and care team regarding his/her condition(s) and treatment.  I provided the patient with my contact information today and encouraged to contact me via phone or [facility] Patient Portal as needed.     Length of Visit   Total Time: 90 Minutes

## 2022-11-30 ENCOUNTER — TELEPHONE (OUTPATIENT)
Dept: ADMINISTRATIVE | Facility: HOSPITAL | Age: 77
End: 2022-11-30
Payer: MEDICARE

## 2022-11-30 ENCOUNTER — TELEPHONE (OUTPATIENT)
Dept: ENDOCRINOLOGY | Facility: CLINIC | Age: 77
End: 2022-11-30
Payer: MEDICARE

## 2022-11-30 NOTE — TELEPHONE ENCOUNTER
Spoke with patient wife, gave results to wife. Wife verbalized understanding.                  Please call to let him know his urine protein level is normal.  Good result.

## 2022-12-02 ENCOUNTER — TELEPHONE (OUTPATIENT)
Dept: ENDOCRINOLOGY | Facility: HOSPITAL | Age: 77
End: 2022-12-02
Payer: MEDICARE

## 2022-12-02 NOTE — TELEPHONE ENCOUNTER
I spoke with Mr. Cooper wife on the phone and notified her that labs have returned for A1c.  She will update him as well but he has shown improvement and recently urine protein labs have been stable.  She asked about arranging a visit with Ester Mitchell NP who he used to follow with in the clinic.  She states that she will call the office in the coming week to scheduled a follow up visit since Ester is back in the clinic and becoming available to see patients.  No other questions from her.

## 2023-01-06 ENCOUNTER — OFFICE VISIT (OUTPATIENT)
Dept: PODIATRY | Facility: CLINIC | Age: 78
End: 2023-01-06
Payer: MEDICARE

## 2023-01-06 VITALS
HEIGHT: 69 IN | BODY MASS INDEX: 27.33 KG/M2 | SYSTOLIC BLOOD PRESSURE: 125 MMHG | WEIGHT: 184.5 LBS | DIASTOLIC BLOOD PRESSURE: 74 MMHG

## 2023-01-06 DIAGNOSIS — Z79.4 TYPE 2 DIABETES MELLITUS WITH CHRONIC KIDNEY DISEASE, WITH LONG-TERM CURRENT USE OF INSULIN, UNSPECIFIED CKD STAGE: Primary | ICD-10-CM

## 2023-01-06 DIAGNOSIS — E11.22 TYPE 2 DIABETES MELLITUS WITH CHRONIC KIDNEY DISEASE, WITH LONG-TERM CURRENT USE OF INSULIN, UNSPECIFIED CKD STAGE: Primary | ICD-10-CM

## 2023-01-06 DIAGNOSIS — B35.1 ONYCHOMYCOSIS DUE TO DERMATOPHYTE: ICD-10-CM

## 2023-01-06 PROCEDURE — 3074F SYST BP LT 130 MM HG: CPT | Mod: HCNC,CPTII,S$GLB, | Performed by: PODIATRIST

## 2023-01-06 PROCEDURE — 3074F PR MOST RECENT SYSTOLIC BLOOD PRESSURE < 130 MM HG: ICD-10-PCS | Mod: HCNC,CPTII,S$GLB, | Performed by: PODIATRIST

## 2023-01-06 PROCEDURE — 1160F PR REVIEW ALL MEDS BY PRESCRIBER/CLIN PHARMACIST DOCUMENTED: ICD-10-PCS | Mod: HCNC,CPTII,S$GLB, | Performed by: PODIATRIST

## 2023-01-06 PROCEDURE — 99999 PR PBB SHADOW E&M-EST. PATIENT-LVL III: ICD-10-PCS | Mod: PBBFAC,HCNC,, | Performed by: PODIATRIST

## 2023-01-06 PROCEDURE — 99499 RISK ADDL DX/OHS AUDIT: ICD-10-PCS | Mod: HCNC,S$GLB,, | Performed by: PODIATRIST

## 2023-01-06 PROCEDURE — 1126F AMNT PAIN NOTED NONE PRSNT: CPT | Mod: HCNC,CPTII,S$GLB, | Performed by: PODIATRIST

## 2023-01-06 PROCEDURE — 99203 OFFICE O/P NEW LOW 30 MIN: CPT | Mod: HCNC,S$GLB,, | Performed by: PODIATRIST

## 2023-01-06 PROCEDURE — 1159F PR MEDICATION LIST DOCUMENTED IN MEDICAL RECORD: ICD-10-PCS | Mod: HCNC,CPTII,S$GLB, | Performed by: PODIATRIST

## 2023-01-06 PROCEDURE — 3078F PR MOST RECENT DIASTOLIC BLOOD PRESSURE < 80 MM HG: ICD-10-PCS | Mod: HCNC,CPTII,S$GLB, | Performed by: PODIATRIST

## 2023-01-06 PROCEDURE — 1159F MED LIST DOCD IN RCRD: CPT | Mod: HCNC,CPTII,S$GLB, | Performed by: PODIATRIST

## 2023-01-06 PROCEDURE — 99499 UNLISTED E&M SERVICE: CPT | Mod: HCNC,S$GLB,, | Performed by: PODIATRIST

## 2023-01-06 PROCEDURE — 1126F PR PAIN SEVERITY QUANTIFIED, NO PAIN PRESENT: ICD-10-PCS | Mod: HCNC,CPTII,S$GLB, | Performed by: PODIATRIST

## 2023-01-06 PROCEDURE — 3078F DIAST BP <80 MM HG: CPT | Mod: HCNC,CPTII,S$GLB, | Performed by: PODIATRIST

## 2023-01-06 PROCEDURE — 1160F RVW MEDS BY RX/DR IN RCRD: CPT | Mod: HCNC,CPTII,S$GLB, | Performed by: PODIATRIST

## 2023-01-06 PROCEDURE — 99203 PR OFFICE/OUTPT VISIT, NEW, LEVL III, 30-44 MIN: ICD-10-PCS | Mod: HCNC,S$GLB,, | Performed by: PODIATRIST

## 2023-01-06 PROCEDURE — 99999 PR PBB SHADOW E&M-EST. PATIENT-LVL III: CPT | Mod: PBBFAC,HCNC,, | Performed by: PODIATRIST

## 2023-01-06 RX ORDER — CICLOPIROX 80 MG/ML
SOLUTION TOPICAL NIGHTLY
Qty: 6.6 ML | Refills: 3 | Status: SHIPPED | OUTPATIENT
Start: 2023-01-06 | End: 2023-04-24

## 2023-01-06 NOTE — PROGRESS NOTES
[facility] - PODIATRY  [address] [place] [address], [address]  [place] [address]  Dept: [phone]  Dept Fax: [phone]    Norman Andrews Jr., SONAL     Assessment:   MDM    Coding  1. Type 2 diabetes mellitus with chronic kidney disease, with long-term current use of insulin, unspecified CKD stage  Ambulatory referral/consult to Podiatry    Ambulatory referral/consult to Diabetes Education    Foot Exam Performed      2. Onychomycosis due to dermatophyte  ciclopirox (PENLAC) 8 % Soln          Plan:     Procedures  1. Type 2 diabetes mellitus with chronic kidney disease, with long-term current use of insulin, unspecified CKD stage  -     Ambulatory referral/consult to Podiatry  -     Ambulatory referral/consult to Diabetes Education; Future; Expected date: 01/13/2023  -     Foot Exam Performed    2. Onychomycosis due to dermatophyte  -     ciclopirox (PENLAC) 8 % Soln; Apply topically nightly.  Dispense: 6.6 mL; Refill: 3      Jin was seen today for diabetic foot exam.    Diagnoses and all orders for this visit:    Type 2 diabetes mellitus with chronic kidney disease, with long-term current use of insulin, unspecified CKD stage  -     Ambulatory referral/consult to Podiatry  -     Ambulatory referral/consult to Diabetes Education; Future  -     Foot Exam Performed    Onychomycosis due to dermatophyte  -     ciclopirox (PENLAC) 8 % Soln; Apply topically nightly.      ADA Risk Classification: No LOPS,No PAD, No deformity - 0: rtc 12 months    -pt seen, evaluated, and managed  -dx discussed in detail. All questions/concerns addressed  -all tx options discussed. All alternatives, risks, benefits of all txs discussed  -the patient was educated about the diagnosis and discussed reducing caloric intake, increase physical activity  -labs reviewed by me: a1c of 6.1. recs as below  -Shoe inspection. Diabetic Foot Education. Patient reminded of the importance of good nutrition and blood sugar control to help  prevent podiatric complications of diabetes. Patient instructed on proper foot hygeine. We discussed wearing proper shoe gear, daily foot inspections, never walking without protective shoe gear, never putting sharp instruments to feet.  -rxs dispensed: penlac  -referrals: dm education  -WB: wbat    Follow up in about 1 year (around 1/6/2024).    Subjective:      Patient ID: Jin Ngo is a 77 y.o. male.    Chief Complaint:   Chief Complaint   Patient presents with    Diabetic Foot Exam       Jin Ngo presents to the clinic upon referral from Dr. Richard  for evaluation and treatment of diabetic feet. Patient relates no major problem with feet. Only complaints today consist of needing dm foot exam.      HPI    Last Podiatry Enc: Visit date not found  Last Enc w/ Me: Visit date not found    Outside reports reviewed: historical medical records.  Family hx: as below  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    Coronary artery disease     Encounter for blood transfusion     Hyperlipidemia     Hypertension     Localized osteoporosis with current pathological fracture with delayed healing 6/9/2022    Myocardial infarction     Obesity     Other chronic pulmonary heart diseases     Paroxysmal atrial fibrillation 11/9/2020    Primary osteoarthritis involving multiple joints 3/8/2017    Stroke     Type 2 diabetes mellitus without complication     Uncontrolled type 2 diabetes mellitus with neurologic complication, without long-term current use of insulin 3/8/2017     Past Surgical History:   Procedure Laterality Date    CARDIAC SURGERY      CATARACT EXTRACTION, BILATERAL      COLONOSCOPY      2011    COLONOSCOPY N/A 3/22/2016    Procedure: COLONOSCOPY;  Surgeon: Sridhar Reynoso MD;  Location: [facility];  Service: Endoscopy;   "Laterality: N/A;    COLONOSCOPY N/A 12/7/2021    Procedure: Colonoscopy;  Surgeon: Bebeto Perry MD;  Location: [facility] ENDO;  Service: Endoscopy;  Laterality: N/A;    CORONARY ANGIOGRAPHY N/A 8/17/2022    Procedure: ANGIOGRAM, CORONARY ARTERY;  Surgeon: Pasquale Ramesh MD;  Location: [facility] CATH LAB/EP;  Service: Cardiology;  Laterality: N/A;    CORONARY ARTERY BYPASS GRAFT      CORONARY BYPASS GRAFT ANGIOGRAPHY  8/17/2022    Procedure: Bypass graft study;  Surgeon: Pasquale Ramesh MD;  Location: [facility] CATH LAB/EP;  Service: Cardiology;;    EYE SURGERY  06/2014    Cataracts    LEFT HEART CATHETERIZATION Left 8/17/2022    Procedure: Left heart cath;  Surgeon: Pasquale Ramesh MD;  Location: [facility] CATH LAB/EP;  Service: Cardiology;  Laterality: Left;    PERCUTANEOUS TRANSLUMINAL ANGIOPLASTY (PTA) OF CAROTID ARTERY N/A 12/10/2020    Procedure: PTA, ARTERY, CAROTID;  Surgeon: Cheko Norris MD;  Location: [facility] CATH LAB;  Service: Cardiology;  Laterality: N/A;     Family History   Problem Relation Age of Onset    Stroke Mother     Heart disease Mother     Hypertension Mother     Diabetes Mother     Hyperlipidemia Mother     Arthritis Father     Hyperlipidemia Father     Heart disease Father     Hypertension Father     Heart attack Father     Coronary artery disease Father     No Known Problems Sister     Hyperlipidemia Brother     Hypertension Brother     Stroke Brother     Thyroid disease Brother     Hypertension Son      Current Outpatient Medications   Medication Sig Dispense Refill    ACCU-CHEK VERONICA PLUS TEST STRP Strp TEST BLOOD SUGAR FOUR TIMES DAILY 400 strip 3    allopurinoL (ZYLOPRIM) 300 MG tablet TAKE 1 TABLET EVERY DAY 90 tablet 0    atorvastatin (LIPITOR) 40 MG tablet TAKE 1 TABLET EVERY DAY 90 tablet 3    azelastine (ASTELIN) 137 mcg (0.1 %) nasal spray 1 spray (137 mcg total) by Nasal route 2 (two) times daily. 30 mL 11    BD ULTRA-FINE TEJINDER PEN NEEDLE 32 gauge x 5/32" Ndle USE WITH LEVEMIR DAILY 100 " each 3    blood-glucose meter kit To check BG 4 times daily, to use with insurance preferred meter 1 each 0    ciclopirox (PENLAC) 8 % Soln Apply topically nightly. 6.6 mL 3    clopidogreL (PLAVIX) 75 mg tablet Take 1 tablet (75 mg total) by mouth once daily. 30 tablet 11    DULoxetine (CYMBALTA) 30 MG capsule TAKE 1 CAPSULE ONE TIME DAILY TO HELP REDUCE FEELING OF BACK PAIN. 90 capsule 3    ELIQUIS 5 mg Tab TAKE 1 TABLET BY MOUTH TWICE A DAY 60 tablet 11    fenofibrate 160 MG Tab TAKE 1 TABLET EVERY DAY 90 tablet 1    furosemide (LASIX) 40 MG tablet Take 1 tablet (40 mg total) by mouth once daily. 30 tablet 11    glipiZIDE (GLUCOTROL) 5 MG tablet Take 2 tablets (10 mg total) by mouth daily with breakfast AND 1 tablet (5 mg total) daily with dinner or evening meal. Take 7.5 mg with breakfast and 5 mg with dinner. 270 tablet 2    HYDROcodone-acetaminophen (NORCO) 7.5-325 mg per tablet Take 1 tablet by mouth every 24 hours as needed for Pain (take 30 minutes prior to parcipating in physical therapy). 20 tablet 0    insulin detemir U-100 (LEVEMIR FLEXTOUCH U-100 INSULN) 100 unit/mL (3 mL) InPn pen Inject 17 Units into the skin every evening. 15 mL 6    lancets (ACCU-CHEK SOFTCLIX LANCETS) Misc To check BG 4 times daily, to use with insurance preferred meter. 200 each 0    levocetirizine (XYZAL) 5 MG tablet Take 1 tablet (5 mg total) by mouth every evening. 30 tablet 11    metoprolol succinate (TOPROL-XL) 25 MG 24 hr tablet Take 1 tablet (25 mg total) by mouth once daily. 30 tablet 11    predniSONE (DELTASONE) 10 MG tablet Take 1 tablet (10 mg total) by mouth once daily. Take 4 pills PO day 1, then 3 pills PO day 2, then 2 pills PO day 3, then 1 pill PO day 4 10 tablet 0    tamsulosin (FLOMAX) 0.4 mg Cap TAKE 1 CAPSULE BY MOUTH ONCE DAILY FOR URINE FLOW 90 capsule 3     No current facility-administered medications for this visit.     Review of patient's allergies indicates:   Allergen Reactions    Metformin      Caused  stroke    Pcn [penicillins]      Social History     Socioeconomic History    Marital status:    Tobacco Use    Smoking status: Former     Packs/day: 3.00     Years: 15.00     Pack years: 45.00     Types: Cigarettes     Quit date: 1972     Years since quittin.0    Smokeless tobacco: Never   Substance and Sexual Activity    Alcohol use: Yes     Alcohol/week: 1.0 standard drink     Types: 1 Shots of liquor per week     Comment: 2-3 drinks per year    Drug use: No    Sexual activity: Not Currently     Partners: Female     Social Determinants of Health     Financial Resource Strain: Low Risk     Difficulty of Paying Living Expenses: Not very hard   Food Insecurity: No Food Insecurity    Worried About Running Out of Food in the Last Year: Never true    Ran Out of Food in the Last Year: Never true   Transportation Needs: No Transportation Needs    Lack of Transportation (Medical): No    Lack of Transportation (Non-Medical): No   Physical Activity: Sufficiently Active    Days of Exercise per Week: 3 days    Minutes of Exercise per Session: 60 min   Stress: No Stress Concern Present    Feeling of Stress : Not at all   Social Connections: Moderately Isolated    Frequency of Communication with Friends and Family: More than three times a week    Frequency of Social Gatherings with Friends and Family: More than three times a week    Attends Mandaeism Services: Never    Active Member of Clubs or Organizations: No    Attends Club or Organization Meetings: Never    Marital Status:    Housing Stability: Low Risk     Unable to Pay for Housing in the Last Year: No    Number of Places Lived in the Last Year: 1    Unstable Housing in the Last Year: No       ROS    REVIEW OF SYSTEMS: Negative as documented below as well as positive findings in bold.       Constitutional  Respiratory  Gastrointestinal  Skin   - Fever - Cough - Heartburn - Rash   - Chills - Spit blood - Nausea - Itching   - Weight Loss - Shortness of  "breath - Vomiting - Nail pain   - Malaise/Fatigue - Wheezing - Abdominal Pain  Wound/Ulcer   - Weight Gain   - Blood in Stool  Poor wound healing       - Diarrhea          Cardiovascular  Genitourinary  Neurological  HEENT   - Chest Pain - Dysuria - Burning Sensation of feet - Headache   - Palpitations - Hematuria - Tingling / Paresthesia - Congestion   - Pain at night in legs - Flank Pain - Dizziness - Sore Throat   - Cramping   - Tremor - Blurred Vision   - Leg Swelling   - Sensory Change - Double Vision   - Dizzy when standing   - Speech Change - Eye Redness       - Focal Weakness - Dry Eyes       - Loss of Consciousness          Endocrine  Musculoskeletal  Psychiatric   - Cold intolerance - Muscle Pain - Depression   - Heat intolerance - Neck Pain - Insomnia   - Anemia - Joint Pain - Memory Loss   -  Easy bruising, bleeding - Heel pain - Anxiety      Toe Pain        Leg/Ankle/Foot Pain         Objective:     /74   Ht 5' 9" (1.753 m)   Wt 83.7 kg (184 lb 8.4 oz)   BMI 27.25 kg/m²   Vitals:    01/06/23 1102   BP: 125/74   Weight: 83.7 kg (184 lb 8.4 oz)   Height: 5' 9" (1.753 m)   PainSc: 0-No pain       Physical Exam    General Appearance:   Patient appears well developed, well nourished  Patient appears stated age    Psychiatric:   Patient is oriented to time, place, and person.  Patient has appropriate mood and affect    Neck:  Trachea Midline  No visible masses    Respiratory/Ears:  No distress or labored breathing.  Able to differentiate between normal talking voice and whisper.  Able to follow commands    Eyes:  Visual Acuity intact  Lids and conjunctivae normal. No discoloration noted.    Physical Exam  Vitals and nursing note reviewed.   Feet:      Right foot:      Protective Sensation: 10 sites tested.  10 sites sensed.      Left foot:      Protective Sensation: 10 sites tested.  10 sites sensed.   Psychiatric:         Thought Content: Thought content normal.         Judgment: Judgment normal. "     Ortho Exam  General    Nursing note and vitals reviewed.  Psychiatric: Judgment and thought content normal.           Foot Exam  Foot/Ankle Musculoskeletal Exam    B/l LE exam con't:  V:  DP 2/4, PT 2/4   CRT< 3s to all digits tested   Tibial and popliteal lymph nodes are w/o abnormality    N:  Patient displays normal ankle reflexes   sensory deficit present    Ortho: +Motor EHL/FHL/TA/GA   no TTP of foot or ankles   Compartments soft/compressible. No pain on passive stretch of big toe. No calf  Pain.    Derm:  skin intact, skin warm and dry, skin without ulcers or lesions, skin without induration, nails abnormal, nails discolored    Imaging / Labs:    Hemoglobin A1C   Date Value Ref Range Status   11/29/2022 6.1 (H) 4.0 - 5.6 % Final     Comment:     ADA Screening Guidelines:  5.7-6.4%  Consistent with prediabetes  >or=6.5%  Consistent with diabetes    High levels of fetal hemoglobin interfere with the HbA1C  assay. Heterozygous hemoglobin variants (HbS, HgC, etc)do  not significantly interfere with this assay.   However, presence of multiple variants may affect accuracy.     08/17/2022 7.4 (H) 4.0 - 5.6 % Final     Comment:     ADA Screening Guidelines:  5.7-6.4%  Consistent with prediabetes  >or=6.5%  Consistent with diabetes    High levels of fetal hemoglobin interfere with the HbA1C  assay. Heterozygous hemoglobin variants (HbS, HgC, etc)do  not significantly interfere with this assay.   However, presence of multiple variants may affect accuracy.     07/20/2022 8.8 (H) 4.0 - 5.6 % Final     Comment:     ADA Screening Guidelines:  5.7-6.4%  Consistent with prediabetes  >or=6.5%  Consistent with diabetes    High levels of fetal hemoglobin interfere with the HbA1C  assay. Heterozygous hemoglobin variants (HbS, HgC, etc)do  not significantly interfere with this assay.   However, presence of multiple variants may affect accuracy.         No results found.      Note: This was dictated using a computer  transcription program. Although proofread, it may contain computer transcription errors and phonetic errors. Other human proofreading errors may also exist. Corrections may be performed at a later time. Please contact us for any clarification if needed.    Norman Andrews DPM  [facility]

## 2023-01-20 ENCOUNTER — OFFICE VISIT (OUTPATIENT)
Dept: FAMILY MEDICINE | Facility: CLINIC | Age: 78
End: 2023-01-20
Payer: MEDICARE

## 2023-01-20 VITALS
HEART RATE: 52 BPM | DIASTOLIC BLOOD PRESSURE: 76 MMHG | TEMPERATURE: 98 F | OXYGEN SATURATION: 98 % | SYSTOLIC BLOOD PRESSURE: 124 MMHG | BODY MASS INDEX: 27.85 KG/M2 | WEIGHT: 188.06 LBS | HEIGHT: 69 IN

## 2023-01-20 DIAGNOSIS — N18.30 CKD STAGE 3 DUE TO TYPE 2 DIABETES MELLITUS: ICD-10-CM

## 2023-01-20 DIAGNOSIS — E11.22 CKD STAGE 3 DUE TO TYPE 2 DIABETES MELLITUS: ICD-10-CM

## 2023-01-20 DIAGNOSIS — I70.0 ABDOMINAL AORTIC ATHEROSCLEROSIS: ICD-10-CM

## 2023-01-20 DIAGNOSIS — I25.119 ATHEROSCLEROSIS OF NATIVE CORONARY ARTERY OF NATIVE HEART WITH ANGINA PECTORIS: ICD-10-CM

## 2023-01-20 DIAGNOSIS — D69.2 SENILE PURPURA: Primary | ICD-10-CM

## 2023-01-20 DIAGNOSIS — Z79.4 TYPE 2 DIABETES MELLITUS WITHOUT COMPLICATION, WITH LONG-TERM CURRENT USE OF INSULIN: ICD-10-CM

## 2023-01-20 DIAGNOSIS — E11.9 TYPE 2 DIABETES MELLITUS WITHOUT COMPLICATION, WITH LONG-TERM CURRENT USE OF INSULIN: ICD-10-CM

## 2023-01-20 PROCEDURE — 1159F MED LIST DOCD IN RCRD: CPT | Mod: CPTII,S$GLB,, | Performed by: FAMILY MEDICINE

## 2023-01-20 PROCEDURE — 3288F FALL RISK ASSESSMENT DOCD: CPT | Mod: CPTII,S$GLB,, | Performed by: FAMILY MEDICINE

## 2023-01-20 PROCEDURE — 1160F RVW MEDS BY RX/DR IN RCRD: CPT | Mod: CPTII,S$GLB,, | Performed by: FAMILY MEDICINE

## 2023-01-20 PROCEDURE — 1126F PR PAIN SEVERITY QUANTIFIED, NO PAIN PRESENT: ICD-10-PCS | Mod: CPTII,S$GLB,, | Performed by: FAMILY MEDICINE

## 2023-01-20 PROCEDURE — 3074F SYST BP LT 130 MM HG: CPT | Mod: CPTII,S$GLB,, | Performed by: FAMILY MEDICINE

## 2023-01-20 PROCEDURE — 99214 OFFICE O/P EST MOD 30 MIN: CPT | Mod: S$GLB,,, | Performed by: FAMILY MEDICINE

## 2023-01-20 PROCEDURE — 3288F PR FALLS RISK ASSESSMENT DOCUMENTED: ICD-10-PCS | Mod: CPTII,S$GLB,, | Performed by: FAMILY MEDICINE

## 2023-01-20 PROCEDURE — 1101F PR PT FALLS ASSESS DOC 0-1 FALLS W/OUT INJ PAST YR: ICD-10-PCS | Mod: CPTII,S$GLB,, | Performed by: FAMILY MEDICINE

## 2023-01-20 PROCEDURE — 1126F AMNT PAIN NOTED NONE PRSNT: CPT | Mod: CPTII,S$GLB,, | Performed by: FAMILY MEDICINE

## 2023-01-20 PROCEDURE — 1160F PR REVIEW ALL MEDS BY PRESCRIBER/CLIN PHARMACIST DOCUMENTED: ICD-10-PCS | Mod: CPTII,S$GLB,, | Performed by: FAMILY MEDICINE

## 2023-01-20 PROCEDURE — 99214 PR OFFICE/OUTPT VISIT, EST, LEVL IV, 30-39 MIN: ICD-10-PCS | Mod: S$GLB,,, | Performed by: FAMILY MEDICINE

## 2023-01-20 PROCEDURE — 3078F DIAST BP <80 MM HG: CPT | Mod: CPTII,S$GLB,, | Performed by: FAMILY MEDICINE

## 2023-01-20 PROCEDURE — 99499 RISK ADDL DX/OHS AUDIT: ICD-10-PCS | Mod: S$GLB,,, | Performed by: FAMILY MEDICINE

## 2023-01-20 PROCEDURE — 1159F PR MEDICATION LIST DOCUMENTED IN MEDICAL RECORD: ICD-10-PCS | Mod: CPTII,S$GLB,, | Performed by: FAMILY MEDICINE

## 2023-01-20 PROCEDURE — 3078F PR MOST RECENT DIASTOLIC BLOOD PRESSURE < 80 MM HG: ICD-10-PCS | Mod: CPTII,S$GLB,, | Performed by: FAMILY MEDICINE

## 2023-01-20 PROCEDURE — 99499 UNLISTED E&M SERVICE: CPT | Mod: S$GLB,,, | Performed by: FAMILY MEDICINE

## 2023-01-20 PROCEDURE — 3074F PR MOST RECENT SYSTOLIC BLOOD PRESSURE < 130 MM HG: ICD-10-PCS | Mod: CPTII,S$GLB,, | Performed by: FAMILY MEDICINE

## 2023-01-20 PROCEDURE — 1101F PT FALLS ASSESS-DOCD LE1/YR: CPT | Mod: CPTII,S$GLB,, | Performed by: FAMILY MEDICINE

## 2023-01-20 RX ORDER — PEN NEEDLE, DIABETIC 31 GX5/16"
NEEDLE, DISPOSABLE MISCELLANEOUS
Qty: 100 EACH | Refills: 3 | Status: SHIPPED | OUTPATIENT
Start: 2023-01-20

## 2023-01-29 PROBLEM — D69.2 SENILE PURPURA: Status: ACTIVE | Noted: 2023-01-29

## 2023-01-30 NOTE — PROGRESS NOTES
" Patient ID: Jin Ngo is a 77 y.o. male.    Chief Complaint: Follow-up    HPI      Jin Ngo is a 77 y.o. male here for follow-up.  Patient follow-up for diabetes mellitus which is under control.  Has been followed more closely recently for cardiac rehabilitation post myocardial infarction.  Completed therapy doing very well.  Physical stamina has increased.  Chronic kidney disease-no new problems.    Pulmonary hypertension-stable.    Vitals:    01/20/23 0925   BP: 124/76   BP Location: Left arm   Patient Position: Sitting   Pulse: (!) 52   Temp: 97.6 °F (36.4 °C)   TempSrc: Oral   SpO2: 98%   Weight: 85.3 kg (188 lb 0.8 oz)   Height: 5' 9" (1.753 m)            Review of Symptoms      Physical Exam    Constitutional:  Oriented to person, place, and time.appears well-developed and well-nourished.  No distress.      HENT  Head: Normocephalic and atraumatic  Right Ear: External ear normal.   Left Ear: External ear normal.   Nose: External nose normal.   Mouth:  Moist mucus membranes.    Eyes:  Conjunctivae are normal. Right eye exhibits no discharge.  Left eye exhibits no discharge. No scleral icterus.  No periorbital edema    Cardiovascular:  Regular rate and rhythm with normal S1 and S2     Pulmonary/Chest:   Clear to auscultation bilaterally without wheezes, rhonchi or rales      Musculoskeletal:  No edema. No obvious deformity No wasting       Neurological:  Alert and oriented to person, place, and time.   Coordination normal.     Skin:   Skin is warm and dry.  No diaphoresis.   No rash noted.     Psychiatric: Normal mood and affect. Behavior is normal.  Judgment and thought content normal.     Complete Blood Count  Lab Results   Component Value Date    RBC 4.77 09/06/2022    HGB 14.0 09/06/2022    HCT 43.9 09/06/2022    MCV 92 09/06/2022    MCH 29.4 09/06/2022    MCHC 31.9 (L) 09/06/2022    RDW 14.3 09/06/2022     09/06/2022    MPV 10.4 09/06/2022    GRAN 2.3 09/06/2022    GRAN 55.5 09/06/2022 " "   LYMPH 1.1 09/06/2022    LYMPH 27.2 09/06/2022    MONO 0.5 09/06/2022    MONO 11.2 09/06/2022    EOS 0.2 09/06/2022    BASO 0.06 09/06/2022    EOSINOPHIL 4.5 09/06/2022    BASOPHIL 1.4 09/06/2022    DIFFMETHOD Automated 09/06/2022       Comprehensive Metabolic Panel  Lab Results   Component Value Date     (H) 09/06/2022    BUN 38 (H) 09/06/2022    CREATININE 1.93 (H) 09/06/2022     09/06/2022    K 4.1 09/06/2022     09/06/2022    PROT 7.6 09/06/2022    ALBUMIN 4.5 09/06/2022    BILITOT 0.6 09/06/2022    AST 42 09/06/2022    ALKPHOS 41 09/06/2022    CO2 30 (H) 09/06/2022    ALT 31 09/06/2022    ANIONGAP 10 09/06/2022       TSH  No results found for: TSH    Assessment / Plan:      ICD-10-CM ICD-9-CM   1. Senile purpura  D69.2 287.2   2. Type 2 diabetes mellitus without complication, with long-term current use of insulin  E11.9 250.00    Z79.4 V58.67   3. Abdominal aortic atherosclerosis  I70.0 440.0   4. CKD stage 3 due to type 2 diabetes mellitus  E11.22 250.40    N18.30 585.3   5. Atherosclerosis of native coronary artery of native heart with angina pectoris  I25.119 414.01     413.9     Senile purpura    Type 2 diabetes mellitus without complication, with long-term current use of insulin    Abdominal aortic atherosclerosis    CKD stage 3 due to type 2 diabetes mellitus    Atherosclerosis of native coronary artery of native heart with angina pectoris    Other orders  -     BD ULTRA-FINE TEJINDER PEN NEEDLE 32 gauge x 5/32" Ndle; USE WITH LEVEMIR DAILYUSE WITH LEVEMIR DAILY  Dispense: 100 each; Refill: 3    Of disease is stable-continue current medication-continue working out and doing exercises at home.  "

## 2023-03-21 RX ORDER — LANCETS
EACH MISCELLANEOUS
Qty: 200 EACH | Refills: 3 | Status: SHIPPED | OUTPATIENT
Start: 2023-03-21

## 2023-03-21 NOTE — TELEPHONE ENCOUNTER
No new care gaps identified.  [facility] Embedded Care Gaps. Reference number: [id number]. 3/21/2023   12:40:14 PM CDT

## 2023-03-21 NOTE — TELEPHONE ENCOUNTER
----- Message from Nirav Mckinnon sent at 3/21/2023 11:08 AM CDT -----  Contact: pt's wife  Type: Requesting  refills        Who Called: PT's wife   Regarding: lancets (ACCU-CHEK SOFTCLIX LANCETS) Misc and ACCU-CHEK VERONICA PLUS TEST STRP Strp  Would the patient rather a call back or a response via [facility]? Call back  Best Call Back Number: [phone]  Pharmacy Information:  [facility] - [place], [place] - [address]   Phone: [phone]  Fax:  [phone]

## 2023-04-19 RX ORDER — FENOFIBRATE 160 MG/1
TABLET ORAL
Qty: 90 TABLET | Refills: 1 | Status: SHIPPED | OUTPATIENT
Start: 2023-04-19

## 2023-04-19 NOTE — TELEPHONE ENCOUNTER
Care Due:                  Date            Visit Type   Department     Provider  --------------------------------------------------------------------------------                                EP -                              PRIMARY      [facility]  Last Visit: 01-      CARE ([facility])   MEDICINE       Sam Barroso                               -                              PRIMARY      [facility]  Next Visit: 07-      CARE ([facility])   MEDICINE       Sam Barroso                                                            Last  Test          Frequency    Reason                     Performed    Due Date  --------------------------------------------------------------------------------    Uric Acid...  12 months..  allopurinoL..............  06- 06-    [facility] Embedded Care Gaps. Reference number: [id number]. 4/19/2023   12:11:36 PM CDT

## 2023-04-19 NOTE — TELEPHONE ENCOUNTER
Refill Routing Note   Medication(s) are not appropriate for processing by [facility] for the following reason(s):      Required labs abnormal: CrCl 35 ml/min    [facility] action(s):  Defer Labs due   Medication Therapy Plan: Patient may benefit from dose adjustment due to renal impairment: lowest available tablet strength.      Appointments  past 12m or future 3m with PCP    Date Provider   Last Visit   1/20/2023 Sam Barroso MD   Next Visit   7/21/2023 Sam Barroso MD   ED visits in past 90 days: 0        Note composed:1:27 PM 04/19/2023

## 2023-04-27 ENCOUNTER — PES CALL (OUTPATIENT)
Dept: ADMINISTRATIVE | Facility: CLINIC | Age: 78
End: 2023-04-27
Payer: MEDICARE

## 2023-04-27 PROBLEM — I12.9 TYPE 2 DIABETES MELLITUS WITH STAGE 3B CHRONIC KIDNEY DISEASE AND HYPERTENSION: Status: ACTIVE | Noted: 2021-03-22

## 2023-04-27 PROBLEM — E11.22 TYPE 2 DIABETES MELLITUS WITH CHRONIC KIDNEY DISEASE, WITH LONG-TERM CURRENT USE OF INSULIN: Status: RESOLVED | Noted: 2022-08-12 | Resolved: 2023-04-27

## 2023-04-27 PROBLEM — E11.40 CONTROLLED TYPE 2 DIABETES MELLITUS WITH NEUROPATHY: Status: ACTIVE | Noted: 2017-03-08

## 2023-04-27 PROBLEM — E78.5 TYPE 2 DIABETES MELLITUS WITH HYPERLIPIDEMIA: Status: ACTIVE | Noted: 2023-04-27

## 2023-04-27 PROBLEM — Z79.4 TYPE 2 DIABETES MELLITUS WITH CHRONIC KIDNEY DISEASE, WITH LONG-TERM CURRENT USE OF INSULIN: Status: RESOLVED | Noted: 2022-08-12 | Resolved: 2023-04-27

## 2023-04-27 PROBLEM — E11.59 HYPERTENSION ASSOCIATED WITH TYPE 2 DIABETES MELLITUS: Status: RESOLVED | Noted: 2022-08-12 | Resolved: 2023-04-27

## 2023-04-27 PROBLEM — E11.69 TYPE 2 DIABETES MELLITUS WITH HYPERLIPIDEMIA: Status: ACTIVE | Noted: 2023-04-27

## 2023-04-27 PROBLEM — I15.2 HYPERTENSION ASSOCIATED WITH TYPE 2 DIABETES MELLITUS: Status: RESOLVED | Noted: 2022-08-12 | Resolved: 2023-04-27

## 2023-04-27 PROBLEM — N18.32 TYPE 2 DIABETES MELLITUS WITH STAGE 3B CHRONIC KIDNEY DISEASE AND HYPERTENSION: Status: ACTIVE | Noted: 2021-03-22

## 2023-04-27 NOTE — PROGRESS NOTES
"  Jin Ngo presented for a  Medicare AWV and comprehensive Health Risk Assessment today. The following components were reviewed and updated:    Medical history  Family History  Social history  Allergies and Current Medications  Health Risk Assessment  Health Maintenance  Care Team         ** See Completed Assessments for Annual Wellness Visit within the encounter summary.**         The following assessments were completed:  Living Situation  CAGE  Depression Screening  Timed Get Up and Go  Whisper Test  Cognitive Function Screening        Nutrition Screening  ADL Screening  PAQ Screening      Review for Opioid Screening: Pt does not have Rx for Opioids      Review for Substance Use Disorders: Patient does not use substance        Current Outpatient Medications:     allopurinoL (ZYLOPRIM) 300 MG tablet, TAKE 1 TABLET EVERY DAY, Disp: 90 tablet, Rfl: 3    atorvastatin (LIPITOR) 40 MG tablet, TAKE 1 TABLET EVERY DAY, Disp: 90 tablet, Rfl: 3    azelastine (ASTELIN) 137 mcg (0.1 %) nasal spray, 1 spray (137 mcg total) by Nasal route 2 (two) times daily., Disp: 30 mL, Rfl: 11    BD ULTRA-FINE TEJINDER PEN NEEDLE 32 gauge x 5/32" Ndle, USE WITH LEVEMIR DAILYUSE WITH LEVEMIR DAILY, Disp: 100 each, Rfl: 3    blood sugar diagnostic (ACCU-CHEK VERONICA PLUS TEST STRP) Strp, To check blood glucose 4 times daily, Disp: 100 strip, Rfl: 0    blood-glucose meter (ACCU-CHEK VERONICA PLUS METER) Misc, To check blood glucose 4 times daily, Disp: 1 each, Rfl: 0    blood-glucose meter kit, To check BG 4 times daily, to use with insurance preferred meter, Disp: 1 each, Rfl: 0    ciclopirox (PENLAC) 8 % Soln, APPLY TOPICALLY NIGHTLY AS DIRECTED, Disp: 1 each, Rfl: 6    clopidogreL (PLAVIX) 75 mg tablet, Take 1 tablet (75 mg total) by mouth once daily., Disp: 30 tablet, Rfl: 11    DULoxetine (CYMBALTA) 30 MG capsule, TAKE 1 CAPSULE ONE TIME DAILY TO HELP REDUCE FEELING OF BACK PAIN., Disp: 90 capsule, Rfl: 3    ELIQUIS 5 mg Tab, TAKE 1 TABLET " "BY MOUTH TWICE A DAY, Disp: 60 tablet, Rfl: 11    fenofibrate 160 MG Tab, TAKE 1 TABLET EVERY DAY, Disp: 90 tablet, Rfl: 1    furosemide (LASIX) 40 MG tablet, Take 1 tablet (40 mg total) by mouth once daily., Disp: 30 tablet, Rfl: 11    glipiZIDE (GLUCOTROL) 5 MG tablet, Take 2 tablets (10 mg total) by mouth daily with breakfast AND 1 tablet (5 mg total) daily with dinner or evening meal. Take 7.5 mg with breakfast and 5 mg with dinner., Disp: 270 tablet, Rfl: 2    insulin detemir U-100 (LEVEMIR FLEXTOUCH U-100 INSULN) 100 unit/mL (3 mL) InPn pen, Inject 17 Units into the skin every evening. (Patient taking differently: Inject 17 Units into the skin once daily.), Disp: 15 mL, Rfl: 6    lancets (ACCU-CHEK SOFTCLIX LANCETS) Misc, To check BG 4 times daily, to use with insurance preferred meter., Disp: 200 each, Rfl: 3    levocetirizine (XYZAL) 5 MG tablet, Take 1 tablet (5 mg total) by mouth every evening., Disp: 30 tablet, Rfl: 11    metoprolol succinate (TOPROL-XL) 25 MG 24 hr tablet, Take 1 tablet (25 mg total) by mouth once daily., Disp: 30 tablet, Rfl: 11    tamsulosin (FLOMAX) 0.4 mg Cap, TAKE 1 CAPSULE BY MOUTH ONCE DAILY FOR URINE FLOW, Disp: 90 capsule, Rfl: 3       Vitals:    05/01/23 1300   BP: 128/60   Pulse: (!) 53   Weight: 87.3 kg (192 lb 7.4 oz)   Height: 5' 9" (1.753 m)   PainSc: 0-No pain      Physical Exam  Vitals and nursing note reviewed.   Constitutional:       General: He is not in acute distress.     Appearance: Normal appearance. He is not ill-appearing.   HENT:      Head: Normocephalic and atraumatic.   Eyes:      General: No scleral icterus.        Right eye: No discharge.         Left eye: No discharge.      Extraocular Movements: Extraocular movements intact.      Conjunctiva/sclera: Conjunctivae normal.   Cardiovascular:      Rate and Rhythm: Regular rhythm. Bradycardia present.      Heart sounds: Normal heart sounds. No murmur heard.  Pulmonary:      Effort: Pulmonary effort is normal. " No respiratory distress.      Breath sounds: Normal breath sounds. No wheezing, rhonchi or rales.   Musculoskeletal:      Cervical back: Normal range of motion.      Right lower leg: No edema.      Left lower leg: No edema.   Skin:     General: Skin is warm and dry.      Findings: Bruising (arms) present. No rash.      Comments: Bilateral hands with papular rash most concentrated to fingers; no rash to arms ; no open wounds    Neurological:      Mental Status: He is alert and oriented to person, place, and time.      Comments: Left hand tremor   Psychiatric:         Mood and Affect: Mood normal.         Behavior: Behavior normal. Behavior is cooperative.         Cognition and Memory: Cognition and memory normal.             Diagnoses and health risks identified today and associated recommendations/orders:    1. Encounter for preventive health examination  - Chart reviewed. Problem list updated. Discussed current medical diagnosis, current medications, medical/surgical/family/social history; updated provider list; documented vital signs; identified any cognitive impairment; and updated risk factor list. Addressed any outstanding health maintenance. Provided patient with personalized health advice. Continue to follow up with PCP and any specialists.     2. Pulmonary HTN  Chronic; stable on current treatment plan; follow up with PCP  - follow up with cardiology     3. Atherosclerosis of native coronary artery of native heart with angina pectoris  Chronic; stable on current treatment plan; follow up with PCP  - follow up with cardiology   - taking statin; on plavix for recent stent     4. Abdominal aortic atherosclerosis  Chronic; stable on current treatment plan; follow up with PCP  - follow up with cardiology   - taking statin     5. Long-term insulin use  Chronic; stable on current treatment plan; follow up with PCP      6. Paroxysmal atrial fibrillation  Chronic; stable on current treatment plan; follow up with  PCP  - follow up with cardiology   - taking eliquis and metoprolol     7. Acute combined systolic and diastolic congestive heart failure  Chronic; stable on current treatment plan; follow up with PCP  - follow up with cardiology   - taking metoprolol and lasix     8. Senile purpura  Chronic; stable on current treatment plan; follow up with PCP    9. Carotid artery disease, unspecified laterality  Chronic; stable on current treatment plan; follow up with PCP  - follow up with cardiology     10. 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 Type 2 diabetes mellitus with hyperlipidemia  Chronic; stable on current treatment plan; follow up with PCP  - follow up with endocrine   - taking statin and fenofibrate     13. 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 Localized osteoporosis with current pathological fracture with delayed healing  Chronic; stable on current treatment plan; follow up with PCP    15. Idiopathic gout, unspecified chronicity, unspecified site  Chronic; stable on current treatment plan; follow up with PCP and endocrine   - taking allopurinol     16. BMI 28.0-28.9,adult  - Recommendation for healthy diet and increasing exercise as tolerated with goal of 150min/week . Recommend weight loss    17. Hand dermatitis  Bilateral hands with papular rash most concentrated to fingers; no rash to arms ; no open wounds   - denies itching or pain to site  - trial hydrocortisone cream; try to determine causative agent, follow up with pcp if no improvement       Provided Jin  with a 5-10 year written screening schedule and personal prevention plan. Recommendations were developed using the USPSTF age appropriate recommendations. Education, counseling, and referrals were provided as needed. After Visit Summary printed and given to patient which includes a list of additional screenings\tests needed.    Follow up in about 1 year (around 5/1/2024) for your next annual wellness visit.    Michelle Maurice, FNP-C    Advance Care Planning     I offered to discuss advanced care planning, including how to pick a person who would make decisions for you if you were unable to make them for yourself, called a health care power of , and what kind of decisions you might make such as use of life sustaining treatments such as ventilators and tube feeding when faced with a life limiting illness recorded on a living will that they will need to know. (How you want to be cared for as you near the end of your natural life)     X Patient is interested in learning more about how to make advanced directives.  I provided them paperwork and offered to discuss this with them.

## 2023-05-01 ENCOUNTER — OFFICE VISIT (OUTPATIENT)
Dept: INTERNAL MEDICINE | Facility: CLINIC | Age: 78
End: 2023-05-01
Payer: MEDICARE

## 2023-05-01 VITALS
HEART RATE: 53 BPM | SYSTOLIC BLOOD PRESSURE: 128 MMHG | DIASTOLIC BLOOD PRESSURE: 60 MMHG | HEIGHT: 69 IN | BODY MASS INDEX: 28.5 KG/M2 | WEIGHT: 192.44 LBS

## 2023-05-01 DIAGNOSIS — M80.80XG LOCALIZED OSTEOPOROSIS WITH CURRENT PATHOLOGICAL FRACTURE WITH DELAYED HEALING: ICD-10-CM

## 2023-05-01 DIAGNOSIS — L30.9 HAND DERMATITIS: ICD-10-CM

## 2023-05-01 DIAGNOSIS — E11.22 TYPE 2 DIABETES MELLITUS WITH STAGE 3B CHRONIC KIDNEY DISEASE AND HYPERTENSION: ICD-10-CM

## 2023-05-01 DIAGNOSIS — I70.0 ABDOMINAL AORTIC ATHEROSCLEROSIS: ICD-10-CM

## 2023-05-01 DIAGNOSIS — M53.87 SCIATICA OF RIGHT SIDE ASSOCIATED WITH DISORDER OF LUMBOSACRAL SPINE: ICD-10-CM

## 2023-05-01 DIAGNOSIS — E78.5 TYPE 2 DIABETES MELLITUS WITH HYPERLIPIDEMIA: ICD-10-CM

## 2023-05-01 DIAGNOSIS — I27.20 PULMONARY HTN: ICD-10-CM

## 2023-05-01 DIAGNOSIS — I12.9 TYPE 2 DIABETES MELLITUS WITH STAGE 3B CHRONIC KIDNEY DISEASE AND HYPERTENSION: ICD-10-CM

## 2023-05-01 DIAGNOSIS — I77.9 CAROTID ARTERY DISEASE, UNSPECIFIED LATERALITY: ICD-10-CM

## 2023-05-01 DIAGNOSIS — D69.2 SENILE PURPURA: ICD-10-CM

## 2023-05-01 DIAGNOSIS — E11.40 CONTROLLED TYPE 2 DIABETES MELLITUS WITH NEUROPATHY: ICD-10-CM

## 2023-05-01 DIAGNOSIS — Z00.00 ENCOUNTER FOR PREVENTIVE HEALTH EXAMINATION: Primary | ICD-10-CM

## 2023-05-01 DIAGNOSIS — Z79.4 LONG-TERM INSULIN USE: ICD-10-CM

## 2023-05-01 DIAGNOSIS — I25.119 ATHEROSCLEROSIS OF NATIVE CORONARY ARTERY OF NATIVE HEART WITH ANGINA PECTORIS: ICD-10-CM

## 2023-05-01 DIAGNOSIS — M10.00 IDIOPATHIC GOUT, UNSPECIFIED CHRONICITY, UNSPECIFIED SITE: ICD-10-CM

## 2023-05-01 DIAGNOSIS — N18.32 TYPE 2 DIABETES MELLITUS WITH STAGE 3B CHRONIC KIDNEY DISEASE AND HYPERTENSION: ICD-10-CM

## 2023-05-01 DIAGNOSIS — I50.41 ACUTE COMBINED SYSTOLIC AND DIASTOLIC CONGESTIVE HEART FAILURE: ICD-10-CM

## 2023-05-01 DIAGNOSIS — I48.0 PAROXYSMAL ATRIAL FIBRILLATION: ICD-10-CM

## 2023-05-01 DIAGNOSIS — E11.69 TYPE 2 DIABETES MELLITUS WITH HYPERLIPIDEMIA: ICD-10-CM

## 2023-05-01 PROCEDURE — 1126F AMNT PAIN NOTED NONE PRSNT: CPT | Mod: HCNC,CPTII,S$GLB, | Performed by: NURSE PRACTITIONER

## 2023-05-01 PROCEDURE — 3288F FALL RISK ASSESSMENT DOCD: CPT | Mod: HCNC,CPTII,S$GLB, | Performed by: NURSE PRACTITIONER

## 2023-05-01 PROCEDURE — 3078F DIAST BP <80 MM HG: CPT | Mod: HCNC,CPTII,S$GLB, | Performed by: NURSE PRACTITIONER

## 2023-05-01 PROCEDURE — 1170F FXNL STATUS ASSESSED: CPT | Mod: HCNC,CPTII,S$GLB, | Performed by: NURSE PRACTITIONER

## 2023-05-01 PROCEDURE — 3288F PR FALLS RISK ASSESSMENT DOCUMENTED: ICD-10-PCS | Mod: HCNC,CPTII,S$GLB, | Performed by: NURSE PRACTITIONER

## 2023-05-01 PROCEDURE — G0439 PPPS, SUBSEQ VISIT: HCPCS | Mod: HCNC,S$GLB,, | Performed by: NURSE PRACTITIONER

## 2023-05-01 PROCEDURE — G0439 PR MEDICARE ANNUAL WELLNESS SUBSEQUENT VISIT: ICD-10-PCS | Mod: HCNC,S$GLB,, | Performed by: NURSE PRACTITIONER

## 2023-05-01 PROCEDURE — 3078F PR MOST RECENT DIASTOLIC BLOOD PRESSURE < 80 MM HG: ICD-10-PCS | Mod: HCNC,CPTII,S$GLB, | Performed by: NURSE PRACTITIONER

## 2023-05-01 PROCEDURE — 3074F PR MOST RECENT SYSTOLIC BLOOD PRESSURE < 130 MM HG: ICD-10-PCS | Mod: HCNC,CPTII,S$GLB, | Performed by: NURSE PRACTITIONER

## 2023-05-01 PROCEDURE — 1126F PR PAIN SEVERITY QUANTIFIED, NO PAIN PRESENT: ICD-10-PCS | Mod: HCNC,CPTII,S$GLB, | Performed by: NURSE PRACTITIONER

## 2023-05-01 PROCEDURE — 1101F PR PT FALLS ASSESS DOC 0-1 FALLS W/OUT INJ PAST YR: ICD-10-PCS | Mod: HCNC,CPTII,S$GLB, | Performed by: NURSE PRACTITIONER

## 2023-05-01 PROCEDURE — 1160F PR REVIEW ALL MEDS BY PRESCRIBER/CLIN PHARMACIST DOCUMENTED: ICD-10-PCS | Mod: HCNC,CPTII,S$GLB, | Performed by: NURSE PRACTITIONER

## 2023-05-01 PROCEDURE — 1159F PR MEDICATION LIST DOCUMENTED IN MEDICAL RECORD: ICD-10-PCS | Mod: HCNC,CPTII,S$GLB, | Performed by: NURSE PRACTITIONER

## 2023-05-01 PROCEDURE — 99999 PR PBB SHADOW E&M-EST. PATIENT-LVL IV: CPT | Mod: PBBFAC,HCNC,, | Performed by: NURSE PRACTITIONER

## 2023-05-01 PROCEDURE — 1170F PR FUNCTIONAL STATUS ASSESSED: ICD-10-PCS | Mod: HCNC,CPTII,S$GLB, | Performed by: NURSE PRACTITIONER

## 2023-05-01 PROCEDURE — 1160F RVW MEDS BY RX/DR IN RCRD: CPT | Mod: HCNC,CPTII,S$GLB, | Performed by: NURSE PRACTITIONER

## 2023-05-01 PROCEDURE — 99999 PR PBB SHADOW E&M-EST. PATIENT-LVL IV: ICD-10-PCS | Mod: PBBFAC,HCNC,, | Performed by: NURSE PRACTITIONER

## 2023-05-01 PROCEDURE — 3074F SYST BP LT 130 MM HG: CPT | Mod: HCNC,CPTII,S$GLB, | Performed by: NURSE PRACTITIONER

## 2023-05-01 PROCEDURE — 1159F MED LIST DOCD IN RCRD: CPT | Mod: HCNC,CPTII,S$GLB, | Performed by: NURSE PRACTITIONER

## 2023-05-01 PROCEDURE — 1101F PT FALLS ASSESS-DOCD LE1/YR: CPT | Mod: HCNC,CPTII,S$GLB, | Performed by: NURSE PRACTITIONER

## 2023-05-01 NOTE — PATIENT INSTRUCTIONS
Counseling and Referral of Other Preventative  (Italic type indicates deductible and co-insurance are waived)    Patient Name: Jin Ngo  Today's Date: 5/1/2023    Health Maintenance       Date Due Completion Date    Shingles Vaccine (1 of 2) 08/12/2023 (Originally 12/29/1995) ---    Hemoglobin A1c 05/29/2023 11/29/2022    Eye Exam 09/30/2023 9/30/2022    Override on 1/1/2014: Done    Lipid Panel 11/16/2023 11/16/2022    Diabetes Urine Screening 11/29/2023 11/29/2022    Colonoscopy 12/07/2024 12/7/2021    Override on 1/1/2011: Done    TETANUS VACCINE 08/31/2025 8/31/2015        No orders of the defined types were placed in this encounter.      The following information is provided to all patients.  This information is to help you find resources for any of the problems found today that may be affecting your health:                Living healthy guide: www.[facility].[place].[facility]      Understanding Diabetes: www.diabetes.org      Eating healthy: www.cdc.gov/healthyweight      CDC home safety checklist: www.cdc.gov/steadi/patient.html      Agency on Aging: www.goea.[place].gov      Alcoholics anonymous (AA): www.aa.org      Physical Activity: www.any.nih.gov/jt6kuwo      Tobacco use: www.quitwithusla.org     
none

## 2023-05-08 ENCOUNTER — OFFICE VISIT (OUTPATIENT)
Dept: OTOLARYNGOLOGY | Facility: CLINIC | Age: 78
End: 2023-05-08
Payer: MEDICARE

## 2023-05-08 VITALS
WEIGHT: 192.69 LBS | DIASTOLIC BLOOD PRESSURE: 62 MMHG | OXYGEN SATURATION: 95 % | HEART RATE: 88 BPM | SYSTOLIC BLOOD PRESSURE: 128 MMHG | BODY MASS INDEX: 28.45 KG/M2

## 2023-05-08 DIAGNOSIS — K21.9 GASTROESOPHAGEAL REFLUX DISEASE, UNSPECIFIED WHETHER ESOPHAGITIS PRESENT: Chronic | ICD-10-CM

## 2023-05-08 DIAGNOSIS — J31.0 CHRONIC RHINITIS: Primary | Chronic | ICD-10-CM

## 2023-05-08 DIAGNOSIS — R13.10 DYSPHAGIA, UNSPECIFIED TYPE: ICD-10-CM

## 2023-05-08 DIAGNOSIS — J30.0 CHRONIC VASOMOTOR RHINITIS: ICD-10-CM

## 2023-05-08 PROCEDURE — 3078F PR MOST RECENT DIASTOLIC BLOOD PRESSURE < 80 MM HG: ICD-10-PCS | Mod: HCNC,CPTII,S$GLB, | Performed by: OTOLARYNGOLOGY

## 2023-05-08 PROCEDURE — 1126F PR PAIN SEVERITY QUANTIFIED, NO PAIN PRESENT: ICD-10-PCS | Mod: HCNC,CPTII,S$GLB, | Performed by: OTOLARYNGOLOGY

## 2023-05-08 PROCEDURE — 1126F AMNT PAIN NOTED NONE PRSNT: CPT | Mod: HCNC,CPTII,S$GLB, | Performed by: OTOLARYNGOLOGY

## 2023-05-08 PROCEDURE — 1159F PR MEDICATION LIST DOCUMENTED IN MEDICAL RECORD: ICD-10-PCS | Mod: HCNC,CPTII,S$GLB, | Performed by: OTOLARYNGOLOGY

## 2023-05-08 PROCEDURE — 99214 PR OFFICE/OUTPT VISIT, EST, LEVL IV, 30-39 MIN: ICD-10-PCS | Mod: HCNC,S$GLB,, | Performed by: OTOLARYNGOLOGY

## 2023-05-08 PROCEDURE — 3074F PR MOST RECENT SYSTOLIC BLOOD PRESSURE < 130 MM HG: ICD-10-PCS | Mod: HCNC,CPTII,S$GLB, | Performed by: OTOLARYNGOLOGY

## 2023-05-08 PROCEDURE — 99999 PR PBB SHADOW E&M-EST. PATIENT-LVL III: ICD-10-PCS | Mod: PBBFAC,HCNC,, | Performed by: OTOLARYNGOLOGY

## 2023-05-08 PROCEDURE — 99214 OFFICE O/P EST MOD 30 MIN: CPT | Mod: HCNC,S$GLB,, | Performed by: OTOLARYNGOLOGY

## 2023-05-08 PROCEDURE — 3078F DIAST BP <80 MM HG: CPT | Mod: HCNC,CPTII,S$GLB, | Performed by: OTOLARYNGOLOGY

## 2023-05-08 PROCEDURE — 99999 PR PBB SHADOW E&M-EST. PATIENT-LVL III: CPT | Mod: PBBFAC,HCNC,, | Performed by: OTOLARYNGOLOGY

## 2023-05-08 PROCEDURE — 3074F SYST BP LT 130 MM HG: CPT | Mod: HCNC,CPTII,S$GLB, | Performed by: OTOLARYNGOLOGY

## 2023-05-08 PROCEDURE — 1159F MED LIST DOCD IN RCRD: CPT | Mod: HCNC,CPTII,S$GLB, | Performed by: OTOLARYNGOLOGY

## 2023-05-08 RX ORDER — LEVOCETIRIZINE DIHYDROCHLORIDE 5 MG/1
5 TABLET, FILM COATED ORAL NIGHTLY
Qty: 30 TABLET | Refills: 11 | Status: SHIPPED | OUTPATIENT
Start: 2023-05-08 | End: 2024-03-28

## 2023-05-08 RX ORDER — AZELASTINE 1 MG/ML
1 SPRAY, METERED NASAL 2 TIMES DAILY
Qty: 30 ML | Refills: 5 | Status: SHIPPED | OUTPATIENT
Start: 2023-05-08 | End: 2024-02-20

## 2023-05-08 RX ORDER — IPRATROPIUM BROMIDE 21 UG/1
1 SPRAY, METERED NASAL 2 TIMES DAILY
Qty: 30 ML | Refills: 3 | Status: SHIPPED | OUTPATIENT
Start: 2023-05-08

## 2023-05-08 NOTE — PATIENT INSTRUCTIONS
Continue azelastine nasal spray.  Continue xyzal daily.  Add atrovent (ipratroprium) nasal spray up to 3 times a day as needed for additional runny nose.   Continue saline nasal spray as often as needed.      We discussed option of seeing ST for swallowing evaluation if any swallowing symptoms worsen.  Patient declines eval for now.      Continue using Pepcid OTC as needed for any GERD symptoms.

## 2023-07-19 ENCOUNTER — TELEPHONE (OUTPATIENT)
Dept: FAMILY MEDICINE | Facility: CLINIC | Age: 78
End: 2023-07-19
Payer: MEDICARE

## 2023-07-19 NOTE — TELEPHONE ENCOUNTER
Spoke to patient's wife. Patient rescheduled for 07/27 -----------------------------------------------------------------------------  ----- Message from Marisa Restrepo sent at 7/19/2023  9:43 AM CDT -----  Regarding: sooner  Contact: [phone]/wife Krupa  Type:  Sooner Apoointment Request    Caller is requesting a sooner appointment.  Caller declined first available appointment listed below.  Caller will not accept being placed on the waitlist and is requesting a message be sent to doctor.  Name of Caller: pt   When is the first available appointment? September   Symptoms: 6 month follow up   Would the patient rather a call back or a response via [facility]?  Call   Best Call Back Number: [phone]  Additional Information:

## 2023-07-27 ENCOUNTER — OFFICE VISIT (OUTPATIENT)
Dept: FAMILY MEDICINE | Facility: CLINIC | Age: 78
End: 2023-07-27
Payer: MEDICARE

## 2023-07-27 ENCOUNTER — LAB VISIT (OUTPATIENT)
Dept: LAB | Facility: HOSPITAL | Age: 78
End: 2023-07-27
Attending: FAMILY MEDICINE
Payer: MEDICARE

## 2023-07-27 VITALS
TEMPERATURE: 98 F | SYSTOLIC BLOOD PRESSURE: 122 MMHG | DIASTOLIC BLOOD PRESSURE: 82 MMHG | WEIGHT: 199.06 LBS | HEART RATE: 65 BPM | BODY MASS INDEX: 29.48 KG/M2 | HEIGHT: 69 IN | OXYGEN SATURATION: 96 %

## 2023-07-27 DIAGNOSIS — E11.9 TYPE 2 DIABETES MELLITUS WITHOUT COMPLICATION, WITH LONG-TERM CURRENT USE OF INSULIN: ICD-10-CM

## 2023-07-27 DIAGNOSIS — Z79.4 TYPE 2 DIABETES MELLITUS WITHOUT COMPLICATION, WITH LONG-TERM CURRENT USE OF INSULIN: ICD-10-CM

## 2023-07-27 DIAGNOSIS — N18.30 CKD STAGE 3 DUE TO TYPE 2 DIABETES MELLITUS: ICD-10-CM

## 2023-07-27 DIAGNOSIS — I70.0 ABDOMINAL AORTIC ATHEROSCLEROSIS: ICD-10-CM

## 2023-07-27 DIAGNOSIS — E11.22 CKD STAGE 3 DUE TO TYPE 2 DIABETES MELLITUS: ICD-10-CM

## 2023-07-27 DIAGNOSIS — E78.2 MIXED HYPERLIPIDEMIA: ICD-10-CM

## 2023-07-27 DIAGNOSIS — E11.9 TYPE 2 DIABETES MELLITUS WITHOUT COMPLICATION, WITH LONG-TERM CURRENT USE OF INSULIN: Primary | ICD-10-CM

## 2023-07-27 DIAGNOSIS — Z79.4 TYPE 2 DIABETES MELLITUS WITHOUT COMPLICATION, WITH LONG-TERM CURRENT USE OF INSULIN: Primary | ICD-10-CM

## 2023-07-27 LAB
ALBUMIN/CREAT UR: 12.7 UG/MG (ref 0–30)
CREAT UR-MCNC: 118 MG/DL (ref 23–375)
MICROALBUMIN UR DL<=1MG/L-MCNC: 15 UG/ML

## 2023-07-27 PROCEDURE — 82570 ASSAY OF URINE CREATININE: CPT | Mod: HCNC,PO | Performed by: FAMILY MEDICINE

## 2023-07-27 PROCEDURE — 3074F PR MOST RECENT SYSTOLIC BLOOD PRESSURE < 130 MM HG: ICD-10-PCS | Mod: CPTII,S$GLB,, | Performed by: FAMILY MEDICINE

## 2023-07-27 PROCEDURE — 1159F MED LIST DOCD IN RCRD: CPT | Mod: CPTII,S$GLB,, | Performed by: FAMILY MEDICINE

## 2023-07-27 PROCEDURE — 1160F PR REVIEW ALL MEDS BY PRESCRIBER/CLIN PHARMACIST DOCUMENTED: ICD-10-PCS | Mod: CPTII,S$GLB,, | Performed by: FAMILY MEDICINE

## 2023-07-27 PROCEDURE — 1125F PR PAIN SEVERITY QUANTIFIED, PAIN PRESENT: ICD-10-PCS | Mod: CPTII,S$GLB,, | Performed by: FAMILY MEDICINE

## 2023-07-27 PROCEDURE — 3288F PR FALLS RISK ASSESSMENT DOCUMENTED: ICD-10-PCS | Mod: CPTII,S$GLB,, | Performed by: FAMILY MEDICINE

## 2023-07-27 PROCEDURE — 99214 OFFICE O/P EST MOD 30 MIN: CPT | Mod: S$GLB,,, | Performed by: FAMILY MEDICINE

## 2023-07-27 PROCEDURE — 3074F SYST BP LT 130 MM HG: CPT | Mod: CPTII,S$GLB,, | Performed by: FAMILY MEDICINE

## 2023-07-27 PROCEDURE — 1101F PT FALLS ASSESS-DOCD LE1/YR: CPT | Mod: CPTII,S$GLB,, | Performed by: FAMILY MEDICINE

## 2023-07-27 PROCEDURE — 3288F FALL RISK ASSESSMENT DOCD: CPT | Mod: CPTII,S$GLB,, | Performed by: FAMILY MEDICINE

## 2023-07-27 PROCEDURE — 3079F DIAST BP 80-89 MM HG: CPT | Mod: CPTII,S$GLB,, | Performed by: FAMILY MEDICINE

## 2023-07-27 PROCEDURE — 1125F AMNT PAIN NOTED PAIN PRSNT: CPT | Mod: CPTII,S$GLB,, | Performed by: FAMILY MEDICINE

## 2023-07-27 PROCEDURE — 1159F PR MEDICATION LIST DOCUMENTED IN MEDICAL RECORD: ICD-10-PCS | Mod: CPTII,S$GLB,, | Performed by: FAMILY MEDICINE

## 2023-07-27 PROCEDURE — 1160F RVW MEDS BY RX/DR IN RCRD: CPT | Mod: CPTII,S$GLB,, | Performed by: FAMILY MEDICINE

## 2023-07-27 PROCEDURE — 1101F PR PT FALLS ASSESS DOC 0-1 FALLS W/OUT INJ PAST YR: ICD-10-PCS | Mod: CPTII,S$GLB,, | Performed by: FAMILY MEDICINE

## 2023-07-27 PROCEDURE — 3079F PR MOST RECENT DIASTOLIC BLOOD PRESSURE 80-89 MM HG: ICD-10-PCS | Mod: CPTII,S$GLB,, | Performed by: FAMILY MEDICINE

## 2023-07-27 PROCEDURE — 99214 PR OFFICE/OUTPT VISIT, EST, LEVL IV, 30-39 MIN: ICD-10-PCS | Mod: S$GLB,,, | Performed by: FAMILY MEDICINE

## 2023-07-27 RX ORDER — DULOXETIN HYDROCHLORIDE 30 MG/1
60 CAPSULE, DELAYED RELEASE ORAL DAILY
Qty: 90 CAPSULE | Refills: 3 | Status: SHIPPED | OUTPATIENT
Start: 2023-07-27 | End: 2023-12-20

## 2023-07-28 ENCOUNTER — TELEPHONE (OUTPATIENT)
Dept: FAMILY MEDICINE | Facility: CLINIC | Age: 78
End: 2023-07-28
Payer: MEDICARE

## 2023-07-29 NOTE — TELEPHONE ENCOUNTER
Your hemoglobin A1c has come up a little bit to 7.1.  Overall however the rest of your lab work is very good.  No changes in treatment needed.

## 2023-07-30 NOTE — PROGRESS NOTES
" Patient ID: Jin Ngo is a 77 y.o. male.    Chief Complaint: Follow-up    HPI      Jin Ngo is a 77 y.o. male here following up on diabetes mellitus without long-term use of insulin.  Has not been as you discussed with diet and exercise as he did in the past.  Was doing well when under cardiovascular rehab.    Gout no exacerbations  Chronic kidney disease-stable no physical complaints.    Allergic rhinitis using current medications.  Stable would like to start exercising more and jaw discussed Melvina    Vitals:    07/27/23 1036   BP: 122/82   BP Location: Left arm   Patient Position: Sitting   Pulse: 65   Temp: 98 °F (36.7 °C)   TempSrc: Oral   SpO2: 96%   Weight: 90.3 kg (199 lb 1.2 oz)   Height: 5' 9" (1.753 m)            Review of Symptoms      Physical Exam    Constitutional:  Oriented to person, place, and time.appears well-developed and well-nourished.  No distress.      HENT  Head: Normocephalic and atraumatic  Right Ear: External ear normal.   Left Ear: External ear normal.   Nose: External nose normal.   Mouth:  Moist mucus membranes.    Eyes:  Conjunctivae are normal. Right eye exhibits no discharge.  Left eye exhibits no discharge. No scleral icterus.  No periorbital edema    Cardiovascular:  Regular rate and rhythm with normal S1 and S2     Pulmonary/Chest:   Clear to auscultation bilaterally without wheezes, rhonchi or rales      Musculoskeletal:  No edema. No obvious deformity No wasting       Neurological:  Alert and oriented to person, place, and time.   Coordination normal.     Skin:   Skin is warm and dry.  No diaphoresis.   No rash noted.     Psychiatric: Normal mood and affect. Behavior is normal.  Judgment and thought content normal.     Complete Blood Count  Lab Results   Component Value Date    RBC 4.63 07/27/2023    HGB 14.2 07/27/2023    HCT 44.0 07/27/2023    MCV 95 07/27/2023    MCH 30.7 07/27/2023    MCHC 32.3 07/27/2023    RDW 15.1 (H) 07/27/2023     07/27/2023    " MPV 10.8 07/27/2023    GRAN 2.8 07/27/2023    GRAN 51.9 07/27/2023    LYMPH 1.5 07/27/2023    LYMPH 28.0 07/27/2023    MONO 0.7 07/27/2023    MONO 13.3 07/27/2023    EOS 0.3 07/27/2023    BASO 0.07 07/27/2023    EOSINOPHIL 4.9 07/27/2023    BASOPHIL 1.3 07/27/2023    DIFFMETHOD Automated 07/27/2023       Comprehensive Metabolic Panel  Lab Results   Component Value Date     (H) 07/27/2023    BUN 39 (H) 07/27/2023    CREATININE 1.65 (H) 07/27/2023     07/27/2023    K 5.4 (H) 07/27/2023     07/27/2023    PROT 7.5 07/27/2023    ALBUMIN 4.2 07/27/2023    BILITOT 0.6 07/27/2023    AST 47 (H) 07/27/2023    ALKPHOS 32 (L) 07/27/2023    CO2 28 07/27/2023    ALT 35 07/27/2023    ANIONGAP 10 07/27/2023       TSH  Lab Results   Component Value Date    TSH 2.980 07/27/2023       Assessment / Plan:      ICD-10-CM ICD-9-CM   1. Type 2 diabetes mellitus without complication, with long-term current use of insulin  E11.9 250.00    Z79.4 V58.67   2. Abdominal aortic atherosclerosis  I70.0 440.0   3. CKD stage 3 due to type 2 diabetes mellitus  E11.22 250.40    N18.30 585.3   4. Mixed hyperlipidemia  E78.2 272.2     Type 2 diabetes mellitus without complication, with long-term current use of insulin  -     Comprehensive Metabolic Panel; Future  -     CBC Auto Differential; Future  -     Lipid Panel; Future  -     TSH; Future  -     Hemoglobin A1C; Future  -     Microalbumin/Creatinine Ratio, Urine; Future; Expected date: 07/27/2023    Abdominal aortic atherosclerosis  -     Comprehensive Metabolic Panel; Future  -     CBC Auto Differential; Future  -     Lipid Panel; Future  -     TSH; Future  -     Hemoglobin A1C; Future  -     Microalbumin/Creatinine Ratio, Urine; Future; Expected date: 07/27/2023    CKD stage 3 due to type 2 diabetes mellitus  -     Comprehensive Metabolic Panel; Future  -     CBC Auto Differential; Future  -     Lipid Panel; Future  -     TSH; Future  -     Hemoglobin A1C; Future  -      Microalbumin/Creatinine Ratio, Urine; Future; Expected date: 07/27/2023    Mixed hyperlipidemia  -     Comprehensive Metabolic Panel; Future  -     CBC Auto Differential; Future  -     Lipid Panel; Future  -     TSH; Future  -     Hemoglobin A1C; Future  -     Microalbumin/Creatinine Ratio, Urine; Future; Expected date: 07/27/2023    Other orders  -     apixaban (ELIQUIS) 5 mg Tab; Take 1 tablet (5 mg total) by mouth 2 (two) times daily.  Dispense: 60 tablet; Refill: 11  -     DULoxetine (CYMBALTA) 30 MG capsule; Take 2 capsules (60 mg total) by mouth once daily.  Dispense: 90 capsule; Refill: 3

## 2023-08-22 ENCOUNTER — TELEPHONE (OUTPATIENT)
Dept: FAMILY MEDICINE | Facility: CLINIC | Age: 78
End: 2023-08-22
Payer: MEDICARE

## 2023-08-22 RX ORDER — METOPROLOL SUCCINATE 25 MG/1
25 TABLET, EXTENDED RELEASE ORAL DAILY
Qty: 90 TABLET | Refills: 3 | Status: SHIPPED | OUTPATIENT
Start: 2023-08-22 | End: 2024-08-21

## 2023-08-22 NOTE — TELEPHONE ENCOUNTER
----- Message from Crystal Santana sent at 8/22/2023  1:46 PM CDT -----  Type:  RX Refill Request    Who Called: pt   Refill or New Rx:refill  RX Name and Strength:metoprolol succinate (TOPROL-XL) 25 MG 24 hr tablet  How is the patient currently taking it? (ex. 1XDay):Sig - Route: Take 1 tablet (25 mg total) by mouth once daily. - Oral  Is this a 30 day or 90 day RX:90  Preferred Pharmacy with phone number:[facility]/pharmacy #[id number] - [address] AT [address] OF [address] [address]  Phone: [phone] Fax: [phone]  Would the patient rather a call back or a response via [facility]? call  Best Call Back Number:[phone]  Additional Information:

## 2023-08-30 DIAGNOSIS — E78.5 HYPERLIPIDEMIA, UNSPECIFIED HYPERLIPIDEMIA TYPE: ICD-10-CM

## 2023-08-30 RX ORDER — TAMSULOSIN HYDROCHLORIDE 0.4 MG/1
CAPSULE ORAL
Qty: 90 CAPSULE | Refills: 0 | OUTPATIENT
Start: 2023-08-30

## 2023-08-30 RX ORDER — ATORVASTATIN CALCIUM 40 MG/1
TABLET, FILM COATED ORAL
Qty: 90 TABLET | Refills: 0 | OUTPATIENT
Start: 2023-08-30

## 2023-08-30 RX ORDER — CLOPIDOGREL BISULFATE 75 MG/1
TABLET ORAL
Qty: 90 TABLET | Refills: 0 | OUTPATIENT
Start: 2023-08-30

## 2023-08-30 RX ORDER — FUROSEMIDE 40 MG/1
TABLET ORAL
Qty: 90 TABLET | Refills: 0 | OUTPATIENT
Start: 2023-08-30

## 2023-08-30 NOTE — TELEPHONE ENCOUNTER
Refill Decision Note   Jin Ngo  is requesting a refill authorization.  Brief Assessment and Rationale for Refill:  Quick Discontinue     Medication Therapy Plan:    Pharmacy is requesting new scripts for the following medications without required information, (sig/ frequency/qty/etc)      Medication Reconciliation Completed: No     Comments: Pharmacies have been requesting medications for patients without required information, (sig, frequency, qty, etc.). In addition, requests are sent for medication(s) pt. are currently not taking, and medications patients have never taken.    We have spoken to the pharmacies about these request types and advised their teams previously that we are unable to assess these New Script requests and require all details for these requests. This is a known issue and has been reported.     Note composed:6:44 PM 08/30/2023

## 2023-08-30 NOTE — TELEPHONE ENCOUNTER
No care due was identified.  [facility] Embedded Care Due Messages. Reference number: [id number].   8/30/2023 4:41:34 PM CDT

## 2023-09-13 NOTE — TELEPHONE ENCOUNTER
Electrolyte Abnormalities, Acid Base Disorder, FERNANDA Prescription for meter strips and lancets sent   FERNANDA Evaluation of Ileostomy

## 2023-09-26 ENCOUNTER — PATIENT OUTREACH (OUTPATIENT)
Dept: ADMINISTRATIVE | Facility: HOSPITAL | Age: 78
End: 2023-09-26
Payer: MEDICARE

## 2023-09-26 NOTE — LETTER
AUTHORIZATION FOR RELEASE OF   CONFIDENTIAL INFORMATION    [facility],    We are seeing Jin Ngo, date of birth 1945, in the clinic at [facility]. Sam Barroso MD is the patient's PCP. Jin Ngo has an outstanding lab/procedure at the time we reviewed his chart. In order to help keep his health information updated, he has authorized us to request the following medical record(s):                             ( X )  EYE EXAM                   Please fax records to [facility], [place], Andrew J., MD,[phone]    If you have any questions, please contact Deanna at [phone].             Patient Name: Jin Ngo  : 1945  Patient Phone #: [phone]

## 2023-10-04 ENCOUNTER — PATIENT OUTREACH (OUTPATIENT)
Dept: ADMINISTRATIVE | Facility: HOSPITAL | Age: 78
End: 2023-10-04
Payer: MEDICARE

## 2023-11-02 DIAGNOSIS — E78.5 HYPERLIPIDEMIA, UNSPECIFIED HYPERLIPIDEMIA TYPE: ICD-10-CM

## 2023-11-02 DIAGNOSIS — Z79.4 TYPE 2 DIABETES MELLITUS WITH CHRONIC KIDNEY DISEASE, WITH LONG-TERM CURRENT USE OF INSULIN, UNSPECIFIED CKD STAGE: ICD-10-CM

## 2023-11-02 DIAGNOSIS — E11.22 TYPE 2 DIABETES MELLITUS WITH CHRONIC KIDNEY DISEASE, WITH LONG-TERM CURRENT USE OF INSULIN, UNSPECIFIED CKD STAGE: ICD-10-CM

## 2023-11-02 RX ORDER — TAMSULOSIN HYDROCHLORIDE 0.4 MG/1
CAPSULE ORAL
Qty: 90 CAPSULE | Refills: 0 | OUTPATIENT
Start: 2023-11-02

## 2023-11-02 RX ORDER — FUROSEMIDE 40 MG/1
TABLET ORAL
Qty: 90 TABLET | Refills: 0 | OUTPATIENT
Start: 2023-11-02

## 2023-11-02 RX ORDER — INSULIN DETEMIR 100 [IU]/ML
INJECTION, SOLUTION SUBCUTANEOUS
Qty: 45 ML | Refills: 0 | OUTPATIENT
Start: 2023-11-02

## 2023-11-02 RX ORDER — ATORVASTATIN CALCIUM 40 MG/1
TABLET, FILM COATED ORAL
Qty: 90 TABLET | Refills: 0 | OUTPATIENT
Start: 2023-11-02

## 2023-11-02 RX ORDER — CLOPIDOGREL BISULFATE 75 MG/1
TABLET ORAL
Qty: 90 TABLET | Refills: 0 | OUTPATIENT
Start: 2023-11-02

## 2023-11-02 NOTE — TELEPHONE ENCOUNTER
Care Due:                  Date            Visit Type   Department     Provider  --------------------------------------------------------------------------------                                EP -                              PRIMARY      [facility] FAMILY  Last Visit: 07-      CARE ([facility])   MEDICINE       Sam Barroso                               -                              PRIMARY      [facility] FAMILY  Next Visit: 01-      CARE ([facility])   MEDICINE       Sam Barroso                                                            Last  Test          Frequency    Reason                     Performed    Due Date  --------------------------------------------------------------------------------    Uric Acid...  12 months..  allopurinoL..............  Not Found    Overdue    [employer] Embedded Care Due Messages. Reference number: [id number].   11/02/2023 2:46:17 PM CDT

## 2023-11-02 NOTE — TELEPHONE ENCOUNTER
Refill Decision Note   Jin Ngo  is requesting a refill authorization.  Brief Assessment and Rationale for Refill:  Quick Discontinue     Medication Therapy Plan:    Pharmacy is requesting new scripts for the following medications without required information, (sig/ frequency/qty/etc)      Medication Reconciliation Completed: No     Comments: Pharmacies have been requesting medications for patients without required information, (sig, frequency, qty, etc.). In addition, requests are sent for medication(s) pt. are currently not taking, and medications patients have never taken.    We have spoken to the pharmacies about these request types and advised their teams previously that we are unable to assess these New Script requests and require all details for these requests. This is a known issue and has been reported.     Note composed:3:09 PM 11/02/2023

## 2023-11-16 DIAGNOSIS — E11.22 TYPE 2 DIABETES MELLITUS WITH CHRONIC KIDNEY DISEASE, WITH LONG-TERM CURRENT USE OF INSULIN, UNSPECIFIED CKD STAGE: ICD-10-CM

## 2023-11-16 DIAGNOSIS — Z79.4 TYPE 2 DIABETES MELLITUS WITH CHRONIC KIDNEY DISEASE, WITH LONG-TERM CURRENT USE OF INSULIN, UNSPECIFIED CKD STAGE: ICD-10-CM

## 2023-11-16 NOTE — TELEPHONE ENCOUNTER
----- Message from Nirav Mckinnon sent at 11/16/2023  3:06 PM CST -----  Contact: pt wife  Type: Requesting REFILL    Who Called: PT'S wife     Regarding: insulin detemir U-100 (LEVEMIR FLEXTOUCH U-100 INSULN) 100 unit/mL (3 mL) InPn pen, atorvastatin (LIPITOR) 40 MG tablet, furosemide (LASIX) 40 MG tablet, and clopidogreL (PLAVIX) 75 mg tablet    Would the patient rather a call back or a response via [facility]? Call back to verify with patient. States this is their second attempt to getting refills.    Best Call Back Number: [phone]    Pharmacy Information:  [facility] - [place], [place] - [address]   Phone: [phone]  Fax: [phone]

## 2023-11-16 NOTE — TELEPHONE ENCOUNTER
No care due was identified.  [facility] Embedded Care Due Messages. Reference number: [id number].   11/16/2023 3:15:00 PM CST

## 2023-11-17 RX ORDER — CLOPIDOGREL BISULFATE 75 MG/1
75 TABLET ORAL DAILY
Qty: 30 TABLET | Refills: 11 | Status: SHIPPED | OUTPATIENT
Start: 2023-11-17 | End: 2024-11-16

## 2023-11-17 RX ORDER — INSULIN DETEMIR 100 [IU]/ML
17 INJECTION, SOLUTION SUBCUTANEOUS NIGHTLY
Qty: 15 ML | Refills: 6 | Status: SHIPPED | OUTPATIENT
Start: 2023-11-17 | End: 2024-11-16

## 2023-11-17 RX ORDER — FUROSEMIDE 40 MG/1
40 TABLET ORAL DAILY
Qty: 30 TABLET | Refills: 11 | Status: SHIPPED | OUTPATIENT
Start: 2023-11-17 | End: 2024-11-16

## 2023-12-20 RX ORDER — DULOXETIN HYDROCHLORIDE 30 MG/1
60 CAPSULE, DELAYED RELEASE ORAL
Qty: 180 CAPSULE | Refills: 2 | Status: SHIPPED | OUTPATIENT
Start: 2023-12-20

## 2023-12-20 NOTE — TELEPHONE ENCOUNTER
Care Due:                  Date            Visit Type   Department     Provider  --------------------------------------------------------------------------------                                EP -                              PRIMARY      [facility] FAMILY  Last Visit: 07-      CARE ([facility])   MEDICINE       Sam Barroso  Next Visit: None Scheduled  None         None Found                                                            Last  Test          Frequency    Reason                     Performed    Due Date  --------------------------------------------------------------------------------    HBA1C.......  6 months...  insulin..................  07- 01-    [facility] Embedded Care Due Messages. Reference number: [id number].   12/20/2023 2:13:37 AM CST

## 2023-12-20 NOTE — TELEPHONE ENCOUNTER
Provider Staff:  Action required for this patient    Requires labs      Please see care gap opportunities below in Care Due Message.    Thanks!  [facility]     Appointments      Date Provider   Last Visit   7/27/2023 Sam Barroso MD   Next Visit   1/30/2024 Sam Barroso MD     Refill Decision Note   Jin Ngo  is requesting a refill authorization.  Brief Assessment and Rationale for Refill:  Approve     Medication Therapy Plan:         Comments:     Note composed:5:11 AM 12/20/2023

## 2024-01-03 ENCOUNTER — HOSPITAL ENCOUNTER (EMERGENCY)
Facility: HOSPITAL | Age: 79
Discharge: HOME | End: 2024-01-03
Attending: EMERGENCY MEDICINE
Payer: MEDICARE

## 2024-01-03 VITALS
DIASTOLIC BLOOD PRESSURE: 59 MMHG | SYSTOLIC BLOOD PRESSURE: 130 MMHG | HEART RATE: 65 BPM | BODY MASS INDEX: 29.62 KG/M2 | OXYGEN SATURATION: 96 % | RESPIRATION RATE: 19 BRPM | HEIGHT: 69 IN | WEIGHT: 200 LBS | TEMPERATURE: 99 F

## 2024-01-03 DIAGNOSIS — R07.9 CHEST PAIN: Primary | ICD-10-CM

## 2024-01-03 LAB
ALBUMIN SERPL BCP-MCNC: 3.9 G/DL (ref 3.5–5.2)
ALP SERPL-CCNC: 41 U/L (ref 38–126)
ALT SERPL W/O P-5'-P-CCNC: 26 U/L (ref 10–44)
ANION GAP SERPL CALC-SCNC: 12 MMOL/L (ref 8–16)
AST SERPL-CCNC: 34 U/L (ref 15–46)
BASOPHILS # BLD AUTO: 0.06 K/UL (ref 0–0.2)
BASOPHILS NFR BLD: 1.2 % (ref 0–1.9)
BILIRUB SERPL-MCNC: 0.4 MG/DL (ref 0.1–1)
CALCIUM SERPL-MCNC: 9.4 MG/DL (ref 8.7–10.5)
CHLORIDE SERPL-SCNC: 101 MMOL/L (ref 95–110)
CO2 SERPL-SCNC: 23 MMOL/L (ref 23–29)
CREAT SERPL-MCNC: 1.55 MG/DL (ref 0.5–1.4)
DIFFERENTIAL METHOD BLD: ABNORMAL
EOSINOPHIL # BLD AUTO: 0.3 K/UL (ref 0–0.5)
EOSINOPHIL NFR BLD: 5.1 % (ref 0–8)
ERYTHROCYTE [DISTWIDTH] IN BLOOD BY AUTOMATED COUNT: 14.6 % (ref 11.5–14.5)
EST. GFR  (NO RACE VARIABLE): 45.5 ML/MIN/1.73 M^2
GLUCOSE SERPL-MCNC: 198 MG/DL (ref 70–110)
HCT VFR BLD AUTO: 40.8 % (ref 40–54)
HGB BLD-MCNC: 13.4 G/DL (ref 14–18)
IMM GRANULOCYTES # BLD AUTO: 0.03 K/UL (ref 0–0.04)
IMM GRANULOCYTES NFR BLD AUTO: 0.6 % (ref 0–0.5)
LYMPHOCYTES # BLD AUTO: 1.3 K/UL (ref 1–4.8)
LYMPHOCYTES NFR BLD: 26.6 % (ref 18–48)
MCH RBC QN AUTO: 30.5 PG (ref 27–31)
MCHC RBC AUTO-ENTMCNC: 32.8 G/DL (ref 32–36)
MCV RBC AUTO: 93 FL (ref 82–98)
MONOCYTES # BLD AUTO: 0.6 K/UL (ref 0.3–1)
MONOCYTES NFR BLD: 12.3 % (ref 4–15)
NEUTROPHILS # BLD AUTO: 2.7 K/UL (ref 1.8–7.7)
NEUTROPHILS NFR BLD: 54.2 % (ref 38–73)
NRBC BLD-RTO: 0 /100 WBC
PLATELET # BLD AUTO: 217 K/UL (ref 150–450)
PMV BLD AUTO: 10.1 FL (ref 9.2–12.9)
POTASSIUM SERPL-SCNC: 4.4 MMOL/L (ref 3.5–5.1)
PROT SERPL-MCNC: 7.1 G/DL (ref 6–8.4)
RBC # BLD AUTO: 4.39 M/UL (ref 4.6–6.2)
SODIUM SERPL-SCNC: 136 MMOL/L (ref 136–145)
TROPONIN I SERPL-MCNC: 0.02 NG/ML (ref 0.01–0.03)
TROPONIN I SERPL-MCNC: 0.03 NG/ML (ref 0.01–0.03)
UUN UR-MCNC: 41 MG/DL (ref 2–20)
WBC # BLD AUTO: 4.89 K/UL (ref 3.9–12.7)

## 2024-01-03 PROCEDURE — 80053 COMPREHEN METABOLIC PANEL: CPT | Mod: HCNC,ER | Performed by: EMERGENCY MEDICINE

## 2024-01-03 PROCEDURE — 94760 N-INVAS EAR/PLS OXIMETRY 1: CPT | Mod: HCNC,ER

## 2024-01-03 PROCEDURE — 99285 EMERGENCY DEPT VISIT HI MDM: CPT | Mod: 25,HCNC,ER

## 2024-01-03 PROCEDURE — 99900035 HC TECH TIME PER 15 MIN (STAT): Mod: HCNC,ER

## 2024-01-03 PROCEDURE — 93005 ELECTROCARDIOGRAM TRACING: CPT | Mod: HCNC,ER

## 2024-01-03 PROCEDURE — 84484 ASSAY OF TROPONIN QUANT: CPT | Mod: 91,HCNC,ER | Performed by: EMERGENCY MEDICINE

## 2024-01-03 PROCEDURE — 85025 COMPLETE CBC W/AUTO DIFF WBC: CPT | Mod: HCNC,ER | Performed by: EMERGENCY MEDICINE

## 2024-01-03 PROCEDURE — 93010 ELECTROCARDIOGRAM REPORT: CPT | Mod: HCNC,,, | Performed by: INTERNAL MEDICINE

## 2024-01-03 NOTE — ED PROVIDER NOTES
Encounter Date: 1/3/2024       History     Chief Complaint   Patient presents with    Chest Pain     C/o right sided chest pain that started this morning. Denies any SOB or dizziness. Pain is dull.      HPI  This is a 78 y.o. male who has a past medical history of Acute hypoxemic respiratory failure (8/18/2022), ANGELIC (acute kidney injury) (7/1/2020), Anticoagulant long-term use, CKD stage 3 due to type 2 diabetes mellitus (3/22/2021), Coronary artery disease, Encounter for blood transfusion, Hyperlipidemia, Hypertension, Localized osteoporosis with current pathological fracture with delayed healing (6/9/2022), Myocardial infarction, Obesity, Other chronic pulmonary heart diseases, Paroxysmal atrial fibrillation (11/9/2020), Primary osteoarthritis involving multiple joints (3/8/2017), Stroke, Type 2 diabetes mellitus without complication, and 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     The patient presents to the Emergency Department with right-sided chest pain that started this morning, lasting several minutes, nonradiating, described as sharp, not responsive to deep breath or cough.  Denies any associated nausea or vomiting.  Denies any palpitations, lightheadedness or dizziness.    Review of patient's allergies indicates:   Allergen Reactions    Metformin      Caused stroke    Pcn [penicillins]      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    Coronary artery disease     Encounter for blood transfusion     Hyperlipidemia     Hypertension     Localized osteoporosis with current pathological fracture with delayed healing 6/9/2022    Myocardial infarction     Obesity     Other chronic pulmonary heart diseases     Paroxysmal atrial fibrillation 11/9/2020    Primary  osteoarthritis involving multiple joints 3/8/2017    Stroke     Type 2 diabetes mellitus without complication     Uncontrolled type 2 diabetes mellitus with neurologic complication, without long-term current use of insulin 3/8/2017     Past Surgical History:   Procedure Laterality Date    CARDIAC SURGERY      CATARACT EXTRACTION, BILATERAL      COLONOSCOPY      2011    COLONOSCOPY N/A 3/22/2016    Procedure: COLONOSCOPY;  Surgeon: Sridhar Reynoso MD;  Location: [facility] ENDO;  Service: Endoscopy;  Laterality: N/A;    COLONOSCOPY N/A 12/7/2021    Procedure: Colonoscopy;  Surgeon: Bebeto Perry MD;  Location: [facility] ENDO;  Service: Endoscopy;  Laterality: N/A;    CORONARY ANGIOGRAPHY N/A 8/17/2022    Procedure: ANGIOGRAM, CORONARY ARTERY;  Surgeon: Pasquale Ramesh MD;  Location: [facility] CATH LAB/EP;  Service: Cardiology;  Laterality: N/A;    CORONARY ARTERY BYPASS GRAFT      CORONARY BYPASS GRAFT ANGIOGRAPHY  8/17/2022    Procedure: Bypass graft study;  Surgeon: Pasquale Ramesh MD;  Location: [facility] CATH LAB/EP;  Service: Cardiology;;    EYE SURGERY  06/2014    Cataracts    LEFT HEART CATHETERIZATION Left 8/17/2022    Procedure: Left heart cath;  Surgeon: Pasquale Ramesh MD;  Location: [facility] CATH LAB/EP;  Service: Cardiology;  Laterality: Left;    PERCUTANEOUS TRANSLUMINAL ANGIOPLASTY (PTA) OF CAROTID ARTERY N/A 12/10/2020    Procedure: PTA, ARTERY, CAROTID;  Surgeon: Cheko Norris MD;  Location: [facility] CATH LAB;  Service: Cardiology;  Laterality: N/A;     Family History   Problem Relation Age of Onset    Stroke Mother     Heart disease Mother     Hypertension Mother     Diabetes Mother     Hyperlipidemia Mother     Arthritis Father     Hyperlipidemia Father     Heart disease Father     Hypertension Father     Heart attack Father     Coronary artery disease Father     No Known Problems Sister     Hyperlipidemia Brother     Hypertension Brother     Stroke Brother     Thyroid disease Brother     Hypertension  Son      Social History     Tobacco Use    Smoking status: Former     Current packs/day: 0.00     Average packs/day: 3.0 packs/day for 15.0 years (45.0 ttl pk-yrs)     Types: Cigarettes     Start date:      Quit date:      Years since quittin.0     Passive exposure: Past    Smokeless tobacco: Never   Substance Use Topics    Alcohol use: Yes     Alcohol/week: 1.0 standard drink of alcohol     Types: 1 Shots of liquor per week     Comment: 2-3 drinks per year    Drug use: No     Review of Systems   All other systems reviewed and are negative.      Physical Exam     Initial Vitals [24 1312]   BP Pulse Resp Temp SpO2   (!) 162/78 80 20 99.3 °F (37.4 °C) (!) 94 %      MAP       --         Physical Exam    Nursing note and vitals reviewed.  Constitutional: He appears well-developed and well-nourished. No distress.   HENT:   Head: Normocephalic and atraumatic.   Mouth/Throat: Oropharynx is clear and moist.   Eyes: Conjunctivae are normal. Pupils are equal, round, and reactive to light.   Neck: Neck supple.   Normal range of motion.  Cardiovascular:  Normal rate, regular rhythm, normal heart sounds and intact distal pulses.           Pulmonary/Chest: Breath sounds normal. No respiratory distress.   Abdominal: Abdomen is soft. Bowel sounds are normal. He exhibits no distension. There is no abdominal tenderness.   Musculoskeletal:         General: No tenderness or edema. Normal range of motion.      Cervical back: Normal range of motion and neck supple.     Lymphadenopathy:     He has no cervical adenopathy.   Neurological: He is alert and oriented to person, place, and time.   Skin: Skin is warm and dry. Capillary refill takes less than 2 seconds. No rash noted. No erythema.   Psychiatric: He has a normal mood and affect. Thought content normal.         ED Course   Procedures  Labs Reviewed   CBC W/ AUTO DIFFERENTIAL - Abnormal; Notable for the following components:       Result Value    RBC 4.39 (*)      Hemoglobin 13.4 (*)     RDW 14.6 (*)     Immature Granulocytes 0.6 (*)     All other components within normal limits   COMPREHENSIVE METABOLIC PANEL - Abnormal; Notable for the following components:    Glucose 198 (*)     BUN 41 (*)     Creatinine 1.55 (*)     eGFR 45.5 (*)     All other components within normal limits   TROPONIN I   TROPONIN I        ECG Results              EKG 12-lead (In process)  Result time 01/03/24 14:21:03      In process by Interface, Lab In [facility] (01/03/24 14:21:03)                   Narrative:    Test Reason : R07.9,    Vent. Rate : 072 BPM     Atrial Rate : 000 BPM     P-R Int : 000 ms          QRS Dur : 110 ms      QT Int : 390 ms       P-R-T Axes : 000 105 058 degrees     QTc Int : 427 ms    Atrial fibrillation with a competing junctional pacemaker with premature  ventricular or aberrantly conducted complexes  Rightward axis  Abnormal ECG  When compared with ECG of 18-AUG-2022 00:39,  Borderline criteria for Inferior infarct are no longer Present  Non-specific change in ST segment in Inferior leads  Nonspecific T wave abnormality, worse in Inferior leads    Referred By: [email or web address]   SELF           Confirmed By:                                   Imaging Results              X-Ray Chest AP Portable (Final result)  Result time 01/03/24 14:16:44      Final result by Zeke Bhatt MD (01/03/24 14:16:44)                   Impression:      No acute abnormality.      Electronically signed by: Zeke Bhatt  Date:    01/03/2024  Time:    14:16               Narrative:    EXAMINATION:  XR CHEST AP PORTABLE    CLINICAL HISTORY:  Chest Pain;    TECHNIQUE:  Single frontal view of the chest was performed.    COMPARISON:  Multiple priors.    FINDINGS:  The lungs are clear, with normal appearance of pulmonary vasculature and no pleural effusion or pneumothorax.    The cardiac silhouette is enlarged.  The hilar and mediastinal contours are unremarkable.    Bones are intact.                                        Medications - No data to display  Medical Decision Making  Amount and/or Complexity of Data Reviewed  Independent Historian: spouse     Details: States pt has hx of CABG.  External Data Reviewed: notes.     Details: Echo 11/01/2022    · The left ventricle is normal in size with low normal systolic function.  · The estimated ejection fraction is 50%.  · Grade III left ventricular diastolic dysfunction.  · Mild-to-moderate mitral regurgitation.  · Mild to moderate tricuspid regurgitation.  · Moderate left atrial enlargement.  · Mild right atrial enlargement.  · Normal central venous pressure (3 mmHg).  · The estimated PA systolic pressure is 39 mmHg.  · Normal right ventricular size with normal right ventricular systolic function.  Labs: ordered. Decision-making details documented in ED Course.  Radiology: ordered and independent interpretation performed.  ECG/medicine tests: ordered and independent interpretation performed. Decision-making details documented in ED Course.    Risk  Diagnosis or treatment significantly limited by social determinants of health.      Additional MDM:   Heart Score:    History:          Slightly suspicious.  ECG:             Normal  Age:               >65 years  Risk factors: >= 3 risk factors or history of atherosclerotic disease  Troponin:       Less than or equal to normal limit  Heart Score = 4                ED Course as of 01/03/24 2119 Wed Jan 03, 2024   1343 Independent EKG Interpretation:  Atrial fibrillation with PVC at 72 bpm, rightward axis, nl intervals, no hypertrophy, no ST-T changes.  Impression:  No STEMI, abnormal     [NP]   1413 Sodium: 136 [NP]   1413 Potassium: 4.4 [NP]   1413 Chloride: 101 [NP]   1413 CO2: 23 [NP]   1413 Glucose(!): 198 [NP]   1413 BUN(!): 41 [NP]   1413 Creatinine(!): 1.55 [NP]   1413 Calcium: 9.4 [NP]   1413 PROTEIN TOTAL: 7.1 [NP]   1413 Albumin: 3.9 [NP]   1413 BILIRUBIN TOTAL: 0.4 [NP]   1413 ALP: 41 [NP]   1413 AST: 34 [NP]    1413 ALT: 26 [NP]   1413 WBC: 4.89 [NP]   1413 Hemoglobin(!): 13.4 [NP]   1413 Hematocrit: 40.8 [NP]   1413 Platelet Count: 217 [NP]   1426 Troponin I: 0.032 [NP]   1758 Troponin I: 0.016 [NP]      ED Course User Index  [NP] Kali Gaviria MD                     Neg trop x2.   HEART score =4, moderate risk.  Hx very atypical, less likely consider cardiac origin.  Will dc home with f/u with cardiology and/or PCP in 1-2 weeks.  Return precautions and aftercare instructions provided.        Clinical Impression:  Final diagnoses:  [R07.9] Chest pain (Primary)          ED Disposition Condition    Discharge Stable          ED Prescriptions    None       Follow-up Information       Follow up With Specialties Details Why Contact Info    Sam Barroso MD Family Medicine Schedule an appointment as soon as possible for a visit   [address] [place] [id number]  [phone]               Kali Gaviria MD  01/03/24 [id number]

## 2024-01-04 ENCOUNTER — TELEPHONE (OUTPATIENT)
Dept: FAMILY MEDICINE | Facility: CLINIC | Age: 79
End: 2024-01-04
Payer: MEDICARE

## 2024-01-04 ENCOUNTER — PATIENT OUTREACH (OUTPATIENT)
Dept: EMERGENCY MEDICINE | Facility: HOSPITAL | Age: 79
End: 2024-01-04
Payer: MEDICARE

## 2024-01-04 NOTE — TELEPHONE ENCOUNTER
----- Message from Berna Ulloa MA sent at 1/4/2024 12:10 PM CST -----  Regarding: ER follow up for chest pain  Good morning/afternoon, this patient is a part of the [facility] quality work for the system and is a patient with two or more chronic conditions and requires a Post ED 7-day appt. Discharged on 1/3/24 for chest pain.  I do not see an appt available within that time frame.  Can you assist in getting this patient scheduled and advise of appt date and time so that I can contact the patient.

## 2024-01-04 NOTE — PROGRESS NOTES
Dr Barroso's staff have reached out to patient's wife Candelaria and have scheduled and confirmed his post ED 7-day follow up for 1/5/24 at 1?;20 pm.

## 2024-01-04 NOTE — PROGRESS NOTES
Patient's wife Candelaria returned call and requested a message be sent to PCP's staff to assist in scheduling a post ED 7-day follow up.

## 2024-01-04 NOTE — TELEPHONE ENCOUNTER
----- Message from Berna Ulloa MA sent at 1/4/2024 12:10 PM CST -----  Regarding: ER follow up for chest pain  Good morning/afternoon, this patient is a part of the [facility] quality work for the system and is a patient with two or more chronic conditions and requires a Post ED 7-day appt. Discharged on 1/3/24 for chest pain.  I do not see an appt available within that time frame.  Can you assist in getting this patient scheduled and advise of appt date and time so that I can contact the patient.

## 2024-01-04 NOTE — DISCHARGE INSTRUCTIONS
Thank you for coming in to see us at [facility]! It was nice to meet you, and I hope you feel better soon. Please feel free to return to the ER at any time should your symptoms get worse, or if you have different emergent concerns.    Our goal in the emergency department is to always give you outstanding care and exceptional service. You may receive a survey by mail or e-mail in the next week regarding your experience in our ED. We would greatly appreciate your completing and returning the survey. Your feedback provides us with a way to recognize our staff who give very good care and it helps us learn how to improve when your experience was below our aspiration of excellence.       Sincerely,    Kali Gaviria MD  Medical Director  Emergency Department  [facility] EDs  [facility]

## 2024-01-05 ENCOUNTER — OFFICE VISIT (OUTPATIENT)
Dept: FAMILY MEDICINE | Facility: CLINIC | Age: 79
End: 2024-01-05
Payer: MEDICARE

## 2024-01-05 VITALS
TEMPERATURE: 98 F | SYSTOLIC BLOOD PRESSURE: 112 MMHG | BODY MASS INDEX: 29.67 KG/M2 | HEIGHT: 69 IN | DIASTOLIC BLOOD PRESSURE: 62 MMHG | OXYGEN SATURATION: 96 % | WEIGHT: 200.31 LBS | HEART RATE: 82 BPM

## 2024-01-05 DIAGNOSIS — I50.41 ACUTE COMBINED SYSTOLIC AND DIASTOLIC CONGESTIVE HEART FAILURE: ICD-10-CM

## 2024-01-05 DIAGNOSIS — R07.9 CHEST PAIN, UNSPECIFIED TYPE: Primary | ICD-10-CM

## 2024-01-05 DIAGNOSIS — E78.5 HYPERLIPIDEMIA, UNSPECIFIED HYPERLIPIDEMIA TYPE: ICD-10-CM

## 2024-01-05 DIAGNOSIS — Z23 FLU VACCINE NEED: ICD-10-CM

## 2024-01-05 DIAGNOSIS — I27.20 PULMONARY HTN: ICD-10-CM

## 2024-01-05 DIAGNOSIS — D69.2 SENILE PURPURA: ICD-10-CM

## 2024-01-05 DIAGNOSIS — I12.9 TYPE 2 DIABETES MELLITUS WITH STAGE 3B CHRONIC KIDNEY DISEASE AND HYPERTENSION: ICD-10-CM

## 2024-01-05 DIAGNOSIS — I65.23 CAROTID STENOSIS, ASYMPTOMATIC, BILATERAL: ICD-10-CM

## 2024-01-05 DIAGNOSIS — E11.22 CKD STAGE 3 DUE TO TYPE 2 DIABETES MELLITUS: ICD-10-CM

## 2024-01-05 DIAGNOSIS — N18.30 CKD STAGE 3 DUE TO TYPE 2 DIABETES MELLITUS: ICD-10-CM

## 2024-01-05 DIAGNOSIS — E11.22 TYPE 2 DIABETES MELLITUS WITH STAGE 3B CHRONIC KIDNEY DISEASE AND HYPERTENSION: ICD-10-CM

## 2024-01-05 DIAGNOSIS — I70.0 ABDOMINAL AORTIC ATHEROSCLEROSIS: ICD-10-CM

## 2024-01-05 DIAGNOSIS — N18.32 TYPE 2 DIABETES MELLITUS WITH STAGE 3B CHRONIC KIDNEY DISEASE AND HYPERTENSION: ICD-10-CM

## 2024-01-05 DIAGNOSIS — I48.0 PAROXYSMAL ATRIAL FIBRILLATION: ICD-10-CM

## 2024-01-05 DIAGNOSIS — I25.119 ATHEROSCLEROSIS OF NATIVE CORONARY ARTERY OF NATIVE HEART WITH ANGINA PECTORIS: ICD-10-CM

## 2024-01-05 DIAGNOSIS — E78.2 MIXED HYPERLIPIDEMIA: ICD-10-CM

## 2024-01-05 PROCEDURE — 1126F AMNT PAIN NOTED NONE PRSNT: CPT | Mod: CPTII,S$GLB,, | Performed by: FAMILY MEDICINE

## 2024-01-05 PROCEDURE — G0008 ADMIN INFLUENZA VIRUS VAC: HCPCS | Mod: S$GLB,,, | Performed by: FAMILY MEDICINE

## 2024-01-05 PROCEDURE — 3074F SYST BP LT 130 MM HG: CPT | Mod: CPTII,S$GLB,, | Performed by: FAMILY MEDICINE

## 2024-01-05 PROCEDURE — 1101F PT FALLS ASSESS-DOCD LE1/YR: CPT | Mod: CPTII,S$GLB,, | Performed by: FAMILY MEDICINE

## 2024-01-05 PROCEDURE — 1159F MED LIST DOCD IN RCRD: CPT | Mod: CPTII,S$GLB,, | Performed by: FAMILY MEDICINE

## 2024-01-05 PROCEDURE — 3288F FALL RISK ASSESSMENT DOCD: CPT | Mod: CPTII,S$GLB,, | Performed by: FAMILY MEDICINE

## 2024-01-05 PROCEDURE — 90694 VACC AIIV4 NO PRSRV 0.5ML IM: CPT | Mod: S$GLB,,, | Performed by: FAMILY MEDICINE

## 2024-01-05 PROCEDURE — 1160F RVW MEDS BY RX/DR IN RCRD: CPT | Mod: CPTII,S$GLB,, | Performed by: FAMILY MEDICINE

## 2024-01-05 PROCEDURE — 3078F DIAST BP <80 MM HG: CPT | Mod: CPTII,S$GLB,, | Performed by: FAMILY MEDICINE

## 2024-01-05 PROCEDURE — 99214 OFFICE O/P EST MOD 30 MIN: CPT | Mod: S$GLB,,, | Performed by: FAMILY MEDICINE

## 2024-01-05 RX ORDER — ROPINIROLE 0.25 MG/1
0.25 TABLET, FILM COATED ORAL 3 TIMES DAILY PRN
Qty: 90 TABLET | Refills: 0 | Status: SHIPPED | OUTPATIENT
Start: 2024-01-05 | End: 2025-01-04

## 2024-01-05 RX ORDER — ATORVASTATIN CALCIUM 40 MG/1
40 TABLET, FILM COATED ORAL DAILY
Qty: 90 TABLET | Refills: 3 | Status: SHIPPED | OUTPATIENT
Start: 2024-01-05

## 2024-01-08 NOTE — PROGRESS NOTES
" Patient ID: Jin Ngo is a 78 y.o. male.    Chief Complaint: Follow-up (ER follow up )    HPI      Jin Ngo is a 78 y.o. male following up for your admission due to chest pain.  Patient had chest pain left side of his chest with some radiation.  Mild breath.  This is something he did not experiencing past.  With the coronary disease.  No current chest pain or palpitations.  No PND or orthopnea continues current medications.  Blood pressure has been under control.  Also follow for lipidemia a or if gross sclerosis chronic kidney disease a purpuric in pulmonary hypertension.  All stable this time.  Vitals:    01/05/24 1331   BP: 112/62   BP Location: Left arm   Patient Position: Sitting   Pulse: 82   Temp: 98.3 °F (36.8 °C)   TempSrc: Oral   SpO2: 96%   Weight: 90.9 kg (200 lb 4.6 oz)   Height: 5' 9" (1.753 m)            Review of Symptoms      Physical Exam    Constitutional:  Oriented to person, place, and time.appears well-developed and well-nourished.  No distress.      HENT  Head: Normocephalic and atraumatic  Right Ear: External ear normal.   Left Ear: External ear normal.   Nose: External nose normal.   Mouth:  Moist mucus membranes.    Eyes:  Conjunctivae are normal. Right eye exhibits no discharge.  Left eye exhibits no discharge. No scleral icterus.  No periorbital edema    Cardiovascular:  Regular rate and rhythm with normal S1 and S2     Pulmonary/Chest:   Clear to auscultation bilaterally without wheezes, rhonchi or rales      Musculoskeletal:  No edema. No obvious deformity No wasting       Neurological:  Alert and oriented to person, place, and time.   Coordination normal.     Skin:   Skin is warm and dry.  No diaphoresis.   No rash noted.     Psychiatric: Normal mood and affect. Behavior is normal.  Judgment and thought content normal.     Complete Blood Count  Lab Results   Component Value Date    RBC 4.39 (L) 01/03/2024    HGB 13.4 (L) 01/03/2024    HCT 40.8 01/03/2024    MCV 93 " 01/03/2024    MCH 30.5 01/03/2024    MCHC 32.8 01/03/2024    RDW 14.6 (H) 01/03/2024     01/03/2024    MPV 10.1 01/03/2024    GRAN 2.7 01/03/2024    GRAN 54.2 01/03/2024    LYMPH 1.3 01/03/2024    LYMPH 26.6 01/03/2024    MONO 0.6 01/03/2024    MONO 12.3 01/03/2024    EOS 0.3 01/03/2024    BASO 0.06 01/03/2024    EOSINOPHIL 5.1 01/03/2024    BASOPHIL 1.2 01/03/2024    DIFFMETHOD Automated 01/03/2024       Comprehensive Metabolic Panel  Lab Results   Component Value Date     (H) 01/03/2024    BUN 41 (H) 01/03/2024    CREATININE 1.55 (H) 01/03/2024     01/03/2024    K 4.4 01/03/2024     01/03/2024    PROT 7.1 01/03/2024    ALBUMIN 3.9 01/03/2024    BILITOT 0.4 01/03/2024    AST 34 01/03/2024    ALKPHOS 41 01/03/2024    CO2 23 01/03/2024    ALT 26 01/03/2024    ANIONGAP 12 01/03/2024       TSH  Lab Results   Component Value Date    TSH 2.980 07/27/2023       Assessment / Plan:      ICD-10-CM ICD-9-CM   1. Chest pain, unspecified type  R07.9 786.50   2. Flu vaccine need  Z23 V04.81   3. Carotid stenosis, asymptomatic, bilateral  I65.23 433.10     433.30   4. Mixed hyperlipidemia  E78.2 272.2   5. CKD stage 3 due to type 2 diabetes mellitus  E11.22 250.40    N18.30 585.3   6. Pulmonary HTN  I27.20 416.8   7. Paroxysmal atrial fibrillation  I48.0 427.31   8. Type 2 diabetes mellitus with stage 3b chronic kidney disease and hypertension  E11.22 250.40    I12.9 403.90    N18.32 585.3   9. Senile purpura  D69.2 287.2   10. Acute combined systolic and diastolic congestive heart failure  I50.41 428.41     428.0   11. Atherosclerosis of native coronary artery of native heart with angina pectoris  I25.119 414.01     413.9   12. Abdominal aortic atherosclerosis  I70.0 440.0   13. Hyperlipidemia, unspecified hyperlipidemia type  E78.5 272.4     Chest pain, unspecified type    Flu vaccine need  -     Influenza (FLUAD) - Quadrivalent (Adjuvanted) *Preferred* (65+) (PF)    Carotid stenosis, asymptomatic,  bilateral  -     CV Ultrasound Bilateral Doppler Carotid; Future  -     US Carotid Bilateral; Future; Expected date: 01/05/2024  -     Comprehensive Metabolic Panel; Future  -     CBC Auto Differential; Future  -     Lipid Panel; Future  -     TSH; Future  -     Hemoglobin A1C; Future  -     Microalbumin/Creatinine Ratio, Urine; Future; Expected date: 01/05/2024    Mixed hyperlipidemia  Comments:  On lipid lowering  Orders:  -     CV Ultrasound Bilateral Doppler Carotid; Future  -     US Carotid Bilateral; Future; Expected date: 01/05/2024    CKD stage 3 due to type 2 diabetes mellitus  Comments:  Continue current medications  Orders:  -     Comprehensive Metabolic Panel; Future  -     CBC Auto Differential; Future  -     Lipid Panel; Future  -     TSH; Future  -     Hemoglobin A1C; Future  -     Microalbumin/Creatinine Ratio, Urine; Future; Expected date: 01/05/2024    Pulmonary HTN  Comments:  Continue current medication  Orders:  -     Comprehensive Metabolic Panel; Future  -     CBC Auto Differential; Future  -     Lipid Panel; Future  -     TSH; Future  -     Hemoglobin A1C; Future  -     Microalbumin/Creatinine Ratio, Urine; Future; Expected date: 01/05/2024    Paroxysmal atrial fibrillation  Comments:  No new problems  Orders:  -     Comprehensive Metabolic Panel; Future  -     CBC Auto Differential; Future  -     Lipid Panel; Future  -     TSH; Future  -     Hemoglobin A1C; Future  -     Microalbumin/Creatinine Ratio, Urine; Future; Expected date: 01/05/2024    Type 2 diabetes mellitus with stage 3b chronic kidney disease and hypertension  Comments:  Stable continue current medications  Orders:  -     Comprehensive Metabolic Panel; Future  -     CBC Auto Differential; Future  -     Lipid Panel; Future  -     TSH; Future  -     Hemoglobin A1C; Future  -     Microalbumin/Creatinine Ratio, Urine; Future; Expected date: 01/05/2024    Senile purpura  Comments:  No changes    Acute combined systolic and diastolic  congestive heart failure    Atherosclerosis of native coronary artery of native heart with angina pectoris  Comments:  Recently worked up my    Abdominal aortic atherosclerosis  Comments:  Lipid lowering medication  Orders:  -     Comprehensive Metabolic Panel; Future  -     CBC Auto Differential; Future  -     Lipid Panel; Future  -     TSH; Future  -     Hemoglobin A1C; Future  -     Microalbumin/Creatinine Ratio, Urine; Future; Expected date: 01/05/2024    Hyperlipidemia, unspecified hyperlipidemia type  Comments:  Lipid lowering medication  Orders:  -     atorvastatin (LIPITOR) 40 MG tablet; Take 1 tablet (40 mg total) by mouth once daily.  Dispense: 90 tablet; Refill: 3  -     Comprehensive Metabolic Panel; Future  -     CBC Auto Differential; Future  -     Lipid Panel; Future  -     TSH; Future  -     Hemoglobin A1C; Future  -     Microalbumin/Creatinine Ratio, Urine; Future; Expected date: 01/05/2024    Other orders  -     rOPINIRole (REQUIP) 0.25 MG tablet; Take 1 tablet (0.25 mg total) by mouth 3 (three) times daily as needed. For muscle spasm  Dispense: 90 tablet; Refill: 0    Your follow-up continue current medication no mi     Requip for muscle spasms that he gets several times a day-trial this medication because previous medication does not work.  Not necessarily restless leg but will try this medication.

## 2024-01-22 ENCOUNTER — HOSPITAL ENCOUNTER (OUTPATIENT)
Dept: CARDIOLOGY | Facility: HOSPITAL | Age: 79
Discharge: HOME | End: 2024-01-22
Attending: FAMILY MEDICINE
Payer: MEDICARE

## 2024-01-22 DIAGNOSIS — E78.2 MIXED HYPERLIPIDEMIA: ICD-10-CM

## 2024-01-22 DIAGNOSIS — I65.23 CAROTID STENOSIS, ASYMPTOMATIC, BILATERAL: ICD-10-CM

## 2024-01-22 PROCEDURE — 93880 EXTRACRANIAL BILAT STUDY: CPT | Mod: HCNC

## 2024-01-22 PROCEDURE — 93880 EXTRACRANIAL BILAT STUDY: CPT | Mod: 26,HCNC,, | Performed by: INTERNAL MEDICINE

## 2024-01-26 LAB
LEFT CBA DIAS: 25 CM/S
LEFT CBA SYS: 90 CM/S
LEFT CCA DIST DIAS: 21 CM/S
LEFT CCA DIST SYS: 86 CM/S
LEFT CCA PROX DIAS: 16 CM/S
LEFT CCA PROX SYS: 64 CM/S
LEFT ECA SYS: 155 CM/S
LEFT ICA DIST DIAS: 22 CM/S
LEFT ICA DIST SYS: 83 CM/S
LEFT ICA MID DIAS: 28 CM/S
LEFT ICA MID SYS: 84 CM/S
LEFT ICA PROX DIAS: 25 CM/S
LEFT ICA PROX SYS: 85 CM/S
LEFT VERTEBRAL DIAS: 17 CM/S
LEFT VERTEBRAL SYS: 63 CM/S
OHS CV CAROTID RIGHT ICA EDV HIGHEST: 67
OHS CV CAROTID ULTRASOUND LEFT ICA/CCA RATIO: 0.99
OHS CV CAROTID ULTRASOUND RIGHT ICA/CCA RATIO: 3.15
OHS CV PV CAROTID LEFT HIGHEST CCA: 86
OHS CV PV CAROTID LEFT HIGHEST ICA: 85
OHS CV PV CAROTID RIGHT HIGHEST CCA: 68
OHS CV PV CAROTID RIGHT HIGHEST ICA: 214
OHS CV US CAROTID LEFT HIGHEST EDV: 28
RIGHT CBA SYS: 101 CM/S
RIGHT CCA DIST DIAS: 20 CM/S
RIGHT CCA DIST SYS: 68 CM/S
RIGHT CCA PROX DIAS: 14 CM/S
RIGHT CCA PROX SYS: 59 CM/S
RIGHT ECA SYS: 425 CM/S
RIGHT ICA DIST DIAS: 24 CM/S
RIGHT ICA DIST SYS: 141 CM/S
RIGHT ICA MID DIAS: 62 CM/S
RIGHT ICA MID SYS: 201 CM/S
RIGHT ICA PROX DIAS: 67 CM/S
RIGHT ICA PROX SYS: 214 CM/S

## 2024-02-13 NOTE — TELEPHONE ENCOUNTER
Care Due:                  Date            Visit Type   Department     Provider  --------------------------------------------------------------------------------                                EP -                              PRIMARY      [facility] FAMILY  Last Visit: 01-      CARE ([facility])   MEDICINE       Sam Barroso                               -                              PRIMARY      [facility] FAMILY  Next Visit: 05-      CARE ([facility])   MEDICINE       Sam Barroso                                                            Last  Test          Frequency    Reason                     Performed    Due Date  --------------------------------------------------------------------------------    Uric Acid...  12 months..  allopurinoL..............  Not Found    Overdue    [employer] Embedded Care Due Messages. Reference number: [id number].   2/13/2024 11:35:42 AM CST

## 2024-02-14 RX ORDER — GLIPIZIDE 5 MG/1
TABLET ORAL
Qty: 90 TABLET | Refills: 0 | OUTPATIENT
Start: 2024-02-14

## 2024-02-14 NOTE — TELEPHONE ENCOUNTER
Refill Decision Note   Jin Ngo  is requesting a refill authorization.  Brief Assessment and Rationale for Refill:  Quick Discontinue     Medication Therapy Plan: Pharmacy is requesting new scripts for the following medications without required information, (sig/ frequency/qty/etc)     Medication Reconciliation Completed: No   Comments:     No Care Gaps recommended.     Note composed:8:06 AM 02/14/2024

## 2024-02-15 RX ORDER — ALLOPURINOL 300 MG/1
TABLET ORAL
Qty: 90 TABLET | Refills: 3 | OUTPATIENT
Start: 2024-02-15

## 2024-02-15 RX ORDER — ALLOPURINOL 300 MG/1
300 TABLET ORAL DAILY
Qty: 90 TABLET | Refills: 3 | Status: SHIPPED | OUTPATIENT
Start: 2024-02-15

## 2024-02-15 NOTE — TELEPHONE ENCOUNTER
No care due was identified.  [facility] Embedded Care Due Messages. Reference number: [id number].   2/15/2024 1:58:59 AM CST

## 2024-02-15 NOTE — TELEPHONE ENCOUNTER
----- Message from Raysa Urbina sent at 2/15/2024  1:33 PM CST -----  Type:  RX Refill Request    Who Called: pt  Refill or New Rx:refill  RX Name and Strength:glipiZIDE (GLUCOTROL) 5 MG tablet  Preferred Pharmacy with phone number:[facility] - [place], [place] - [address]  Local or Mail Order:local  Ordering Provider:st scott  Would the patient rather a call back or a response via [facility]? call  Best Call Back Number:[phone]  Additional Information: Pharmacy needs a new script

## 2024-02-20 DIAGNOSIS — J30.0 CHRONIC VASOMOTOR RHINITIS: ICD-10-CM

## 2024-02-20 RX ORDER — AZELASTINE 1 MG/ML
1 SPRAY, METERED NASAL 2 TIMES DAILY
Qty: 60 ML | Refills: 3 | Status: SHIPPED | OUTPATIENT
Start: 2024-02-20

## 2024-02-26 ENCOUNTER — TELEPHONE (OUTPATIENT)
Dept: FAMILY MEDICINE | Facility: CLINIC | Age: 79
End: 2024-02-26
Payer: MEDICARE

## 2024-02-26 DIAGNOSIS — Z00.00 ROUTINE CHECK-UP: Chronic | ICD-10-CM

## 2024-02-26 DIAGNOSIS — Z79.890 ENCOUNTER FOR MONITORING TESTOSTERONE REPLACEMENT THERAPY: Chronic | ICD-10-CM

## 2024-02-26 DIAGNOSIS — Z51.81 ENCOUNTER FOR MONITORING TESTOSTERONE REPLACEMENT THERAPY: Chronic | ICD-10-CM

## 2024-02-26 RX ORDER — GLIPIZIDE 5 MG/1
TABLET ORAL
Qty: 270 TABLET | Refills: 2 | Status: SHIPPED | OUTPATIENT
Start: 2024-02-26 | End: 2024-03-01

## 2024-02-26 NOTE — TELEPHONE ENCOUNTER
----- Message from Raysa Urbina sent at 2/26/2024  2:29 PM CST -----  Type:  RX Refill Request    Who Called: pt  Refill or New Rx: New Script  RX Name and Strength:glipiZIDE (GLUCOTROL) 5 MG tablet  Preferred Pharmacy with phone number:[facility] - [place], [place] - [address]  Local or Mail Order:Mail Order  Ordering Provider:st scott  Would the patient rather a call back or a response via [facility]? call  Best Call Back Number: [phone]  Additional Information: Pt is completely out.

## 2024-03-01 ENCOUNTER — TELEPHONE (OUTPATIENT)
Dept: FAMILY MEDICINE | Facility: CLINIC | Age: 79
End: 2024-03-01
Payer: MEDICARE

## 2024-03-01 DIAGNOSIS — Z00.00 ROUTINE CHECK-UP: Chronic | ICD-10-CM

## 2024-03-01 RX ORDER — GLIPIZIDE 5 MG/1
TABLET, FILM COATED, EXTENDED RELEASE ORAL
Qty: 90 TABLET | Refills: 3 | Status: SHIPPED | OUTPATIENT
Start: 2024-03-01 | End: 2024-05-06 | Stop reason: SDUPTHER

## 2024-03-01 NOTE — TELEPHONE ENCOUNTER
Eldon requesting clarification     Rx for Glipizide 5mg tab with conflicting directions. Should this be 2 tabs with breakfast & 1 tab with dinner   Or  1&1/2 tabs with breakfast & 1 tab with dinner    Please clarifiy

## 2024-03-21 ENCOUNTER — PATIENT OUTREACH (OUTPATIENT)
Dept: ADMINISTRATIVE | Facility: HOSPITAL | Age: 79
End: 2024-03-21
Payer: MEDICARE

## 2024-03-21 NOTE — PROGRESS NOTES
Population Health Chart Review & Patient Outreach Details      Additional [facility] Notes:               Updates Requested / Reviewed:      Updated Care Coordination Note, Care Everywhere, , Care Team Updated, Removed  or Duplicate Orders, and Immunizations Reconciliation Completed or Queried: [facility] Topics Overdue:      [facility] Score: 1     Eye Exam    Shingles/Zoster Vaccine  RSV Vaccine                  Health Maintenance Topic(s) Outreach Outcomes & Actions Taken:    Eye Exam - Outreach Outcomes & Actions Taken  : NAGI

## 2024-03-27 DIAGNOSIS — J30.0 CHRONIC VASOMOTOR RHINITIS: ICD-10-CM

## 2024-03-28 RX ORDER — LEVOCETIRIZINE DIHYDROCHLORIDE 5 MG/1
5 TABLET, FILM COATED ORAL NIGHTLY
Qty: 90 TABLET | Refills: 3 | Status: SHIPPED | OUTPATIENT
Start: 2024-03-28

## 2024-04-29 ENCOUNTER — LAB VISIT (OUTPATIENT)
Dept: LAB | Facility: HOSPITAL | Age: 79
End: 2024-04-29
Attending: FAMILY MEDICINE
Payer: MEDICARE

## 2024-04-29 DIAGNOSIS — I12.9 TYPE 2 DIABETES MELLITUS WITH STAGE 3B CHRONIC KIDNEY DISEASE AND HYPERTENSION: ICD-10-CM

## 2024-04-29 DIAGNOSIS — N18.32 TYPE 2 DIABETES MELLITUS WITH STAGE 3B CHRONIC KIDNEY DISEASE AND HYPERTENSION: ICD-10-CM

## 2024-04-29 DIAGNOSIS — I48.0 PAROXYSMAL ATRIAL FIBRILLATION: ICD-10-CM

## 2024-04-29 DIAGNOSIS — I70.0 ABDOMINAL AORTIC ATHEROSCLEROSIS: ICD-10-CM

## 2024-04-29 DIAGNOSIS — E11.22 TYPE 2 DIABETES MELLITUS WITH STAGE 3B CHRONIC KIDNEY DISEASE AND HYPERTENSION: ICD-10-CM

## 2024-04-29 DIAGNOSIS — I27.20 PULMONARY HTN: ICD-10-CM

## 2024-04-29 DIAGNOSIS — E78.5 HYPERLIPIDEMIA, UNSPECIFIED HYPERLIPIDEMIA TYPE: ICD-10-CM

## 2024-04-29 DIAGNOSIS — E11.22 CKD STAGE 3 DUE TO TYPE 2 DIABETES MELLITUS: ICD-10-CM

## 2024-04-29 DIAGNOSIS — I65.23 CAROTID STENOSIS, ASYMPTOMATIC, BILATERAL: ICD-10-CM

## 2024-04-29 DIAGNOSIS — N18.30 CKD STAGE 3 DUE TO TYPE 2 DIABETES MELLITUS: ICD-10-CM

## 2024-04-29 LAB
ALBUMIN SERPL BCP-MCNC: 4.1 G/DL (ref 3.5–5.2)
ALBUMIN/CREAT UR: 14 UG/MG (ref 0–30)
ALP SERPL-CCNC: 59 U/L (ref 38–126)
ALT SERPL W/O P-5'-P-CCNC: 35 U/L (ref 10–44)
ANION GAP SERPL CALC-SCNC: 13 MMOL/L (ref 8–16)
AST SERPL-CCNC: 37 U/L (ref 15–46)
BASOPHILS # BLD AUTO: 0.06 K/UL (ref 0–0.2)
BASOPHILS NFR BLD: 1.2 % (ref 0–1.9)
BILIRUB SERPL-MCNC: 0.9 MG/DL (ref 0.1–1)
CALCIUM SERPL-MCNC: 9.1 MG/DL (ref 8.7–10.5)
CHLORIDE SERPL-SCNC: 103 MMOL/L (ref 95–110)
CHOLEST SERPL-MCNC: 104 MG/DL (ref 120–199)
CHOLEST/HDLC SERPL: 3.5 {RATIO} (ref 2–5)
CO2 SERPL-SCNC: 23 MMOL/L (ref 23–29)
CREAT SERPL-MCNC: 1.13 MG/DL (ref 0.5–1.4)
CREAT UR-MCNC: 100 MG/DL (ref 23–375)
DIFFERENTIAL METHOD BLD: ABNORMAL
EOSINOPHIL # BLD AUTO: 0.2 K/UL (ref 0–0.5)
EOSINOPHIL NFR BLD: 3.1 % (ref 0–8)
ERYTHROCYTE [DISTWIDTH] IN BLOOD BY AUTOMATED COUNT: 14 % (ref 11.5–14.5)
EST. GFR  (NO RACE VARIABLE): >60 ML/MIN/1.73 M^2
ESTIMATED AVG GLUCOSE: 189 MG/DL (ref 68–131)
ESTIMATED AVG GLUCOSE: 189 MG/DL (ref 68–131)
GLUCOSE SERPL-MCNC: 178 MG/DL (ref 70–110)
HBA1C MFR BLD: 8.2 % (ref 4–5.6)
HBA1C MFR BLD: 8.2 % (ref 4–5.6)
HCT VFR BLD AUTO: 42.4 % (ref 40–54)
HDLC SERPL-MCNC: 30 MG/DL (ref 40–75)
HDLC SERPL: 28.8 % (ref 20–50)
HGB BLD-MCNC: 14.1 G/DL (ref 14–18)
IMM GRANULOCYTES # BLD AUTO: 0.03 K/UL (ref 0–0.04)
IMM GRANULOCYTES NFR BLD AUTO: 0.6 % (ref 0–0.5)
LDLC SERPL CALC-MCNC: 58.8 MG/DL (ref 63–159)
LYMPHOCYTES # BLD AUTO: 1.5 K/UL (ref 1–4.8)
LYMPHOCYTES NFR BLD: 28.4 % (ref 18–48)
MCH RBC QN AUTO: 30.4 PG (ref 27–31)
MCHC RBC AUTO-ENTMCNC: 33.3 G/DL (ref 32–36)
MCV RBC AUTO: 91 FL (ref 82–98)
MICROALBUMIN UR DL<=1MG/L-MCNC: 14 UG/ML
MONOCYTES # BLD AUTO: 0.5 K/UL (ref 0.3–1)
MONOCYTES NFR BLD: 10.3 % (ref 4–15)
NEUTROPHILS # BLD AUTO: 2.9 K/UL (ref 1.8–7.7)
NEUTROPHILS NFR BLD: 56.4 % (ref 38–73)
NONHDLC SERPL-MCNC: 74 MG/DL
NRBC BLD-RTO: 0 /100 WBC
PLATELET # BLD AUTO: 203 K/UL (ref 150–450)
PMV BLD AUTO: 10.6 FL (ref 9.2–12.9)
POTASSIUM SERPL-SCNC: 4.2 MMOL/L (ref 3.5–5.1)
PROT SERPL-MCNC: 7 G/DL (ref 6–8.4)
RBC # BLD AUTO: 4.64 M/UL (ref 4.6–6.2)
SODIUM SERPL-SCNC: 139 MMOL/L (ref 136–145)
TRIGL SERPL-MCNC: 76 MG/DL (ref 30–150)
TSH SERPL DL<=0.005 MIU/L-ACNC: 2.62 UIU/ML (ref 0.4–4)
UUN UR-MCNC: 27 MG/DL (ref 2–20)
WBC # BLD AUTO: 5.17 K/UL (ref 3.9–12.7)

## 2024-04-29 PROCEDURE — 80061 LIPID PANEL: CPT | Performed by: FAMILY MEDICINE

## 2024-04-29 PROCEDURE — 84443 ASSAY THYROID STIM HORMONE: CPT | Mod: PN | Performed by: FAMILY MEDICINE

## 2024-04-29 PROCEDURE — 83036 HEMOGLOBIN GLYCOSYLATED A1C: CPT | Performed by: FAMILY MEDICINE

## 2024-04-29 PROCEDURE — 80053 COMPREHEN METABOLIC PANEL: CPT | Mod: PN | Performed by: FAMILY MEDICINE

## 2024-04-29 PROCEDURE — 36415 COLL VENOUS BLD VENIPUNCTURE: CPT | Mod: PN | Performed by: FAMILY MEDICINE

## 2024-04-29 PROCEDURE — 82043 UR ALBUMIN QUANTITATIVE: CPT | Mod: PN | Performed by: FAMILY MEDICINE

## 2024-04-29 PROCEDURE — 85025 COMPLETE CBC W/AUTO DIFF WBC: CPT | Mod: PN | Performed by: FAMILY MEDICINE

## 2024-05-06 DIAGNOSIS — E78.5 HYPERLIPIDEMIA, UNSPECIFIED HYPERLIPIDEMIA TYPE: ICD-10-CM

## 2024-05-06 RX ORDER — FENOFIBRATE 160 MG/1
160 TABLET ORAL DAILY
Qty: 90 TABLET | Refills: 1 | Status: SHIPPED | OUTPATIENT
Start: 2024-05-06

## 2024-05-06 RX ORDER — ATORVASTATIN CALCIUM 40 MG/1
40 TABLET, FILM COATED ORAL DAILY
Qty: 90 TABLET | Refills: 3 | Status: SHIPPED | OUTPATIENT
Start: 2024-05-06

## 2024-05-06 RX ORDER — GLIPIZIDE 5 MG/1
TABLET, FILM COATED, EXTENDED RELEASE ORAL
Qty: 90 TABLET | Refills: 3 | Status: SHIPPED | OUTPATIENT
Start: 2024-05-06 | End: 2024-05-13 | Stop reason: SDUPTHER

## 2024-05-06 NOTE — TELEPHONE ENCOUNTER
Care Due:                  Date            Visit Type   Department     Provider  --------------------------------------------------------------------------------                                EP -                              PRIMARY      [facility] FAMILY  Last Visit: 01-      CARE ([facility])   MEDICINE       Sam Barroso                               -                              PRIMARY      [facility] FAMILY  Next Visit: 05-      CARE ([facility])   [facility]       Sam Barroso                                                            Last  Test          Frequency    Reason                     Performed    Due Date  --------------------------------------------------------------------------------    Uric Acid...  12 months..  allopurinoL..............  Not Found    Overdue    [employer] Embedded Care Due Messages. Reference number: [id number].   5/06/2024 4:42:33 PM CDT

## 2024-05-06 NOTE — TELEPHONE ENCOUNTER
----- Message from Zabrina Childers sent at 5/6/2024  4:13 PM CDT -----  Regarding: appt  Contact: Candelaria / walked in  The pt had really bad diarrhea today and could not make it in to his appt.  Please call Candelaria to reschedule at [phone]

## 2024-05-06 NOTE — TELEPHONE ENCOUNTER
Pt's wife has requested refills on Fenofibrate, Glipizide, and Atorvastatin. I've pended them for approval.

## 2024-05-13 ENCOUNTER — OFFICE VISIT (OUTPATIENT)
Dept: FAMILY MEDICINE | Facility: CLINIC | Age: 79
End: 2024-05-13
Payer: MEDICARE

## 2024-05-13 VITALS
OXYGEN SATURATION: 96 % | HEART RATE: 76 BPM | SYSTOLIC BLOOD PRESSURE: 108 MMHG | HEIGHT: 69 IN | BODY MASS INDEX: 28.75 KG/M2 | TEMPERATURE: 99 F | DIASTOLIC BLOOD PRESSURE: 64 MMHG | WEIGHT: 194.13 LBS

## 2024-05-13 DIAGNOSIS — E78.5 HYPERLIPIDEMIA, UNSPECIFIED HYPERLIPIDEMIA TYPE: ICD-10-CM

## 2024-05-13 DIAGNOSIS — Z79.4 TYPE 2 DIABETES MELLITUS WITH CHRONIC KIDNEY DISEASE, WITH LONG-TERM CURRENT USE OF INSULIN, UNSPECIFIED CKD STAGE: ICD-10-CM

## 2024-05-13 DIAGNOSIS — E11.22 CKD STAGE 3 DUE TO TYPE 2 DIABETES MELLITUS: ICD-10-CM

## 2024-05-13 DIAGNOSIS — Z00.00 ROUTINE CHECK-UP: Primary | ICD-10-CM

## 2024-05-13 DIAGNOSIS — E78.2 MIXED HYPERLIPIDEMIA: ICD-10-CM

## 2024-05-13 DIAGNOSIS — I48.0 PAROXYSMAL ATRIAL FIBRILLATION: ICD-10-CM

## 2024-05-13 DIAGNOSIS — E11.22 TYPE 2 DIABETES MELLITUS WITH CHRONIC KIDNEY DISEASE, WITH LONG-TERM CURRENT USE OF INSULIN, UNSPECIFIED CKD STAGE: ICD-10-CM

## 2024-05-13 DIAGNOSIS — N18.30 CKD STAGE 3 DUE TO TYPE 2 DIABETES MELLITUS: ICD-10-CM

## 2024-05-13 DIAGNOSIS — I27.20 PULMONARY HTN: ICD-10-CM

## 2024-05-13 DIAGNOSIS — E11.22 TYPE 2 DIABETES MELLITUS WITH STAGE 3B CHRONIC KIDNEY DISEASE AND HYPERTENSION: ICD-10-CM

## 2024-05-13 DIAGNOSIS — N18.32 TYPE 2 DIABETES MELLITUS WITH STAGE 3B CHRONIC KIDNEY DISEASE AND HYPERTENSION: ICD-10-CM

## 2024-05-13 DIAGNOSIS — I50.41 ACUTE COMBINED SYSTOLIC AND DIASTOLIC CONGESTIVE HEART FAILURE: ICD-10-CM

## 2024-05-13 DIAGNOSIS — I12.9 TYPE 2 DIABETES MELLITUS WITH STAGE 3B CHRONIC KIDNEY DISEASE AND HYPERTENSION: ICD-10-CM

## 2024-05-13 PROCEDURE — 1160F RVW MEDS BY RX/DR IN RCRD: CPT | Mod: CPTII,S$GLB,, | Performed by: FAMILY MEDICINE

## 2024-05-13 PROCEDURE — 1101F PT FALLS ASSESS-DOCD LE1/YR: CPT | Mod: CPTII,S$GLB,, | Performed by: FAMILY MEDICINE

## 2024-05-13 PROCEDURE — 3288F FALL RISK ASSESSMENT DOCD: CPT | Mod: CPTII,S$GLB,, | Performed by: FAMILY MEDICINE

## 2024-05-13 PROCEDURE — 1126F AMNT PAIN NOTED NONE PRSNT: CPT | Mod: CPTII,S$GLB,, | Performed by: FAMILY MEDICINE

## 2024-05-13 PROCEDURE — 3078F DIAST BP <80 MM HG: CPT | Mod: CPTII,S$GLB,, | Performed by: FAMILY MEDICINE

## 2024-05-13 PROCEDURE — 1159F MED LIST DOCD IN RCRD: CPT | Mod: CPTII,S$GLB,, | Performed by: FAMILY MEDICINE

## 2024-05-13 PROCEDURE — 99214 OFFICE O/P EST MOD 30 MIN: CPT | Mod: S$GLB,,, | Performed by: FAMILY MEDICINE

## 2024-05-13 PROCEDURE — 3074F SYST BP LT 130 MM HG: CPT | Mod: CPTII,S$GLB,, | Performed by: FAMILY MEDICINE

## 2024-05-13 RX ORDER — GLIPIZIDE 5 MG/1
TABLET, FILM COATED, EXTENDED RELEASE ORAL
Qty: 270 TABLET | Refills: 3 | Status: SHIPPED | OUTPATIENT
Start: 2024-05-13

## 2024-05-13 RX ORDER — PEN NEEDLE, DIABETIC 31 GX5/16"
NEEDLE, DISPOSABLE MISCELLANEOUS
Qty: 100 EACH | Refills: 3 | Status: SHIPPED | OUTPATIENT
Start: 2024-05-13

## 2024-05-13 NOTE — PROGRESS NOTES
" Patient ID: Jin Ngo is a 78 y.o. male.    Chief Complaint: Follow-up    HPI      Jin Ngo is a 78 y.o. male fall with back pain from hitting the bed  -pain is improving.  Using over-the-counter medication.  Diabetes mellitus-nearly under control with current medication -hemoglobin A1c 8.2.  Lipidemia using atorvastatin 40 milligrams daily.  Patient also with paroxysmal atrial fibrillation on Eliquis 5 milligrams b.i.d..  History of CVA remote past-on lipid lowering medication Eliquis daily    Vitals:    05/13/24 1517   BP: 108/64   BP Location: Right arm   Patient Position: Sitting   Pulse: 76   Temp: 98.6 °F (37 °C)   TempSrc: Oral   SpO2: 96%   Weight: 88 kg (194 lb 1.8 oz)   Height: 5' 9" (1.753 m)            Review of Symptoms      Physical Exam    Constitutional:  Oriented to person, place, and time.appears well-developed and well-nourished.  No distress.      HENT  Head: Normocephalic and atraumatic  Right Ear: External ear normal.   Left Ear: External ear normal.   Nose: External nose normal.   Mouth:  Moist mucus membranes.    Eyes:  Conjunctivae are normal. Right eye exhibits no discharge.  Left eye exhibits no discharge. No scleral icterus.  No periorbital edema    Cardiovascular:  Irregular    Pulmonary/Chest:   Clear to auscultation bilaterally without wheezes, rhonchi or rales      Musculoskeletal:  No edema. No obvious deformity No wasting       Neurological:  Alert and oriented to person, place, and time.   Coordination normal.     Skin:   Skin is warm and dry.  No diaphoresis.   No rash noted.     Psychiatric: Normal mood and affect. Behavior is normal.  Judgment and thought content normal.     Complete Blood Count  Lab Results   Component Value Date    RBC 4.64 04/29/2024    HGB 14.1 04/29/2024    HCT 42.4 04/29/2024    MCV 91 04/29/2024    MCH 30.4 04/29/2024    MCHC 33.3 04/29/2024    RDW 14.0 04/29/2024     04/29/2024    MPV 10.6 04/29/2024    GRAN 2.9 04/29/2024    GRAN " 56.4 04/29/2024    LYMPH 1.5 04/29/2024    LYMPH 28.4 04/29/2024    MONO 0.5 04/29/2024    MONO 10.3 04/29/2024    EOS 0.2 04/29/2024    BASO 0.06 04/29/2024    EOSINOPHIL 3.1 04/29/2024    BASOPHIL 1.2 04/29/2024    DIFFMETHOD Automated 04/29/2024       Comprehensive Metabolic Panel  Lab Results   Component Value Date     (H) 04/29/2024    BUN 27 (H) 04/29/2024    CREATININE 1.13 04/29/2024     04/29/2024    K 4.2 04/29/2024     04/29/2024    PROT 7.0 04/29/2024    ALBUMIN 4.1 04/29/2024    BILITOT 0.9 04/29/2024    AST 37 04/29/2024    ALKPHOS 59 04/29/2024    CO2 23 04/29/2024    ALT 35 04/29/2024    ANIONGAP 13 04/29/2024       TSH  Lab Results   Component Value Date    TSH 2.620 04/29/2024       Assessment / Plan:      ICD-10-CM ICD-9-CM   1. Routine check-up  Z00.00 V70.0   2. Mixed hyperlipidemia  E78.2 272.2   3. CKD stage 3 due to type 2 diabetes mellitus  E11.22 250.40    N18.30 585.3   4. Acute combined systolic and diastolic congestive heart failure  I50.41 428.41     428.0   5. Type 2 diabetes mellitus with stage 3b chronic kidney disease and hypertension  E11.22 250.40    I12.9 403.90    N18.32 585.3   6. Paroxysmal atrial fibrillation  I48.0 427.31   7. Pulmonary HTN  I27.20 416.8   8. Type 2 diabetes mellitus with chronic kidney disease, with long-term current use of insulin, unspecified CKD stage  E11.22 250.40    Z79.4 585.9     V58.67   9. Hyperlipidemia, unspecified hyperlipidemia type  E78.5 272.4     Routine check-up    Mixed hyperlipidemia    CKD stage 3 due to type 2 diabetes mellitus    Acute combined systolic and diastolic congestive heart failure    Type 2 diabetes mellitus with stage 3b chronic kidney disease and hypertension  -     Hemoglobin A1C; Standing    Paroxysmal atrial fibrillation    Pulmonary HTN    Type 2 diabetes mellitus with chronic kidney disease, with long-term current use of insulin, unspecified CKD stage  -     blood sugar diagnostic (ACCU-CHEK VERONICA  "PLUS TEST STRP) Strp; To check blood glucose 4 times daily  Dispense: 300 strip; Refill: 0    Hyperlipidemia, unspecified hyperlipidemia type  Comments:  Lipid lowering medication    Other orders  -     BD ULTRA-FINE TEJINDER PEN NEEDLE 32 gauge x 5/32" Ndle; USE WITH LEVEMIR DAILYUSE WITH LEVEMIR DAILY  Dispense: 100 each; Refill: 3  -     glipiZIDE 5 MG TR24; 2 tablets (10 mg total) by mouth daily with breakfast AND 1 tablet (5 mg total) daily with dinner or evening meal  Dispense: 270 tablet; Refill: 3    Discussed diabetes control and medication  Discussed lipid lowering medication  Atrial fib stable pulse rate controlled  "

## 2024-06-24 ENCOUNTER — LAB VISIT (OUTPATIENT)
Dept: LAB | Facility: HOSPITAL | Age: 79
End: 2024-06-24
Attending: FAMILY MEDICINE
Payer: MEDICARE

## 2024-06-24 DIAGNOSIS — E11.22 TYPE 2 DIABETES MELLITUS WITH STAGE 3B CHRONIC KIDNEY DISEASE AND HYPERTENSION: ICD-10-CM

## 2024-06-24 DIAGNOSIS — N18.32 TYPE 2 DIABETES MELLITUS WITH STAGE 3B CHRONIC KIDNEY DISEASE AND HYPERTENSION: ICD-10-CM

## 2024-06-24 DIAGNOSIS — I12.9 TYPE 2 DIABETES MELLITUS WITH STAGE 3B CHRONIC KIDNEY DISEASE AND HYPERTENSION: ICD-10-CM

## 2024-06-24 LAB
ESTIMATED AVG GLUCOSE: 171 MG/DL (ref 68–131)
HBA1C MFR BLD: 7.6 % (ref 4–5.6)

## 2024-06-24 PROCEDURE — 83036 HEMOGLOBIN GLYCOSYLATED A1C: CPT | Mod: HCNC | Performed by: FAMILY MEDICINE

## 2024-06-24 PROCEDURE — 36415 COLL VENOUS BLD VENIPUNCTURE: CPT | Mod: HCNC,PN | Performed by: FAMILY MEDICINE

## 2024-06-25 ENCOUNTER — TELEPHONE (OUTPATIENT)
Dept: FAMILY MEDICINE | Facility: CLINIC | Age: 79
End: 2024-06-25
Payer: MEDICARE

## 2024-06-25 NOTE — TELEPHONE ENCOUNTER
Spoke with pt's wife; discussed A1C results; Wife verbalized understanding    ----- Message from Sam Barroso MD sent at 6/24/2024  5:38 PM CDT -----  Hemoglobin A1c is down to 7.6.  Keep up the great work.

## 2024-07-17 ENCOUNTER — PATIENT OUTREACH (OUTPATIENT)
Dept: ADMINISTRATIVE | Facility: HOSPITAL | Age: 79
End: 2024-07-17
Payer: MEDICARE

## 2024-07-17 NOTE — PROGRESS NOTES
Population Health Chart Review & Patient Outreach Details      Additional [facility] [place] [facility] Notes:               Updates Requested / Reviewed:      Updated Care Coordination Note, Care Everywhere, , Care Team Updated, Removed  or Duplicate Orders, and Immunizations Reconciliation Completed or Queried: [facility] Topics Overdue:      [facility] Score: 1     Eye Exam    Shingles/Zoster Vaccine  RSV Vaccine                  Health Maintenance Topic(s) Outreach Outcomes & Actions Taken:    Eye Exam - Outreach Outcomes & Actions Taken  : LM, no portal

## 2024-07-27 NOTE — TELEPHONE ENCOUNTER
Care Due:                  Date            Visit Type   Department     Provider  --------------------------------------------------------------------------------                                EP -                              PRIMARY      [facility] FAMILY  Last Visit: 05-      CARE ([facility])   MEDICINE       Sam Barroso                               -                              PRIMARY      [facility] FAMILY  Next Visit: 11-      CARE ([facility])   MEDICINE       Sam Barroso                                                            Last  Test          Frequency    Reason                     Performed    Due Date  --------------------------------------------------------------------------------    Uric Acid...  12 months..  allopurinoL..............  Not Found    Overdue    [employer] Embedded Care Due Messages. Reference number: [id number].   7/27/2024 1:47:35 AM CDT

## 2024-07-29 RX ORDER — DULOXETIN HYDROCHLORIDE 30 MG/1
60 CAPSULE, DELAYED RELEASE ORAL
Qty: 180 CAPSULE | Refills: 2 | Status: SHIPPED | OUTPATIENT
Start: 2024-07-29

## 2024-07-29 NOTE — TELEPHONE ENCOUNTER
Provider Staff:  Action required for this patient     Please see care gap opportunities below in Care Due Message.    Thanks!  [facility]     Appointments      Date Provider   Last Visit   5/13/2024 Sam Barroso MD   Next Visit   11/13/2024 Sam Barroso MD      Refill Decision Note   Jin Ngo  is requesting a refill authorization.  Brief Assessment and Rationale for Refill:  Approve     Medication Therapy Plan:         Comments:     Note composed:2:37 AM 07/29/2024

## 2024-08-06 RX ORDER — APIXABAN 5 MG/1
5 TABLET, FILM COATED ORAL 2 TIMES DAILY
Qty: 60 TABLET | Refills: 11 | Status: SHIPPED | OUTPATIENT
Start: 2024-08-06

## 2024-08-30 NOTE — TELEPHONE ENCOUNTER
No care due was identified.  [facility] Embedded Care Due Messages. Reference number: [id number].   8/30/2024 3:49:47 PM CDT

## 2024-08-31 RX ORDER — TAMSULOSIN HYDROCHLORIDE 0.4 MG/1
CAPSULE ORAL
Qty: 90 CAPSULE | Refills: 0 | OUTPATIENT
Start: 2024-08-31

## 2024-08-31 NOTE — TELEPHONE ENCOUNTER
Refill Decision Note   Jin Ngo  is requesting a refill authorization.  Brief Assessment and Rationale for Refill:  Quick Discontinue     Medication Therapy Plan:    Pharmacy is requesting new scripts for the following medications without required information, (sig/ frequency/qty/etc)      Medication Reconciliation Completed: No     Comments: Pharmacies have been requesting medications for patients without required information, (sig, frequency, qty, etc.). In addition, requests are sent for medication(s) pt. are currently not taking, and medications patients have never taken.    We have spoken to the pharmacies about these request types and advised their teams previously that we are unable to assess these New Script requests and require all details for these requests. This is a known issue and has been reported.     Note composed:10:33 AM 08/31/2024

## 2024-12-04 RX ORDER — ALLOPURINOL 300 MG/1
300 TABLET ORAL
Qty: 90 TABLET | Refills: 0 | Status: SHIPPED | OUTPATIENT
Start: 2024-12-04

## 2024-12-04 NOTE — TELEPHONE ENCOUNTER
Refill Routing Note   Medication(s) are not appropriate for processing by [facility] for the following reason(s):        Required labs outdated    [facility] action(s):  Defer     Requires labs : Yes             Appointments  past 12m or future 3m with PCP    Date Provider   Last Visit   5/13/2024 Sam Barroso MD   Next Visit   12/12/2024 Sam Barroso MD   ED visits in past 90 days: 0        Note composed:1:33 PM 12/04/2024

## 2024-12-04 NOTE — TELEPHONE ENCOUNTER
Care Due:                  Date            Visit Type   Department     Provider  --------------------------------------------------------------------------------                                EP -                              [facility] FAMILY  Last Visit: 05-      CARE ([facility])   MEDICINE       Sam Barroso                               -                              [facility] FAMILY  Next Visit: 12-      CARE ([facility])   MEDICINE       Sam Barroso                                                            Last  Test          Frequency    Reason                     Performed    Due Date  --------------------------------------------------------------------------------    HBA1C.......  6 months...  glipiZIDE, insulin.......  06- 12-    Uric Acid...  12 months..  allopurinoL..............  Not Found    Overdue    [employer] Embedded Care Due Messages. Reference number: [id number].   12/04/2024 3:29:45 AM CST

## 2024-12-11 ENCOUNTER — LAB VISIT (OUTPATIENT)
Dept: LAB | Facility: HOSPITAL | Age: 79
End: 2024-12-11
Attending: FAMILY MEDICINE
Payer: MEDICARE

## 2024-12-11 DIAGNOSIS — E11.22 TYPE 2 DIABETES MELLITUS WITH STAGE 3B CHRONIC KIDNEY DISEASE AND HYPERTENSION: ICD-10-CM

## 2024-12-11 DIAGNOSIS — I12.9 TYPE 2 DIABETES MELLITUS WITH STAGE 3B CHRONIC KIDNEY DISEASE AND HYPERTENSION: ICD-10-CM

## 2024-12-11 DIAGNOSIS — N18.32 TYPE 2 DIABETES MELLITUS WITH STAGE 3B CHRONIC KIDNEY DISEASE AND HYPERTENSION: ICD-10-CM

## 2024-12-11 LAB
ESTIMATED AVG GLUCOSE: 171 MG/DL (ref 68–131)
HBA1C MFR BLD: 7.6 % (ref 4–5.6)

## 2024-12-11 PROCEDURE — 36415 COLL VENOUS BLD VENIPUNCTURE: CPT | Mod: HCNC,PN | Performed by: FAMILY MEDICINE

## 2024-12-11 PROCEDURE — 83036 HEMOGLOBIN GLYCOSYLATED A1C: CPT | Mod: HCNC,PN | Performed by: FAMILY MEDICINE

## 2024-12-12 ENCOUNTER — OFFICE VISIT (OUTPATIENT)
Dept: FAMILY MEDICINE | Facility: CLINIC | Age: 79
End: 2024-12-12
Payer: MEDICARE

## 2024-12-12 VITALS
DIASTOLIC BLOOD PRESSURE: 50 MMHG | HEIGHT: 69 IN | SYSTOLIC BLOOD PRESSURE: 112 MMHG | WEIGHT: 192.56 LBS | HEART RATE: 74 BPM | TEMPERATURE: 98 F | OXYGEN SATURATION: 96 % | BODY MASS INDEX: 28.52 KG/M2

## 2024-12-12 DIAGNOSIS — E11.22 CKD STAGE 3 DUE TO TYPE 2 DIABETES MELLITUS: ICD-10-CM

## 2024-12-12 DIAGNOSIS — E78.5 HYPERLIPIDEMIA, UNSPECIFIED HYPERLIPIDEMIA TYPE: ICD-10-CM

## 2024-12-12 DIAGNOSIS — N18.30 CKD STAGE 3 DUE TO TYPE 2 DIABETES MELLITUS: ICD-10-CM

## 2024-12-12 DIAGNOSIS — Z12.11 SCREENING FOR COLON CANCER: ICD-10-CM

## 2024-12-12 DIAGNOSIS — N18.32 TYPE 2 DIABETES MELLITUS WITH STAGE 3B CHRONIC KIDNEY DISEASE AND HYPERTENSION: Primary | ICD-10-CM

## 2024-12-12 DIAGNOSIS — E11.22 TYPE 2 DIABETES MELLITUS WITH STAGE 3B CHRONIC KIDNEY DISEASE AND HYPERTENSION: Primary | ICD-10-CM

## 2024-12-12 DIAGNOSIS — Z23 NEED FOR INFLUENZA VACCINATION: ICD-10-CM

## 2024-12-12 DIAGNOSIS — I12.9 TYPE 2 DIABETES MELLITUS WITH STAGE 3B CHRONIC KIDNEY DISEASE AND HYPERTENSION: Primary | ICD-10-CM

## 2024-12-12 RX ORDER — FUROSEMIDE 40 MG/1
40 TABLET ORAL DAILY
Qty: 90 TABLET | Refills: 3 | Status: SHIPPED | OUTPATIENT
Start: 2024-12-12 | End: 2025-12-12

## 2024-12-12 RX ORDER — CHLORHEXIDINE GLUCONATE ORAL RINSE 1.2 MG/ML
15 SOLUTION DENTAL 2 TIMES DAILY
Qty: 500 ML | Refills: 0 | Status: SHIPPED | OUTPATIENT
Start: 2024-12-12 | End: 2024-12-26

## 2024-12-12 RX ORDER — TAMSULOSIN HYDROCHLORIDE 0.4 MG/1
0.4 CAPSULE ORAL DAILY
Qty: 90 CAPSULE | Refills: 3 | Status: SHIPPED | OUTPATIENT
Start: 2024-12-12

## 2024-12-12 RX ORDER — FUROSEMIDE 40 MG/1
40 TABLET ORAL DAILY
Qty: 30 TABLET | Refills: 11 | Status: SHIPPED | OUTPATIENT
Start: 2024-12-12 | End: 2024-12-12 | Stop reason: SDUPTHER

## 2024-12-13 ENCOUNTER — TELEPHONE (OUTPATIENT)
Dept: ENDOSCOPY | Facility: HOSPITAL | Age: 79
End: 2024-12-13

## 2024-12-13 ENCOUNTER — CLINICAL SUPPORT (OUTPATIENT)
Dept: ENDOSCOPY | Facility: HOSPITAL | Age: 79
End: 2024-12-13
Attending: FAMILY MEDICINE
Payer: MEDICARE

## 2024-12-13 VITALS — WEIGHT: 192 LBS | BODY MASS INDEX: 28.44 KG/M2 | HEIGHT: 69 IN

## 2024-12-13 DIAGNOSIS — Z12.11 SCREENING FOR COLON CANCER: ICD-10-CM

## 2024-12-13 DIAGNOSIS — Z12.11 SPECIAL SCREENING FOR MALIGNANT NEOPLASMS, COLON: Primary | ICD-10-CM

## 2024-12-13 NOTE — TELEPHONE ENCOUNTER
Referral for procedure from PAT appointment      Spoke to pt wife to schedule procedure(s) Colonoscopy       Physician to perform procedure(s) Dr. DAVY Lopez  Date of Procedure (s) 02/03/25  Arrival Time 10:00 AM  Time of Procedure(s) 11:00 AM   Location of Procedure(s) [place] [address] Floor  Type of Rx Prep sent to patient: PEG  Instructions provided to patient via Postal Mail    Patient was informed on the following information and verbalized understanding. Screening questionnaire reviewed with patient and complete. If procedure requires anesthesia, a responsible adult needs to be present to accompany the patient home, patient cannot drive after receiving anesthesia. Appointment details are tentative, especially check-in time. Patient will receive a prep-op call 7 days prior to confirm check-in time for procedure. If applicable the patient should contact their pharmacy to verify Rx for procedure prep is ready for pick-up. Patient was advised to call the scheduling department at [phone] if pharmacy states no Rx is available. Patient was advised to call the endoscopy scheduling department if any questions or concerns arise.      [facility] Scheduling Department

## 2024-12-13 NOTE — PROGRESS NOTES
Patient ID: Jin Ngo is a 78 y.o. male.    Chief Complaint: Follow-up and Dental Problem    HPI    Jin Ngo is a 78 y.o. male     History of Present Illness    CHIEF COMPLAINT:  Patient presents today for follow-up.    ALLERGIES AND NASAL ISSUES:  He experiences sudden onset of sneezing, particularly when sitting in the living room watching TV. He uses nasal spray approximately twice daily for symptom relief, though not on a regular schedule. Environmental factors appear to influence his allergies, with outdoor conditions and air quality playing a role. He has implemented an air purifier in the TV room to help reduce pollen and dust in the air. He notes proximity to a concrete plant and cane fields as potential sources of airborne irritants.    DIABETES MANAGEMENT:  He reports a recent A1C level of 7.6, consistent with previous measurements (7.4 two years ago and 7.65 five months ago). His current diet includes occasional desserts such as carrot cake in small portions, and he regularly consumes rice and potatoes.    URINARY AND PROSTATE ISSUES:  He reports frequent urination. He is currently taking Tamsulosin to help manage urinary problems and improve urine flow.    EYE ISSUES:  He reports persistent eye itching and confirms using eye drops for symptom management.    DENTAL PROBLEMS:  He reports gum disease with associated soreness and gum recession, causing difficulty brushing his teeth due to pain. He notes tooth staining from consuming iced tea, with attempts to brush the stains leading to rawness and soreness in his mouth.    CARDIOVASCULAR ISSUES:  He reports experiencing an irregular heartbeat, which he can sometimes feel but not always. He expresses a desire to resume walking activities.    BACK PROBLEMS AND MOBILITY ISSUES:  He reports longstanding back problems since age 15 when he broke his back. He experiences difficulty moving and tends to drag his feet more than he used to. His back  "condition is severe, mentioning that imaging studies have confirmed the extent of his back problems. He can only stand for about 30 minutes before needing to sit down due to fatigue and discomfort. He can walk for short distances but requires frequent rest breaks. He estimates he can do about 50% of the activities he used to perform before his condition worsened. He denies experiencing falls or loss of balance. He mentions using a cane for support when walking longer distances or on uneven surfaces. He expresses frustration that others may not fully understand or believe the severity of his condition despite his ability to walk and talk.         Vitals:    12/12/24 1134   BP: (!) 112/50   BP Location: Right arm   Patient Position: Sitting   Pulse: 74   Temp: 98.2 °F (36.8 °C)   TempSrc: Oral   SpO2: 96%   Weight: 87.4 kg (192 lb 9.2 oz)   Height: 5' 9" (1.753 m)            Review of Symptoms      Physical Exam    Constitutional:  Oriented to person, place, and time.appears well-developed and well-nourished.  No distress.      HENT  Head: Normocephalic and atraumatic  Right Ear: External ear normal.   Left Ear: External ear normal.   Nose: External nose normal.   Mouth:  Moist mucus membranes.    Eyes:  Conjunctivae are normal. Right eye exhibits no discharge.  Left eye exhibits no discharge. No scleral icterus.  No periorbital edema    Cardiovascular:  Regular rate and rhythm with normal S1 and S2     Pulmonary/Chest:   Clear to auscultation bilaterally without wheezes, rhonchi or rales      Musculoskeletal:  No edema. No obvious deformity No wasting       Neurological:  Alert and oriented to person, place, and time.       Skin:   Skin is warm and dry.  No diaphoresis.   No rash noted.     Psychiatric: Normal mood and affect. Behavior is normal.  Judgment and thought content normal.     ]    Complete Blood Count  Lab Results   Component Value Date    RBC 4.64 04/29/2024    HGB 14.1 04/29/2024    HCT 42.4 04/29/2024 " "   MCV 91 04/29/2024    MCH 30.4 04/29/2024    MCHC 33.3 04/29/2024    RDW 14.0 04/29/2024     04/29/2024    MPV 10.6 04/29/2024    GRAN 2.9 04/29/2024    GRAN 56.4 04/29/2024    LYMPH 1.5 04/29/2024    LYMPH 28.4 04/29/2024    MONO 0.5 04/29/2024    MONO 10.3 04/29/2024    EOS 0.2 04/29/2024    BASO 0.06 04/29/2024    EOSINOPHIL 3.1 04/29/2024    BASOPHIL 1.2 04/29/2024    DIFFMETHOD Automated 04/29/2024       Comprehensive Metabolic Panel  No results found for: "GLU", "BUN", "CREATININE", "NA", "K", "CL", "PROT", "ALBUMIN", "BILITOT", "AST", "ALKPHOS", "CO2", "ALT", "ANIONGAP", "EGFRNONAA", "ESTGFRAFRICA"    TSH  No results found for: "TSH"    Assessment / Plan:      ICD-10-CM ICD-9-CM   1. Type 2 diabetes mellitus with stage 3b chronic kidney disease and hypertension  E11.22 250.40    I12.9 403.90    N18.32 585.3   2. Need for influenza vaccination  Z23 V04.81   3. Screening for colon cancer  Z12.11 V76.51   4. CKD stage 3 due to type 2 diabetes mellitus  E11.22 250.40    N18.30 585.3   5. Hyperlipidemia, unspecified hyperlipidemia type  E78.5 272.4     Type 2 diabetes mellitus with stage 3b chronic kidney disease and hypertension  -     Lipid Panel; Future; Expected date: 12/12/2024  -     TSH; Future; Expected date: 12/12/2024  -     Comprehensive Metabolic Panel; Future; Expected date: 12/12/2024  -     Microalbumin/Creatinine Ratio, Urine; Future; Expected date: 12/12/2024  -     Hemoglobin A1C; Future; Expected date: 12/12/2024  -     CBC Auto Differential; Future; Expected date: 12/12/2024    Need for influenza vaccination  -     influenza (adjuvanted) (Fluad) 45 mcg/0.5 mL IM vaccine (> or = 66 yo) 0.5 mL    Screening for colon cancer  -     Ambulatory referral/consult to Endo Procedure ; Future; Expected date: 12/13/2024    CKD stage 3 due to type 2 diabetes mellitus  -     Lipid Panel; Future; Expected date: 12/12/2024  -     TSH; Future; Expected date: 12/12/2024  -     Comprehensive " Metabolic Panel; Future; Expected date: 12/12/2024  -     Microalbumin/Creatinine Ratio, Urine; Future; Expected date: 12/12/2024  -     Hemoglobin A1C; Future; Expected date: 12/12/2024  -     CBC Auto Differential; Future; Expected date: 12/12/2024    Hyperlipidemia, unspecified hyperlipidemia type  -     Lipid Panel; Future; Expected date: 12/12/2024  -     TSH; Future; Expected date: 12/12/2024  -     Comprehensive Metabolic Panel; Future; Expected date: 12/12/2024  -     Microalbumin/Creatinine Ratio, Urine; Future; Expected date: 12/12/2024  -     Hemoglobin A1C; Future; Expected date: 12/12/2024  -     CBC Auto Differential; Future; Expected date: 12/12/2024    Other orders  -     Discontinue: furosemide (LASIX) 40 MG tablet; Take 1 tablet (40 mg total) by mouth once daily.  Dispense: 30 tablet; Refill: 11  -     tamsulosin (FLOMAX) 0.4 mg Cap; Take 1 capsule (0.4 mg total) by mouth once daily.  Dispense: 90 capsule; Refill: 3  -     furosemide (LASIX) 40 MG tablet; Take 1 tablet (40 mg total) by mouth once daily.  Dispense: 90 tablet; Refill: 3  -     chlorhexidine (PERIDEX) 0.12 % solution; Use as directed 15 mLs in the mouth or throat 2 (two) times daily. for 14 days  Dispense: 500 mL; Refill: 0        Assessment & Plan    - Assessed patient's diabetes management: A1C of 7.6 considered acceptable given patient's age and risk of hypoglycemia with more aggressive treatment  - Evaluated cardiac rhythm: noted mostly regular beats with occasional irregular beats, likely premature ventricular contractions rather than atrial fibrillation  - Reviewed overall physical condition: patient's mobility and cognitive function appear relatively well-preserved for age    TYPE 2 DIABETES MELLITUS:  - Explained that current diabetes management approach balances glucose control with quality of life, avoiding overly restrictive diet.  - Patient to continue current eating habits and activity level.  - Continued current insulin  regimen without changes.    CARDIOVASCULAR HEALTH:  - Discussed normal occurrence of premature ventricular contractions.  - Continued Lasix at current dose.  - Recommend resuming walking for exercise.    BENIGN PROSTATIC HYPERPLASIA:  - Continued Tamsulosin (Flomax) at current dose.    PERIODONTAL DISEASE:  - Started Paradex mouthwash for gum inflammation.    PREVENTIVE CARE:  - Flu shot administered in office.    FOLLOW-UP:  - Lab work ordered for 6 months from now.  - Follow up in 6 months for lab work.         This note was generated with the assistance of ambient listening technology. Verbal consent was obtained by the patient and accompanying visitor(s) for the recording of patient appointment to facilitate this note. I attest to having reviewed and edited the generated note for accuracy, though some syntax or spelling errors may persist. Please contact the author of this note for any clarification.

## 2024-12-13 NOTE — TELEPHONE ENCOUNTER
The patient is currently under an internal PCP, RADHA Rod MD,  care. Is patient okay to hold blood thinner Eliquis (apixaban) for their upcoming scheduled Colonoscopy on 02/03/25.     The patient is currently under an internal PCP, RADHA Rod MD,  care. Is patient okay to hold blood thinner PLavix for their upcoming scheduled Colonoscopy on 02/03/25.

## 2024-12-13 NOTE — TELEPHONE ENCOUNTER
Colonoscopy Procedure Prep Instructions    Date of procedure: 02/03/25 Arrive at: 10:00 AM    Location of Department:   [facility] [address], [place] [address]  Take the Elevators to 2nd Floor Endoscopy Procedural Area    As soon as possible:   your prep from pharmacy and over the counter DULCOLAX LAXATIVE TABLETS            On the day before your procedure   What You CAN do:   You may have clear liquids ONLY-see below for list.     Liquids That Are OK to Drink:   Water  Sports drinks (Gatorade, Power-Aid)  Coffee or tea (no cream or nondairy creamer)  Clear juices without pulp (apple, white grape)  Gelatin desserts (no fruit or toppings)  Clear soda (sprite, coke, ginger ale)  Chicken broth (until 12 midnight the night before procedure)    What You CANNOT do:   Do not EAT solid food, drink milk or anything   colored red.  Do not drink alcohol.  Do not take oral medications within 1 hour of starting   each dose of prep.  No gum chewing or candy morning of procedure                       Note:   (Please disregard the insert instructions from pharmacy).  PEG Bowel Prep is indicated for cleansing of the colon as a preparation for colonoscopy in adults.   Be sure to tell your doctor about all the medicines you take, including prescription and non-prescription medicines, vitamins, and herbal supplements. PEG Bowel Prep may affect how other medicines work.  Medication taken by mouth may not be absorbed properly when taken within 1 hour before the start of each dose of PEG Bowel Prep.    It is not uncommon to experience some abdominal cramping, nausea and/or vomiting when taking the prep. If you have nausea and/or vomiting while taking the prep, stop drinking for 20 to 30 minutes then continue.      How to take prep:    PEG Bowel Prep is a (2-day) prep.    One (1) bottle of prep are required for a complete preparation for colonoscopy. Dilute the solution concentrate as directed  prior to use. You must drink water with each dose of prep, and additional water after each dose.    DOSE 1--Day Before Colonoscopy 02/02/25     Drink at least 6 to 8 glasses of clear liquids from time you wake up until you begin your prep and then continue until bedtime to avoid dehydration.     12:00 pm (NOON) Mix your entire container of prep with lukewarm water and refrigerate. Take four (4) Dulcolax (Bisacodyl) tablets with at least 8 ounces or more of clear liquids.       6:00 pm:    You must complete Steps 1 and 2 below before going to bed:    Step 1-Drink half the liquid in the container within one (1) hour.   Step 2-Refrigerate the remaining half of the liquid for dose 2. See below when to begin this step.                       IMPORTANT: If you experience preparation-related symptoms (for example, nausea, bloating, or cramping), stop, or slow the rate of drinking the additional water until your symptoms decrease.    DOSE 2--Day of the Colonoscopy 02/03/25 at 2-3 AM.    For this dose, repeat Step 1 shown above using the remaining half of the liquid prep.   You may continue drinking water/clear liquids until   2 hours before your colonoscopy or as directed by the scheduling nurse  9:00 AM.      For information about your procedure, two (2) things to view prior to colonoscopy:  Please watch this informational video. It is important to watch this animated consent video prior to your arrival. If you haven't watched the video prior to arriving, you are required to watch it during admission which can causes delays.    Options for viewing:   Using a keyboard:  press and hold the control tab (Ctrl) and left mouse click to follow links.           Colonoscopy Instructional Video                                                                                   OR    Type link address into your web browser's address bar:  [email or web address]      Educational Booklet with  pictures:      Colonoscopy Prep - Liquid      Comments:        IMPORTANT INFORMATION TO KNOW BEFORE YOUR PROCEDURE    [facility] [place] 2nd Floor         If your procedure requires the administration of anesthesia, it is necessary for a responsible adult to drive you home. (Medical Transportation, Uber, Lyft, Taxi, etc. may ONLY be used if a responsible adult is present to accompany you home.  The responsible adult CAN'T be the  of the service).      person must be available to return to pick you up within 15 minutes of being notified of discharge.       Please bring a picture ID, insurance card, & copayment      Take Medications as directed below:          1) Stop taking Eliquis (apixaban) 2 days (prior to the procedure) on:  02/01/25     2) Stop taking Plavix (clopidogrel) 5 days (prior to the procedure) on:  01/29/25    If you begin taking any blood thinning medications, injectable weight loss/diabetes medications (other than insulin) , or Adipex (Phentermine) please contact the endoscopy scheduling department listed below as soon as possible.    If you are diabetic see the attached instruction sheet regarding your medication.     If you take HEART, BLOOD PRESSURE, SEIZURE, PAIN, LUNG (including inhalers/nebulizers), ANTI-REJECTION (transplant patients), or PSYCHIATRIC medications, please take at your regular times with a sip of water or as directed by the scheduling nurse.     Important contact information:    Endoscopy [phone] Hours of operation Monday-Friday 8:00-4:30pm.    Questions about insurance or financial obligations call [phone] or [phone].    If you have questions regarding the prep or need to reschedule, please call [phone]. After hours questions requiring immediate assistance, contact [facility] nurse line at [phone] or [phone].   NOTE:     On occasion, unforeseen circumstances may cause a delay in your procedure  start time. We respect your time and appreciate your patience during these circumstances.      Comments:       Are you ready for your Colonoscopy?      __ If you take blood thinners,weight loss or diabetic injectable medications, have you stopped taking them according to your doctor's instructions before your procedures?  __ Have you stopped eating solid foods and followed a clear liquid diet a full day before your procedure or followed the diet       guidelines indicated in your instructions?       REMINDER: NO BROTH AFTER MIDNIGHT THE DAY BEFORE PROCEDURE.   __ Have you completed all your prep solution? PLEASE DO NOT FOLLOW the insert/or box instructions from pharmacy)  __ Have you taken your blood pressure, heart, seizure, or other essential medications the morning of your procedure?  __ Have you planned for a ride to and from procedure?  (Medical Transportation, Uber, Lyft, Taxi, etc. may ONLY be used if a responsible adult is present to accompany you home). The responsible adult CANNOT be the  of the service.  person must be available to return and pick you up within 15 minutes of being notified of discharge.           Questions or Concerns?  Please call us!  [phone] (M-F) [phone] (Nights and weekends)    Listed below are some helpful tips:    Please bring protective cases for eyewear and hearing aids-wear comfortable clothing/shoes.  Follow prep instructions closely so you don't have to do it twice.  Bowel prep is prescription used to clean out the colon before a colonoscopy. The prep increases movement of your colon by causing you to have diarrhea (loose stools). Cleaning out stool from the colon helps your doctor to see in your colon clearly during this procedure.   It is important to stay hydrated before, during and after bowel prep to prevent loss of fluid (dehydration). You can have water and your choice of clear liquids. Reminder: these liquids you will drink in addition to the  bowel prep.     Preparing the mixture:    First, mix the prep with water.  Make the taste better by adding a sugar free drink mix (Crystal Light) can improve the taste of your prep.   Use a large bore (opening) straw. Place towards the back of mouth (throat) as tolerated.  Prepare a prep mixture that is lightly chilled, but not ice-cold. Drinking a large amount of ice-cold liquid can make you feel very ill.  Avoid drinking any RED beverages or eating popsicles with this color for the 24 hours leading up to your procedure. This color can look like blood in the colon.    Consuming the prep:    Take your time. If you feel ill, take a 15-minute break from drinking the prep mixture  Combat hunger and dehydration with clear liquids. Options like JELL-O, or popsicles will help.  Settle in with good reading material. The goal is to clean out 6 feet of colon, so you can plan on spending a good deal of time in the bathroom. Have some good reading material on-hand or an iPad ready to keep yourself entertained!  Keep yourself comfortable. We recommend applying personal hygiene wipes, Tucks pads and a soothing ointment, like A&D ointment, Desitin, or Vaseline to your bottom before starting and as needed to protect your skin.

## 2024-12-19 ENCOUNTER — PATIENT OUTREACH (OUTPATIENT)
Dept: ADMINISTRATIVE | Facility: HOSPITAL | Age: 79
End: 2024-12-19
Payer: MEDICARE

## 2024-12-19 NOTE — LETTER
24    AUTHORIZATION FOR RELEASE OF   CONFIDENTIAL INFORMATION    Dr Collado    We are seeing Jin Ngo, date of birth 1945, in the clinic at [facility]. Sam Barroso MD is the patient's PCP. Jin Ngo has an outstanding lab/procedure at the time we reviewed his chart. In order to help keep his health information updated, he has authorized us to request the following medical record(s):       LATEST EYE EXAM       Please fax records to [facility], [place], Andrew J., MD,  at [phone] or email to [email or web address].               Patient Name: Jin Ngo  : 1945  Patient Phone #: [phone]

## 2024-12-23 ENCOUNTER — TELEPHONE (OUTPATIENT)
Dept: ENDOSCOPY | Facility: HOSPITAL | Age: 79
End: 2024-12-23
Payer: MEDICARE

## 2024-12-23 NOTE — TELEPHONE ENCOUNTER
Dear Dr Barroso,    Patient has a scheduled procedure Colonoscopy on 2/3/25 and is currently taking a blood thinner. In order to ensure patient safety, we would like to confirm that the patient can place their blood thinner medication on hold for the procedure. Can he/she discontinue Plavix (clopidogrel) for a minimum of 5 days and Eliquis 2 days prior to the procedure?     Thank you for your prompt reply.    [facility] Endoscopy Scheduling

## 2024-12-23 NOTE — TELEPHONE ENCOUNTER
----- Message from Med Assistant Shilpi sent at 2024  1:47 PM CST -----  Regardin/03 BT  The patient is currently under an internal PCP, RADHA Rod MD,  care. Is patient okay to hold blood thinner Eliquis (apixaban) for their upcoming scheduled Colonoscopy on 25.     The patient is currently under an internal PCP, RADHA Rod MD,  care. Is patient okay to hold blood thinner PLavix for their upcoming scheduled Colonoscopy on 25.

## 2025-01-13 DIAGNOSIS — Z00.00 ENCOUNTER FOR MEDICARE ANNUAL WELLNESS EXAM: ICD-10-CM

## 2025-01-15 DIAGNOSIS — J30.0 CHRONIC VASOMOTOR RHINITIS: ICD-10-CM

## 2025-01-16 RX ORDER — LEVOCETIRIZINE DIHYDROCHLORIDE 5 MG/1
5 TABLET, FILM COATED ORAL NIGHTLY
Qty: 90 TABLET | Refills: 3 | Status: SHIPPED | OUTPATIENT
Start: 2025-01-16

## 2025-01-17 ENCOUNTER — TELEPHONE (OUTPATIENT)
Dept: GASTROENTEROLOGY | Facility: CLINIC | Age: 80
End: 2025-01-17
Payer: MEDICARE

## 2025-01-17 NOTE — TELEPHONE ENCOUNTER
----- Message from Chantelle sent at 1/17/2025 10:36 AM CST -----  Regarding: Requesting a call back  Good morning, pt called requesting a call back regarding incoming Colonoscopy procedure. Please call to phone number on file.    Thank you.    Chantelle Gill  Physician Referral Specialist.

## 2025-01-20 NOTE — PROGRESS NOTES
Chief Complaint   Patient presents with    Follow-up     HPI:       iJn Ngo is a 76 y.o. male who presents for evaluation of a several months history of nasal congestion and postnasal drip. He describes difficulty breathing at night, as well as significant thick postnasal drip that happens episodically and causes significant coughing and choking. There is bilateral nasal obstruction. He previously used sinus rinses or nasal sprays. He has midface pain and pressure, worse on left.  He has rhinorrhea and postnasal drip. There is not maxillary tooth pain. He  denies headaches.  He has not had sinus or nasal surgery. There is no history of sinonasal trauma.  Patient uses saline nasal spray daily and previously used Flonase but stopped because he felt it was not helping.  He is also still taking Zyrtec daily.  He has not had any true aspiration episodes but does feel that occasionally the postnasal drip will choke him.  He reports a history of GERD, but no symptoms currently and not currently taking any medications.    Interval HPI: Reports still having some issue with PND, but improved since last visit.  Still using azelastine and xyzal. Also uses saline nasal spray frequently.  Reports mild dysphagia on occasion (food difficult to get down due to mucus), no aspiration issues, GERD under control.    Active Ambulatory Problems     Diagnosis Date Noted    Gout 02/11/2016    Sciatica of right side associated with disorder of lumbosacral spine 02/11/2016    Essential hypertension 02/11/2016    History of TIA (transient ischemic attack) 02/11/2016    History of MI (myocardial infarction) 02/11/2016    Atherosclerosis of native coronary artery of native heart with angina pectoris     History of colon polyps 02/22/2016    S/P CABG (coronary artery bypass graft) 07/25/2016    Pulmonary HTN 08/22/2016    Mixed hyperlipidemia 12/19/2016    Primary osteoarthritis involving multiple joints 03/08/2017    Other chronic  pulmonary heart diseases 03/08/2017    BMI 31.0-31.9,adult 03/08/2017    Positive depression screening 03/08/2017    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 03/08/2017    Abdominal aortic atherosclerosis 04/04/2017    Paroxysmal atrial fibrillation 11/09/2020    Carotid stenosis, asymptomatic, bilateral 12/01/2020    Type 2 diabetes mellitus with stage 3b chronic kidney disease and hypertension 03/22/2021    High potassium 06/01/2021    Compression fracture of L2 lumbar vertebra, open, initial encounter 06/07/2022    Age-related physical debility 06/07/2022    Physical deconditioning 06/07/2022    Localized osteoporosis with current pathological fracture with delayed healing 06/09/2022    Decreased strength of lower extremity 06/28/2022    Gait abnormality 06/28/2022    Impaired functional mobility, balance, gait, and endurance 06/28/2022    Acute combined systolic and diastolic congestive heart failure 08/18/2022    Status post insertion of drug eluting coronary artery stent 09/06/2022    Senile purpura 01/29/2023    Type 2 diabetes mellitus with hyperlipidemia 04/27/2023    Hand dermatitis 05/01/2023    Long-term insulin use 05/01/2023     Resolved Ambulatory Problems     Diagnosis Date Noted    Dyslipidemia with low HDL cholesterol with hypertriglyceridemia due to type 2 diabetes mellitus 02/11/2016    Hypertension associated with diabetes 03/08/2017    Bilateral carotid artery disease 10/26/2015    ANGELIC (acute kidney injury) 07/01/2020    Abnormal heart sounds 10/01/2020    Palpitations 10/23/2020    Hyperlipidemia     Hypertension associated with type 2 diabetes mellitus 08/12/2022    Type 2 diabetes mellitus with chronic kidney disease, with long-term current use of insulin 08/12/2022    NSTEMI (non-ST elevated myocardial infarction) 08/16/2022    Acute pulmonary edema 08/17/2022    Acute hypoxemic respiratory failure 08/18/2022     Past Medical History:   Diagnosis Date    Anticoagulant long-term  use     CKD stage 3 due to type 2 diabetes mellitus 3/22/2021    Coronary artery disease     Encounter for blood transfusion     Hypertension     Myocardial infarction     Obesity     Stroke     Type 2 diabetes mellitus without complication     Uncontrolled type 2 diabetes mellitus with neurologic complication, without long-term current use of insulin 3/8/2017       Review of Systems  General: negative for chills, fever or weight loss  Psychological: negative for mood changes or depression  Ophthalmic: negative for blurry vision, photophobia or eye pain  ENT: see HPI  Respiratory: no cough, shortness of breath, or wheezing  Cardiovascular: no chest pain or dyspnea on exertion  Gastrointestinal: no abdominal pain, change in bowel habits, or black/ bloody stools  Musculoskeletal: negative for gait disturbance or muscular weakness  Neurological: no syncope or seizures; no ataxia  Dermatological: negative for pruritis, rash and jaundice  Hematologic/lymphatic: no easy bruising, no new adenopathy    Physical Exam     Vitals:    05/08/23 0953   BP: 128/62   Pulse: 88         Constitutional:   He is oriented to person, place, and time. Vital signs are normal. He appears well-developed and well-nourished. He appears alert. He is cooperative. Normal speech.      Head:  Normocephalic and atraumatic. Salivary glands normal.  Facial strength is normal.      Ears:  Hearing normal to normal and whispered voice; external ear normal without scars, lesions, or masses; ear canal, tympanic membrane, and middle ear normal., right ear hearing normal to normal and whispered voice; external ear normal without scars, lesions, or masses; ear canal, tympanic membrane, and middle ear normal. and left ear hearing normal to normal and whispered voice; external ear normal without scars, lesions, or masses; ear canal, tympanic membrane, and middle ear normal..   Right Ear: No middle ear effusion.   Left Ear:  No middle ear effusion.      Nose:  Mucosal edema and rhinorrhea present. No septal deviation or polyps. Turbinates abnormal and turbinate hypertrophy (3+, boggy, congested mucosa).  Right sinus exhibits no maxillary sinus tenderness and no frontal sinus tenderness. Left sinus exhibits no maxillary sinus tenderness and no frontal sinus tenderness.     Mouth/Throat  Oropharynx clear and moist without lesions or asymmetry, lips, teeth, and gums normal and oropharynx normal. No mucous membrane lesions. No oropharyngeal exudate, posterior oropharyngeal edema or posterior oropharyngeal erythema. Tonsillar erythema, tonsillar exudate.  Mirror exam not performed due to patient tolerance.  Mirror exam not performed due to patient tolerance.      Neck:  Neck normal without thyromegaly masses, asymmetry, normal tracheal structure, crepitus, and tenderness, thyroid normal, trachea normal, phonation normal, full range of motion with neck supple and no adenopathy. No JVD present. Carotid bruit is not present. No thyroid mass and no thyromegaly present.     He has no cervical adenopathy.     Cardiovascular:    Normal rate, regular rhythm and rate and rhythm, heart sounds, and pulses normal.              Pulmonary/Chest:   Effort and breath sounds normal.     Psychiatric:   He has a normal mood and affect. His speech is normal and behavior is normal.     Neurological:   He is alert and oriented to person, place, and time. He has neurological normal, alert and oriented. No cranial nerve deficit.     Skin:   No abrasions, lacerations, lesions, or rashes.       Assessment/Plan:      ICD-10-CM ICD-9-CM    1. Chronic rhinitis  J31.0 472.0       2. Chronic vasomotor rhinitis  J30.0 477.9 levocetirizine (XYZAL) 5 MG tablet      azelastine (ASTELIN) 137 mcg (0.1 %) nasal spray      3. Gastroesophageal reflux disease, unspecified whether esophagitis present  K21.9 530.81       4. Dysphagia, unspecified type  R13.10 787.20             Continue azelastine nasal  spray.  Continue xyzal daily.  Add atrovent (ipratroprium) nasal spray up to 3 times a day as needed for additional runny nose.   Continue saline nasal spray as often as needed.      We discussed option of seeing ST for swallowing evaluation if any swallowing symptoms worsen.  Patient declines eval for now.      Continue using Pepcid OTC as needed for any GERD symptoms.     Follow up 6 mos or sooner, if needed.     Pepper Retana MD  [facility]               Show Applicator Variable?: Yes Consent: The patient's consent was obtained including but not limited to risks of crusting, scabbing, blistering, scarring, darker or lighter pigmentary change, recurrence, incomplete removal and infection. Render Post-Care Instructions In Note?: no Post-Care Instructions: I reviewed with the patient in detail post-care instructions. Patient is to wear sunprotection, and avoid picking at any of the treated lesions. Pt may apply Vaseline to crusted or scabbing areas. Duration Of Freeze Thaw-Cycle (Seconds): 2 Number Of Freeze-Thaw Cycles: 2 freeze-thaw cycles Detail Level: Detailed

## 2025-01-27 ENCOUNTER — TELEPHONE (OUTPATIENT)
Dept: ENDOSCOPY | Facility: HOSPITAL | Age: 80
End: 2025-01-27
Payer: MEDICARE

## 2025-01-27 NOTE — TELEPHONE ENCOUNTER
Pt's wife called to confirm. They have not rec'd mailed instructions yet. I reviewed with Krupa and also sent copy via email to her. All questions answered. Patient's wife verbalized understanding.Encouraged to call back for any needs or questions.

## 2025-01-28 ENCOUNTER — TELEPHONE (OUTPATIENT)
Dept: ENDOSCOPY | Facility: HOSPITAL | Age: 80
End: 2025-01-28
Payer: MEDICARE

## 2025-01-28 NOTE — TELEPHONE ENCOUNTER
----- Message from Chantelle sent at 1/17/2025 10:36 AM CST -----  Regarding: Requesting a call back  Good morning, pt called requesting a call back regarding incoming Colonoscopy procedure. Please call to phone number on file.     Thank you.     Chantelle Gill  Physician Referral Specialist.        Pt wife stated that she had already spoken with Lorin and that she no longer needed any assistance. Pt verbalized an understanding.

## 2025-01-29 NOTE — TELEPHONE ENCOUNTER
----- Message from Preethi sent at 1/29/2025  2:48 PM CST -----  Type:  RX Refill Request    Who Called: pt's wife   Refill or New Rx: Refill   RX Name and Strength:clopidogreL (PLAVIX) 75 mg tablet   fenofibrate 160 MG Tab   Preferred Pharmacy with phone number: [facility] - [facility] [address]  [address] [address]  Phone: [phone]  Local or Mail Order: Mail   Ordering Provider: [facility]   Would the patient rather a call back or a response via [facility]? Call   Best Call Back Number:[phone]   Additional Information:

## 2025-01-29 NOTE — TELEPHONE ENCOUNTER
Care Due:                  Date            Visit Type   Department     Provider  --------------------------------------------------------------------------------                                EP -                              PRIMARY      [facility] FAMILY  Last Visit: 12-      CARE ([facility])   MEDICINE       Sam Barroso                               -                              PRIMARY      [facility] FAMILY  Next Visit: 06-      CARE ([facility])   MEDICINE       Sam Barroso                                                            Last  Test          Frequency    Reason                     Performed    Due Date  --------------------------------------------------------------------------------    CBC.........  12 months..  allopurinoL, clopidogreL,   04- 04-                             fenofibrate..............    CMP.........  12 months..  DULoxetine, allopurinoL,   04- 04-                             atorvastatin,                             fenofibrate, furosemide,                             glipiZIDE, insulin.......    Lipid Panel.  12 months..  atorvastatin, fenofibrate  04- 04-    [facility] Embedded Care Due Messages. Reference number: [id number].   1/29/2025 2:50:48 PM CST

## 2025-01-31 ENCOUNTER — TELEPHONE (OUTPATIENT)
Dept: ENDOSCOPY | Facility: HOSPITAL | Age: 80
End: 2025-01-31
Payer: MEDICARE

## 2025-01-31 RX ORDER — CLOPIDOGREL BISULFATE 75 MG/1
75 TABLET ORAL DAILY
Qty: 90 TABLET | Refills: 3 | Status: SHIPPED | OUTPATIENT
Start: 2025-01-31 | End: 2026-01-31

## 2025-02-03 ENCOUNTER — ANESTHESIA EVENT (OUTPATIENT)
Dept: ENDOSCOPY | Facility: HOSPITAL | Age: 80
End: 2025-02-03
Payer: MEDICARE

## 2025-02-03 DIAGNOSIS — Z12.11 COLON CANCER SCREENING: Primary | ICD-10-CM

## 2025-02-03 RX ORDER — SODIUM, POTASSIUM,MAG SULFATES 17.5-3.13G
1 SOLUTION, RECONSTITUTED, ORAL ORAL DAILY
Qty: 1 KIT | Refills: 0 | Status: ON HOLD | OUTPATIENT
Start: 2025-02-03 | End: 2025-02-04 | Stop reason: HOSPADM

## 2025-02-03 NOTE — ANESTHESIA PREPROCEDURE EVALUATION
02/03/2025  Jin Ngo is a 79 y.o., male.    [facility]  Anesthesia Pre-Operative Evaluation       Patient Name: Jin Ngo  YOB: 1945  MRN: [id number]  CSN: [id number]      Code Status: Prior   Date of Procedure: 2/4/2025  Anesthesia: Choice Procedure: Procedure(s) (LRB):  COLONOSCOPY, SCREENING, HIGH RISK PATIENT (N/A)  Pre-Operative Diagnosis: Colon cancer screening [Z12.11]  Proceduralist: Surgeons and Role:     * Chris Bermudez MD - Primary        SUBJECTIVE:   Jin Ngo is a 79 y.o. male who  has a past medical history of Acute hypoxemic respiratory failure (8/18/2022), ANGELIC (acute kidney injury) (7/1/2020), Anticoagulant long-term use, CKD stage 3 due to type 2 diabetes mellitus (3/22/2021), Coronary artery disease, Encounter for blood transfusion, Hyperlipidemia, Hypertension, Localized osteoporosis with current pathological fracture with delayed healing (6/9/2022), Myocardial infarction, Obesity, Other chronic pulmonary heart diseases, Paroxysmal atrial fibrillation (11/9/2020), Primary osteoarthritis involving multiple joints (3/8/2017), Stroke, Type 2 diabetes mellitus without complication, and 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     he has a current medication list which includes the following long-term medication(s): atorvastatin, blood-glucose meter, blood-glucose meter, clopidogrel, duloxetine, fenofibrate, furosemide, glipizide, levemir flextouch u100 insulin, levocetirizine, metoprolol succinate, ropinirole, tamsulosin, [DISCONTINUED] amlodipine, and [DISCONTINUED] metoprolol tartrate.     ALLERGIES:     Review of patient's allergies indicates:   Allergen Reactions    Metformin      Caused stroke    Pcn [penicillins]      LDA:          Lines/Drains/Airways   "     None                  Anesthesia Evaluation      Airway   Mallampati: II  TM distance: Normal  Dental      Pulmonary    Cardiovascular   (+) pacemaker, hypertension, past MI, CAD, CABG/stent, angina, CHF    ECG reviewed  Rate: Normal    Neuro/Psych    (+) neuromuscular disease, TIA, CVA    GI/Hepatic/Renal    (+) chronic renal disease    Endo/Other    (+) diabetes mellitus, arthritis  Abdominal                   MEDICATIONS:     Antibiotics (From admission, onward)      None          VTE Risk Mitigation (From admission, onward)      None              No current facility-administered medications for this encounter.     Current Outpatient Medications   Medication Sig Dispense Refill    allopurinoL (ZYLOPRIM) 300 MG tablet TAKE 1 TABLET ONE TIME DAILY 90 tablet 0    atorvastatin (LIPITOR) 40 MG tablet Take 1 tablet (40 mg total) by mouth once daily. 90 tablet 3    BD ULTRA-FINE TEJINDER PEN NEEDLE 32 gauge x 5/32" Ndle USE WITH LEVEMIR DAILYUSE WITH LEVEMIR DAILY 100 each 3    blood sugar diagnostic (ACCU-CHEK VERONICA PLUS TEST STRP) Strp To check blood glucose 4 times daily 300 strip 0    blood-glucose meter (ACCU-CHEK VERONICA PLUS METER) Misc To check blood glucose 4 times daily 1 each 0    blood-glucose meter kit To check BG 4 times daily, to use with insurance preferred meter 1 each 0    clopidogreL (PLAVIX) 75 mg tablet Take 1 tablet (75 mg total) by mouth once daily. 90 tablet 3    DULoxetine (CYMBALTA) 30 MG capsule TAKE 2 CAPSULES ONE TIME DAILY 180 capsule 2    ELIQUIS 5 mg Tab TAKE 1 TABLET BY MOUTH TWICE A DAY 60 tablet 11    fenofibrate 160 MG Tab Take 1 tablet (160 mg total) by mouth once daily. 90 tablet 1    furosemide (LASIX) 40 MG tablet Take 1 tablet (40 mg total) by mouth once daily. 90 tablet 3    glipiZIDE 5 MG TR24 2 tablets (10 mg total) by mouth daily with breakfast AND 1 tablet (5 mg total) daily with dinner or evening meal 270 tablet 3    insulin detemir U-100, Levemir, (LEVEMIR " FLEXTOUCH U100 INSULIN) 100 unit/mL (3 mL) InPn pen Inject 17 Units into the skin every evening. 15 mL 6    ipratropium (ATROVENT) 21 mcg (0.03 %) nasal spray 1 spray by Each Nostril route 2 (two) times daily. 30 mL 3    lancets (ACCU-CHEK SOFTCLIX LANCETS) Misc To check BG 4 times daily, to use with insurance preferred meter. 200 each 3    levocetirizine (XYZAL) 5 MG tablet TAKE 1 TABLET EVERY EVENING 90 tablet 3    metoprolol succinate (TOPROL-XL) 25 MG 24 hr tablet TAKE 1 TABLET ONE TIME DAILY 90 tablet 3    rOPINIRole (REQUIP) 0.25 MG tablet Take 1 tablet (0.25 mg total) by mouth 3 (three) times daily as needed. For muscle spasm 90 tablet 0    sodium,potassium,mag sulfates (SUPREP BOWEL PREP KIT) 17.5-3.13-1.6 gram SolR Take 177 mLs by mouth once daily. for 2 days 1 kit 0    tamsulosin (FLOMAX) 0.4 mg Cap Take 1 capsule (0.4 mg total) by mouth once daily. 90 capsule 3          History:   There are no hospital problems to display for this patient.    Surgical History:    has a past surgical history that includes Coronary artery bypass graft; Colonoscopy; Eye surgery (06/2014); Colonoscopy (N/A, 3/22/2016); Cataract extraction, bilateral; Cardiac surgery; Percutaneous transluminal angioplasty (PTA) of carotid artery (N/A, 12/10/2020); Colonoscopy (N/A, 12/7/2021); Left heart catheterization (Left, 8/17/2022); Coronary bypass graft angiography (8/17/2022); and Coronary angiography (N/A, 8/17/2022).   Social History:    reports that he is not currently sexually active and has had partner(s) who are female.  reports that he quit smoking about 53 years ago. His smoking use included cigarettes. He started smoking about 68 years ago. He has a 45 pack-year smoking history. He has been exposed to tobacco smoke. He has never used smokeless tobacco. He reports current alcohol use of about 1.0 standard drink of alcohol per week. He reports that he does not use drugs.     OBJECTIVE:     Vital Signs (Most Recent):     Vital Signs Range (Last 24H):          There is no height or weight on file to calculate BMI.   Wt Readings from Last 4 Encounters:   12/13/24 87.1 kg (192 lb)   12/12/24 87.4 kg (192 lb 9.2 oz)   05/13/24 88 kg (194 lb 1.8 oz)   01/05/24 90.9 kg (200 lb 4.6 oz)       Significant Labs:  Lab Results   Component Value Date    WBC 5.17 04/29/2024    HGB 14.1 04/29/2024    HCT 42.4 04/29/2024     04/29/2024     04/29/2024    K 4.2 04/29/2024     04/29/2024    CREATININE 1.13 04/29/2024    BUN 27 (H) 04/29/2024    CO2 23 04/29/2024     (H) 04/29/2024    CALCIUM 9.1 04/29/2024    MG 1.8 08/20/2022    PHOS 4.1 08/20/2022    ALKPHOS 59 04/29/2024    ALT 35 04/29/2024    AST 37 04/29/2024    ALBUMIN 4.1 04/29/2024    INR 1.3 (H) 08/16/2022    APTT 43.1 (H) 08/17/2022    HGBA1C 7.6 (H) 12/11/2024    CPK 88 06/12/2015    TROPONINI 0.016 01/03/2024     No LMP for male patient.  No results found for this or any previous visit (from the past 72 hours).    EKG:   Results for orders placed or performed during the hospital encounter of 01/03/24   EKG 12-lead    Collection Time: 01/03/24  1:09 PM    Narrative    Test Reason : R07.9,    Vent. Rate : 072 BPM     Atrial Rate : 000 BPM     P-R Int : 000 ms          QRS Dur : 110 ms      QT Int : 390 ms       P-R-T Axes : 000 105 058 degrees     QTc Int : 427 ms    Atrial fibrillation with a competing junctional pacemaker with premature  ventricular or aberrantly conducted complexes  Rightward axis  Abnormal ECG  When compared with ECG of 18-AUG-2022 00:39,  Borderline criteria for Inferior infarct are no longer Present  Non-specific change in ST segment in Inferior leads  Nonspecific T wave abnormality, worse in Inferior leads  Confirmed by Yousef MD, Niko M. ([id number]) on 1/4/2024 5:30:13 PM    Referred By: [email or web address]   SELF           Confirmed By:Niko Rosen MD       TTE:  Results for orders placed or performed during the hospital encounter of 11/01/22    Echo   Result Value Ref Range    BSA 2.03 m2    TDI SEPTAL 0.05 m/s    LV LATERAL E/E' RATIO 9.75 m/s    LV SEPTAL E/E' RATIO 15.60 m/s    LA WIDTH 3.60 cm    IVC diameter 1.63 cm    RV mid diameter 13.78 cm    Left Ventricular Outflow Tract Mean Velocity 0.54 cm/s    Left Ventricular Outflow Tract Mean Gradient 1.48 mmHg    Pulmonary Valve Mean Velocity 0.59 m/s    AORTIC VALVE CUSP SEPERATION 1.43 cm    TDI LATERAL 0.08 m/s    PV PEAK VELOCITY 0.82 cm/s    LVIDd 5.49 3.5 - 6.0 cm    IVS 0.88 0.6 - 1.1 cm    PW 0.99 0.6 - 1.1 cm    Ao root annulus 2.85 cm    LVIDs 4.12 (A) 2.1 - 4.0 cm    FS 25 28 - 44 %    LA Vol 77.85 cm3    STJ 1.97 cm    Ascending aorta 2.49 cm    LV mass 194.64 g    LA size 4.91 cm    RVDD 2.70 cm    RV Basal Diameter 6.47 cm    RA Area 19.9 cm2    Left Ventricle Relative Wall Thickness 0.36 cm    AV regurgitation pressure 1/2 time [id number] ms    AV mean gradient 4 mmHg    AV valve area 2.17 cm2    AV Velocity Ratio 0.62     AV index (prosthetic) 0.61     MV mean gradient 1 mmHg    MV valve area by continuity eq 2.70 cm2    E/A ratio 2.00     Mean e' 0.07 m/s    E wave deceleration time 209.56 msec    LVOT diameter 2.13 cm    LVOT area 3.6 cm2    LVOT peak bebeto 0.90 m/s    LVOT peak VTI 18.50 cm    Ao peak bebeto 1.46 m/s    Ao VTI 30.4 cm    Mr max bebeto 4.78 m/s    LVOT stroke volume 65.89 cm3    AV peak gradient 9 mmHg    MV peak gradient 4 mmHg    E/E' ratio 12.00 m/s    MV Peak E Bebeto 0.78 m/s    AR Max Bebeto 4.39 m/s    TR Max Bebeto 3.01 m/s    MV VTI 24.4 cm    MV Peak A Bebeto 0.39 m/s    LV Systolic Volume 75.29 mL    LV Systolic Volume Index 37.5 mL/m2    LV Diastolic Volume 146.96 mL    LV Diastolic Volume Index 73.11 mL/m2    MARLENA 38.7 mL/m2    LV Mass Index 97 g/m2    RA Vol 61.01 mL    RA Major Axis 5.43 cm    Left Atrium Minor Axis 4.43 cm    Left Atrium Major Axis 6.24 cm    Triscuspid Valve Regurgitation Peak Gradient 36 mmHg    MARLENA (MOD) 46.4 mL/m2    LA Vol (MOD) 93.22 cm3  "   RA Width 3.76 cm    Right Atrial Pressure (from IVC) 3 mmHg    EF 50 %    TV resting pulmonary artery pressure 39 mmHg    Narrative    · The left ventricle is normal in size with low normal systolic function.  · The estimated ejection fraction is 50%.  · Grade III left ventricular diastolic dysfunction.  · Mild-to-moderate mitral regurgitation.  · Mild to moderate tricuspid regurgitation.  · Moderate left atrial enlargement.  · Mild right atrial enlargement.  · Normal central venous pressure (3 mmHg).  · The estimated PA systolic pressure is 39 mmHg.  · Normal right ventricular size with normal right ventricular systolic   function.        EF   Date Value Ref Range Status   11/01/2022 50 % Final   08/17/2022 30 % Final      Results for orders placed or performed during the hospital encounter of 08/01/16   2D Echo w/ Color Flow Doppler    Collection Time: 08/01/16 11:00 AM   Result Value Ref Range    EF + QEF 60 55 - 65    Diastolic Dysfunction No     Est. PA Systolic Pressure 58.95 (A)     Mitral Valve Mobility NORMAL     Tricuspid Valve Regurgitation TRIVIAL TO MILD      PEPPER:  No results found for this or any previous visit.  Stress Test:  No results found for this or any previous visit.     LHC:  Results for orders placed during the hospital encounter of 08/16/22    Cardiac catheterization    Conclusion  · There was three vessel coronary artery disease.  · There was diastolic dysfunction.  · Patent LIMA-LAD  · Occluded unknown SVG  · Patent LM to LCX, however, dampening with diagnostic catheter  · Patent SVG-PDA with severe proximal stenosis - culprit lesion  · Successful IVUS guided PCI with 3.5 x 18 RAYNA with distal filter protection with excellent results  · Discharge on Plavix/DOAC    The procedure log was documented by Documenter: Hubert Loja RN; Cortes Hanna RT; Aidan Marshall RN and verified by Pasquale Ramesh MD.    Date: 8/19/2022  Time: 12:56 PM     PFT:  No results found for: "FEV1", "FVC", " ""WCM5ONA", "TLC", "DLCO"     ASSESSMENT/PLAN:       Pre-op Assessment    I have reviewed the Patient Summary Reports.     I have reviewed the Nursing Notes. I have reviewed the NPO Status.   I have reviewed the Medications.     Review of Systems  Anesthesia Hx:  No problems with previous Anesthesia             Denies Family Hx of Anesthesia complications.    Denies Personal Hx of Anesthesia complications.                    Social:  Former Smoker, Alcohol Use       Hematology/Oncology:  Hematology Normal   Oncology Normal                                   EENT/Dental:  EENT/Dental Normal           Cardiovascular:    Pacemaker Hypertension  Past MI CAD   CABG/stent   Angina CHF       ECG has been reviewed.                            Pulmonary:  Pulmonary Normal                       Renal/:  Chronic Renal Disease                Hepatic/GI:  Hepatic/GI Normal                    Musculoskeletal:  Arthritis          Spine Disorders: lumbar            Neurological:  TIA, CVA Neuromuscular Disease,                                   Endocrine:  Diabetes         Obesity / BMI > 30  Dermatological:  Skin Normal    Psych:  Psychiatric Normal                  Physical Exam  General: Well nourished    Airway:  Mallampati: II   Mouth Opening: Normal  TM Distance: Normal    Chest/Lungs:  Normal Respiratory Rate    Heart:  Rate: Normal    Anesthesia Plan  Type of Anesthesia, risks & benefits discussed:    Anesthesia Type: Gen Natural Airway  Intra-op Monitoring Plan: Standard ASA Monitors  Induction:  IV  Informed Consent: Informed consent signed with the Patient and all parties understand the risks and agree with anesthesia plan.  All questions answered. Patient consented to blood products? No  ASA Score: 3  Day of Surgery Review of History & Physical: H&P Update referred to the surgeon/provider.    Ready For Surgery From Anesthesia Perspective.     .      "

## 2025-02-04 ENCOUNTER — ANESTHESIA (OUTPATIENT)
Dept: ENDOSCOPY | Facility: HOSPITAL | Age: 80
End: 2025-02-04
Payer: MEDICARE

## 2025-02-04 ENCOUNTER — HOSPITAL ENCOUNTER (OUTPATIENT)
Facility: HOSPITAL | Age: 80
Discharge: HOME | End: 2025-02-04
Attending: INTERNAL MEDICINE | Admitting: INTERNAL MEDICINE
Payer: MEDICARE

## 2025-02-04 VITALS
HEART RATE: 89 BPM | DIASTOLIC BLOOD PRESSURE: 85 MMHG | HEIGHT: 69 IN | TEMPERATURE: 98 F | BODY MASS INDEX: 29.33 KG/M2 | SYSTOLIC BLOOD PRESSURE: 162 MMHG | OXYGEN SATURATION: 97 % | WEIGHT: 198 LBS | RESPIRATION RATE: 17 BRPM

## 2025-02-04 DIAGNOSIS — Z86.0100 HISTORY OF COLON POLYPS: Primary | ICD-10-CM

## 2025-02-04 LAB — POCT GLUCOSE: 118 MG/DL (ref 70–110)

## 2025-02-04 PROCEDURE — D9220A PRA ANESTHESIA: Mod: PT,,, | Performed by: NURSE ANESTHETIST, CERTIFIED REGISTERED

## 2025-02-04 PROCEDURE — 99900035 HC TECH TIME PER 15 MIN (STAT)

## 2025-02-04 PROCEDURE — 45385 COLONOSCOPY W/LESION REMOVAL: CPT | Mod: PT,HCNC,, | Performed by: INTERNAL MEDICINE

## 2025-02-04 PROCEDURE — 88305 TISSUE EXAM BY PATHOLOGIST: CPT | Mod: 59 | Performed by: STUDENT IN AN ORGANIZED HEALTH CARE EDUCATION/TRAINING PROGRAM

## 2025-02-04 PROCEDURE — 63600175 PHARM REV CODE 636 W HCPCS: Performed by: NURSE ANESTHETIST, CERTIFIED REGISTERED

## 2025-02-04 PROCEDURE — 37000009 HC ANESTHESIA EA ADD 15 MINS: Performed by: INTERNAL MEDICINE

## 2025-02-04 PROCEDURE — 27200997: Performed by: INTERNAL MEDICINE

## 2025-02-04 PROCEDURE — 37000008 HC ANESTHESIA 1ST 15 MINUTES: Performed by: INTERNAL MEDICINE

## 2025-02-04 PROCEDURE — 82962 GLUCOSE BLOOD TEST: CPT | Performed by: INTERNAL MEDICINE

## 2025-02-04 PROCEDURE — 45385 COLONOSCOPY W/LESION REMOVAL: CPT | Mod: PT | Performed by: INTERNAL MEDICINE

## 2025-02-04 PROCEDURE — 94760 N-INVAS EAR/PLS OXIMETRY 1: CPT

## 2025-02-04 PROCEDURE — 88305 TISSUE EXAM BY PATHOLOGIST: CPT | Mod: 26,HCNC,, | Performed by: STUDENT IN AN ORGANIZED HEALTH CARE EDUCATION/TRAINING PROGRAM

## 2025-02-04 PROCEDURE — 27201089 HC SNARE, DISP (ANY): Performed by: INTERNAL MEDICINE

## 2025-02-04 PROCEDURE — 25000003 PHARM REV CODE 250: Performed by: NURSE ANESTHETIST, CERTIFIED REGISTERED

## 2025-02-04 RX ORDER — PROPOFOL 10 MG/ML
VIAL (ML) INTRAVENOUS CONTINUOUS PRN
Status: DISCONTINUED | OUTPATIENT
Start: 2025-02-04 | End: 2025-02-04

## 2025-02-04 RX ORDER — PROPOFOL 10 MG/ML
VIAL (ML) INTRAVENOUS
Status: DISCONTINUED | OUTPATIENT
Start: 2025-02-04 | End: 2025-02-04

## 2025-02-04 RX ORDER — LIDOCAINE HYDROCHLORIDE 20 MG/ML
INJECTION INTRAVENOUS
Status: DISCONTINUED | OUTPATIENT
Start: 2025-02-04 | End: 2025-02-04

## 2025-02-04 RX ORDER — SODIUM CHLORIDE 9 MG/ML
INJECTION, SOLUTION INTRAVENOUS CONTINUOUS
Status: DISCONTINUED | OUTPATIENT
Start: 2025-02-04 | End: 2025-02-04 | Stop reason: HOSPADM

## 2025-02-04 RX ADMIN — SODIUM CHLORIDE: 0.9 INJECTION, SOLUTION INTRAVENOUS at 10:02

## 2025-02-04 RX ADMIN — PROPOFOL 70 MG: 10 INJECTION, EMULSION INTRAVENOUS at 10:02

## 2025-02-04 RX ADMIN — LIDOCAINE HYDROCHLORIDE 100 MG: 20 INJECTION INTRAVENOUS at 10:02

## 2025-02-04 RX ADMIN — PROPOFOL 150 MCG/KG/MIN: 10 INJECTION, EMULSION INTRAVENOUS at 10:02

## 2025-02-04 NOTE — PLAN OF CARE
Pt tolerated colonoscopy well. He is aaox4. His vitals are stable. He has no complaints at this time. Pt has received d/c papers and verbalized understanding. Pt passed PO challenge. Pt will be discharged home with wife

## 2025-02-04 NOTE — TRANSFER OF CARE
"Anesthesia Transfer of Care Note    Patient: Jin Ngo    Procedure(s) Performed: Procedure(s) (LRB):  COLONOSCOPY, SCREENING, HIGH RISK PATIENT (N/A)    Patient location: PACU    Anesthesia Type: general    Transport from OR: Transported from OR on 6-10 L/min O2 by face mask with adequate spontaneous ventilation    Post pain: adequate analgesia    Post assessment: no apparent anesthetic complications and tolerated procedure well    Post vital signs: stable    Level of consciousness: responds to stimulation and sedated    Nausea/Vomiting: no nausea/vomiting    Complications: none    Transfer of care protocol was followed    Last vitals: Visit Vitals  BP (!) 169/79 (BP Location: Left arm, Patient Position: Lying)   Pulse 98   Temp 36.7 °C (98.1 °F) (Temporal)   Resp 18   Ht 5' 9" (1.753 m)   Wt 89.8 kg (198 lb)   SpO2 96%   BMI 29.24 kg/m²     "

## 2025-02-04 NOTE — ANESTHESIA POSTPROCEDURE EVALUATION
Anesthesia Post Evaluation    Patient: Jin Ngo    Procedure(s) Performed: Procedure(s) (LRB):  COLONOSCOPY, SCREENING, HIGH RISK PATIENT (N/A)    Final Anesthesia Type: general      Patient location during evaluation: PACU  Patient participation: Yes- Able to Participate  Level of consciousness: awake and alert  Post-procedure vital signs: reviewed and stable  Pain management: adequate  Airway patency: patent    PONV status at discharge: No PONV  Anesthetic complications: no      Cardiovascular status: stable  Respiratory status: spontaneous ventilation  Hydration status: euvolemic  Follow-up not needed.          Vitals Value Taken Time   /85 02/04/25 1124   Temp 36.8 °C (98.2 °F) 02/04/25 1056   Pulse 89 02/04/25 1124   Resp 17 02/04/25 1124   SpO2 97 % 02/04/25 1124         Event Time   Out of Recovery 11:14:00         Pain/Jaylin Score: Jaylin Score: 10 (2/4/2025 11:15 AM)

## 2025-02-04 NOTE — PROVATION PATIENT INSTRUCTIONS
Discharge Summary/Instructions after an Endoscopic Procedure  Patient Name: Jin Ngo  Patient MRN: [id number]  Patient YOB: 1945 Tuesday, February 4, 2025  Denny Plaza MD  Dear patient,  As a result of recent federal legislation (The Federal Cures Act), you may   receive lab or pathology results from your procedure in your [facility]   account before your physician is able to contact you. Your physician or   their representative will relay the results to you with their   recommendations at their soonest availability.  Thank you,  RESTRICTIONS:  During your procedure today, you received medications for sedation.  These   medications may affect your judgment, balance and coordination.  Therefore,   for 24 hours, you have the following restrictions:   - DO NOT drive a car, operate machinery, make legal/financial decisions,   sign important papers or drink alcohol.    ACTIVITY:  Today: no heavy lifting, straining or running due to procedural   sedation/anesthesia.  The following day: return to full activity including work.  DIET:  Eat and drink normally unless instructed otherwise.     TREATMENT FOR COMMON SIDE EFFECTS:  - Mild abdominal pain, nausea, belching, bloating or excessive gas:  rest,   eat lightly and use a heating pad.  - Sore Throat: treat with throat lozenges and/or gargle with warm salt   water.  - Because air was used during the procedure, expelling large amounts of air   from your rectum or belching is normal.  - If a bowel prep was taken, you may not have a bowel movement for 1-3 days.    This is normal.  SYMPTOMS TO WATCH FOR AND REPORT TO YOUR PHYSICIAN:  1. Abdominal pain or bloating, other than gas cramps.  2. Chest pain.  3. Back pain.  4. Signs of infection such as: chills or fever occurring within 24 hours   after the procedure.  5. Rectal bleeding, which would show as bright red, maroon, or black stools.   (A tablespoon of blood from the rectum is not serious, especially  if   hemorrhoids are present.)  6. Vomiting.  7. Weakness or dizziness.  GO DIRECTLY TO THE NEAREST EMERGENCY ROOM IF YOU HAVE ANY OF THE FOLLOWING:      Difficulty breathing              Chills and/or fever over 101 F   Persistent vomiting and/or vomiting blood   Severe abdominal pain   Severe chest pain   Black, tarry stools   Bleeding- more than one tablespoon   Any other symptom or condition that you feel may need urgent attention  Your doctor recommends these additional instructions:  If any biopsies were taken, your doctors clinic will contact you in 1 to 2   weeks with any results.  - Patient has a contact number available for emergencies.  The signs and   symptoms of potential delayed complications were discussed with the   patient.  Return to normal activities tomorrow.  Written discharge   instructions were provided to the patient.   - Discharge patient to home.   - Resume previous diet.   - Continue present medications.   - Await pathology results.   - Repeat colonoscopy in 3 years for surveillance.  For questions, problems or results please call your physician - Denny Plaza MD at Work:  [phone].  [facility] [place], EMERGENCY ROOM PHONE NUMBER: [phone]  IF A COMPLICATION OR EMERGENCY SITUATION ARISES AND YOU ARE UNABLE TO REACH   YOUR PHYSICIAN - GO DIRECTLY TO THE EMERGENCY ROOM.  Denny Plaza MD  2/4/2025 10:59:00 AM  This report has been verified and signed electronically.  Dear patient,  As a result of recent federal legislation (The Federal Cures Act), you may   receive lab or pathology results from your procedure in your [facility]   account before your physician is able to contact you. Your physician or   their representative will relay the results to you with their   recommendations at their soonest availability.  Thank you,  PROVATION

## 2025-02-04 NOTE — H&P
Short Stay Endoscopy History and Physical    PCP - Sam Barroso MD    Procedure - Colonoscopy  Sedation: GA  ASA - per anesthesia  Mallampati - per anesthesia  History of Anesthesia problems - no  Family history Anesthesia problems -  no     HPI:  This is a 79 y.o. male here for evaluation of : History of colon polyps    Reflux - no  Dysphagia - no  Abdominal pain - no  Diarrhea - no    ROS:  Constitutional: No fevers, chills, No weight loss  ENT: No allergies  CV: No chest pain  Pulm: No cough, No shortness of breath  Ophtho: No vision changes  GI: see HPI  Medical History:  has a past medical history of Acute hypoxemic respiratory failure (8/18/2022), ANGELIC (acute kidney injury) (7/1/2020), Anticoagulant long-term use, CKD stage 3 due to type 2 diabetes mellitus (3/22/2021), Coronary artery disease, Encounter for blood transfusion, Hyperlipidemia, Hypertension, Localized osteoporosis with current pathological fracture with delayed healing (6/9/2022), Myocardial infarction, Obesity, Other chronic pulmonary heart diseases, Paroxysmal atrial fibrillation (11/9/2020), Primary osteoarthritis involving multiple joints (3/8/2017), Stroke, Type 2 diabetes mellitus without complication, and 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    Surgical History:  has a past surgical history that includes Coronary artery bypass graft; Colonoscopy; Eye surgery (06/2014); Colonoscopy (N/A, 3/22/2016); Cataract extraction, bilateral; Cardiac surgery; Percutaneous transluminal angioplasty (PTA) of carotid artery (N/A, 12/10/2020); Colonoscopy (N/A, 12/7/2021); Left heart catheterization (Left, 8/17/2022); Coronary bypass graft angiography (8/17/2022); and Coronary angiography (N/A, 8/17/2022).    Family History: family history includes Arthritis in his father; Coronary artery disease in his father; Diabetes in his mother; Heart attack in his father;  "Heart disease in his father and mother; Hyperlipidemia in his brother, father, and mother; Hypertension in his brother, father, mother, and son; No Known Problems in his sister; Stroke in his brother and mother; Thyroid disease in his brother.. Otherwise no colon cancer, inflammatory bowel disease, or GI malignancies.    Social History:  reports that he quit smoking about 53 years ago. His smoking use included cigarettes. He started smoking about 68 years ago. He has a 45 pack-year smoking history. He has been exposed to tobacco smoke. He has never used smokeless tobacco. He reports current alcohol use of about 1.0 standard drink of alcohol per week. He reports that he does not use drugs.    Review of patient's allergies indicates:   Allergen Reactions    Metformin      Caused stroke    Pcn [penicillins]        Medications:   Medications Prior to Admission   Medication Sig Dispense Refill Last Dose/Taking    allopurinoL (ZYLOPRIM) 300 MG tablet TAKE 1 TABLET ONE TIME DAILY 90 tablet 0     atorvastatin (LIPITOR) 40 MG tablet Take 1 tablet (40 mg total) by mouth once daily. 90 tablet 3     BD ULTRA-FINE TEJINDER PEN NEEDLE 32 gauge x 5/32" Ndle USE WITH LEVEMIR DAILYUSE WITH LEVEMIR DAILY 100 each 3     blood sugar diagnostic (ACCU-CHEK VERONICA PLUS TEST STRP) Strp To check blood glucose 4 times daily 300 strip 0     blood-glucose meter (ACCU-CHEK VERONICA PLUS METER) Misc To check blood glucose 4 times daily 1 each 0     blood-glucose meter kit To check BG 4 times daily, to use with insurance preferred meter 1 each 0     clopidogreL (PLAVIX) 75 mg tablet Take 1 tablet (75 mg total) by mouth once daily. 90 tablet 3     DULoxetine (CYMBALTA) 30 MG capsule TAKE 2 CAPSULES ONE TIME DAILY 180 capsule 2     ELIQUIS 5 mg Tab TAKE 1 TABLET BY MOUTH TWICE A DAY 60 tablet 11     fenofibrate 160 MG Tab Take 1 tablet (160 mg total) by mouth once daily. 90 tablet 1     furosemide (LASIX) 40 MG tablet Take 1 tablet (40 mg total) by mouth " once daily. 90 tablet 3     glipiZIDE 5 MG TR24 2 tablets (10 mg total) by mouth daily with breakfast AND 1 tablet (5 mg total) daily with dinner or evening meal 270 tablet 3     insulin detemir U-100, Levemir, (LEVEMIR FLEXTOUCH U100 INSULIN) 100 unit/mL (3 mL) InPn pen Inject 17 Units into the skin every evening. 15 mL 6     ipratropium (ATROVENT) 21 mcg (0.03 %) nasal spray 1 spray by Each Nostril route 2 (two) times daily. 30 mL 3     lancets (ACCU-CHEK SOFTCLIX LANCETS) Misc To check BG 4 times daily, to use with insurance preferred meter. 200 each 3     levocetirizine (XYZAL) 5 MG tablet TAKE 1 TABLET EVERY EVENING 90 tablet 3     metoprolol succinate (TOPROL-XL) 25 MG 24 hr tablet TAKE 1 TABLET ONE TIME DAILY 90 tablet 3     rOPINIRole (REQUIP) 0.25 MG tablet Take 1 tablet (0.25 mg total) by mouth 3 (three) times daily as needed. For muscle spasm 90 tablet 0     sodium,potassium,mag sulfates (SUPREP BOWEL PREP KIT) 17.5-3.13-1.6 gram SolR Take 177 mLs by mouth once daily. for 2 days 1 kit 0     tamsulosin (FLOMAX) 0.4 mg Cap Take 1 capsule (0.4 mg total) by mouth once daily. 90 capsule 3        Objective Findings:    Vital Signs: Per nursing notes.    Physical Exam:  General Appearance: Well appearing in no acute distress  Head:   Normocephalic, without obvious abnormality  Eyes:    No scleral icterus  Airway: Open  Neck: No restriction in mobility  Lungs: CTA bilaterally in anterior and posterior fields, no wheezes, no crackles.  Heart:  Regular rate and rhythm, S1, S2 normal, no murmurs heard  Abdomen: Soft, non tender, non distended      Labs:  Lab Results   Component Value Date    WBC 5.17 04/29/2024    HGB 14.1 04/29/2024    HCT 42.4 04/29/2024     04/29/2024    CHOL 104 (L) 04/29/2024    TRIG 76 04/29/2024    HDL 30 (L) 04/29/2024    ALT 35 04/29/2024    AST 37 04/29/2024     04/29/2024    K 4.2 04/29/2024     04/29/2024    CREATININE 1.13 04/29/2024    BUN 27 (H) 04/29/2024    CO2 23  04/29/2024    TSH 2.620 04/29/2024    PSA 0.95 04/04/2017    INR 1.3 (H) 08/16/2022    HGBA1C 7.6 (H) 12/11/2024         I have explained the risks and benefits of endoscopy procedures to the patient including but not limited to bleeding, perforation, infection, and death.    Thank you so much for allowing me to participate in the care of iJn Plaza MD

## 2025-02-06 ENCOUNTER — TELEPHONE (OUTPATIENT)
Dept: GASTROENTEROLOGY | Facility: CLINIC | Age: 80
End: 2025-02-06
Payer: MEDICARE

## 2025-02-06 LAB
FINAL PATHOLOGIC DIAGNOSIS: NORMAL
GROSS: NORMAL
Lab: NORMAL
MICROSCOPIC EXAM: NORMAL

## 2025-02-06 NOTE — TELEPHONE ENCOUNTER
----- Message from Denny Plaza MD sent at 2/6/2025  2:35 PM CST -----  Please call and notify patient, the colon polyps were benign.

## 2025-02-16 NOTE — TELEPHONE ENCOUNTER
Care Due:                  Date            Visit Type   Department     Provider  --------------------------------------------------------------------------------                                EP -                              PRIMARY      [facility] FAM[facility]  Last Visit: 12-      CARE ([facility])   MEDICINE       Sam Barroso  Next Visit: None Scheduled  None         None Found                                                            Last  Test          Frequency    Reason                     Performed    Due Date  --------------------------------------------------------------------------------    Uric Acid...  12 months..  allopurinoL..............  Not Found    Overdue    [employer] Embedded Care Due Messages. Reference number: [id number].   2/16/2025 1:17:16 PM CST

## 2025-02-17 RX ORDER — ALLOPURINOL 300 MG/1
300 TABLET ORAL
Qty: 90 TABLET | Refills: 0 | Status: SHIPPED | OUTPATIENT
Start: 2025-02-17

## 2025-02-17 NOTE — TELEPHONE ENCOUNTER
Refill Routing Note   Medication(s) are not appropriate for processing by [facility] for the following reason(s):        Required labs outdated    [facility] action(s):  Defer        Medication Therapy Plan: FLOS 06/13/25      Appointments  past 12m or future 3m with PCP    Date Provider   Last Visit   12/12/2024 Sam Barroso MD   Next Visit   6/16/2025 Sam Barroso MD   ED visits in past 90 days: 0        Note composed:3:16 AM 02/17/2025

## 2025-03-06 DIAGNOSIS — Z79.4 TYPE 2 DIABETES MELLITUS WITH CHRONIC KIDNEY DISEASE, WITH LONG-TERM CURRENT USE OF INSULIN, UNSPECIFIED CKD STAGE: ICD-10-CM

## 2025-03-06 DIAGNOSIS — E11.22 TYPE 2 DIABETES MELLITUS WITH CHRONIC KIDNEY DISEASE, WITH LONG-TERM CURRENT USE OF INSULIN, UNSPECIFIED CKD STAGE: ICD-10-CM

## 2025-03-06 RX ORDER — INSULIN DETEMIR 100 [IU]/ML
INJECTION, SOLUTION SUBCUTANEOUS
Qty: 15 ML | Refills: 0 | Status: SHIPPED | OUTPATIENT
Start: 2025-03-06

## 2025-03-06 RX ORDER — FENOFIBRATE 160 MG/1
160 TABLET ORAL
Qty: 90 TABLET | Refills: 0 | Status: SHIPPED | OUTPATIENT
Start: 2025-03-06

## 2025-03-06 NOTE — TELEPHONE ENCOUNTER
No care due was identified.  [facility] Embedded Care Due Messages. Reference number: [id number].   3/06/2025 5:15:44 PM CST

## 2025-03-07 NOTE — TELEPHONE ENCOUNTER
Refill Decision Note   Jin Ngo  is requesting a refill authorization.  Brief Assessment and Rationale for Refill:  Approve     Medication Therapy Plan:         Comments:     Note composed:11:21 PM 03/06/2025

## 2025-03-12 ENCOUNTER — TELEPHONE (OUTPATIENT)
Dept: FAMILY MEDICINE | Facility: CLINIC | Age: 80
End: 2025-03-12
Payer: MEDICARE

## 2025-03-12 RX ORDER — INSULIN GLARGINE 100 [IU]/ML
INJECTION, SOLUTION SUBCUTANEOUS
Qty: 15 ML | Refills: 11 | Status: SHIPPED | OUTPATIENT
Start: 2025-03-12

## 2025-03-12 NOTE — TELEPHONE ENCOUNTER
Received a faxed from [facility] stating levemir are  d/c and are not covered. Please send a new Rx for lantus or tresiba

## 2025-04-16 RX ORDER — METOPROLOL SUCCINATE 25 MG/1
25 TABLET, EXTENDED RELEASE ORAL
Qty: 90 TABLET | Refills: 2 | Status: SHIPPED | OUTPATIENT
Start: 2025-04-16

## 2025-04-16 NOTE — TELEPHONE ENCOUNTER
Refill Routing Note   Medication(s) are not appropriate for processing by [facility] for the following reason(s):        Required vitals abnormal    [facility] action(s):  Defer               Appointments  past 12m or future 3m with PCP    Date Provider   Last Visit   12/12/2024 Sam Barroso MD   Next Visit   6/16/2025 Sam Barroso MD   ED visits in past 90 days: 0        Note composed:3:34 PM 04/16/2025

## 2025-04-16 NOTE — TELEPHONE ENCOUNTER
Care Due:                  Date            Visit Type   Department     Provider  --------------------------------------------------------------------------------                                EP -                              PRIMARY      [facility] FAMILY  Last Visit: 12-      CARE ([facility])   MEDICINE       Sam Barroso                               -                              PRIMARY      [facility] FAMILY  Next Visit: 06-      CARE ([facility])   MEDICINE       Sam Barroso                                                            Last  Test          Frequency    Reason                     Performed    Due Date  --------------------------------------------------------------------------------    CBC.........  12 months..  ELIQUIS, allopurinoL,      04- 04-                             clopidogreL, fenofibrate.    CMP.........  12 months..  DULoxetine, ELIQUIS,       04- 04-                             allopurinoL,                             atorvastatin,                             fenofibrate, furosemide,                             glipiZIDE, insulin.......    HBA1C.......  6 months...  glipiZIDE, insulin.......  12- 06-    Lipid Panel.  12 months..  atorvastatin, fenofibrate  04- 04-    [facility] Embedded Care Due Messages. Reference number: [id number].   4/16/2025 12:23:16 PM CDT

## 2025-05-31 DIAGNOSIS — E78.5 HYPERLIPIDEMIA, UNSPECIFIED HYPERLIPIDEMIA TYPE: ICD-10-CM

## 2025-06-01 RX ORDER — FENOFIBRATE 160 MG/1
160 TABLET ORAL
Qty: 90 TABLET | Refills: 0 | Status: SHIPPED | OUTPATIENT
Start: 2025-06-01

## 2025-06-01 RX ORDER — PEN NEEDLE, DIABETIC 31 GX5/16"
NEEDLE, DISPOSABLE MISCELLANEOUS
Qty: 100 EACH | Refills: 3 | Status: SHIPPED | OUTPATIENT
Start: 2025-06-01

## 2025-06-01 RX ORDER — GLIPIZIDE 5 MG/1
TABLET, FILM COATED, EXTENDED RELEASE ORAL
Qty: 270 TABLET | Refills: 0 | Status: SHIPPED | OUTPATIENT
Start: 2025-06-01

## 2025-06-01 RX ORDER — ATORVASTATIN CALCIUM 40 MG/1
40 TABLET, FILM COATED ORAL
Qty: 90 TABLET | Refills: 0 | Status: SHIPPED | OUTPATIENT
Start: 2025-06-01

## 2025-06-12 ENCOUNTER — TELEPHONE (OUTPATIENT)
Dept: PHARMACY | Facility: CLINIC | Age: 80
End: 2025-06-12
Payer: MEDICARE

## 2025-06-12 NOTE — TELEPHONE ENCOUNTER
[facility]/Population Health Chart Review & Patient Outreach Details For Medication Adherence Project    Reason for Outreach Encounter: 3rd Party payor non-compliance report ([employer], [employer], C, etc)  2.  Patient Outreach Method: Reviewed Patient Chart  3.   Medication in question: atorvastatin   LAST FILLED: 6/3/25 for 90 day supply  Hyperlipidemia Medications              atorvastatin (LIPITOR) 40 MG tablet TAKE 1 TABLET ONE TIME DAILY    fenofibrate 160 MG Tab TAKE 1 TABLET EVERY DAY               4.  Reviewed and or Updates Made To: Patient Chart  5. Outreach Outcomes and/or actions taken: Patient filled medication and is on track to be adherent

## 2025-06-13 ENCOUNTER — TELEPHONE (OUTPATIENT)
Dept: FAMILY MEDICINE | Facility: CLINIC | Age: 80
End: 2025-06-13
Payer: MEDICARE

## 2025-06-14 ENCOUNTER — LAB VISIT (OUTPATIENT)
Dept: LAB | Facility: HOSPITAL | Age: 80
End: 2025-06-14
Attending: FAMILY MEDICINE
Payer: MEDICARE

## 2025-06-14 DIAGNOSIS — E11.22 CKD STAGE 3 DUE TO TYPE 2 DIABETES MELLITUS: ICD-10-CM

## 2025-06-14 DIAGNOSIS — E78.5 HYPERLIPIDEMIA, UNSPECIFIED HYPERLIPIDEMIA TYPE: ICD-10-CM

## 2025-06-14 DIAGNOSIS — N18.30 CKD STAGE 3 DUE TO TYPE 2 DIABETES MELLITUS: ICD-10-CM

## 2025-06-14 DIAGNOSIS — N18.32 TYPE 2 DIABETES MELLITUS WITH STAGE 3B CHRONIC KIDNEY DISEASE AND HYPERTENSION: ICD-10-CM

## 2025-06-14 DIAGNOSIS — I12.9 TYPE 2 DIABETES MELLITUS WITH STAGE 3B CHRONIC KIDNEY DISEASE AND HYPERTENSION: ICD-10-CM

## 2025-06-14 DIAGNOSIS — E11.22 TYPE 2 DIABETES MELLITUS WITH STAGE 3B CHRONIC KIDNEY DISEASE AND HYPERTENSION: ICD-10-CM

## 2025-06-14 LAB
ABSOLUTE EOSINOPHIL (OHS): 0.17 K/UL
ABSOLUTE MONOCYTE (OHS): 0.62 K/UL (ref 0.3–1)
ABSOLUTE NEUTROPHIL COUNT (OHS): 3.83 K/UL (ref 1.8–7.7)
ALBUMIN SERPL BCP-MCNC: 4 G/DL (ref 3.5–5.2)
ALBUMIN/CREAT UR: 16.7 UG/MG
ALP SERPL-CCNC: 41 UNIT/L (ref 40–150)
ALT SERPL W/O P-5'-P-CCNC: 19 UNIT/L (ref 10–44)
ANION GAP (OHS): 12 MMOL/L (ref 8–16)
AST SERPL-CCNC: 22 UNIT/L (ref 11–45)
BASOPHILS # BLD AUTO: 0.07 K/UL
BASOPHILS NFR BLD AUTO: 1.2 %
BILIRUB SERPL-MCNC: 0.6 MG/DL (ref 0.1–1)
BUN SERPL-MCNC: 33 MG/DL (ref 8–23)
CALCIUM SERPL-MCNC: 9.5 MG/DL (ref 8.7–10.5)
CHLORIDE SERPL-SCNC: 106 MMOL/L (ref 95–110)
CHOLEST SERPL-MCNC: 108 MG/DL (ref 120–199)
CHOLEST/HDLC SERPL: 4.3 {RATIO} (ref 2–5)
CO2 SERPL-SCNC: 23 MMOL/L (ref 23–29)
CREAT SERPL-MCNC: 1.5 MG/DL (ref 0.5–1.4)
CREAT UR-MCNC: 120 MG/DL (ref 23–375)
EAG (OHS): 192 MG/DL (ref 68–131)
ERYTHROCYTE [DISTWIDTH] IN BLOOD BY AUTOMATED COUNT: 14.7 % (ref 11.5–14.5)
GFR SERPLBLD CREATININE-BSD FMLA CKD-EPI: 47 ML/MIN/1.73/M2
GLUCOSE SERPL-MCNC: 125 MG/DL (ref 70–110)
HBA1C MFR BLD: 8.3 % (ref 4–5.6)
HCT VFR BLD AUTO: 41.5 % (ref 40–54)
HDLC SERPL-MCNC: 25 MG/DL (ref 40–75)
HDLC SERPL: 23.1 % (ref 20–50)
HGB BLD-MCNC: 13.5 GM/DL (ref 14–18)
IMM GRANULOCYTES # BLD AUTO: 0.04 K/UL (ref 0–0.04)
IMM GRANULOCYTES NFR BLD AUTO: 0.7 % (ref 0–0.5)
LDLC SERPL CALC-MCNC: 65.4 MG/DL (ref 63–159)
LYMPHOCYTES # BLD AUTO: 1.19 K/UL (ref 1–4.8)
MCH RBC QN AUTO: 30.1 PG (ref 27–31)
MCHC RBC AUTO-ENTMCNC: 32.5 G/DL (ref 32–36)
MCV RBC AUTO: 93 FL (ref 82–98)
MICROALBUMIN UR-MCNC: 20 UG/ML (ref ?–5000)
NONHDLC SERPL-MCNC: 83 MG/DL
NUCLEATED RBC (/100WBC) (OHS): 0 /100 WBC
PLATELET # BLD AUTO: 225 K/UL (ref 150–450)
PMV BLD AUTO: 10.6 FL (ref 9.2–12.9)
POTASSIUM SERPL-SCNC: 4.4 MMOL/L (ref 3.5–5.1)
PROT SERPL-MCNC: 7.3 GM/DL (ref 6–8.4)
RBC # BLD AUTO: 4.48 M/UL (ref 4.6–6.2)
RELATIVE EOSINOPHIL (OHS): 2.9 %
RELATIVE LYMPHOCYTE (OHS): 20.1 % (ref 18–48)
RELATIVE MONOCYTE (OHS): 10.5 % (ref 4–15)
RELATIVE NEUTROPHIL (OHS): 64.6 % (ref 38–73)
SODIUM SERPL-SCNC: 141 MMOL/L (ref 136–145)
TRIGL SERPL-MCNC: 88 MG/DL (ref 30–150)
TSH SERPL-ACNC: 2 UIU/ML (ref 0.4–4)
WBC # BLD AUTO: 5.92 K/UL (ref 3.9–12.7)

## 2025-06-14 PROCEDURE — 80053 COMPREHEN METABOLIC PANEL: CPT | Mod: HCNC

## 2025-06-14 PROCEDURE — 84443 ASSAY THYROID STIM HORMONE: CPT | Mod: HCNC

## 2025-06-14 PROCEDURE — 36415 COLL VENOUS BLD VENIPUNCTURE: CPT | Mod: HCNC,PO

## 2025-06-14 PROCEDURE — 85025 COMPLETE CBC W/AUTO DIFF WBC: CPT | Mod: HCNC

## 2025-06-14 PROCEDURE — 83036 HEMOGLOBIN GLYCOSYLATED A1C: CPT | Mod: HCNC

## 2025-06-14 PROCEDURE — 80061 LIPID PANEL: CPT | Mod: HCNC

## 2025-06-14 PROCEDURE — 82570 ASSAY OF URINE CREATININE: CPT | Mod: HCNC

## 2025-06-15 ENCOUNTER — RESULTS FOLLOW-UP (OUTPATIENT)
Dept: FAMILY MEDICINE | Facility: CLINIC | Age: 80
End: 2025-06-15

## 2025-06-16 ENCOUNTER — OFFICE VISIT (OUTPATIENT)
Dept: FAMILY MEDICINE | Facility: CLINIC | Age: 80
End: 2025-06-16
Payer: MEDICARE

## 2025-06-16 VITALS
WEIGHT: 197.75 LBS | OXYGEN SATURATION: 98 % | TEMPERATURE: 98 F | HEIGHT: 69 IN | DIASTOLIC BLOOD PRESSURE: 64 MMHG | BODY MASS INDEX: 29.29 KG/M2 | HEART RATE: 72 BPM | SYSTOLIC BLOOD PRESSURE: 106 MMHG

## 2025-06-16 DIAGNOSIS — E11.22 TYPE 2 DIABETES MELLITUS WITH CHRONIC KIDNEY DISEASE, WITH LONG-TERM CURRENT USE OF INSULIN, UNSPECIFIED CKD STAGE: Primary | ICD-10-CM

## 2025-06-16 DIAGNOSIS — E11.22 TYPE 2 DIABETES MELLITUS WITH STAGE 3B CHRONIC KIDNEY DISEASE AND HYPERTENSION: ICD-10-CM

## 2025-06-16 DIAGNOSIS — I48.0 PAROXYSMAL ATRIAL FIBRILLATION: ICD-10-CM

## 2025-06-16 DIAGNOSIS — Z79.4 TYPE 2 DIABETES MELLITUS WITH CHRONIC KIDNEY DISEASE, WITH LONG-TERM CURRENT USE OF INSULIN, UNSPECIFIED CKD STAGE: Primary | ICD-10-CM

## 2025-06-16 DIAGNOSIS — N18.32 TYPE 2 DIABETES MELLITUS WITH STAGE 3B CHRONIC KIDNEY DISEASE AND HYPERTENSION: ICD-10-CM

## 2025-06-16 DIAGNOSIS — I12.9 TYPE 2 DIABETES MELLITUS WITH STAGE 3B CHRONIC KIDNEY DISEASE AND HYPERTENSION: ICD-10-CM

## 2025-06-16 DIAGNOSIS — I27.20 PULMONARY HTN: ICD-10-CM

## 2025-06-16 PROCEDURE — 1160F RVW MEDS BY RX/DR IN RCRD: CPT | Mod: CPTII,S$GLB,, | Performed by: FAMILY MEDICINE

## 2025-06-16 PROCEDURE — 1101F PT FALLS ASSESS-DOCD LE1/YR: CPT | Mod: CPTII,S$GLB,, | Performed by: FAMILY MEDICINE

## 2025-06-16 PROCEDURE — 1159F MED LIST DOCD IN RCRD: CPT | Mod: CPTII,S$GLB,, | Performed by: FAMILY MEDICINE

## 2025-06-16 PROCEDURE — 99397 PER PM REEVAL EST PAT 65+ YR: CPT | Mod: S$GLB,,, | Performed by: FAMILY MEDICINE

## 2025-06-16 PROCEDURE — 1125F AMNT PAIN NOTED PAIN PRSNT: CPT | Mod: CPTII,S$GLB,, | Performed by: FAMILY MEDICINE

## 2025-06-16 PROCEDURE — 3078F DIAST BP <80 MM HG: CPT | Mod: CPTII,S$GLB,, | Performed by: FAMILY MEDICINE

## 2025-06-16 PROCEDURE — 3074F SYST BP LT 130 MM HG: CPT | Mod: CPTII,S$GLB,, | Performed by: FAMILY MEDICINE

## 2025-06-16 PROCEDURE — 3288F FALL RISK ASSESSMENT DOCD: CPT | Mod: CPTII,S$GLB,, | Performed by: FAMILY MEDICINE

## 2025-06-16 RX ORDER — BLOOD-GLUCOSE SENSOR
EACH MISCELLANEOUS
Qty: 3 EACH | Refills: 2 | Status: SHIPPED | OUTPATIENT
Start: 2025-06-16

## 2025-06-16 NOTE — PROGRESS NOTES
Health Maintenance    Eye: pt will self schedule soon with [facility] on [facility]

## 2025-06-23 NOTE — PROGRESS NOTES
" Patient ID: Jin Ngo is a 79 y.o. male.    Chief Complaint: Follow-up    HPI    Jin Ngo is a 79 y.o. male     History of Present Illness    CHIEF COMPLAINT:  Patient presents today for follow up of diabetes management    DIABETES MANAGEMENT:  He reports elevated A1C of 8.2-8.3, increased from previous level of 7. He is currently on Lantus insulin 20 units but admits to not regularly checking blood sugars. His diet includes frequent potato consumption as snacks and salads once or twice daily. He notes decreased appetite overall but continues to snack between meals.    GENITOURINARY:  Creatinine was 1.5, increased from previous value of 1.13. He reports polyuria, noting up to four episodes within an hour. He reports high fluid intake, particularly iced tea and water.    MUSCULOSKELETAL:  He reports decreasing size of knee bump with associated pain and soreness starting three weeks ago. X-ray from 2015 showed bone spur on tibia tuberosity.    SKIN:  He presents with a recent dog scratch on arm with surrounding erythema, particularly on outer edges. He notes skin in the affected area is delicate.         Vitals:    06/16/25 1455   BP: 106/64   BP Location: Left arm   Patient Position: Sitting   Pulse: 72   Temp: 98 °F (36.7 °C)   TempSrc: Oral   SpO2: 98%   Weight: 89.7 kg (197 lb 12 oz)   Height: 5' 9" (1.753 m)            Review of Symptoms      Physical Exam    Constitutional:  Oriented to person, place, and time.appears well-developed and well-nourished.  No distress.      HENT  Head: Normocephalic and atraumatic  Right Ear: External ear normal.   Left Ear: External ear normal.   Nose: External nose normal.   Mouth:  Moist mucus membranes.    Eyes:  Conjunctivae are normal. Right eye exhibits no discharge.  Left eye exhibits no discharge. No scleral icterus.  No periorbital edema    Cardiovascular:  Regular rate and rhythm with normal S1 and S2     Pulmonary/Chest:   Clear to auscultation bilaterally " without wheezes, rhonchi or rales      Musculoskeletal:  No edema. No obvious deformity No wasting       Neurological:  Alert and oriented to person, place, and time.   Coordination normal.     Skin:   Skin is warm and dry.  No diaphoresis.   Dog scratch mild erythema    Psychiatric: Normal mood and affect. Behavior is normal.  Judgment and thought content normal.     Physical Exam              Complete Blood Count  Lab Results   Component Value Date    RBC 4.48 (L) 06/14/2025    HGB 13.5 (L) 06/14/2025    HCT 41.5 06/14/2025    MCV 93 06/14/2025    MCH 30.1 06/14/2025    MCHC 32.5 06/14/2025    RDW 14.7 (H) 06/14/2025     06/14/2025    MPV 10.6 06/14/2025    GRAN 2.9 04/29/2024    GRAN 56.4 04/29/2024    LYMPH 20.1 06/14/2025    LYMPH 1.19 06/14/2025    MONO 10.5 06/14/2025    MONO 0.62 06/14/2025    EOS 2.9 06/14/2025    EOS 0.17 06/14/2025    BASO 0.06 04/29/2024    EOSINOPHIL 3.1 04/29/2024    BASOPHIL 1.2 06/14/2025    BASOPHIL 0.07 06/14/2025    DIFFMETHOD Automated 04/29/2024       Comprehensive Metabolic Panel  Lab Results   Component Value Date     (H) 06/14/2025    BUN 33 (H) 06/14/2025    CREATININE 1.5 (H) 06/14/2025     06/14/2025    K 4.4 06/14/2025     06/14/2025    PROT 7.3 06/14/2025    ALBUMIN 4.0 06/14/2025    BILITOT 0.6 06/14/2025    AST 22 06/14/2025    ALKPHOS 41 06/14/2025    CO2 23 06/14/2025    ALT 19 06/14/2025    ANIONGAP 12 06/14/2025       TSH  Lab Results   Component Value Date    TSH 2.004 06/14/2025       Assessment / Plan:      ICD-10-CM ICD-9-CM   1. Type 2 diabetes mellitus with chronic kidney disease, with long-term current use of insulin, unspecified CKD stage  E11.22 250.40    Z79.4 585.9     V58.67   2. Type 2 diabetes mellitus with stage 3b chronic kidney disease and hypertension  E11.22 250.40    I12.9 403.90    N18.32 585.3   3. Pulmonary HTN  I27.20 416.8   4. Paroxysmal atrial fibrillation  I48.0 427.31     Type 2 diabetes mellitus with chronic  kidney disease, with long-term current use of insulin, unspecified CKD stage  -     blood-glucose sensor (DEXCOM G7 SENSOR) [place]; Use to check glucose throughout the day  Dispense: 3 each; Refill: 2  -     Hemoglobin A1C; Future; Expected date: 06/16/2025    Type 2 diabetes mellitus with stage 3b chronic kidney disease and hypertension  -     blood-glucose sensor (DEXCOM G7 SENSOR) [place]; Use to check glucose throughout the day  Dispense: 3 each; Refill: 2  -     Comprehensive Metabolic Panel; Future  -     CBC Auto Differential; Future  -     Lipid Panel; Future  -     TSH; Future  -     Hemoglobin A1C; Future    Pulmonary HTN    Paroxysmal atrial fibrillation        Assessment & Plan    - Assessed diabetes management, noting elevated HbA1c (8.2-8.3) and slight increase in creatinine (1.5 from previous 1.13).  - Considered bone spur as cause of knee bump, based on previous XR from 2015 showing spurring of tibia tuberosity.    TYPE 2 DIABETES MELLITUS WITH DIABETIC CHRONIC KIDNEY DISEASE:  - Monitored the patient's hemoglobin A1C, which is elevated  up from 7 previously, with elevated sugar levels.  - Noted the patient consumes excessive potatoes and other carbohydrates (pasta, rice, bread), which convert to sugar, and does not adjust insulin from the standard 17 units.  - Patient does not check glucose regularly.  - Evaluated creatinine level, which has increased to 1.5 from 1.13, indicating deteriorating kidney function.    CHRONIC KIDNEY DISEASE, STAGE 3 UNSPECIFIED:  - Monitored creatinine level, which has increased to 1.5 from 1.13, indicating deteriorating kidney function.  - Assessed the need for hydration and medication restrictions.  - Advised the patient to stay hydrated and avoid ibuprofen or Advil.  - Schedule follow up in 6 months.    FREQUENCY OF MICTURITION:  - Monitored the patient's urinary frequency, noting the patient reports urinating as frequently as 4 times per hour.    CALCANEAL SPUR:  -  Assessed the bone spur on the tibia tuberosity, noting it has enlarged over time.    OPEN WOUND OF RIGHT FOREARM:  - Monitored the dog scratch on the patient's arm, observing peripheral erythema.  - Recommend application of mercurochrome to the wound.    LONG TERM USE OF INSULIN:  - Discussed benefits of continuous glucose monitoring for diabetes management.  - Started continuous glucose monitor to be worn on the back of the arm and ordered device to be sent to [facility].  - Increased Lantus dosage to 20 units.         This note was generated with the assistance of ambient listening technology. Verbal consent was obtained by the patient and accompanying visitor(s) for the recording of patient appointment to facilitate this note. I attest to having reviewed and edited the generated note for accuracy, though some syntax or spelling errors may persist. Please contact the author of this note for any clarification.

## 2025-06-30 NOTE — TELEPHONE ENCOUNTER
No care due was identified.  [facility] Embedded Care Due Messages. Reference number: [id number].   6/30/2025 10:44:27 AM CDT

## 2025-07-01 RX ORDER — ALLOPURINOL 300 MG/1
300 TABLET ORAL
Qty: 90 TABLET | Refills: 3 | Status: SHIPPED | OUTPATIENT
Start: 2025-07-01

## 2025-07-01 NOTE — TELEPHONE ENCOUNTER
Refill Routing Note   Medication(s) are not appropriate for processing by [facility] for the following reason(s):        Required labs outdated    [facility] action(s):  Defer               Appointments  past 12m or future 3m with PCP    Date Provider   Last Visit   6/16/2025 Sam Barroso MD   Next Visit   12/16/2025 Sam Barroso MD   ED visits in past 90 days: 0        Note composed:8:23 PM 06/30/2025

## 2025-08-12 RX ORDER — APIXABAN 5 MG/1
5 TABLET, FILM COATED ORAL 2 TIMES DAILY
Qty: 60 TABLET | Refills: 11 | Status: SHIPPED | OUTPATIENT
Start: 2025-08-12

## 2025-08-18 ENCOUNTER — LAB VISIT (OUTPATIENT)
Dept: LAB | Facility: HOSPITAL | Age: 80
End: 2025-08-18
Attending: FAMILY MEDICINE
Payer: MEDICARE

## 2025-08-18 DIAGNOSIS — I12.9 TYPE 2 DIABETES MELLITUS WITH STAGE 3B CHRONIC KIDNEY DISEASE AND HYPERTENSION: ICD-10-CM

## 2025-08-18 DIAGNOSIS — E11.22 TYPE 2 DIABETES MELLITUS WITH STAGE 3B CHRONIC KIDNEY DISEASE AND HYPERTENSION: ICD-10-CM

## 2025-08-18 DIAGNOSIS — N18.32 TYPE 2 DIABETES MELLITUS WITH STAGE 3B CHRONIC KIDNEY DISEASE AND HYPERTENSION: ICD-10-CM

## 2025-08-18 LAB
EAG (OHS): 166 MG/DL (ref 68–131)
HBA1C MFR BLD: 7.4 % (ref 4–5.6)

## 2025-08-18 PROCEDURE — 36415 COLL VENOUS BLD VENIPUNCTURE: CPT | Mod: PN

## 2025-08-18 PROCEDURE — 83036 HEMOGLOBIN GLYCOSYLATED A1C: CPT | Mod: PN

## 2025-08-19 ENCOUNTER — TELEPHONE (OUTPATIENT)
Dept: FAMILY MEDICINE | Facility: CLINIC | Age: 80
End: 2025-08-19
Payer: MEDICARE

## (undated) DEVICE — GLIDESHEATH SLENDER SS 5FR10CM

## (undated) DEVICE — SYS EMB PROTEC.014 190CM

## (undated) DEVICE — VISIPAQUE 320 200ML +PK

## (undated) DEVICE — KIT INTRODUCER W/GUIDEWIRE

## (undated) DEVICE — INFLATOR ENCORE 26 BLLN INFL

## (undated) DEVICE — KIT INTRODUCER MICROPUNCTR 4F

## (undated) DEVICE — KIT MICROINTRO 4F .018X40X7CM

## (undated) DEVICE — Device

## (undated) DEVICE — KIT LEFT HEART MANIFOLD CUSTOM

## (undated) DEVICE — TUBING HPCIL ROT M/F ADPT 48IN

## (undated) DEVICE — KIT SHEATH 9FRX11CM

## (undated) DEVICE — COVER BAND BAG 40 X 40

## (undated) DEVICE — SHEATH INTRODUCER 5F 10CM

## (undated) DEVICE — GUIDEWIRE STD .035X180CM ANG

## (undated) DEVICE — CATH EAGLE EYE PLATINUM

## (undated) DEVICE — CATH IMPULSE 5FR PIGTAIL 125CM

## (undated) DEVICE — GUIDEWIRE EMERALD 150CM PTFE

## (undated) DEVICE — STOPCOCK 3-WAY

## (undated) DEVICE — SPIKE CONTRAST CONTROLLER

## (undated) DEVICE — CATH IMPULSE 5F 100CM FR4

## (undated) DEVICE — DEVICE PERCLOSE SUT CLSR 6FR

## (undated) DEVICE — TUBING HPCIL ROT M/F ADPT 10IN

## (undated) DEVICE — GUIDEWIRE .021X180CM J-3MM TIP

## (undated) DEVICE — KIT CUSTOM MANIFOLD

## (undated) DEVICE — GUIDE WIRE BMW 014 X190

## (undated) DEVICE — CATH ANG PIGTAIL 4FR INFINITY

## (undated) DEVICE — CATH GUIDE LAUNCH MB1 6F 100CM

## (undated) DEVICE — SEE MEDLINE ITEM 156894

## (undated) DEVICE — KIT CO-PILOT

## (undated) DEVICE — CATH IMPULSE FL4 5FR 100CM

## (undated) DEVICE — CATH STERLING MR 5X20X135

## (undated) DEVICE — PAD DEFIB CADENCE ADULT R2

## (undated) DEVICE — CATH IMA INFINITI 4FRX100CM

## (undated) DEVICE — VALVE CONTROL COPILOT

## (undated) DEVICE — SHEATH INTRODUCER 6FR 11CM

## (undated) DEVICE — GUIDEWIRE AMPLATZ .035X260

## (undated) DEVICE — CATH BERNSTAN 5FR

## (undated) DEVICE — DRAPE ANGIO BRACH 38X44IN

## (undated) DEVICE — COVER PROBE US 5.5X58L NON LTX

## (undated) DEVICE — CATH 5FR MP 125CM 5/BX

## (undated) DEVICE — CATH EMERGE MR 12 X 2.50

## (undated) DEVICE — PAD RADI FEMORAL

## (undated) DEVICE — DEVICE MOMA ULTRA 9FR .083